# Patient Record
Sex: MALE | Race: BLACK OR AFRICAN AMERICAN | NOT HISPANIC OR LATINO | Employment: OTHER | URBAN - METROPOLITAN AREA
[De-identification: names, ages, dates, MRNs, and addresses within clinical notes are randomized per-mention and may not be internally consistent; named-entity substitution may affect disease eponyms.]

---

## 2019-06-17 ENCOUNTER — TELEPHONE (OUTPATIENT)
Dept: CARDIOLOGY | Facility: CLINIC | Age: 74
End: 2019-06-17

## 2019-06-17 NOTE — TELEPHONE ENCOUNTER
----- Message from Leias Esparza sent at 6/17/2019 11:21 AM CDT -----  Contact: self / [phone]  Patient is requesting a call back regarding, can you see him this week for medical rose marie. Please advise

## 2019-06-18 ENCOUNTER — OFFICE VISIT (OUTPATIENT)
Dept: CARDIOLOGY | Facility: CLINIC | Age: 74
End: 2019-06-18
Payer: MEDICARE

## 2019-06-18 VITALS
HEIGHT: 70 IN | WEIGHT: 199.94 LBS | DIASTOLIC BLOOD PRESSURE: 62 MMHG | HEART RATE: 66 BPM | BODY MASS INDEX: 28.62 KG/M2 | SYSTOLIC BLOOD PRESSURE: 133 MMHG

## 2019-06-18 DIAGNOSIS — Z01.818 PRE-OP EVALUATION: Primary | ICD-10-CM

## 2019-06-18 DIAGNOSIS — I25.10 CORONARY ARTERY DISEASE INVOLVING NATIVE CORONARY ARTERY OF NATIVE HEART WITHOUT ANGINA PECTORIS: ICD-10-CM

## 2019-06-18 DIAGNOSIS — Z95.1 HX OF CABG: ICD-10-CM

## 2019-06-18 DIAGNOSIS — M79.606 ISCHEMIC LEG PAIN: ICD-10-CM

## 2019-06-18 DIAGNOSIS — I73.9 PAD (PERIPHERAL ARTERY DISEASE): ICD-10-CM

## 2019-06-18 DIAGNOSIS — I99.8 ISCHEMIC LEG PAIN: ICD-10-CM

## 2019-06-18 PROCEDURE — 3075F SYST BP GE 130 - 139MM HG: CPT | Mod: CPTII,S$GLB,, | Performed by: INTERNAL MEDICINE

## 2019-06-18 PROCEDURE — 99204 PR OFFICE/OUTPT VISIT, NEW, LEVL IV, 45-59 MIN: ICD-10-PCS | Mod: S$GLB,,, | Performed by: INTERNAL MEDICINE

## 2019-06-18 PROCEDURE — 3078F PR MOST RECENT DIASTOLIC BLOOD PRESSURE < 80 MM HG: ICD-10-PCS | Mod: CPTII,S$GLB,, | Performed by: INTERNAL MEDICINE

## 2019-06-18 PROCEDURE — 3078F DIAST BP <80 MM HG: CPT | Mod: CPTII,S$GLB,, | Performed by: INTERNAL MEDICINE

## 2019-06-18 PROCEDURE — 99999 PR PBB SHADOW E&M-EST. PATIENT-LVL III: ICD-10-PCS | Mod: PBBFAC,,, | Performed by: INTERNAL MEDICINE

## 2019-06-18 PROCEDURE — 99999 PR PBB SHADOW E&M-EST. PATIENT-LVL III: CPT | Mod: PBBFAC,,, | Performed by: INTERNAL MEDICINE

## 2019-06-18 PROCEDURE — 99204 OFFICE O/P NEW MOD 45 MIN: CPT | Mod: S$GLB,,, | Performed by: INTERNAL MEDICINE

## 2019-06-18 PROCEDURE — 3075F PR MOST RECENT SYSTOLIC BLOOD PRESS GE 130-139MM HG: ICD-10-PCS | Mod: CPTII,S$GLB,, | Performed by: INTERNAL MEDICINE

## 2019-06-18 RX ORDER — SAW PALMETTO 160 MG
160 CAPSULE ORAL 2 TIMES DAILY
COMMUNITY

## 2019-06-18 RX ORDER — AMOXICILLIN 500 MG
CAPSULE ORAL DAILY
COMMUNITY
End: 2020-08-24

## 2019-06-18 RX ORDER — MULTIVITAMIN
1 TABLET ORAL DAILY
COMMUNITY

## 2019-06-18 NOTE — PROGRESS NOTES
Subjective:   Patient ID:  Kermit Castro is a 73 y.o. male who presents for evaluation of No chief complaint on file.      HPI: 71 y/o AA male with PMH of CAD s/p 3 vessel CABG in , PAD with previous interventions, HTN and HLP present as f/u after 4 years. He is also planning abdominal surgery for recently found polyps. He denies chest pain and SOB. He is only able to walk 200 feet,  Last MI was in .   No dizziness or syncope. BP is controlled. No orthopnea or PND. No history of CVA, CHF, recent MI, CRF or DM.   He had back surgery in  and continues to have discomfort when walking.     Past Medical History:   Diagnosis Date    Coronary artery disease     Hyperlipidemia     Hypertension        Past Surgical History:   Procedure Laterality Date    CORONARY ARTERY BYPASS GRAFT             Social History     Tobacco Use    Smoking status: Former Smoker     Last attempt to quit: 10/5/1998     Years since quittin.7   Substance Use Topics    Alcohol use: Not on file    Drug use: Not on file       History reviewed. No pertinent family history.    Patient's Medications   New Prescriptions    No medications on file   Previous Medications    AMLODIPINE (NORVASC) 10 MG TABLET        ATORVASTATIN (LIPITOR) 40 MG TABLET        FISH OIL-OMEGA-3 FATTY ACIDS 300-1,000 MG CAPSULE    Take by mouth once daily.    HYDROCODONE-ACETAMINOPHEN 10-325MG (NORCO)  MG TAB        LISINOPRIL (PRINIVIL,ZESTRIL) 20 MG TABLET        METOPROLOL TARTRATE (LOPRESSOR) 50 MG TABLET        MULTIVITAMIN (ONE DAILY MULTIVITAMIN) PER TABLET    Take 1 tablet by mouth once daily.    SAW PALMETTO 160 MG CAPSULE    Take 160 mg by mouth 2 (two) times daily.   Modified Medications    No medications on file   Discontinued Medications    No medications on file       Review of patient's allergies indicates:   Allergen Reactions    Iodine and iodide containing products Anaphylaxis    Shellfish containing products Anaphylaxis        Review of Systems   Constitution: Negative.   HENT: Negative.    Eyes: Negative.    Cardiovascular: Negative.    Respiratory: Negative.    Endocrine: Negative.    Hematologic/Lymphatic: Negative.    Skin: Negative.    Musculoskeletal: Negative.    Gastrointestinal: Negative.    Neurological: Negative.      Objective:   Physical Exam   Constitutional: He is oriented to person, place, and time. He appears well-developed and well-nourished. No distress.   Examination of the digits showed no clubbing or cyanosis   HENT:   Head: Normocephalic and atraumatic.   Eyes: Pupils are equal, round, and reactive to light. Conjunctivae are normal. Right eye exhibits no discharge.   Neck: Normal range of motion. Neck supple. No JVD present. No thyromegaly present.   No carotid bruits   Cardiovascular: Normal rate, regular rhythm, S1 normal, S2 normal, normal heart sounds, intact distal pulses and normal pulses. PMI is not displaced. Exam reveals no gallop, no friction rub and no opening snap.   No murmur heard.  Pulmonary/Chest: Effort normal and breath sounds normal. No respiratory distress. He has no wheezes. He has no rales. He exhibits no tenderness.   Abdominal: Soft. Bowel sounds are normal. He exhibits no distension and no mass. There is no tenderness. There is no guarding.   No hepatosplenomegaly   Musculoskeletal: Normal range of motion. He exhibits no edema or tenderness.   Lymphadenopathy:     He has no cervical adenopathy.   Neurological: He is alert and oriented to person, place, and time.   Skin: Skin is warm. No rash noted. He is not diaphoretic. No erythema.   Psychiatric: He has a normal mood and affect.   Nursing note and vitals reviewed.          ECGs reviewed-NSR with non specific ST and T wave changes.  Imaging including Echoes reviewed-2010 ef 55%  [facility] reviewed-2005 LIMA to LAD patent, SVG to PDA patent, SVG to LCx patent.  Peripheral angiogram reviewed. Stent to both left and right SFA and external  iliac respectively.    Assessment:     1. Pre-op evaluation    2. PAD (peripheral artery disease)    3. Ischemic leg pain    4. Coronary artery disease involving native coronary artery of native heart without angina pectoris    5. Hx of CABG        Plan:     Patient is intermediate risk for moderate risk surgery to remove polyps. MET<4 but risk factors are low with no DM, CVA, recent MI, CHF or known CRF. No contraindication to surgery such as recent MI, CHF, known severe obstructive valve disease or tachyarrhythmias.     Continue current medications  Arterial US of legs and ABIs  F/u in 4 months. Call with results and refer to Dr. May or Dr. Ocampo if abnormal

## 2019-06-18 NOTE — LETTER
June 18, 2019        Napoleon Wilcox MD  [address]  [address]  [place] [address]             [facility]  [address], [address]  [facility] [address]  Phone: [phone]   Patient: Kermit Castro   MR Number: [id number]   YOB: 1945   Date of Visit: 6/18/2019       Dear Dr. Wilcox:    Thank you for referring Kermit Castro to me for evaluation. Attached you will find relevant portions of my assessment and plan of care.          ECGs reviewed-NSR with non specific ST and T wave changes.  Imaging including Echoes reviewed-2010 ef 55%  [facility] reviewed-2005 LIMA to LAD patent, SVG to PDA patent, SVG to LCx patent.  Peripheral angiogram reviewed. Stent to both left and right SFA and external iliac respectively.     Assessment:      1. Pre-op evaluation    2. PAD (peripheral artery disease)    3. Ischemic leg pain    4. Coronary artery disease involving native coronary artery of native heart without angina pectoris    5. Hx of CABG          Plan:      Patient is intermediate risk for moderate risk surgery to remove polyps. MET<4 but risk factors are low with no DM, CVA, recent MI, CHF or known CRF. No contraindication to surgery such as recent MI, CHF, known severe obstructive valve disease or tachyarrhythmias.          If you have questions, please do not hesitate to call me. I look forward to following Kermit Castro along with you.    Sincerely,      Agustin Christine MD            CC  No Recipients    Enclosure

## 2019-06-21 ENCOUNTER — HOSPITAL ENCOUNTER (OUTPATIENT)
Dept: CARDIOLOGY | Facility: HOSPITAL | Age: 74
Discharge: HOME | End: 2019-06-21
Attending: INTERNAL MEDICINE
Payer: MEDICARE

## 2019-06-21 ENCOUNTER — HOSPITAL ENCOUNTER (OUTPATIENT)
Dept: RADIOLOGY | Facility: HOSPITAL | Age: 74
Discharge: HOME | End: 2019-06-21
Attending: INTERNAL MEDICINE
Payer: MEDICARE

## 2019-06-21 DIAGNOSIS — I99.8 ISCHEMIC LEG PAIN: ICD-10-CM

## 2019-06-21 DIAGNOSIS — M79.606 ISCHEMIC LEG PAIN: ICD-10-CM

## 2019-06-21 DIAGNOSIS — I73.9 PAD (PERIPHERAL ARTERY DISEASE): ICD-10-CM

## 2019-06-21 PROCEDURE — 93922 UPR/L XTREMITY ART 2 LEVELS: CPT | Mod: 50,PO

## 2019-06-21 PROCEDURE — 93922 UPR/L XTREMITY ART 2 LEVELS: CPT | Mod: 26,,, | Performed by: INTERNAL MEDICINE

## 2019-06-21 PROCEDURE — 93922 CV US ANKLE BRACHIAL INDICES RESTING (CUPID ONLY): ICD-10-PCS | Mod: 26,,, | Performed by: INTERNAL MEDICINE

## 2019-06-21 PROCEDURE — 93925 LOWER EXTREMITY STUDY: CPT | Mod: TC,PO

## 2019-07-01 LAB
LEFT ABI: 1.06
LEFT ARM BP: 134 MMHG
LEFT DORSALIS PEDIS: 144 MMHG
RIGHT ABI: 0.77
RIGHT ARM BP: 136 MMHG
RIGHT DORSALIS PEDIS: 105 MMHG

## 2019-07-02 ENCOUNTER — TELEPHONE (OUTPATIENT)
Dept: CARDIOLOGY | Facility: CLINIC | Age: 74
End: 2019-07-02

## 2019-07-02 NOTE — TELEPHONE ENCOUNTER
[facility] Dr May.    Will mail appt slip.

## 2019-08-07 ENCOUNTER — OFFICE VISIT (OUTPATIENT)
Dept: CARDIOLOGY | Facility: CLINIC | Age: 74
End: 2019-08-07
Payer: MEDICARE

## 2019-08-07 VITALS
HEIGHT: 70 IN | SYSTOLIC BLOOD PRESSURE: 100 MMHG | HEART RATE: 70 BPM | RESPIRATION RATE: 18 BRPM | DIASTOLIC BLOOD PRESSURE: 60 MMHG | BODY MASS INDEX: 26.48 KG/M2 | WEIGHT: 185 LBS

## 2019-08-07 DIAGNOSIS — I25.10 CORONARY ARTERY DISEASE, ANGINA PRESENCE UNSPECIFIED, UNSPECIFIED VESSEL OR LESION TYPE, UNSPECIFIED WHETHER NATIVE OR TRANSPLANTED HEART: ICD-10-CM

## 2019-08-07 DIAGNOSIS — I73.9 PAD (PERIPHERAL ARTERY DISEASE): Primary | ICD-10-CM

## 2019-08-07 DIAGNOSIS — I70.211 ATHEROSCLEROSIS OF NATIVE ARTERY OF RIGHT LOWER EXTREMITY WITH INTERMITTENT CLAUDICATION: ICD-10-CM

## 2019-08-07 DIAGNOSIS — R06.00 DYSPNEA, UNSPECIFIED TYPE: ICD-10-CM

## 2019-08-07 DIAGNOSIS — R06.09 DYSPNEA ON EXERTION: ICD-10-CM

## 2019-08-07 DIAGNOSIS — I10 ESSENTIAL HYPERTENSION: ICD-10-CM

## 2019-08-07 DIAGNOSIS — R07.9 CHEST PAIN, UNSPECIFIED TYPE: ICD-10-CM

## 2019-08-07 DIAGNOSIS — I25.111 CORONARY ARTERY DISEASE INVOLVING NATIVE CORONARY ARTERY OF NATIVE HEART WITH ANGINA PECTORIS WITH DOCUMENTED SPASM: ICD-10-CM

## 2019-08-07 DIAGNOSIS — E78.2 MIXED HYPERLIPIDEMIA: ICD-10-CM

## 2019-08-07 PROCEDURE — 99999 PR PBB SHADOW E&M-EST. PATIENT-LVL III: ICD-10-PCS | Mod: PBBFAC,,, | Performed by: INTERNAL MEDICINE

## 2019-08-07 PROCEDURE — 3074F SYST BP LT 130 MM HG: CPT | Mod: CPTII,S$GLB,, | Performed by: INTERNAL MEDICINE

## 2019-08-07 PROCEDURE — 99999 PR PBB SHADOW E&M-EST. PATIENT-LVL III: CPT | Mod: PBBFAC,,, | Performed by: INTERNAL MEDICINE

## 2019-08-07 PROCEDURE — 3074F PR MOST RECENT SYSTOLIC BLOOD PRESSURE < 130 MM HG: ICD-10-PCS | Mod: CPTII,S$GLB,, | Performed by: INTERNAL MEDICINE

## 2019-08-07 PROCEDURE — 99205 OFFICE O/P NEW HI 60 MIN: CPT | Mod: S$GLB,,, | Performed by: INTERNAL MEDICINE

## 2019-08-07 PROCEDURE — 3078F PR MOST RECENT DIASTOLIC BLOOD PRESSURE < 80 MM HG: ICD-10-PCS | Mod: CPTII,S$GLB,, | Performed by: INTERNAL MEDICINE

## 2019-08-07 PROCEDURE — 99205 PR OFFICE/OUTPT VISIT, NEW, LEVL V, 60-74 MIN: ICD-10-PCS | Mod: S$GLB,,, | Performed by: INTERNAL MEDICINE

## 2019-08-07 PROCEDURE — 3078F DIAST BP <80 MM HG: CPT | Mod: CPTII,S$GLB,, | Performed by: INTERNAL MEDICINE

## 2019-08-07 RX ORDER — ASPIRIN 81 MG/1
81 TABLET ORAL DAILY
Refills: 0 | COMMUNITY
Start: 2019-08-07 | End: 2020-08-19

## 2019-08-07 NOTE — PATIENT INSTRUCTIONS
Taking Aspirin for Atherosclerosis       Aspirin is a medicine often prescribed to treat atherosclerosis. This condition affects your arteries. These are the blood vessels that carry blood away from your heart. Having atherosclerosis means youre at greater risk of a heart attack or stroke. Aspirin can help prevent these from happening.  How atherosclerosis affects your arteries   A fatty material (plaque) can build up in your arteries. This makes it harder for blood to flow through them. A blood clot can then form on the plaque. This may block the artery, cutting off blood flow. This can cause conditions such as coronary artery disease (CAD) and peripheral arterial disease (PAD):  · CAD occurs when plaque builds up in the coronary artery. This artery supplies the heart with oxygen-rich blood.  · PAD occurs when plaque forms in leg arteries.  The same things that cause CAD and PAD can also cause plaque to form in other arteries in your body, such as those in the brain. When plaque occurs in any of these arteries, it raises your risk of heart attack or stroke.  What aspirin does  Aspirin is a blood-thinner (antiplatelet medicine). It helps keep blood clots from forming. This reduces the risk of blockage. Aspirin can be taken daily if you are at high risk of or have already had a heart attack or stroke. It is also used after a procedure called a stent placement. This is when a tiny wire mesh tube, or stent, is placed in an artery to keep it open. Aspirin helps prevent blood clots from forming on the stent.  Taking aspirin safely  Tell your healthcare provider about any medicines you are taking. This includes:  · All prescription medicines  · Over-the-counter medicines  · Herbs, vitamins, and other supplements  Also mention if you have a history of ulcers or bleeding problems. Ask whether you will need to stop taking aspirin before having surgery or dental work. Always take medicines as directed.  Tips for taking  aspirin  · Have a routine. For example, take aspirin with the same meal each day.  · Dont take more than prescribed. A low dose gives the same benefit as a higher one, with a lower risk of side effects.  · Dont skip doses. Aspirin needs to be taken each day to work well.  · Keep track of what you take. A pillbox with days of the week can help, especially if you take several medicines. Or use a list or chart to keep track.  When to call your healthcare provider  Side effects of aspirin are not usually serious. If you do have problems, changing your dose may help. Call your healthcare provider if you have any of the following:  · Excessive bruising (some bruising is normal)  · Nosebleeds, bleeding gums, or other excessive bleeding  · An upset stomach or stomach pain   Date Last Reviewed: 6/1/2016 © 2000-2017 [employer]. [address] [place] [address]. All rights reserved. This information is not intended as a substitute for professional medical care. Always follow your healthcare professional's instructions.        Heart Disease Education    The heart beats 60 to 100 times per minute, 24 hours a day. This equals almost 1000,000 times a day. It pumps blood with oxygen and nutrients to the tissues and organs of the body. But the heart is a muscle and needs its own supply of blood. Blood flow to the heart is supplied by the coronary arteries. Coronary artery disease (atherosclerosis) is a result of cholesterol, saturated fat, and calcium deposits (plaques) that build up inside the walls. This causes inflammation within the coronary arteries. These plaques narrow the artery and reduce blood flow to the heart muscle. The reduction in blood flow to the heart muscle decreases oxygen supply to the heart. If the narrowing is significant enough, the oxygen supply to one or more regions of the heart can be temporarily or permanently shut down. This can cause chest pain, and possibly death of heart  tissue (heart attack).  Types of chest pain  Angina is the name for pain in the heart muscle. Angina is a warning sign of serious heart disease. When untreated it can lead to a heart attack, also known as acute myocardial infarction, or AMI. Angina occurs when there is not enough blood and oxygen flowing to the heart for the amount of work it is doing. This most often happens during physical exertion, when the heart is working hardest. It is usually relieved by rest or nitroglycerin. Angina may also occur after a large meal when extra blood is sent to the digestive organs and less goes to the heart. In the case of advanced or unstable heart disease, angina can occur at rest or awaken you from sleep. Angina usually lasts from a few minutes up to 20 minutes or more. When treated early, the effects of angina can be reversed without permanent damage to the heart. Angina is a serious condition and needs to be evaluated by a medical professional immediately.  There are two types of angina -- stable and unstable:  · Stable angina usually occurs with a predictable level of activity. Being stable, its character, severity, and occurrence do not change much over time. It usually starts with activity, and resolves with rest or taking your medicine as instructed by your doctor. The symptoms usually do not last long.  · Unstable angina changes or gets worse over time. It is different from whatever you are used to. It may feel different or worse, begin without cause, occur with exercise or exertion, wake you up from sleep, and last longer. It may not respond in the same way as it does when you take your usual medicines for an attack. This type of angina can be a warning sign of an impending heart attack.     A heart attack is usually the result of a blood clot that suddenly forms in a coronary artery that has been narrowed with plaque. When this occurs, blood flow may be cut off to a part of the heart muscle, causing the cells to  "die. This weakens the pumping action of the heart, which affects the delivery of blood to all the other organs in the body including the brain. This damage is not reversible. However, early treatment can limit the amount of damage.  The pain you feel with angina and a heart attack may have a similar quality. However, it is usually different in intensity and duration. Here are some typical descriptions of a heart attack:  · It is most often experienced as a squeezing, crushing, pressure-like sensation in the center of the chest.  · It is sometimes described as something heavy sitting on my chest.  · It may feel more like a bad case of indigestion.  · The pain may spread from the chest to the arm, shoulder, throat or jaw.  · Sometimes the pain is not felt in the chest at all, but only in the arm, shoulder, throat or jaw.  · There may also be nausea, vomiting, dizziness or light-headedness, sweating and trouble breathing.  · Palpitations, or your heart beating rapidly  · A new, irregular heart beat  · Unexplained weakness  You may not be able to tell the difference between "bad" angina and a heart attack at home. Seek help if your symptoms are different than usual. Do not be in denial or just try to "tough it out."  Call 911  This is the fastest and safest way to get to the emergency department. The paramedics can also start treatment on the way to the hospital, saving valuable time for your heart.  · If the angina gets worse, if it continues, or if it stops and returns, call 911 immediately. Do not delay. You may be having a heart attack.  · After you call 911, take a second tablet or spray unless instructed otherwise. When repeating doses, sit down if possible, because it can make you feel lightheaded or dizzy. Wait another 5 minutes. If the angina still does not go away, take a third tablet or spray. Do not take more than 3 tablets or sprays within 15 minutes. Stay on the phone with 911 for further " instruction.  · Your healthcare provider may give you slightly different instructions than those above. If so, follow them carefully.  Do not wait until symptoms become severe to call 911.  Other reasons to call 911 include:  · Trouble breathing  · Feeling lightheaded, faint, or dizzy  · Rapid heart beat  · Slower than usual heart rate compared to your normal  · Angina with weakness, dizziness, fainting, heavy sweating, nausea, or vomiting  · Extreme drowsiness, confusion  · Weakness of an arm or leg or one side of the face  · Difficulty with speech or vision  When to seek medical care  Remember, the signs and symptoms of a heart attack are not always like they are on TV. Sometimes they are not so obvious. You may only feel weak, or just not right. If it is not clear or if you have any doubt, call for advice.  · Seek help if there is a change in the type of pain, if it feels different, or if your symptoms are mild.  · Do not drive yourself. Have someone else drive you. If no one can drive, call 911.  · Do not delay. Fast diagnosis and treatment can prevent or limit the amount of heart damage during a heart attack.  · Do not go to your doctor's office or a clinic as they may not be able to provide all the testing and treatment required for this condition.  · If your doctor has given you medicine to take when symptoms occur, take them but don't delay getting help trying to locate medicines.  What happens in the emergency department  The emergency department is connected to your local emergency medical system (EMS) through 911. That's why during a cardiac emergency, calling 911 is the fastest way to get help. The goal of the emergency department is to rapidly screen, evaluate, and treat people.  Once you are there, an electrocardiogram (ECG or heart tracing) will be done. Blood samples may be taken to look for the presence of heart enzymes that leak from damaged heart cells and show if a heart attack is occurring. You  "will often be evaluated by a heart specialist (cardiologist) who decides the best course of action. In the case of severe angina or early heart attack, and depending on the circumstances, powerful "clot busting" medicines can be used to dissolve blood clots in the coronary artery. In other cases, you may be taken to a cardiac catheterization lab. Here, a tiny balloon-tipped catheter is advanced through blood vessels to the heart. There the balloon is inflated pushing open the blood vessel restoring blood flow.  Risk factors for heart disease  Risk factors for heart disease are a combination of genetic and lifestyle. Many risk factors work by either directly or indirectly damaging the blood vessels of the heart, or by increasing the risk of forming blood or cholesterol clots, which then clog up and block the arteries.     Examples of physical lifestyle risk factors:  · Cigarette smoking  · High blood pressure  · High blood cholesterol  · Use of stimulant drugs such as cocaine, crack, and amphetamines  · Eating a high-fat, high-cholesterol meal  · Diabetes   · Obesity which increases risk for diabetes and high blood pressure  · Lack of regular physical activity     Examples of emotional lifestyle factors:  · Chronic high stress levels release stress hormones. These raise blood pressure and cholesterol level and makes blood clot more easily.  · Held-in anger, hostile or cynical attitude  · Social and emotional isolation, lack of intimacy  · Loss of relationship  · Depression  Other factors that increase the risk of heart attack that you cannot control :  · Age. The older you get beyond 40, the greater is your risk of significant coronary artery disease.  · Gender. More men than women get heart disease; but once past menopause, women who are not taking estrogen replacement have the same risk as men for a heart attack.  · Family history. If your mother, father, brother or sister has coronary artery disease, your risk " of having it is higher than a person your age without this family history.  What can you do to decrease your risk  To reduce your risk of heart disease:  · Get regular checkups with your doctor.  · Take your medicines for blood pressure, cholesterol or diabetes as directed.  · Watch your diet. Eat a heart healthy diet choosing fresh foods, less salt, cholesterol, and fat  · Stop smoking. Get help if needed.  · Get regular exercise.  · Manage stress.  · Carry a list of medicines and doses in your wallet.  Date Last Reviewed: 12/30/2015  © 9959-7003 [employer]. [address] [place] [address]. All rights reserved. This information is not intended as a substitute for professional medical care. Always follow your healthcare professional's instructions.

## 2019-08-07 NOTE — PROGRESS NOTES
Subjective:   Patient ID:  Kermit Castro is a 73 y.o. male who presents for evaluation and treatment of Claudication; Peripheral Arterial Disease; Shortness of Breath; Coronary Artery Disease; Hyperlipidemia; and Hypertension      HPI:       He is here for management of worsening yanira IIb claudication R >> L. He cannot walk > 1 room. He also complained of dyspnea II with moderate exertion. He has a history of bilateral SFA + R EIA intervention (details below) and CAD s/p CABG + POBA of LIMA-LAD + HTN + diastolic dysfunction and HLP. + aspirin for MAPT. + acei + statin.       JOAQUINA      R 0.77   L 1.06      US 6/2019    Diameters in mm     CFA 9   SFA 7   POP 6   BTK 4   BTA 3          R , , pSFA 81, mSFA 41, dSFA 18, pPOP 26-36, PT 34, AT 28, DP 23  L , , pSFA 219-136, mSFA 89, dSFA 25, POP 35, PT 59, AT 30, DP 03           S/p multiple PCI then CABG 1998     LIMA-LAD   SVG-OM   SVG-PDA        PTA of LIMA-LAD 12/1998      [facility] 2005     3 patent graft   Normal EF   LVEDP 20 mmHg   Patent renal arteries            Care team:    Dr. Emmett Hassan            Patient Active Problem List    Diagnosis Date Noted    PAD (peripheral artery disease) 08/07/2019 2/2006     L SFA  PTAS   8 x 40 mm and 6.0 x 120 Protege SES          5/2006     R SFA  PTA with 4 and 6.0 mm balloons   R EIA PTAS with 7 mm SES for ISR (Resilient trial)      JOAQUINA 6/2019     R 0.77   L 1.06        US 6/2019    Diameters in mm     CFA 9   SFA 7   POP 6   BTK 4   BTA 3          R , , pSFA 81, mSFA 41, dSFA 18, pPOP 26-36, PT 34, AT 28, DP 23  L , , pSFA 219-136, mSFA 89, dSFA 25, POP 35, PT 59, AT 30, DP 03                  Dyspnea on exertion 08/07/2019    Atherosclerosis of native artery of right lower extremity with intermittent claudication 08/07/2019    Mixed hyperlipidemia 08/07/2019    Essential hypertension 08/07/2019    Coronary artery disease involving native  coronary artery of native heart with angina pectoris with documented spasm 2019            S/p multiple PCI then CABG      LIMA-LAD   SVG-OM   SVG-PDA        PTA of LIMA-LAD 1998      [facility]      3 patent graft   Normal EF   LVEDP 20 mmHg   Patent renal arteries                     Right Arm BP - Sittin/60  Left Arm BP - Sittin/60        LABS      Lipid panel  Lab Results   Component Value Date    CHOL 164 2005     Lab Results   Component Value Date    HDL 37.0 (L) 2005     Lab Results   Component Value Date    LDLCALC 108.8 2005     Lab Results   Component Value Date    TRIG 91 2005     Lab Results   Component Value Date    CHOLHDL 22.6 2005            ROS    Objective:   Physical Exam   Constitutional: He is oriented to person, place, and time. He appears well-developed and well-nourished. He is not intubated.   HENT:   Head: Normocephalic and atraumatic.   Right Ear: External ear normal.   Left Ear: External ear normal.   Mouth/Throat: Oropharynx is clear and moist.   Eyes: Pupils are equal, round, and reactive to light. Conjunctivae and EOM are normal. Right eye exhibits no discharge. Left eye exhibits no discharge. No scleral icterus.   Neck: Normal range of motion. Neck supple. Normal carotid pulses, no hepatojugular reflux and no JVD present. Carotid bruit is not present. No tracheal deviation present. No thyromegaly present.   Cardiovascular: Normal rate, regular rhythm, S1 normal and S2 normal.  No extrasystoles are present. PMI is not displaced. Exam reveals no gallop, no S3, no distant heart sounds, no friction rub and no midsystolic click.   No murmur heard.  Pulses:       Carotid pulses are 2+ on the right side, and 2+ on the left side.       Radial pulses are 2+ on the right side, and 2+ on the left side.        Femoral pulses are 2+ on the right side, and 2+ on the left side.       Popliteal pulses are 2+ on the right side, and 2+ on the left  side.        Dorsalis pedis pulses are 0 on the right side, and 0 on the left side.        Posterior tibial pulses are 0 on the right side, and 0 on the left side.       Biphasic bilateral DP and PT doppler signals          Pulmonary/Chest: Effort normal and breath sounds normal. No accessory muscle usage or stridor. No apnea, no tachypnea and no bradypnea. He is not intubated. No respiratory distress. He has no decreased breath sounds. He has no wheezes. He has no rales. He exhibits no tenderness and no bony tenderness.   Abdominal: He exhibits no distension, no pulsatile liver, no abdominal bruit, no ascites, no pulsatile midline mass and no mass. There is no tenderness. There is no rebound and no guarding.   Musculoskeletal: Normal range of motion. He exhibits no edema or tenderness.   Lymphadenopathy:     He has no cervical adenopathy.   Neurological: He is alert and oriented to person, place, and time. He has normal reflexes. No cranial nerve deficit. Coordination normal.   Skin: Skin is warm. No rash noted. No erythema. No pallor.       Varicose veins      No ulcers         Psychiatric: He has a normal mood and affect. His behavior is normal. Judgment and thought content normal.       Assessment:     1. PAD (peripheral artery disease)    2. Dyspnea on exertion    3. Atherosclerosis of native artery of right lower extremity with intermittent claudication    4. Mixed hyperlipidemia    5. Essential hypertension    6. Dyspnea, unspecified type    7. Coronary artery disease, angina presence unspecified, unspecified vessel or lesion type, unspecified whether native or transplanted heart    8. Chest pain, unspecified type    9. Coronary artery disease involving native coronary artery of native heart with angina pectoris with documented spasm        Plan:         Will obtain an echo + stress for ischemic work + labs   Follow up after tests  Continue with medical therapy for now      Will start pletal after reviewing  EF  If stress test is abnormal he will have LHC +/- PCI and aortogram        He is a ANISA candidate  Referral to podiatry for high risk feet after his work up      Continue with current medical plan and lifestyle changes.  Return sooner for concerns or questions. If symptoms persist go to the ED  I have reviewed all pertinent data on this patient       I have reviewed the patient's medical history in detail and updated the computerized patient record.    Orders Placed This Encounter   Procedures    NM Myocardial Perfusion Spect Multi Pharmacologic     Standing Status:   Future     Standing Expiration Date:   8/7/2020     Order Specific Question:   May the Radiologist modify the order per protocol to meet the clinical needs of the patient?     Answer:   Yes     Order Specific Question:   Stress Medication to use:     Answer:   Regadenoson     Order Specific Question:   Diabetes?     Answer:   No     Order Specific Question:   Will a Cardiologist read this study?     Answer:   No    Comprehensive metabolic panel     Standing Status:   Future     Standing Expiration Date:   10/5/2020    Lipid panel     Standing Status:   Future     Standing Expiration Date:   10/5/2020    CBC auto differential     Standing Status:   Future     Standing Expiration Date:   10/5/2020    Treadmill Stress Test     Standing Status:   Future     Standing Expiration Date:   8/7/2020     Order Specific Question:   Which stress agent will be used     Answer:   Pharm     Order Specific Question:   Which medicaton for the stress procedure?     Answer:   Regadenoson    Transthoracic echo (TTE) 2D with Color Flow     Standing Status:   Future     Standing Expiration Date:   8/7/2020       Follow up as scheduled. Return sooner for concerns or questions            He expressed verbal understanding and agreed with the plan        Patient's Medications   New Prescriptions    ASPIRIN (ECOTRIN) 81 MG EC TABLET    Take 1 tablet (81 mg total) by mouth  once daily.   Previous Medications    AMLODIPINE (NORVASC) 10 MG TABLET        ATORVASTATIN (LIPITOR) 40 MG TABLET        FISH OIL-OMEGA-3 FATTY ACIDS 300-1,000 MG CAPSULE    Take by mouth once daily.    HYDROCODONE-ACETAMINOPHEN 10-325MG (NORCO)  MG TAB        LISINOPRIL (PRINIVIL,ZESTRIL) 20 MG TABLET        METOPROLOL TARTRATE (LOPRESSOR) 50 MG TABLET        MULTIVITAMIN (ONE DAILY MULTIVITAMIN) PER TABLET    Take 1 tablet by mouth once daily.    SAW PALMETTO 160 MG CAPSULE    Take 160 mg by mouth 2 (two) times daily.   Modified Medications    No medications on file   Discontinued Medications    No medications on file

## 2019-08-27 ENCOUNTER — HOSPITAL ENCOUNTER (OUTPATIENT)
Dept: CARDIOLOGY | Facility: HOSPITAL | Age: 74
Discharge: HOME | End: 2019-08-27
Attending: INTERNAL MEDICINE
Payer: MEDICARE

## 2019-08-27 ENCOUNTER — HOSPITAL ENCOUNTER (OUTPATIENT)
Dept: RADIOLOGY | Facility: HOSPITAL | Age: 74
Discharge: HOME | End: 2019-08-27
Attending: INTERNAL MEDICINE
Payer: MEDICARE

## 2019-08-27 DIAGNOSIS — I25.10 CORONARY ARTERY DISEASE, ANGINA PRESENCE UNSPECIFIED, UNSPECIFIED VESSEL OR LESION TYPE, UNSPECIFIED WHETHER NATIVE OR TRANSPLANTED HEART: ICD-10-CM

## 2019-08-27 DIAGNOSIS — R06.00 DYSPNEA, UNSPECIFIED TYPE: ICD-10-CM

## 2019-08-27 DIAGNOSIS — R07.9 CHEST PAIN, UNSPECIFIED TYPE: ICD-10-CM

## 2019-08-27 LAB
AORTIC ROOT ANNULUS: 2.22 CM
AORTIC VALVE CUSP SEPERATION: 2.05 CM
AV INDEX (PROSTH): 0.95
AV MEAN GRADIENT: 3 MMHG
AV PEAK GRADIENT: 5 MMHG
AV VALVE AREA: 3.56 CM2
AV VELOCITY RATIO: 0.79
CV ECHO LV RWT: 0.48 CM
CV STRESS BASE HR: 61 BPM
DIASTOLIC BLOOD PRESSURE: 67 MMHG
DOP CALC AO PEAK VEL: 1.17 M/S
DOP CALC AO VTI: 26.49 CM
DOP CALC LVOT AREA: 3.8 CM2
DOP CALC LVOT DIAMETER: 2.19 CM
DOP CALC LVOT PEAK VEL: 0.92 M/S
DOP CALC LVOT STROKE VOLUME: 94.27 CM3
DOP CALC MV VTI: 35 CM
DOP CALCLVOT PEAK VEL VTI: 25.04 CM
E WAVE DECELERATION TIME: 187.71 MSEC
E/A RATIO: 0.76
E/E' RATIO: 6.3 M/S
ECHO LV POSTERIOR WALL: 1.17 CM (ref 0.6–1.1)
FRACTIONAL SHORTENING: 29 % (ref 28–44)
INTERVENTRICULAR SEPTUM: 1.2 CM (ref 0.6–1.1)
LA MAJOR: 5.48 CM
LA MINOR: 5.49 CM
LA WIDTH: 4.4 CM
LEFT ATRIUM SIZE: 4.13 CM
LEFT ATRIUM VOLUME: 84.72 CM3
LEFT INTERNAL DIMENSION IN SYSTOLE: 3.46 CM (ref 2.1–4)
LEFT VENTRICLE DIASTOLIC VOLUME: 112.63 ML
LEFT VENTRICLE SYSTOLIC VOLUME: 49.61 ML
LEFT VENTRICULAR INTERNAL DIMENSION IN DIASTOLE: 4.9 CM (ref 3.5–6)
LEFT VENTRICULAR MASS: 222.4 G
LV LATERAL E/E' RATIO: 5.25 M/S
LV SEPTAL E/E' RATIO: 7.88 M/S
MV A" WAVE DURATION": 161 MSEC
MV PEAK A VEL: 0.83 M/S
MV PEAK E VEL: 0.63 M/S
MV STENOSIS PRESSURE HALF TIME: 54 MS
MV VALVE AREA BY CONTINUITY EQUATION: 2.69 CM2
MV VALVE AREA P 1/2 METHOD: 4.07 CM2
OHS CV CPX 1 MINUTE RECOVERY HEART RATE: 66 BPM
OHS CV CPX 85 PERCENT MAX PREDICTED HEART RATE MALE: 124
OHS CV CPX MAX PREDICTED HEART RATE: 146
OHS CV CPX PATIENT IS FEMALE: 0
OHS CV CPX PATIENT IS MALE: 1
OHS CV CPX PEAK DIASTOLIC BLOOD PRESSURE: 67 MMHG
OHS CV CPX PEAK HEAR RATE: 78 BPM
OHS CV CPX PEAK RATE PRESSURE PRODUCT: NORMAL
OHS CV CPX PEAK SYSTOLIC BLOOD PRESSURE: 148 MMHG
OHS CV CPX PERCENT MAX PREDICTED HEART RATE ACHIEVED: 53
OHS CV CPX RATE PRESSURE PRODUCT PRESENTING: 9028
PISA TR MAX VEL: 2.99 M/S
PULM VEIN S/D RATIO: 0.76
PV PEAK D VEL: 0.38 M/S
PV PEAK S VEL: 0.29 M/S
PV PEAK VELOCITY: 1.09 CM/S
RA PRESSURE: 3 MMHG
RIGHT VENTRICULAR END-DIASTOLIC DIMENSION: 2.74 CM
SINUS: 2.52 CM
STRESS ECHO TARGET HR: 124.1 BPM
SYSTOLIC BLOOD PRESSURE: 148 MMHG
TDI LATERAL: 0.12 M/S
TDI SEPTAL: 0.08 M/S
TDI: 0.1 M/S
TR MAX PG: 36 MMHG
TRICUSPID ANNULAR PLANE SYSTOLIC EXCURSION: 2.35 CM
TV REST PULMONARY ARTERY PRESSURE: 39 MMHG

## 2019-08-27 PROCEDURE — A9502 TC99M TETROFOSMIN: HCPCS | Mod: PO

## 2019-08-27 PROCEDURE — 93306 TTE W/DOPPLER COMPLETE: CPT | Mod: PO

## 2019-08-27 PROCEDURE — 93017 CV STRESS TEST TRACING ONLY: CPT | Mod: PO

## 2019-08-27 PROCEDURE — 93306 TRANSTHORACIC ECHO (TTE) COMPLETE (CUPID ONLY): ICD-10-PCS | Mod: 26,,, | Performed by: INTERNAL MEDICINE

## 2019-08-27 PROCEDURE — 63600175 PHARM REV CODE 636 W HCPCS: Mod: PO | Performed by: INTERNAL MEDICINE

## 2019-08-27 PROCEDURE — 93018 CV STRESS TEST I&R ONLY: CPT | Mod: ,,, | Performed by: INTERNAL MEDICINE

## 2019-08-27 PROCEDURE — 93306 TTE W/DOPPLER COMPLETE: CPT | Mod: 26,,, | Performed by: INTERNAL MEDICINE

## 2019-08-27 PROCEDURE — 93016 CV STRESS TEST SUPVJ ONLY: CPT | Mod: ,,, | Performed by: INTERNAL MEDICINE

## 2019-08-27 PROCEDURE — 93016 TREADMILL STRESS TEST (CUPID ONLY): ICD-10-PCS | Mod: ,,, | Performed by: INTERNAL MEDICINE

## 2019-08-27 PROCEDURE — 93018 TREADMILL STRESS TEST (CUPID ONLY): ICD-10-PCS | Mod: ,,, | Performed by: INTERNAL MEDICINE

## 2019-08-27 RX ORDER — REGADENOSON 0.08 MG/ML
0.4 INJECTION, SOLUTION INTRAVENOUS ONCE
Status: COMPLETED | OUTPATIENT
Start: 2019-08-27 | End: 2019-08-27

## 2019-08-27 RX ADMIN — REGADENOSON 0.4 MG: 0.08 INJECTION, SOLUTION INTRAVENOUS at 12:08

## 2019-09-06 ENCOUNTER — TELEPHONE (OUTPATIENT)
Dept: CARDIOLOGY | Facility: CLINIC | Age: 74
End: 2019-09-06

## 2019-09-06 DIAGNOSIS — I25.111 CORONARY ARTERY DISEASE INVOLVING NATIVE CORONARY ARTERY OF NATIVE HEART WITH ANGINA PECTORIS WITH DOCUMENTED SPASM: Primary | ICD-10-CM

## 2019-09-06 DIAGNOSIS — R94.39 ABNORMAL STRESS TEST: ICD-10-CM

## 2019-09-06 NOTE — TELEPHONE ENCOUNTER
----- Message from Mello Caceres NP sent at 9/6/2019 11:37 AM CDT -----  Please schedule PET stress

## 2019-09-06 NOTE — TELEPHONE ENCOUNTER
----- Message from Madalyn Alejandra sent at 9/6/2019 10:26 AM CDT -----  Contact: Self [phone]  TEST RESULTS:   Patient would like to get test results.  Name of test (lab, mammo, etc.): Stress test and Echo   Date of test: 8/27/19

## 2019-09-09 ENCOUNTER — TELEPHONE (OUTPATIENT)
Dept: CARDIOLOGY | Facility: CLINIC | Age: 74
End: 2019-09-09

## 2019-09-09 NOTE — TELEPHONE ENCOUNTER
----- Message from Sandy Moran sent at 9/9/2019  4:10 PM CDT -----  Contact: [phone]-Samira (wife)  Patient wife called stating she is still waiting on test results. That she keeps calling and not getting a callback concerning recent tests. Please call [phone] Samira (wife)

## 2019-09-10 NOTE — TELEPHONE ENCOUNTER
----- Message from Trena Vazquez sent at 9/10/2019  3:26 PM CDT -----  Contact: [phone]/wife  Patient wife is calling to speak with you concerning the patient test results   Please call back to assist today at [phone]

## 2019-09-10 NOTE — TELEPHONE ENCOUNTER
Returned pt wife phone call and apologized on the delay with the results of stress test and echo results pt had done on 8/27     Can you please interpret pt results

## 2019-09-25 ENCOUNTER — TELEPHONE (OUTPATIENT)
Dept: CARDIOLOGY | Facility: CLINIC | Age: 74
End: 2019-09-25

## 2019-09-25 DIAGNOSIS — R94.39 ABNORMAL STRESS TEST: ICD-10-CM

## 2019-09-25 DIAGNOSIS — I25.111 CORONARY ARTERY DISEASE INVOLVING NATIVE CORONARY ARTERY OF NATIVE HEART WITH ANGINA PECTORIS WITH DOCUMENTED SPASM: Primary | ICD-10-CM

## 2019-10-05 ENCOUNTER — HOSPITAL ENCOUNTER (EMERGENCY)
Facility: HOSPITAL | Age: 74
Discharge: HOME | End: 2019-10-06
Attending: EMERGENCY MEDICINE
Payer: MEDICARE

## 2019-10-05 DIAGNOSIS — R53.1 WEAKNESS: ICD-10-CM

## 2019-10-05 DIAGNOSIS — J18.9 PNEUMONIA OF LEFT UPPER LOBE DUE TO INFECTIOUS ORGANISM: Primary | ICD-10-CM

## 2019-10-05 DIAGNOSIS — R42 DIZZINESS: ICD-10-CM

## 2019-10-05 LAB
ALBUMIN SERPL BCP-MCNC: 4.2 G/DL (ref 3.5–5.2)
ALP SERPL-CCNC: 85 U/L (ref 38–126)
ALT SERPL W/O P-5'-P-CCNC: 23 U/L (ref 10–44)
ANION GAP SERPL CALC-SCNC: 11 MMOL/L (ref 8–16)
AST SERPL-CCNC: 22 U/L (ref 15–46)
BACTERIA #/AREA URNS AUTO: NORMAL /HPF
BASOPHILS # BLD AUTO: 0.02 K/UL (ref 0–0.2)
BASOPHILS NFR BLD: 0.1 % (ref 0–1.9)
BILIRUB SERPL-MCNC: 1.5 MG/DL (ref 0.1–1)
BILIRUB UR QL STRIP: NEGATIVE
BUN SERPL-MCNC: 10 MG/DL (ref 2–20)
CALCIUM SERPL-MCNC: 9.1 MG/DL (ref 8.7–10.5)
CHLORIDE SERPL-SCNC: 100 MMOL/L (ref 95–110)
CK SERPL-CCNC: 57 U/L (ref 55–170)
CLARITY UR REFRACT.AUTO: CLEAR
CO2 SERPL-SCNC: 26 MMOL/L (ref 23–29)
COLOR UR AUTO: ABNORMAL
CREAT SERPL-MCNC: 1 MG/DL (ref 0.5–1.4)
DIFFERENTIAL METHOD: ABNORMAL
EOSINOPHIL # BLD AUTO: 0 K/UL (ref 0–0.5)
EOSINOPHIL NFR BLD: 0 % (ref 0–8)
ERYTHROCYTE [DISTWIDTH] IN BLOOD BY AUTOMATED COUNT: 15.2 % (ref 11.5–14.5)
EST. GFR  (AFRICAN AMERICAN): >60 ML/MIN/1.73 M^2
EST. GFR  (NON AFRICAN AMERICAN): >60 ML/MIN/1.73 M^2
GLUCOSE SERPL-MCNC: 116 MG/DL (ref 70–110)
GLUCOSE UR QL STRIP: NEGATIVE
HCT VFR BLD AUTO: 42.4 % (ref 40–54)
HGB BLD-MCNC: 13.4 G/DL (ref 14–18)
HGB UR QL STRIP: ABNORMAL
KETONES UR QL STRIP: NEGATIVE
LACTATE SERPL-SCNC: 1.3 MMOL/L (ref 0.5–2.2)
LEUKOCYTE ESTERASE UR QL STRIP: ABNORMAL
LYMPHOCYTES # BLD AUTO: 1.3 K/UL (ref 1–4.8)
LYMPHOCYTES NFR BLD: 7.4 % (ref 18–48)
MCH RBC QN AUTO: 28.2 PG (ref 27–31)
MCHC RBC AUTO-ENTMCNC: 31.6 G/DL (ref 32–36)
MCV RBC AUTO: 89 FL (ref 82–98)
MICROSCOPIC COMMENT: NORMAL
MONOCYTES # BLD AUTO: 1.4 K/UL (ref 0.3–1)
MONOCYTES NFR BLD: 8.3 % (ref 4–15)
NEUTROPHILS # BLD AUTO: 14.5 K/UL (ref 1.8–7.7)
NEUTROPHILS NFR BLD: 84.2 % (ref 38–73)
NITRITE UR QL STRIP: NEGATIVE
NT-PROBNP: 812 PG/ML (ref 5–900)
PH UR STRIP: 6 [PH] (ref 5–8)
PLATELET # BLD AUTO: 276 K/UL (ref 150–350)
PMV BLD AUTO: 9.6 FL (ref 9.2–12.9)
POTASSIUM SERPL-SCNC: 4.4 MMOL/L (ref 3.5–5.1)
PROT SERPL-MCNC: 7.2 G/DL (ref 6–8.4)
PROT UR QL STRIP: NEGATIVE
RBC # BLD AUTO: 4.76 M/UL (ref 4.6–6.2)
RBC #/AREA URNS AUTO: 1 /HPF (ref 0–4)
SODIUM SERPL-SCNC: 137 MMOL/L (ref 136–145)
SP GR UR STRIP: 1.01 (ref 1–1.03)
TROPONIN I SERPL DL<=0.01 NG/ML-MCNC: <0.012 NG/ML (ref 0.01–0.03)
URN SPEC COLLECT METH UR: ABNORMAL
UROBILINOGEN UR STRIP-ACNC: NEGATIVE EU/DL
WBC # BLD AUTO: 17.33 K/UL (ref 3.9–12.7)
WBC #/AREA URNS AUTO: 5 /HPF (ref 0–5)

## 2019-10-05 PROCEDURE — 96361 HYDRATE IV INFUSION ADD-ON: CPT | Mod: ER

## 2019-10-05 PROCEDURE — 83880 ASSAY OF NATRIURETIC PEPTIDE: CPT | Mod: ER

## 2019-10-05 PROCEDURE — 87040 BLOOD CULTURE FOR BACTERIA: CPT | Mod: ER

## 2019-10-05 PROCEDURE — 85025 COMPLETE CBC W/AUTO DIFF WBC: CPT | Mod: ER

## 2019-10-05 PROCEDURE — 83605 ASSAY OF LACTIC ACID: CPT | Mod: ER

## 2019-10-05 PROCEDURE — 80053 COMPREHEN METABOLIC PANEL: CPT | Mod: ER

## 2019-10-05 PROCEDURE — 25000003 PHARM REV CODE 250: Mod: ER | Performed by: EMERGENCY MEDICINE

## 2019-10-05 PROCEDURE — 93010 ELECTROCARDIOGRAM REPORT: CPT | Mod: ,,, | Performed by: INTERNAL MEDICINE

## 2019-10-05 PROCEDURE — 84484 ASSAY OF TROPONIN QUANT: CPT | Mod: ER

## 2019-10-05 PROCEDURE — 82550 ASSAY OF CK (CPK): CPT | Mod: ER

## 2019-10-05 PROCEDURE — 93010 EKG 12-LEAD: ICD-10-PCS | Mod: ,,, | Performed by: INTERNAL MEDICINE

## 2019-10-05 PROCEDURE — 93005 ELECTROCARDIOGRAM TRACING: CPT | Mod: ER

## 2019-10-05 PROCEDURE — 81000 URINALYSIS NONAUTO W/SCOPE: CPT | Mod: ER

## 2019-10-05 PROCEDURE — 99285 EMERGENCY DEPT VISIT HI MDM: CPT | Mod: 25,ER

## 2019-10-05 PROCEDURE — 96365 THER/PROPH/DIAG IV INF INIT: CPT | Mod: ER

## 2019-10-05 PROCEDURE — 63600175 PHARM REV CODE 636 W HCPCS: Mod: ER | Performed by: EMERGENCY MEDICINE

## 2019-10-05 RX ORDER — LEVOFLOXACIN 500 MG/1
500 TABLET, FILM COATED ORAL DAILY
Qty: 7 TABLET | Refills: 0 | Status: SHIPPED | OUTPATIENT
Start: 2019-10-05 | End: 2019-10-12

## 2019-10-05 RX ORDER — LEVOFLOXACIN 5 MG/ML
750 INJECTION, SOLUTION INTRAVENOUS
Status: COMPLETED | OUTPATIENT
Start: 2019-10-05 | End: 2019-10-06

## 2019-10-05 RX ORDER — ACETAMINOPHEN 500 MG
1000 TABLET ORAL
Status: COMPLETED | OUTPATIENT
Start: 2019-10-05 | End: 2019-10-05

## 2019-10-05 RX ADMIN — SODIUM CHLORIDE 1000 ML: 0.9 INJECTION, SOLUTION INTRAVENOUS at 07:10

## 2019-10-05 RX ADMIN — ACETAMINOPHEN 1000 MG: 500 TABLET ORAL at 11:10

## 2019-10-05 RX ADMIN — LEVOFLOXACIN 750 MG: 750 INJECTION, SOLUTION INTRAVENOUS at 10:10

## 2019-10-06 VITALS
OXYGEN SATURATION: 98 % | HEART RATE: 80 BPM | BODY MASS INDEX: 30.16 KG/M2 | HEIGHT: 68 IN | SYSTOLIC BLOOD PRESSURE: 117 MMHG | WEIGHT: 199 LBS | DIASTOLIC BLOOD PRESSURE: 60 MMHG | TEMPERATURE: 100 F | RESPIRATION RATE: 16 BRPM

## 2019-10-06 NOTE — ED PROVIDER NOTES
"Encounter Date: 10/5/2019       History     Chief Complaint   Patient presents with    Fatigue     75 y/o M to er with c/o "weakness and over heated." Pt reports to working in the yard all day and started feeling dizzy. Pt admits to getting nauseated and vomiting once. Pt reports "it felt like my head was about to explode." EMS called and checked blood pressure and told pt he "looked dehydrated but otherwise ok." Pt states he started feeling bad about 10 am this morning.     The history is provided by the patient.   General Illness    The current episode started today. The problem occurs continuously. The problem has been gradually improving. The pain is at a severity of 0/10. Nothing relieves the symptoms. Nothing aggravates the symptoms. Associated symptoms include muscle aches. Pertinent negatives include no fever, no abdominal pain, no constipation, no diarrhea, no nausea, no vomiting, no cough, no shortness of breath, no URI and no wheezing.     Review of patient's allergies indicates:   Allergen Reactions    Iodine and iodide containing products Anaphylaxis    Shellfish containing products Anaphylaxis     Past Medical History:   Diagnosis Date    Coronary artery disease     Hyperlipidemia     Hypertension      Past Surgical History:   Procedure Laterality Date    ABDOMINAL SURGERY      Polyps in colon removed (noncancerous)    APPENDECTOMY      back surgery (screws & okate)      CORONARY ARTERY BYPASS GRAFT           History reviewed. No pertinent family history.  Social History     Tobacco Use    Smoking status: Former Smoker     Last attempt to quit: 10/5/1998     Years since quittin.0   Substance Use Topics    Alcohol use: Not Currently     Alcohol/week: 0.0 standard drinks     Comment:     Drug use: Never     Review of Systems   Constitutional: Negative for fever.   Respiratory: Negative for cough, shortness of breath and wheezing.    Gastrointestinal: Negative for abdominal pain, " constipation, diarrhea, nausea and vomiting.   All other systems reviewed and are negative.      Physical Exam     Initial Vitals [10/05/19 1902]   BP Pulse Resp Temp SpO2   (!) 141/65 86 18 98.9 °F (37.2 °C) 98 %      MAP       --         Physical Exam    Nursing note and vitals reviewed.  Constitutional: He appears well-developed and well-nourished.   HENT:   Head: Normocephalic and atraumatic.   Eyes: Conjunctivae and EOM are normal.   Neck: Normal range of motion. Neck supple.   Cardiovascular: Normal rate, regular rhythm and normal heart sounds.   Pulmonary/Chest: Breath sounds normal. No respiratory distress. He has no wheezes. He has no rhonchi. He has no rales.   Abdominal: Soft. He exhibits no distension. There is no tenderness. There is no rebound and no guarding.   Musculoskeletal: Normal range of motion.   Neurological: He is alert and oriented to person, place, and time. GCS score is 15. GCS eye subscore is 4. GCS verbal subscore is 5. GCS motor subscore is 6.   Skin: Skin is warm and dry. Capillary refill takes less than 2 seconds.   Psychiatric: He has a normal mood and affect. His behavior is normal. Judgment and thought content normal.         ED Course   Procedures  Labs Reviewed   CBC W/ AUTO DIFFERENTIAL - Abnormal; Notable for the following components:       Result Value    WBC 17.33 (*)     Hemoglobin 13.4 (*)     Mean Corpuscular Hemoglobin Conc 31.6 (*)     RDW 15.2 (*)     Gran # (ANC) 14.5 (*)     Mono # 1.4 (*)     Gran% 84.2 (*)     Lymph% 7.4 (*)     All other components within normal limits   COMPREHENSIVE METABOLIC PANEL - Abnormal; Notable for the following components:    Glucose 116 (*)     Total Bilirubin 1.5 (*)     All other components within normal limits   URINALYSIS, REFLEX TO URINE CULTURE - Abnormal; Notable for the following components:    Occult Blood UA Trace (*)     Leukocytes, UA 3+ (*)     All other components within normal limits    Narrative:     Preferred Collection  Type->Urine, Clean Catch   CK   URINALYSIS MICROSCOPIC    Narrative:     Preferred Collection Type->Urine, Clean Catch     EKG Readings: (Independently Interpreted)   Rhythm: Normal Sinus Rhythm. Heart Rate: 89. Ectopy: No Ectopy. Conduction: Normal. ST Segments: Normal ST Segments. T Waves: Normal. Clinical Impression: Normal Sinus Rhythm       Imaging Results    None       X-Rays:   Independently Interpreted Readings:   Chest X-Ray: Cardiomegaly present.  Increased vascular markings consistent with CHF are present. Concerned that there may be an infiltrate in the left lung.     Medical Decision Making:   Clinical Tests:   Lab Tests: Ordered and Reviewed  Radiological Study: Ordered and Reviewed  Medical Tests: Ordered and Reviewed                      Clinical Impression:       ICD-10-CM ICD-9-CM   1. Pneumonia of left upper lobe due to infectious organism J18.1 486   2. Dizziness R42 780.4   3. Weakness R53.1 780.79         Disposition:   Disposition: Discharged  Condition: Stable                        Quynh Tovar MD  10/05/19 7927

## 2019-10-06 NOTE — ED NOTES
"Pt reports that he became overheated while cutting the grass this am--had a near syncopal episode PTA--states that he felt like his head was going to explode during incident--pt was dizzy and lethargic PTA--pt denies pain and dizziness at present--still c/o fatigue and feels "dehydrated"--neuro intact--family at bedside--will monitor  "

## 2019-10-10 ENCOUNTER — TELEPHONE (OUTPATIENT)
Dept: CARDIOLOGY | Facility: CLINIC | Age: 74
End: 2019-10-10

## 2019-10-12 LAB
BACTERIA BLD CULT: NORMAL
BACTERIA BLD CULT: NORMAL

## 2019-10-14 ENCOUNTER — CLINICAL SUPPORT (OUTPATIENT)
Dept: CARDIOLOGY | Facility: CLINIC | Age: 74
End: 2019-10-14
Attending: NURSE PRACTITIONER
Payer: MEDICARE

## 2019-10-14 VITALS
BODY MASS INDEX: 29.5 KG/M2 | WEIGHT: 194 LBS | DIASTOLIC BLOOD PRESSURE: 56 MMHG | HEART RATE: 74 BPM | SYSTOLIC BLOOD PRESSURE: 130 MMHG

## 2019-10-14 DIAGNOSIS — R94.39 ABNORMAL STRESS TEST: ICD-10-CM

## 2019-10-14 DIAGNOSIS — I25.111 CORONARY ARTERY DISEASE INVOLVING NATIVE CORONARY ARTERY OF NATIVE HEART WITH ANGINA PECTORIS WITH DOCUMENTED SPASM: ICD-10-CM

## 2019-10-14 LAB
CFR FLOW - ANTERIOR: 1.99 CC/MIN/G
CFR FLOW - INFERIOR: 2.42 CC/MIN/G
CFR FLOW - LATERAL: 2.19 CC/MIN/G
CFR FLOW - MAX: 3.3 CC/MIN/G
CFR FLOW - MIN: 1.4 CC/MIN/G
CFR FLOW - SEPTAL: 2.12 CC/MIN/G
CFR FLOW - WHOLE HEART: 2.18 CC/MIN/G
CV PHARM DOSE: 49.4 MG
CV STRESS BASE HR: 62 BPM
DIASTOLIC BLOOD PRESSURE: 75 MMHG
END DIASTOLIC INDEX-HIGH: 170 ML/M2
END SYSTOLIC INDEX-HIGH: 70 ML/M2
NUC REST DIASTOLIC VOLUME INDEX: 72
NUC REST EJECTION FRACTION: 59
NUC REST SYSTOLIC VOLUME INDEX: 30
NUC STRESS DIASTOLIC VOLUME INDEX: 118
NUC STRESS EJECTION FRACTION: 76 %
NUC STRESS SYSTOLIC VOLUME INDEX: 29
OHS CV CPX 85 PERCENT MAX PREDICTED HEART RATE MALE: 123
OHS CV CPX MAX PREDICTED HEART RATE: 145
OHS CV CPX PATIENT IS FEMALE: 0
OHS CV CPX PATIENT IS MALE: 1
OHS CV CPX PEAK DIASTOLIC BLOOD PRESSURE: 66 MMHG
OHS CV CPX PEAK HEAR RATE: 72 BPM
OHS CV CPX PEAK RATE PRESSURE PRODUCT: 9504
OHS CV CPX PEAK SYSTOLIC BLOOD PRESSURE: 132 MMHG
OHS CV CPX PERCENT MAX PREDICTED HEART RATE ACHIEVED: 50
OHS CV CPX RATE PRESSURE PRODUCT PRESENTING: NORMAL
PERFUSION DEFECT 1 SIZE IN %: 8 %
REST FLOW - ANTERIOR: 0.84 CC/MIN/G
REST FLOW - INFERIOR: 0.61 CC/MIN/G
REST FLOW - LATERAL: 0.84 CC/MIN/G
REST FLOW - MAX: 1.3 CC/MIN/G
REST FLOW - MIN: 0.3 CC/MIN/G
REST FLOW - SEPTAL: 0.87 CC/MIN/G
REST FLOW - WHOLE HEART: 0.79
RETIRED EF AND QEF - SEE NOTES: 51 %
STRESS ECHO TARGET HR: 123.25 BPM
STRESS FLOW - ANTERIOR: 1.65 CC/MIN/G
STRESS FLOW - INFERIOR: 1.47 CC/MIN/G
STRESS FLOW - LATERAL: 1.84 CC/MIN/G
STRESS FLOW - MAX: 2.5 CC/MIN/G
STRESS FLOW - MIN: 0.6 CC/MIN/G
STRESS FLOW - SEPTAL: 1.84 CC/MIN/G
STRESS FLOW - WHOLE HEART: 1.7 CC/MIN/G
SYSTOLIC BLOOD PRESSURE: 187 MMHG

## 2019-10-14 PROCEDURE — 78492 CARDIAC PET SCAN STRESS (CUPID ONLY): ICD-10-PCS | Mod: S$GLB,,, | Performed by: INTERNAL MEDICINE

## 2019-10-14 PROCEDURE — 93015 CV STRESS TEST SUPVJ I&R: CPT | Mod: S$GLB,,, | Performed by: INTERNAL MEDICINE

## 2019-10-14 PROCEDURE — 99999 PR PBB SHADOW E&M-EST. PATIENT-LVL II: CPT | Mod: PBBFAC,,,

## 2019-10-14 PROCEDURE — 93015 CARDIAC PET SCAN STRESS (CUPID ONLY): ICD-10-PCS | Mod: S$GLB,,, | Performed by: INTERNAL MEDICINE

## 2019-10-14 PROCEDURE — A9555 CARDIAC PET SCAN STRESS (CUPID ONLY): ICD-10-PCS | Mod: S$GLB,,, | Performed by: INTERNAL MEDICINE

## 2019-10-14 PROCEDURE — 99999 PR PBB SHADOW E&M-EST. PATIENT-LVL II: ICD-10-PCS | Mod: PBBFAC,,,

## 2019-10-14 PROCEDURE — A9555 RB82 RUBIDIUM: HCPCS | Mod: S$GLB,,, | Performed by: INTERNAL MEDICINE

## 2019-10-14 PROCEDURE — 78492 MYOCRD IMG PET MLT RST&STRS: CPT | Mod: S$GLB,,, | Performed by: INTERNAL MEDICINE

## 2019-10-14 RX ORDER — DIPYRIDAMOLE 5 MG/ML
49.38 INJECTION INTRAVENOUS
Status: COMPLETED | OUTPATIENT
Start: 2019-10-14 | End: 2019-10-14

## 2019-10-14 RX ADMIN — DIPYRIDAMOLE 49.4 MG: 5 INJECTION INTRAVENOUS at 03:10

## 2019-11-29 ENCOUNTER — TELEPHONE (OUTPATIENT)
Dept: CARDIOLOGY | Facility: CLINIC | Age: 74
End: 2019-11-29

## 2019-11-29 NOTE — TELEPHONE ENCOUNTER
----- Message from Stewart Lazaro, Patient Care Assistant sent at 11/29/2019 11:26 AM CST -----  Contact: Pt wife  Pt wants to check on results     Please advise pt wife can be reached [phone]

## 2019-12-04 ENCOUNTER — OFFICE VISIT (OUTPATIENT)
Dept: CARDIOLOGY | Facility: CLINIC | Age: 74
End: 2019-12-04
Payer: MEDICARE

## 2019-12-04 VITALS
SYSTOLIC BLOOD PRESSURE: 122 MMHG | HEART RATE: 52 BPM | HEIGHT: 70 IN | WEIGHT: 198 LBS | BODY MASS INDEX: 28.35 KG/M2 | DIASTOLIC BLOOD PRESSURE: 60 MMHG

## 2019-12-04 DIAGNOSIS — I25.111 CORONARY ARTERY DISEASE INVOLVING NATIVE CORONARY ARTERY OF NATIVE HEART WITH ANGINA PECTORIS WITH DOCUMENTED SPASM: ICD-10-CM

## 2019-12-04 DIAGNOSIS — E78.2 MIXED HYPERLIPIDEMIA: ICD-10-CM

## 2019-12-04 DIAGNOSIS — I70.211 ATHEROSCLEROSIS OF NATIVE ARTERY OF RIGHT LOWER EXTREMITY WITH INTERMITTENT CLAUDICATION: ICD-10-CM

## 2019-12-04 DIAGNOSIS — I73.9 PAD (PERIPHERAL ARTERY DISEASE): Primary | ICD-10-CM

## 2019-12-04 DIAGNOSIS — R94.39 ABNORMAL CARDIOVASCULAR STRESS TEST: ICD-10-CM

## 2019-12-04 DIAGNOSIS — I10 ESSENTIAL HYPERTENSION: ICD-10-CM

## 2019-12-04 PROCEDURE — 1159F MED LIST DOCD IN RCRD: CPT | Mod: S$GLB,,, | Performed by: INTERNAL MEDICINE

## 2019-12-04 PROCEDURE — 1126F PR PAIN SEVERITY QUANTIFIED, NO PAIN PRESENT: ICD-10-PCS | Mod: S$GLB,,, | Performed by: INTERNAL MEDICINE

## 2019-12-04 PROCEDURE — 99215 OFFICE O/P EST HI 40 MIN: CPT | Mod: S$GLB,,, | Performed by: INTERNAL MEDICINE

## 2019-12-04 PROCEDURE — 3074F PR MOST RECENT SYSTOLIC BLOOD PRESSURE < 130 MM HG: ICD-10-PCS | Mod: CPTII,S$GLB,, | Performed by: INTERNAL MEDICINE

## 2019-12-04 PROCEDURE — 1101F PR PT FALLS ASSESS DOC 0-1 FALLS W/OUT INJ PAST YR: ICD-10-PCS | Mod: CPTII,S$GLB,, | Performed by: INTERNAL MEDICINE

## 2019-12-04 PROCEDURE — 1126F AMNT PAIN NOTED NONE PRSNT: CPT | Mod: S$GLB,,, | Performed by: INTERNAL MEDICINE

## 2019-12-04 PROCEDURE — 1101F PT FALLS ASSESS-DOCD LE1/YR: CPT | Mod: CPTII,S$GLB,, | Performed by: INTERNAL MEDICINE

## 2019-12-04 PROCEDURE — 99999 PR PBB SHADOW E&M-EST. PATIENT-LVL III: CPT | Mod: PBBFAC,,, | Performed by: INTERNAL MEDICINE

## 2019-12-04 PROCEDURE — 1159F PR MEDICATION LIST DOCUMENTED IN MEDICAL RECORD: ICD-10-PCS | Mod: S$GLB,,, | Performed by: INTERNAL MEDICINE

## 2019-12-04 PROCEDURE — 3078F DIAST BP <80 MM HG: CPT | Mod: CPTII,S$GLB,, | Performed by: INTERNAL MEDICINE

## 2019-12-04 PROCEDURE — 3074F SYST BP LT 130 MM HG: CPT | Mod: CPTII,S$GLB,, | Performed by: INTERNAL MEDICINE

## 2019-12-04 PROCEDURE — 99999 PR PBB SHADOW E&M-EST. PATIENT-LVL III: ICD-10-PCS | Mod: PBBFAC,,, | Performed by: INTERNAL MEDICINE

## 2019-12-04 PROCEDURE — 99215 PR OFFICE/OUTPT VISIT, EST, LEVL V, 40-54 MIN: ICD-10-PCS | Mod: S$GLB,,, | Performed by: INTERNAL MEDICINE

## 2019-12-04 PROCEDURE — 3078F PR MOST RECENT DIASTOLIC BLOOD PRESSURE < 80 MM HG: ICD-10-PCS | Mod: CPTII,S$GLB,, | Performed by: INTERNAL MEDICINE

## 2019-12-04 RX ORDER — CILOSTAZOL 100 MG/1
100 TABLET ORAL 2 TIMES DAILY
Qty: 180 TABLET | Refills: 3 | Status: SHIPPED | OUTPATIENT
Start: 2019-12-04 | End: 2021-04-13

## 2019-12-04 NOTE — PROGRESS NOTES
Subjective:   Patient ID:  Kermit Castro is a 74 y.o. male who presents for evaluation and treatment of Coronary Artery Disease; Hyperlipidemia; Hypertension; Peripheral Arterial Disease; and Claudication      HPI:       He is here for management of worsening yanira IIb claudication R >> L. He cannot walk > 1 room. He also complained of dyspnea II with moderate exertion. He has a history of bilateral SFA + R EIA intervention (details below) and CAD s/p CABG + POBA of LIMA-LAD + HTN + diastolic dysfunction and HLP. + aspirin for MAPT. + acei + statin.       JOAQUINA      R 0.77   L 1.06      US 6/2019    Diameters in mm     CFA 9   SFA 7   POP 6   BTK 4   BTA 3          R , , pSFA 81, mSFA 41, dSFA 18, pPOP 26-36, PT 34, AT 28, DP 23  L , , pSFA 219-136, mSFA 89, dSFA 25, POP 35, PT 59, AT 30, DP 03           S/p multiple PCI then CABG 1998     LIMA-LAD   SVG-OM   SVG-PDA        PTA of LIMA-LAD 12/1998      [facility] 2005     3 patent graft   Normal EF   LVEDP 20 mmHg   Patent renal arteries        PET stress 10/2019     8% infarct   Normal EF   No arrhythmias   No symptoms   No ECG changes               Care team:    Dr. Emmett Hassan            Patient Active Problem List    Diagnosis Date Noted    Abnormal cardiovascular stress test 12/04/2019         PET stress 10/2019     8% infarct   Normal EF   No arrhythmias   No symptoms   No ECG changes         PAD (peripheral artery disease) 08/07/2019 2/2006     L SFA  PTAS   8 x 40 mm and 6.0 x 120 Protege SES          5/2006     R SFA  PTA with 4 and 6.0 mm balloons   R EIA PTAS with 7 mm SES for ISR (Resilient trial)      JOAQUINA 6/2019     R 0.77   L 1.06        US 6/2019    Diameters in mm     CFA 9   SFA 7   POP 6   BTK 4   BTA 3          R , , pSFA 81, mSFA 41, dSFA 18, pPOP 26-36, PT 34, AT 28, DP 23  L , , pSFA 219-136, mSFA 89, dSFA 25, POP 35, PT 59, AT 30, DP 03                  Dyspnea on  exertion 2019    Atherosclerosis of native artery of right lower extremity with intermittent claudication 2019    Mixed hyperlipidemia 2019    Essential hypertension 2019    Coronary artery disease involving native coronary artery of native heart with angina pectoris with documented spasm 2019            S/p multiple PCI then CABG      LIMA-LAD   SVG-OM   SVG-PDA        PTA of LIMA-LAD 1998      [facility] 2005     3 patent graft   Normal EF   LVEDP 20 mmHg   Patent renal arteries                     Right Arm BP - Sittin/60  Left Arm BP - Sittin/58        LABS      Lipid panel  Lab Results   Component Value Date    CHOL 115 (L) 2019    CHOL 164 2005     Lab Results   Component Value Date    HDL 40 2019    HDL 37.0 (L) 2005     Lab Results   Component Value Date    LDLCALC 63.2 2019    LDLCALC 108.8 2005     Lab Results   Component Value Date    TRIG 59 2019    TRIG 91 2005     Lab Results   Component Value Date    CHOLHDL 34.8 2019    CHOLHDL 22.6 2005            Review of Systems   Constitution: Negative for diaphoresis, night sweats, weight gain and weight loss.   HENT: Negative for congestion.    Eyes: Negative for blurred vision, discharge and double vision.   Cardiovascular: Positive for claudication. Negative for chest pain, cyanosis, dyspnea on exertion, irregular heartbeat, leg swelling, near-syncope, orthopnea, palpitations, paroxysmal nocturnal dyspnea and syncope.   Respiratory: Negative for cough, shortness of breath and wheezing.    Endocrine: Negative for cold intolerance, heat intolerance and polyphagia.   Hematologic/Lymphatic: Negative for adenopathy and bleeding problem. Does not bruise/bleed easily.   Skin: Negative for dry skin and nail changes.   Musculoskeletal: Negative for arthritis, back pain, falls, joint pain, myalgias and neck pain.   Gastrointestinal: Negative for bloating,  abdominal pain, change in bowel habit and constipation.   Genitourinary: Negative for bladder incontinence, dysuria, flank pain, genital sores and missed menses.   Neurological: Negative for aphonia, brief paralysis, difficulty with concentration, dizziness and weakness.   Psychiatric/Behavioral: Negative for altered mental status and memory loss. The patient does not have insomnia.    Allergic/Immunologic: Negative for environmental allergies.       Objective:   Physical Exam   Constitutional: He is oriented to person, place, and time. He appears well-developed and well-nourished. He is not intubated.   HENT:   Head: Normocephalic and atraumatic.   Right Ear: External ear normal.   Left Ear: External ear normal.   Mouth/Throat: Oropharynx is clear and moist.   Eyes: Pupils are equal, round, and reactive to light. Conjunctivae and EOM are normal. Right eye exhibits no discharge. Left eye exhibits no discharge. No scleral icterus.   Neck: Normal range of motion. Neck supple. Normal carotid pulses, no hepatojugular reflux and no JVD present. Carotid bruit is not present. No tracheal deviation present. No thyromegaly present.   Cardiovascular: Normal rate, regular rhythm, S1 normal and S2 normal.  No extrasystoles are present. PMI is not displaced. Exam reveals no gallop, no S3, no distant heart sounds, no friction rub and no midsystolic click.   No murmur heard.  Pulses:       Carotid pulses are 2+ on the right side, and 2+ on the left side.       Radial pulses are 2+ on the right side, and 2+ on the left side.        Femoral pulses are 2+ on the right side, and 2+ on the left side.       Popliteal pulses are 2+ on the right side, and 2+ on the left side.        Dorsalis pedis pulses are 0 on the right side, and 0 on the left side.        Posterior tibial pulses are 0 on the right side, and 0 on the left side.       Biphasic bilateral DP and PT doppler signals          Pulmonary/Chest: Effort normal and breath sounds  normal. No accessory muscle usage or stridor. No apnea, no tachypnea and no bradypnea. He is not intubated. No respiratory distress. He has no decreased breath sounds. He has no wheezes. He has no rales. He exhibits no tenderness and no bony tenderness.   Abdominal: He exhibits no distension, no pulsatile liver, no abdominal bruit, no ascites, no pulsatile midline mass and no mass. There is no tenderness. There is no rebound and no guarding.   Musculoskeletal: Normal range of motion. He exhibits no edema or tenderness.   Lymphadenopathy:     He has no cervical adenopathy.   Neurological: He is alert and oriented to person, place, and time. He has normal reflexes. No cranial nerve deficit. Coordination normal.   Skin: Skin is warm. No rash noted. No erythema. No pallor.       Varicose veins      No ulcers         Psychiatric: He has a normal mood and affect. His behavior is normal. Judgment and thought content normal.       Assessment:     1. PAD (peripheral artery disease)    2. Atherosclerosis of native artery of right lower extremity with intermittent claudication    3. Mixed hyperlipidemia    4. Essential hypertension    5. Coronary artery disease involving native coronary artery of native heart with angina pectoris with documented spasm    6. Abnormal cardiovascular stress test        Plan:         Medical therapy for PAD   Pletal 50 mg then titrate to 100 mg po bid   Start walking program         Follow up in 6 months with JOAQUINA + US  Consider revascularization for refractory claudication or CLTI or ALI        Medical therapy for CAD   PET stress only revealed 8% ischemic burden             Continue with current medical plan and lifestyle changes.  Return sooner for concerns or questions. If symptoms persist go to the ED  I have reviewed all pertinent data on this patient       I have reviewed the patient's medical history in detail and updated the computerized patient record.    Orders Placed This Encounter    Procedures    US Lower Extremity Arteries Bilateral     Standing Status:   Future     Standing Expiration Date:   12/4/2020    CV Exercise JOAQUINA     Standing Status:   Future     Standing Expiration Date:   12/4/2020       Follow up as scheduled. Return sooner for concerns or questions            He expressed verbal understanding and agreed with the plan        Patient's Medications   New Prescriptions    CILOSTAZOL (PLETAL) 100 MG TAB    Take 1 tablet (100 mg total) by mouth 2 (two) times daily.   Previous Medications    AMLODIPINE (NORVASC) 10 MG TABLET        ASPIRIN (ECOTRIN) 81 MG EC TABLET    Take 1 tablet (81 mg total) by mouth once daily.    ATORVASTATIN (LIPITOR) 40 MG TABLET        FISH OIL-OMEGA-3 FATTY ACIDS 300-1,000 MG CAPSULE    Take by mouth once daily.    HYDROCODONE-ACETAMINOPHEN 10-325MG (NORCO)  MG TAB        LISINOPRIL (PRINIVIL,ZESTRIL) 20 MG TABLET        METOPROLOL TARTRATE (LOPRESSOR) 50 MG TABLET        MULTIVITAMIN (ONE DAILY MULTIVITAMIN) PER TABLET    Take 1 tablet by mouth once daily.    SAW PALMETTO 160 MG CAPSULE    Take 160 mg by mouth 2 (two) times daily.   Modified Medications    No medications on file   Discontinued Medications    No medications on file

## 2019-12-05 NOTE — PATIENT INSTRUCTIONS
Taking Aspirin for Atherosclerosis       Aspirin is a medicine often prescribed to treat atherosclerosis. This condition affects your arteries. These are the blood vessels that carry blood away from your heart. Having atherosclerosis means youre at greater risk of a heart attack or stroke. Aspirin can help prevent these from happening.  How atherosclerosis affects your arteries   A fatty material (plaque) can build up in your arteries. This makes it harder for blood to flow through them. A blood clot can then form on the plaque. This may block the artery, cutting off blood flow. This can cause conditions such as coronary artery disease (CAD) and peripheral arterial disease (PAD):  · CAD occurs when plaque builds up in the coronary artery. This artery supplies the heart with oxygen-rich blood.  · PAD occurs when plaque forms in leg arteries.  The same things that cause CAD and PAD can also cause plaque to form in other arteries in your body, such as those in the brain. When plaque occurs in any of these arteries, it raises your risk of heart attack or stroke.  What aspirin does  Aspirin is a blood-thinner (antiplatelet medicine). It helps keep blood clots from forming. This reduces the risk of blockage. Aspirin can be taken daily if you are at high risk of or have already had a heart attack or stroke. It is also used after a procedure called a stent placement. This is when a tiny wire mesh tube, or stent, is placed in an artery to keep it open. Aspirin helps prevent blood clots from forming on the stent.  Taking aspirin safely  Tell your healthcare provider about any medicines you are taking. This includes:  · All prescription medicines  · Over-the-counter medicines  · Herbs, vitamins, and other supplements  Also mention if you have a history of ulcers or bleeding problems. Ask whether you will need to stop taking aspirin before having surgery or dental work. Always take medicines as directed.  Tips for taking  aspirin  · Have a routine. For example, take aspirin with the same meal each day.  · Dont take more than prescribed. A low dose gives the same benefit as a higher one, with a lower risk of side effects.  · Dont skip doses. Aspirin needs to be taken each day to work well.  · Keep track of what you take. A pillbox with days of the week can help, especially if you take several medicines. Or use a list or chart to keep track.  When to call your healthcare provider  Side effects of aspirin are not usually serious. If you do have problems, changing your dose may help. Call your healthcare provider if you have any of the following:  · Excessive bruising (some bruising is normal)  · Nosebleeds, bleeding gums, or other excessive bleeding  · An upset stomach or stomach pain   Date Last Reviewed: 6/1/2016 © 2000-2017 [employer]. [address] [place] [address]. All rights reserved. This information is not intended as a substitute for professional medical care. Always follow your healthcare professional's instructions.        Heart Disease Education    The heart beats 60 to 100 times per minute, 24 hours a day. This equals almost 1000,000 times a day. It pumps blood with oxygen and nutrients to the tissues and organs of the body. But the heart is a muscle and needs its own supply of blood. Blood flow to the heart is supplied by the coronary arteries. Coronary artery disease (atherosclerosis) is a result of cholesterol, saturated fat, and calcium deposits (plaques) that build up inside the walls. This causes inflammation within the coronary arteries. These plaques narrow the artery and reduce blood flow to the heart muscle. The reduction in blood flow to the heart muscle decreases oxygen supply to the heart. If the narrowing is significant enough, the oxygen supply to one or more regions of the heart can be temporarily or permanently shut down. This can cause chest pain, and possibly death of heart  tissue (heart attack).  Types of chest pain  Angina is the name for pain in the heart muscle. Angina is a warning sign of serious heart disease. When untreated it can lead to a heart attack, also known as acute myocardial infarction, or AMI. Angina occurs when there is not enough blood and oxygen flowing to the heart for the amount of work it is doing. This most often happens during physical exertion, when the heart is working hardest. It is usually relieved by rest or nitroglycerin. Angina may also occur after a large meal when extra blood is sent to the digestive organs and less goes to the heart. In the case of advanced or unstable heart disease, angina can occur at rest or awaken you from sleep. Angina usually lasts from a few minutes up to 20 minutes or more. When treated early, the effects of angina can be reversed without permanent damage to the heart. Angina is a serious condition and needs to be evaluated by a medical professional immediately.  There are two types of angina -- stable and unstable:  · Stable angina usually occurs with a predictable level of activity. Being stable, its character, severity, and occurrence do not change much over time. It usually starts with activity, and resolves with rest or taking your medicine as instructed by your doctor. The symptoms usually do not last long.  · Unstable angina changes or gets worse over time. It is different from whatever you are used to. It may feel different or worse, begin without cause, occur with exercise or exertion, wake you up from sleep, and last longer. It may not respond in the same way as it does when you take your usual medicines for an attack. This type of angina can be a warning sign of an impending heart attack.     A heart attack is usually the result of a blood clot that suddenly forms in a coronary artery that has been narrowed with plaque. When this occurs, blood flow may be cut off to a part of the heart muscle, causing the cells to  "die. This weakens the pumping action of the heart, which affects the delivery of blood to all the other organs in the body including the brain. This damage is not reversible. However, early treatment can limit the amount of damage.  The pain you feel with angina and a heart attack may have a similar quality. However, it is usually different in intensity and duration. Here are some typical descriptions of a heart attack:  · It is most often experienced as a squeezing, crushing, pressure-like sensation in the center of the chest.  · It is sometimes described as something heavy sitting on my chest.  · It may feel more like a bad case of indigestion.  · The pain may spread from the chest to the arm, shoulder, throat or jaw.  · Sometimes the pain is not felt in the chest at all, but only in the arm, shoulder, throat or jaw.  · There may also be nausea, vomiting, dizziness or light-headedness, sweating and trouble breathing.  · Palpitations, or your heart beating rapidly  · A new, irregular heart beat  · Unexplained weakness  You may not be able to tell the difference between "bad" angina and a heart attack at home. Seek help if your symptoms are different than usual. Do not be in denial or just try to "tough it out."  Call 911  This is the fastest and safest way to get to the emergency department. The paramedics can also start treatment on the way to the hospital, saving valuable time for your heart.  · If the angina gets worse, if it continues, or if it stops and returns, call 911 immediately. Do not delay. You may be having a heart attack.  · After you call 911, take a second tablet or spray unless instructed otherwise. When repeating doses, sit down if possible, because it can make you feel lightheaded or dizzy. Wait another 5 minutes. If the angina still does not go away, take a third tablet or spray. Do not take more than 3 tablets or sprays within 15 minutes. Stay on the phone with 911 for further " instruction.  · Your healthcare provider may give you slightly different instructions than those above. If so, follow them carefully.  Do not wait until symptoms become severe to call 911.  Other reasons to call 911 include:  · Trouble breathing  · Feeling lightheaded, faint, or dizzy  · Rapid heart beat  · Slower than usual heart rate compared to your normal  · Angina with weakness, dizziness, fainting, heavy sweating, nausea, or vomiting  · Extreme drowsiness, confusion  · Weakness of an arm or leg or one side of the face  · Difficulty with speech or vision  When to seek medical care  Remember, the signs and symptoms of a heart attack are not always like they are on TV. Sometimes they are not so obvious. You may only feel weak, or just not right. If it is not clear or if you have any doubt, call for advice.  · Seek help if there is a change in the type of pain, if it feels different, or if your symptoms are mild.  · Do not drive yourself. Have someone else drive you. If no one can drive, call 911.  · Do not delay. Fast diagnosis and treatment can prevent or limit the amount of heart damage during a heart attack.  · Do not go to your doctor's office or a clinic as they may not be able to provide all the testing and treatment required for this condition.  · If your doctor has given you medicine to take when symptoms occur, take them but don't delay getting help trying to locate medicines.  What happens in the emergency department  The emergency department is connected to your local emergency medical system (EMS) through 911. That's why during a cardiac emergency, calling 911 is the fastest way to get help. The goal of the emergency department is to rapidly screen, evaluate, and treat people.  Once you are there, an electrocardiogram (ECG or heart tracing) will be done. Blood samples may be taken to look for the presence of heart enzymes that leak from damaged heart cells and show if a heart attack is occurring. You  "will often be evaluated by a heart specialist (cardiologist) who decides the best course of action. In the case of severe angina or early heart attack, and depending on the circumstances, powerful "clot busting" medicines can be used to dissolve blood clots in the coronary artery. In other cases, you may be taken to a cardiac catheterization lab. Here, a tiny balloon-tipped catheter is advanced through blood vessels to the heart. There the balloon is inflated pushing open the blood vessel restoring blood flow.  Risk factors for heart disease  Risk factors for heart disease are a combination of genetic and lifestyle. Many risk factors work by either directly or indirectly damaging the blood vessels of the heart, or by increasing the risk of forming blood or cholesterol clots, which then clog up and block the arteries.     Examples of physical lifestyle risk factors:  · Cigarette smoking  · High blood pressure  · High blood cholesterol  · Use of stimulant drugs such as cocaine, crack, and amphetamines  · Eating a high-fat, high-cholesterol meal  · Diabetes   · Obesity which increases risk for diabetes and high blood pressure  · Lack of regular physical activity     Examples of emotional lifestyle factors:  · Chronic high stress levels release stress hormones. These raise blood pressure and cholesterol level and makes blood clot more easily.  · Held-in anger, hostile or cynical attitude  · Social and emotional isolation, lack of intimacy  · Loss of relationship  · Depression  Other factors that increase the risk of heart attack that you cannot control :  · Age. The older you get beyond 40, the greater is your risk of significant coronary artery disease.  · Gender. More men than women get heart disease; but once past menopause, women who are not taking estrogen replacement have the same risk as men for a heart attack.  · Family history. If your mother, father, brother or sister has coronary artery disease, your risk " of having it is higher than a person your age without this family history.  What can you do to decrease your risk  To reduce your risk of heart disease:  · Get regular checkups with your doctor.  · Take your medicines for blood pressure, cholesterol or diabetes as directed.  · Watch your diet. Eat a heart healthy diet choosing fresh foods, less salt, cholesterol, and fat  · Stop smoking. Get help if needed.  · Get regular exercise.  · Manage stress.  · Carry a list of medicines and doses in your wallet.  Date Last Reviewed: 12/30/2015  © 7787-6923 [employer]. [address] [place] [address]. All rights reserved. This information is not intended as a substitute for professional medical care. Always follow your healthcare professional's instructions.         None

## 2019-12-10 ENCOUNTER — TELEPHONE (OUTPATIENT)
Dept: CARDIOLOGY | Facility: CLINIC | Age: 74
End: 2019-12-10

## 2019-12-10 NOTE — TELEPHONE ENCOUNTER
I called  and left him V/Message. to remind him of his U/s  Test 12-20-19 and follow up w/ Dr.Ndandu Aisha Koch.

## 2019-12-20 ENCOUNTER — HOSPITAL ENCOUNTER (OUTPATIENT)
Dept: RADIOLOGY | Facility: HOSPITAL | Age: 74
Discharge: HOME | End: 2019-12-20
Attending: INTERNAL MEDICINE
Payer: MEDICARE

## 2019-12-20 ENCOUNTER — HOSPITAL ENCOUNTER (OUTPATIENT)
Dept: CARDIOLOGY | Facility: HOSPITAL | Age: 74
Discharge: HOME | End: 2019-12-20
Attending: INTERNAL MEDICINE
Payer: MEDICARE

## 2019-12-20 DIAGNOSIS — I73.9 PAD (PERIPHERAL ARTERY DISEASE): ICD-10-CM

## 2019-12-20 LAB
LEFT ABI: 0.71
LEFT ARM BP: 147 MMHG
LEFT DORSALIS PEDIS: 91 MMHG
LEFT POSTERIOR TIBIAL: 104 MMHG
RIGHT ABI: 0.64
RIGHT ARM BP: 147 MMHG
RIGHT DORSALIS PEDIS: 94 MMHG
RIGHT POSTERIOR TIBIAL: 93 MMHG

## 2019-12-20 PROCEDURE — 93922 CV US ANKLE BRACHIAL INDICES RESTING (CUPID ONLY): ICD-10-PCS | Mod: 26,,, | Performed by: INTERNAL MEDICINE

## 2019-12-20 PROCEDURE — 93922 UPR/L XTREMITY ART 2 LEVELS: CPT | Mod: 26,,, | Performed by: INTERNAL MEDICINE

## 2019-12-20 PROCEDURE — 93925 LOWER EXTREMITY STUDY: CPT | Mod: TC

## 2019-12-20 PROCEDURE — 93922 UPR/L XTREMITY ART 2 LEVELS: CPT | Mod: 50

## 2019-12-20 PROCEDURE — 93925 LOWER EXTREMITY STUDY: CPT | Mod: 26,,, | Performed by: RADIOLOGY

## 2019-12-20 PROCEDURE — 93925 US LOWER EXTREMITY ARTERIES BILATERAL: ICD-10-PCS | Mod: 26,,, | Performed by: RADIOLOGY

## 2019-12-20 PROCEDURE — 93924 LWR XTR VASC STDY BILAT: CPT

## 2020-01-08 ENCOUNTER — OFFICE VISIT (OUTPATIENT)
Dept: CARDIOLOGY | Facility: CLINIC | Age: 75
End: 2020-01-08
Payer: MEDICARE

## 2020-01-08 VITALS
SYSTOLIC BLOOD PRESSURE: 110 MMHG | HEART RATE: 65 BPM | DIASTOLIC BLOOD PRESSURE: 70 MMHG | BODY MASS INDEX: 29.7 KG/M2 | WEIGHT: 196 LBS | HEIGHT: 68 IN

## 2020-01-08 DIAGNOSIS — E78.2 MIXED HYPERLIPIDEMIA: ICD-10-CM

## 2020-01-08 DIAGNOSIS — I10 ESSENTIAL HYPERTENSION: ICD-10-CM

## 2020-01-08 DIAGNOSIS — I70.211 ATHEROSCLEROSIS OF NATIVE ARTERY OF RIGHT LOWER EXTREMITY WITH INTERMITTENT CLAUDICATION: ICD-10-CM

## 2020-01-08 DIAGNOSIS — I25.111 CORONARY ARTERY DISEASE INVOLVING NATIVE CORONARY ARTERY OF NATIVE HEART WITH ANGINA PECTORIS WITH DOCUMENTED SPASM: ICD-10-CM

## 2020-01-08 DIAGNOSIS — I73.9 PAD (PERIPHERAL ARTERY DISEASE): Primary | ICD-10-CM

## 2020-01-08 PROCEDURE — 1101F PR PT FALLS ASSESS DOC 0-1 FALLS W/OUT INJ PAST YR: ICD-10-PCS | Mod: CPTII,S$GLB,, | Performed by: INTERNAL MEDICINE

## 2020-01-08 PROCEDURE — 1159F MED LIST DOCD IN RCRD: CPT | Mod: S$GLB,,, | Performed by: INTERNAL MEDICINE

## 2020-01-08 PROCEDURE — 1126F AMNT PAIN NOTED NONE PRSNT: CPT | Mod: S$GLB,,, | Performed by: INTERNAL MEDICINE

## 2020-01-08 PROCEDURE — 3078F PR MOST RECENT DIASTOLIC BLOOD PRESSURE < 80 MM HG: ICD-10-PCS | Mod: CPTII,S$GLB,, | Performed by: INTERNAL MEDICINE

## 2020-01-08 PROCEDURE — 99215 PR OFFICE/OUTPT VISIT, EST, LEVL V, 40-54 MIN: ICD-10-PCS | Mod: S$GLB,,, | Performed by: INTERNAL MEDICINE

## 2020-01-08 PROCEDURE — 1159F PR MEDICATION LIST DOCUMENTED IN MEDICAL RECORD: ICD-10-PCS | Mod: S$GLB,,, | Performed by: INTERNAL MEDICINE

## 2020-01-08 PROCEDURE — 99999 PR PBB SHADOW E&M-EST. PATIENT-LVL III: ICD-10-PCS | Mod: PBBFAC,,, | Performed by: INTERNAL MEDICINE

## 2020-01-08 PROCEDURE — 1101F PT FALLS ASSESS-DOCD LE1/YR: CPT | Mod: CPTII,S$GLB,, | Performed by: INTERNAL MEDICINE

## 2020-01-08 PROCEDURE — 3074F PR MOST RECENT SYSTOLIC BLOOD PRESSURE < 130 MM HG: ICD-10-PCS | Mod: CPTII,S$GLB,, | Performed by: INTERNAL MEDICINE

## 2020-01-08 PROCEDURE — 3078F DIAST BP <80 MM HG: CPT | Mod: CPTII,S$GLB,, | Performed by: INTERNAL MEDICINE

## 2020-01-08 PROCEDURE — 1126F PR PAIN SEVERITY QUANTIFIED, NO PAIN PRESENT: ICD-10-PCS | Mod: S$GLB,,, | Performed by: INTERNAL MEDICINE

## 2020-01-08 PROCEDURE — 99999 PR PBB SHADOW E&M-EST. PATIENT-LVL III: CPT | Mod: PBBFAC,,, | Performed by: INTERNAL MEDICINE

## 2020-01-08 PROCEDURE — 99215 OFFICE O/P EST HI 40 MIN: CPT | Mod: S$GLB,,, | Performed by: INTERNAL MEDICINE

## 2020-01-08 PROCEDURE — 3074F SYST BP LT 130 MM HG: CPT | Mod: CPTII,S$GLB,, | Performed by: INTERNAL MEDICINE

## 2020-01-08 NOTE — PROGRESS NOTES
Subjective:   Patient ID:  Kermit Castro is a 74 y.o. male who presents for evaluation and treatment of Peripheral Arterial Disease; Hyperlipidemia; and Hypertension      HPI:       He is here for management of worsening yanira IIb claudication R >> L. He cannot walk > 1 room. He also complained of dyspnea II with moderate exertion. He has a history of bilateral SFA + R EIA intervention (details below) and CAD s/p CABG + POBA of LIMA-LAD + HTN + diastolic dysfunction and HLP. + aspirin for MAPT. + acei + statin.       JOAQUINA 6/2019     R 0.77   L 1.06      JOAQUINA 12/2019      R 0.64   L 0.71    US 6/2019    Diameters in mm     CFA 9   SFA 7   POP 6   BTK 4   BTA 3          R , , pSFA 81, mSFA 41, dSFA 18, pPOP 26-36, PT 34, AT 28, DP 23  L , , pSFA 219-136, mSFA 89, dSFA 25, POP 35, PT 59, AT 30, DP 03           S/p multiple PCI then CABG 1998     LIMA-LAD   SVG-OM   SVG-PDA        PTA of LIMA-LAD 12/1998      [facility] 2005     3 patent graft   Normal EF   LVEDP 20 mmHg   Patent renal arteries        PET stress 10/2019     8% infarct   Normal EF   No arrhythmias   No symptoms   No ECG changes       US 12/2019       Bilateral SFA    Native R SFA and L SFA ISR               Care team:    Dr. Emmett Hassan            Patient Active Problem List    Diagnosis Date Noted    Abnormal cardiovascular stress test 12/04/2019         PET stress 10/2019     8% infarct   Normal EF   No arrhythmias   No symptoms   No ECG changes         PAD (peripheral artery disease) 08/07/2019 2/2006     L SFA  PTAS   8 x 40 mm and 6.0 x 120 Protege SES          5/2006     R SFA  PTA with 4 and 6.0 mm balloons   R EIA PTAS with 7 mm SES for ISR (Resilient trial)      JOAQUINA 6/2019     R 0.77   L 1.06        US 6/2019    Diameters in mm     CFA 9   SFA 7   POP 6   BTK 4   BTA 3          R , , pSFA 81, mSFA 41, dSFA 18, pPOP 26-36, PT 34, AT 28, DP 23  L , , pSFA 219-136, mSFA  89, dSFA 25, POP 35, PT 59, AT 30, DP 03                  Dyspnea on exertion 2019    Atherosclerosis of native artery of right lower extremity with intermittent claudication 2019    Mixed hyperlipidemia 2019    Essential hypertension 2019    Coronary artery disease involving native coronary artery of native heart with angina pectoris with documented spasm 2019            S/p multiple PCI then CABG      LIMA-LAD   SVG-OM   SVG-PDA        PTA of LIMA-LAD 1998      [facility] 2005     3 patent graft   Normal EF   LVEDP 20 mmHg   Patent renal arteries                     Right Arm BP - Sittin/70  Left Arm BP - Sittin/70        LABS      Lipid panel  Lab Results   Component Value Date    CHOL 115 (L) 2019    CHOL 164 2005     Lab Results   Component Value Date    HDL 40 2019    HDL 37.0 (L) 2005     Lab Results   Component Value Date    LDLCALC 63.2 2019    LDLCALC 108.8 2005     Lab Results   Component Value Date    TRIG 59 2019    TRIG 91 2005     Lab Results   Component Value Date    CHOLHDL 34.8 2019    CHOLHDL 22.6 2005            Review of Systems   Constitution: Negative for diaphoresis, night sweats, weight gain and weight loss.   HENT: Negative for congestion.    Eyes: Negative for blurred vision, discharge and double vision.   Cardiovascular: Positive for claudication. Negative for chest pain, cyanosis, dyspnea on exertion, irregular heartbeat, leg swelling, near-syncope, orthopnea, palpitations, paroxysmal nocturnal dyspnea and syncope.   Respiratory: Negative for cough, shortness of breath and wheezing.    Endocrine: Negative for cold intolerance, heat intolerance and polyphagia.   Hematologic/Lymphatic: Negative for adenopathy and bleeding problem. Does not bruise/bleed easily.   Skin: Negative for dry skin and nail changes.   Musculoskeletal: Negative for arthritis, back pain, falls, joint pain,  myalgias and neck pain.   Gastrointestinal: Negative for bloating, abdominal pain, change in bowel habit and constipation.   Genitourinary: Negative for bladder incontinence, dysuria, flank pain, genital sores and missed menses.   Neurological: Negative for aphonia, brief paralysis, difficulty with concentration, dizziness and weakness.   Psychiatric/Behavioral: Negative for altered mental status and memory loss. The patient does not have insomnia.    Allergic/Immunologic: Negative for environmental allergies.       Objective:   Physical Exam   Constitutional: He is oriented to person, place, and time. He appears well-developed and well-nourished. He is not intubated.   HENT:   Head: Normocephalic and atraumatic.   Right Ear: External ear normal.   Left Ear: External ear normal.   Mouth/Throat: Oropharynx is clear and moist.   Eyes: Pupils are equal, round, and reactive to light. Conjunctivae and EOM are normal. Right eye exhibits no discharge. Left eye exhibits no discharge. No scleral icterus.   Neck: Normal range of motion. Neck supple. Normal carotid pulses, no hepatojugular reflux and no JVD present. Carotid bruit is not present. No tracheal deviation present. No thyromegaly present.   Cardiovascular: Normal rate, regular rhythm, S1 normal and S2 normal.  No extrasystoles are present. PMI is not displaced. Exam reveals no gallop, no S3, no distant heart sounds, no friction rub and no midsystolic click.   No murmur heard.  Pulses:       Carotid pulses are 2+ on the right side, and 2+ on the left side.       Radial pulses are 2+ on the right side, and 2+ on the left side.        Femoral pulses are 2+ on the right side, and 2+ on the left side.       Popliteal pulses are 2+ on the right side, and 2+ on the left side.        Dorsalis pedis pulses are 0 on the right side, and 0 on the left side.        Posterior tibial pulses are 0 on the right side, and 0 on the left side.       Biphasic bilateral DP and PT doppler  signals          Pulmonary/Chest: Effort normal and breath sounds normal. No accessory muscle usage or stridor. No apnea, no tachypnea and no bradypnea. He is not intubated. No respiratory distress. He has no decreased breath sounds. He has no wheezes. He has no rales. He exhibits no tenderness and no bony tenderness.   Abdominal: He exhibits no distension, no pulsatile liver, no abdominal bruit, no ascites, no pulsatile midline mass and no mass. There is no tenderness. There is no rebound and no guarding.   Musculoskeletal: Normal range of motion. He exhibits no edema or tenderness.       R lumbar pain with palpation   Lymphadenopathy:     He has no cervical adenopathy.   Neurological: He is alert and oriented to person, place, and time. He has normal reflexes. No cranial nerve deficit. Coordination normal.   Skin: Skin is warm. No rash noted. No erythema. No pallor.       Varicose veins      No ulcers         Psychiatric: He has a normal mood and affect. His behavior is normal. Judgment and thought content normal.       Assessment:     1. PAD (peripheral artery disease)    2. Atherosclerosis of native artery of right lower extremity with intermittent claudication    3. Mixed hyperlipidemia    4. Essential hypertension    5. Coronary artery disease involving native coronary artery of native heart with angina pectoris with documented spasm        Plan:         Medical therapy for PAD   Pletal 50 mg then titrate to 100 mg po bid   Start walking program         Follow up in 6 months with JOAQUINA + US  Consider revascularization for refractory claudication or CLTI or ALI        Medical therapy for CAD   PET stress only revealed 8% ischemic burden       Follow up with pain management for chronic lumbar radiculopathy            Continue with current medical plan and lifestyle changes.  Return sooner for concerns or questions. If symptoms persist go to the ED  I have reviewed all pertinent data on this patient       I have  reviewed the patient's medical history in detail and updated the computerized patient record.    Orders Placed This Encounter   Procedures    Cardiac Rehab Phase II     Standing Status:   Future     Standing Expiration Date:   1/8/2021     Order Specific Question:   Department     Answer:   [facility]     Order Specific Question:   Select qualifying diagnosis:     Answer:   Other - Specify     Order Specific Question:   Other (Specify):     Answer:   PAD walking program    CV Exercise JOAQUINA     Standing Status:   Future     Standing Expiration Date:   1/8/2021       Follow up as scheduled. Return sooner for concerns or questions            He expressed verbal understanding and agreed with the plan        Patient's Medications   New Prescriptions    No medications on file   Previous Medications    AMLODIPINE (NORVASC) 10 MG TABLET        ASPIRIN (ECOTRIN) 81 MG EC TABLET    Take 1 tablet (81 mg total) by mouth once daily.    ATORVASTATIN (LIPITOR) 40 MG TABLET        CILOSTAZOL (PLETAL) 100 MG TAB    Take 1 tablet (100 mg total) by mouth 2 (two) times daily.    FISH OIL-OMEGA-3 FATTY ACIDS 300-1,000 MG CAPSULE    Take by mouth once daily.    HYDROCODONE-ACETAMINOPHEN 10-325MG (NORCO)  MG TAB        LISINOPRIL (PRINIVIL,ZESTRIL) 20 MG TABLET        METOPROLOL TARTRATE (LOPRESSOR) 50 MG TABLET        MULTIVITAMIN (ONE DAILY MULTIVITAMIN) PER TABLET    Take 1 tablet by mouth once daily.    SAW PALMETTO 160 MG CAPSULE    Take 160 mg by mouth 2 (two) times daily.   Modified Medications    No medications on file   Discontinued Medications    No medications on file

## 2020-01-08 NOTE — PATIENT INSTRUCTIONS
Taking Aspirin for Atherosclerosis       Aspirin is a medicine often prescribed to treat atherosclerosis. This condition affects your arteries. These are the blood vessels that carry blood away from your heart. Having atherosclerosis means youre at greater risk of a heart attack or stroke. Aspirin can help prevent these from happening.  How atherosclerosis affects your arteries   A fatty material (plaque) can build up in your arteries. This makes it harder for blood to flow through them. A blood clot can then form on the plaque. This may block the artery, cutting off blood flow. This can cause conditions such as coronary artery disease (CAD) and peripheral arterial disease (PAD):  · CAD occurs when plaque builds up in the coronary artery. This artery supplies the heart with oxygen-rich blood.  · PAD occurs when plaque forms in leg arteries.  The same things that cause CAD and PAD can also cause plaque to form in other arteries in your body, such as those in the brain. When plaque occurs in any of these arteries, it raises your risk of heart attack or stroke.  What aspirin does  Aspirin is a blood-thinner (antiplatelet medicine). It helps keep blood clots from forming. This reduces the risk of blockage. Aspirin can be taken daily if you are at high risk of or have already had a heart attack or stroke. It is also used after a procedure called a stent placement. This is when a tiny wire mesh tube, or stent, is placed in an artery to keep it open. Aspirin helps prevent blood clots from forming on the stent.  Taking aspirin safely  Tell your healthcare provider about any medicines you are taking. This includes:  · All prescription medicines  · Over-the-counter medicines  · Herbs, vitamins, and other supplements  Also mention if you have a history of ulcers or bleeding problems. Ask whether you will need to stop taking aspirin before having surgery or dental work. Always take medicines as directed.  Tips for taking  aspirin  · Have a routine. For example, take aspirin with the same meal each day.  · Dont take more than prescribed. A low dose gives the same benefit as a higher one, with a lower risk of side effects.  · Dont skip doses. Aspirin needs to be taken each day to work well.  · Keep track of what you take. A pillbox with days of the week can help, especially if you take several medicines. Or use a list or chart to keep track.  When to call your healthcare provider  Side effects of aspirin are not usually serious. If you do have problems, changing your dose may help. Call your healthcare provider if you have any of the following:  · Excessive bruising (some bruising is normal)  · Nosebleeds, bleeding gums, or other excessive bleeding  · An upset stomach or stomach pain   Date Last Reviewed: 6/1/2016  © 6445-0907 [employer]. [address] [place] [address], [place] [address]. All rights reserved. This information is not intended as a substitute for professional medical care. Always follow your healthcare professional's instructions.

## 2020-03-10 ENCOUNTER — OFFICE VISIT (OUTPATIENT)
Dept: CARDIOLOGY | Facility: CLINIC | Age: 75
End: 2020-03-10
Payer: MEDICARE

## 2020-03-10 VITALS
WEIGHT: 201.5 LBS | DIASTOLIC BLOOD PRESSURE: 75 MMHG | SYSTOLIC BLOOD PRESSURE: 142 MMHG | HEIGHT: 67 IN | OXYGEN SATURATION: 97 % | HEART RATE: 71 BPM | BODY MASS INDEX: 31.63 KG/M2

## 2020-03-10 DIAGNOSIS — I73.9 PAD (PERIPHERAL ARTERY DISEASE): Primary | ICD-10-CM

## 2020-03-10 DIAGNOSIS — I25.10 CORONARY ARTERY DISEASE INVOLVING NATIVE CORONARY ARTERY OF NATIVE HEART WITHOUT ANGINA PECTORIS: ICD-10-CM

## 2020-03-10 DIAGNOSIS — I70.211 ATHEROSCLEROSIS OF NATIVE ARTERY OF RIGHT LOWER EXTREMITY WITH INTERMITTENT CLAUDICATION: ICD-10-CM

## 2020-03-10 DIAGNOSIS — E78.2 MIXED HYPERLIPIDEMIA: ICD-10-CM

## 2020-03-10 DIAGNOSIS — I10 ESSENTIAL HYPERTENSION: ICD-10-CM

## 2020-03-10 PROCEDURE — 3077F PR MOST RECENT SYSTOLIC BLOOD PRESSURE >= 140 MM HG: ICD-10-PCS | Mod: CPTII,S$GLB,, | Performed by: INTERNAL MEDICINE

## 2020-03-10 PROCEDURE — 1126F AMNT PAIN NOTED NONE PRSNT: CPT | Mod: S$GLB,,, | Performed by: INTERNAL MEDICINE

## 2020-03-10 PROCEDURE — 99999 PR PBB SHADOW E&M-EST. PATIENT-LVL III: CPT | Mod: PBBFAC,,, | Performed by: INTERNAL MEDICINE

## 2020-03-10 PROCEDURE — 3077F SYST BP >= 140 MM HG: CPT | Mod: CPTII,S$GLB,, | Performed by: INTERNAL MEDICINE

## 2020-03-10 PROCEDURE — 1159F MED LIST DOCD IN RCRD: CPT | Mod: S$GLB,,, | Performed by: INTERNAL MEDICINE

## 2020-03-10 PROCEDURE — 1101F PR PT FALLS ASSESS DOC 0-1 FALLS W/OUT INJ PAST YR: ICD-10-PCS | Mod: CPTII,S$GLB,, | Performed by: INTERNAL MEDICINE

## 2020-03-10 PROCEDURE — 1101F PT FALLS ASSESS-DOCD LE1/YR: CPT | Mod: CPTII,S$GLB,, | Performed by: INTERNAL MEDICINE

## 2020-03-10 PROCEDURE — 3078F DIAST BP <80 MM HG: CPT | Mod: CPTII,S$GLB,, | Performed by: INTERNAL MEDICINE

## 2020-03-10 PROCEDURE — 99214 OFFICE O/P EST MOD 30 MIN: CPT | Mod: S$GLB,,, | Performed by: INTERNAL MEDICINE

## 2020-03-10 PROCEDURE — 1159F PR MEDICATION LIST DOCUMENTED IN MEDICAL RECORD: ICD-10-PCS | Mod: S$GLB,,, | Performed by: INTERNAL MEDICINE

## 2020-03-10 PROCEDURE — 99999 PR PBB SHADOW E&M-EST. PATIENT-LVL III: ICD-10-PCS | Mod: PBBFAC,,, | Performed by: INTERNAL MEDICINE

## 2020-03-10 PROCEDURE — 1126F PR PAIN SEVERITY QUANTIFIED, NO PAIN PRESENT: ICD-10-PCS | Mod: S$GLB,,, | Performed by: INTERNAL MEDICINE

## 2020-03-10 PROCEDURE — 99214 PR OFFICE/OUTPT VISIT, EST, LEVL IV, 30-39 MIN: ICD-10-PCS | Mod: S$GLB,,, | Performed by: INTERNAL MEDICINE

## 2020-03-10 PROCEDURE — 3078F PR MOST RECENT DIASTOLIC BLOOD PRESSURE < 80 MM HG: ICD-10-PCS | Mod: CPTII,S$GLB,, | Performed by: INTERNAL MEDICINE

## 2020-03-10 NOTE — LETTER
March 10, 2020      Steven May MD  [address]  [address]  [place] [address]           [facility]  [address]  [facility] [address]  Phone: [phone]          Patient: Kermit Castro   MR Number: [id number]   YOB: 1945   Date of Visit: 3/10/2020       Dear Dr. Steven May:    Thank you for referring Kermit Castro to me for evaluation. Attached you will find relevant portions of my assessment and plan of care.    If you have questions, please do not hesitate to call me. I look forward to following Kermit Castro along with you.    Sincerely,    Gume Nagy MD    Enclosure  CC:  No Recipients    If you would like to receive this communication electronically, please contact [email or web address] or [phone] to request more information on [facility] [facility] Link access.    For providers and/or their staff who would like to refer a patient to [facility], please contact us through our one-stop-shop provider referral line, [facility], at [phone].    If you feel you have received this communication in error or would no longer like to receive these types of communications, please e-mail [email or web address]

## 2020-03-10 NOTE — PROGRESS NOTES
Interventional Cardiology Clinic Note  Reason for Visit: PAD    HPI:   Pt is a 74 year old gentleman who presented for a second opinion for PAD    He has yanira IIb claudication R >> L. He cannot walk > 1 room. He also complainsof dyspnea II with moderate exertion. He has a history of bilateral SFA + R EIA intervention (details below) and CAD s/p CABG + POBA of LIMA-LAD + HTN + diastolic dysfunction and HLP. He is on GDMT however still has symptoms. He was just seen in January for this however states that he was not told anything so scheduled an appt today.     JOAQUINA 6/2019                 R 0.77              L 1.06        JOAQUINA 12/2019                        R 0.64            L 0.71     US 6/2019     Diameters in mm                 CFA 9              SFA 7              POP 6              BTK 4              BTA 3              R , , pSFA 81, mSFA 41, dSFA 18, pPOP 26-36, PT 34, AT 28, DP 23  L , , pSFA 219-136, mSFA 89, dSFA 25, POP 35, PT 59, AT 30, DP 03                         S/p multiple PCI then CABG 1998                 LIMA-LAD              SVG-OM              SVG-PDA                               PTA of LIMA-LAD 12/1998        [facility] 2005                 3 patent graft              Normal EF              LVEDP 20 mmHg              Patent renal arteries           PET stress 10/2019                 8% infarct              Normal EF              No arrhythmias              No symptoms              No ECG changes         US 12/2019                    Bilateral SFA               Native R SFA and L SFA ISR           Review of Systems   All other systems reviewed and are negative.      PMH:     Past Medical History:   Diagnosis Date    Coronary artery disease     Hyperlipidemia     Hypertension      Past Surgical History:   Procedure Laterality Date    ABDOMINAL SURGERY      Polyps in colon removed (noncancerous)    APPENDECTOMY      back surgery (screws & okate)       "CORONARY ARTERY BYPASS GRAFT           Allergies:     Review of patient's allergies indicates:   Allergen Reactions    Iodine and iodide containing products Anaphylaxis    Shellfish containing products Anaphylaxis     Medications:     Current Outpatient Medications on File Prior to Visit   Medication Sig Dispense Refill    amlodipine (NORVASC) 10 MG tablet       aspirin (ECOTRIN) 81 MG EC tablet Take 1 tablet (81 mg total) by mouth once daily.  0    atorvastatin (LIPITOR) 40 MG tablet       cilostazol (PLETAL) 100 MG Tab Take 1 tablet (100 mg total) by mouth 2 (two) times daily. 180 tablet 3    fish oil-omega-3 fatty acids 300-1,000 mg capsule Take by mouth once daily.      hydrocodone-acetaminophen 10-325mg (NORCO)  mg Tab       lisinopril (PRINIVIL,ZESTRIL) 20 MG tablet       metoprolol tartrate (LOPRESSOR) 50 MG tablet       multivitamin (ONE DAILY MULTIVITAMIN) per tablet Take 1 tablet by mouth once daily.      saw palmetto 160 MG capsule Take 160 mg by mouth 2 (two) times daily.       No current facility-administered medications on file prior to visit.      Social History:     Social History     Tobacco Use    Smoking status: Former Smoker     Last attempt to quit: 10/5/1998     Years since quittin.4   Substance Use Topics    Alcohol use: Not Currently     Alcohol/week: 0.0 standard drinks     Comment:      Family History:   History reviewed. No pertinent family history.    Physical Exam  BP (!) 142/75 (BP Location: Right arm, Patient Position: Sitting, BP Method: Large (Automatic))   Pulse 71   Ht 5' 7" (1.702 m)   Wt 91.4 kg (201 lb 8 oz)   SpO2 97%   BMI 31.56 kg/m²    GEN: Alert and oriented in NAD  NECK: no JVD appreciated  CVS: RRR, s1/s2, no MRG  PULM: CTAB no rales  ABD: NT/ND BS +  Extremities: warm and dry, no palpable PT/DP, no edema  NEURO: Alert and oriented x 3  PSYCH: appropriate affect.             Labs:     Lab Results   Component Value Date     " 10/05/2019    K 4.4 10/05/2019     10/05/2019    CO2 26 10/05/2019    BUN 10 10/05/2019    CREATININE 1.00 10/05/2019    ANIONGAP 11 10/05/2019     No results found for: HGBA1C  No results found for: BNP, BNPTRIAGEBLO Lab Results   Component Value Date    WBC 17.33 (H) 10/05/2019    HGB 13.4 (L) 10/05/2019    HCT 42.4 10/05/2019     10/05/2019    GRAN 14.5 (H) 10/05/2019    GRAN 84.2 (H) 10/05/2019     Lab Results   Component Value Date    CHOL 115 (L) 08/27/2019    HDL 40 08/27/2019    LDLCALC 63.2 08/27/2019    TRIG 59 08/27/2019          Nuc Stress EF   Date Value Ref Range Status   10/14/2019 76 % Final     Nuc Rest EF   Date Value Ref Range Status   10/14/2019 59.0  Final       Assessment and Plan  Kermit Castro is a 74 y.o. gentleman here for a second opinion for PAD    1. PAD (peripheral artery disease)   Pt of Dr. Velazquez with Yuliet IIb claudication mainly the right leg. Was just seen in January and was placed on pletal and was told to start an exercise program however the patient states he was never told anything so presented here today for a second opinion. He was instructed to start an exercise program and follow up in May.      2. Atherosclerosis of native artery of right lower extremity with intermittent claudication  As above     3. Mixed hyperlipidemia   Continue statin    4. Essential hypertension   Management per PCP    5. Coronary artery disease involving native coronary artery of native heart without angina pectoris   No chest pain, recent PET with no serious defects.       Plan  Exercise program  Cilostazol   F/U in May if symptoms have not improved will plan for angiogram.     Signed:        Damion Wetzel MD  Cardiology Fellow  Pager [phone]    Interventional Cardiology Staff  I have personally taken the history and examined this patient. I have discussed and agree with the resident's findings and plan as documented in the resident's note.  Yuliet 2B claudication  self-referred for 2nd opinion.  Patient was recently placed on plate tall has known bilateral lower extremity PAD and prior stents done by me in the distant past.  I discussed walking program to the point of leg pain extensively with patient and recommended continuing plate tall and follow-up in approximately 3 months.  Would not recommend intervention at this point unless patient fails walking program.  Gume Nagy

## 2020-05-20 ENCOUNTER — TELEPHONE (OUTPATIENT)
Dept: CARDIOLOGY | Facility: HOSPITAL | Age: 75
End: 2020-05-20

## 2020-05-20 ENCOUNTER — TELEPHONE (OUTPATIENT)
Dept: CARDIOLOGY | Facility: CLINIC | Age: 75
End: 2020-05-20

## 2020-05-20 NOTE — TELEPHONE ENCOUNTER
,     Per ,  I need to scheduled you an appointment to see  here at [facility]  Or      In  [facility].    Please call me at your earliest convenience at    [phone].      Thank you--    Aisha Koch (rita).

## 2020-05-20 NOTE — TELEPHONE ENCOUNTER
He has an appointment with Dr. Nagy next week      An in person visit will be very helpful       We work together as team. Dr. Nagy will notify me       I can see him the following week either in [place] or [place]      Please tell them to send the picture of the feet      Thanks      ZN

## 2020-05-20 NOTE — TELEPHONE ENCOUNTER
----- Message from Nesha Russo sent at 5/20/2020  1:33 PM CDT -----  Contact: [phone] patient  Patient states he is returning Shruti's call. Please advise.

## 2020-05-20 NOTE — TELEPHONE ENCOUNTER
----- Message from Aisha Koch MA sent at 5/19/2020 11:24 AM CDT -----  Contact: Pt wife Sloane Castro  Dear RUBEN,    Pt c/o Foot swelling that Comes and goes.    Family is going to Hopefully send me Pt Foot Pictures he stated     , and tomorrow we can do  Audio Visit.      Please advise-    Aisha Koch (rita).        ----- Message -----  From: Rylee Cantrell  Sent: 5/19/2020  10:53 AM CDT  To: Lalo BLACK Staff    Type:  Needs Medical Advice    Who Called: Mrs. Castro  Symptoms (please be specific): swollen feet   How long has patient had these symptoms:  Last week  Would the patient rather a call back or a response via [facility]? Callback   Best Call Back Number: [phone]  Additional Information: Says it has gotten worst

## 2020-05-21 ENCOUNTER — TELEPHONE (OUTPATIENT)
Dept: CARDIOLOGY | Facility: CLINIC | Age: 75
End: 2020-05-21

## 2020-05-21 NOTE — TELEPHONE ENCOUNTER
Returned patient call. Appointment scheduled for follow-up appointment and mailed to patient.      Aisha Koch (rita).                     ----- Message from Aisha Koch MA sent at 5/20/2020  5:04 PM CDT -----  Pooja BLACK Staff  Caller: patient (Today, 11:28 AM)         Type:  Patient Returning Call     Who Called: Patient   Who Left Message for Patient: Shruti   Does the patient know what this is regarding?: yes   Would the patient rather a call back or a response via [facility]?  Call back   Best Call Back Number: [phone]   Additional Information: please call today

## 2020-05-25 ENCOUNTER — TELEPHONE (OUTPATIENT)
Dept: CARDIOLOGY | Facility: CLINIC | Age: 75
End: 2020-05-25

## 2020-05-25 NOTE — TELEPHONE ENCOUNTER
I returned patient call, he didn't answer,    So I left him V/Message reminding him of his up coming visit with  6-1-2020.          ANDREINA Amaya (RITA).                        ----- Message from Nesha Russo sent at 5/25/2020  9:30 AM CDT -----  Contact: patient [phone]  Patient requesting to speak with you regarding his upcoming appointment/procedure. Please advise.

## 2020-06-03 ENCOUNTER — OFFICE VISIT (OUTPATIENT)
Dept: CARDIOLOGY | Facility: CLINIC | Age: 75
End: 2020-06-03
Payer: MEDICARE

## 2020-06-03 VITALS
DIASTOLIC BLOOD PRESSURE: 64 MMHG | HEART RATE: 74 BPM | WEIGHT: 196 LBS | BODY MASS INDEX: 29.7 KG/M2 | HEIGHT: 68 IN | SYSTOLIC BLOOD PRESSURE: 115 MMHG

## 2020-06-03 DIAGNOSIS — I87.2 VENOUS INSUFFICIENCY OF LOWER EXTREMITY, UNSPECIFIED LATERALITY: ICD-10-CM

## 2020-06-03 DIAGNOSIS — I70.211 ATHEROSCLEROSIS OF NATIVE ARTERY OF RIGHT LOWER EXTREMITY WITH INTERMITTENT CLAUDICATION: ICD-10-CM

## 2020-06-03 DIAGNOSIS — E78.2 MIXED HYPERLIPIDEMIA: ICD-10-CM

## 2020-06-03 DIAGNOSIS — I73.9 PAD (PERIPHERAL ARTERY DISEASE): ICD-10-CM

## 2020-06-03 DIAGNOSIS — M54.16 LUMBAR RADICULOPATHY: Primary | ICD-10-CM

## 2020-06-03 DIAGNOSIS — I25.111 CORONARY ARTERY DISEASE INVOLVING NATIVE CORONARY ARTERY OF NATIVE HEART WITH ANGINA PECTORIS WITH DOCUMENTED SPASM: ICD-10-CM

## 2020-06-03 DIAGNOSIS — M54.50 LOW BACK PAIN, NON-SPECIFIC: ICD-10-CM

## 2020-06-03 DIAGNOSIS — I10 ESSENTIAL HYPERTENSION: ICD-10-CM

## 2020-06-03 PROCEDURE — 99999 PR PBB SHADOW E&M-EST. PATIENT-LVL III: ICD-10-PCS | Mod: PBBFAC,,, | Performed by: INTERNAL MEDICINE

## 2020-06-03 PROCEDURE — 1126F PR PAIN SEVERITY QUANTIFIED, NO PAIN PRESENT: ICD-10-PCS | Mod: S$GLB,,, | Performed by: INTERNAL MEDICINE

## 2020-06-03 PROCEDURE — 1126F AMNT PAIN NOTED NONE PRSNT: CPT | Mod: S$GLB,,, | Performed by: INTERNAL MEDICINE

## 2020-06-03 PROCEDURE — 3074F SYST BP LT 130 MM HG: CPT | Mod: CPTII,S$GLB,, | Performed by: INTERNAL MEDICINE

## 2020-06-03 PROCEDURE — 99215 OFFICE O/P EST HI 40 MIN: CPT | Mod: S$GLB,,, | Performed by: INTERNAL MEDICINE

## 2020-06-03 PROCEDURE — 3074F PR MOST RECENT SYSTOLIC BLOOD PRESSURE < 130 MM HG: ICD-10-PCS | Mod: CPTII,S$GLB,, | Performed by: INTERNAL MEDICINE

## 2020-06-03 PROCEDURE — 1159F MED LIST DOCD IN RCRD: CPT | Mod: S$GLB,,, | Performed by: INTERNAL MEDICINE

## 2020-06-03 PROCEDURE — 99999 PR PBB SHADOW E&M-EST. PATIENT-LVL III: CPT | Mod: PBBFAC,,, | Performed by: INTERNAL MEDICINE

## 2020-06-03 PROCEDURE — 3078F PR MOST RECENT DIASTOLIC BLOOD PRESSURE < 80 MM HG: ICD-10-PCS | Mod: CPTII,S$GLB,, | Performed by: INTERNAL MEDICINE

## 2020-06-03 PROCEDURE — 99215 PR OFFICE/OUTPT VISIT, EST, LEVL V, 40-54 MIN: ICD-10-PCS | Mod: S$GLB,,, | Performed by: INTERNAL MEDICINE

## 2020-06-03 PROCEDURE — 1159F PR MEDICATION LIST DOCUMENTED IN MEDICAL RECORD: ICD-10-PCS | Mod: S$GLB,,, | Performed by: INTERNAL MEDICINE

## 2020-06-03 PROCEDURE — 3078F DIAST BP <80 MM HG: CPT | Mod: CPTII,S$GLB,, | Performed by: INTERNAL MEDICINE

## 2020-06-03 PROCEDURE — 1101F PT FALLS ASSESS-DOCD LE1/YR: CPT | Mod: CPTII,S$GLB,, | Performed by: INTERNAL MEDICINE

## 2020-06-03 PROCEDURE — 1101F PR PT FALLS ASSESS DOC 0-1 FALLS W/OUT INJ PAST YR: ICD-10-PCS | Mod: CPTII,S$GLB,, | Performed by: INTERNAL MEDICINE

## 2020-06-03 NOTE — PROGRESS NOTES
Subjective:   Patient ID:  Kermit Castro is a 74 y.o. male who presents for evaluation and treatment of Peripheral Arterial Disease; Claudication; Hyperlipidemia; and Hypertension      HPI:       He is here for management of worsening R leg pain with ambulation. He has yanira IIb claudication R >> L. He cannot walk > 1 room. He also has lumbar radiculopathy with pain starting in the lower back area going down to the foot. He has a history of back surgery + injections. He also complained of dyspnea II with moderate exertion unchanged over several years. He has trace to 1+ edema of his feet with some varicose veins suggestive of underlying venous insufficiency. He does not wear compression stockings. He has a history of bilateral SFA + R EIA intervention (details below) and CAD s/p CABG + POBA of LIMA-LAD + HTN + diastolic dysfunction and HLP. + aspirin for MAPT. + acei + statin.       JOAQUINA 6/2019     R 0.77   L 1.06      JOAQUINA 12/2019      R 0.64   L 0.71    US 6/2019    Diameters in mm     CFA 9   SFA 7   POP 6   BTK 4   BTA 3          R , , pSFA 81, mSFA 41, dSFA 18, pPOP 26-36, PT 34, AT 28, DP 23  L , , pSFA 219-136, mSFA 89, dSFA 25, POP 35, PT 59, AT 30, DP 03           S/p multiple PCI then CABG 1998     LIMA-LAD   SVG-OM   SVG-PDA        PTA of LIMA-LAD 12/1998      [facility] 2005     3 patent graft   Normal EF   LVEDP 20 mmHg   Patent renal arteries        PET stress 10/2019     8% infarct   Normal EF   No arrhythmias   No symptoms   No ECG changes       US 12/2019       Bilateral SFA    Native R SFA and L SFA ISR               Care team:    Dr. Emmett Hassan            Patient Active Problem List    Diagnosis Date Noted    Abnormal cardiovascular stress test 12/04/2019         PET stress 10/2019     8% infarct   Normal EF   No arrhythmias   No symptoms   No ECG changes         PAD (peripheral artery disease) 08/07/2019 2/2006     L SFA  PTAS   8 x 40 mm and 6.0  x 120 [employer] SES          2006     R SFA  PTA with 4 and 6.0 mm balloons   R EIA PTAS with 7 mm SES for ISR (Resilient trial)      JOAQUINA 2019     R 0.77   L 1.06        US 2019    Diameters in mm     CFA 9   SFA 7   POP 6   BTK 4   BTA 3          R , , pSFA 81, mSFA 41, dSFA 18, pPOP 26-36, PT 34, AT 28, DP 23  L , , pSFA 219-136, mSFA 89, dSFA 25, POP 35, PT 59, AT 30, DP 03                  Dyspnea on exertion 2019    Atherosclerosis of native artery of right lower extremity with intermittent claudication 2019    Mixed hyperlipidemia 2019    Essential hypertension 2019    Coronary artery disease involving native coronary artery of native heart with angina pectoris with documented spasm 2019            S/p multiple PCI then CABG      LIMA-LAD   SVG-OM   SVG-PDA        PTA of LIMA-LAD 1998      [facility] 2005     3 patent graft   Normal EF   LVEDP 20 mmHg   Patent renal arteries                     Right Arm BP - Sittin/63  Left Arm BP - Sittin/64        LABS      Lipid panel  Lab Results   Component Value Date    CHOL 115 (L) 2019    CHOL 164 2005     Lab Results   Component Value Date    HDL 40 2019    HDL 37.0 (L) 2005     Lab Results   Component Value Date    LDLCALC 63.2 2019    LDLCALC 108.8 2005     Lab Results   Component Value Date    TRIG 59 2019    TRIG 91 2005     Lab Results   Component Value Date    CHOLHDL 34.8 2019    CHOLHDL 22.6 2005            Review of Systems   Constitution: Negative for diaphoresis, night sweats, weight gain and weight loss.   HENT: Negative for congestion.    Eyes: Negative for blurred vision, discharge and double vision.   Cardiovascular: Positive for claudication. Negative for chest pain, cyanosis, dyspnea on exertion, irregular heartbeat, leg swelling, near-syncope, orthopnea, palpitations, paroxysmal nocturnal dyspnea and syncope.    Respiratory: Negative for cough, shortness of breath and wheezing.    Endocrine: Negative for cold intolerance, heat intolerance and polyphagia.   Hematologic/Lymphatic: Negative for adenopathy and bleeding problem. Does not bruise/bleed easily.   Skin: Negative for dry skin and nail changes.   Musculoskeletal: Negative for arthritis, back pain, falls, joint pain, myalgias and neck pain.   Gastrointestinal: Negative for bloating, abdominal pain, change in bowel habit and constipation.   Genitourinary: Negative for bladder incontinence, dysuria, flank pain, genital sores and missed menses.   Neurological: Negative for aphonia, brief paralysis, difficulty with concentration, dizziness and weakness.   Psychiatric/Behavioral: Negative for altered mental status and memory loss. The patient does not have insomnia.    Allergic/Immunologic: Negative for environmental allergies.       Objective:   Physical Exam   Constitutional: He is oriented to person, place, and time. He appears well-developed and well-nourished. He is not intubated.   HENT:   Head: Normocephalic and atraumatic.   Right Ear: External ear normal.   Left Ear: External ear normal.   Mouth/Throat: Oropharynx is clear and moist.   Eyes: Pupils are equal, round, and reactive to light. Conjunctivae and EOM are normal. Right eye exhibits no discharge. Left eye exhibits no discharge. No scleral icterus.   Neck: Normal range of motion. Neck supple. Normal carotid pulses, no hepatojugular reflux and no JVD present. Carotid bruit is not present. No tracheal deviation present. No thyromegaly present.   Cardiovascular: Normal rate, regular rhythm, S1 normal and S2 normal.  No extrasystoles are present. PMI is not displaced. Exam reveals no gallop, no S3, no distant heart sounds, no friction rub and no midsystolic click.   No murmur heard.  Pulses:       Carotid pulses are 2+ on the right side, and 2+ on the left side.       Radial pulses are 2+ on the right side, and  2+ on the left side.        Femoral pulses are 2+ on the right side, and 2+ on the left side.       Popliteal pulses are 2+ on the right side, and 2+ on the left side.        Dorsalis pedis pulses are 0 on the right side, and 0 on the left side.        Posterior tibial pulses are 0 on the right side, and 0 on the left side.       Biphasic bilateral DP and PT doppler signals          Pulmonary/Chest: Effort normal and breath sounds normal. No accessory muscle usage or stridor. No apnea, no tachypnea and no bradypnea. He is not intubated. No respiratory distress. He has no decreased breath sounds. He has no wheezes. He has no rales. He exhibits no tenderness and no bony tenderness.   Abdominal: He exhibits no distension, no pulsatile liver, no abdominal bruit, no ascites, no pulsatile midline mass and no mass. There is no tenderness. There is no rebound and no guarding.   Musculoskeletal: Normal range of motion. He exhibits no edema or tenderness.       R lumbar pain with palpation   Lymphadenopathy:     He has no cervical adenopathy.   Neurological: He is alert and oriented to person, place, and time. He has normal reflexes. No cranial nerve deficit. Coordination normal.   Skin: Skin is warm. No rash noted. No erythema. No pallor.       Varicose veins      No ulcers         Psychiatric: He has a normal mood and affect. His behavior is normal. Judgment and thought content normal.       Assessment:     1. Lumbar radiculopathy    2. PAD (peripheral artery disease)    3. Atherosclerosis of native artery of right lower extremity with intermittent claudication    4. Mixed hyperlipidemia    5. Essential hypertension    6. Coronary artery disease involving native coronary artery of native heart with angina pectoris with documented spasm    7. Venous insufficiency of lower extremity, unspecified laterality    8. Low back pain, non-specific        Plan:       Lumbar MRI with pain management referral   To assure there is no  underlying lumbar radiculopathy contributing to his symptoms        Medical therapy for PAD for now   Pletal 100 mg po bid   Start walking program         Follow up after pain management visit   Consider revascularization for refractory claudication or CLTI or ALI        Medical therapy for CAD   PET stress only revealed 8% ischemic burden       Compression stockings for edema  Consider venous reflux ultrasound for refractory symptoms           Continue with current medical plan and lifestyle changes.  Return sooner for concerns or questions. If symptoms persist go to the ED  I have reviewed all pertinent data on this patient       I have reviewed the patient's medical history in detail and updated the computerized patient record.    Orders Placed This Encounter   Procedures    COMPRESSION STOCKINGS     Knee high     Order Specific Question:   Pressure amount:     Answer:   20-30 mmHg    MRI Lumbar Spine W WO Contrast     Standing Status:   Future     Standing Expiration Date:   6/3/2021     Order Specific Question:   Does the patient have a pacemaker or a defibrilator?     Answer:   No     Order Specific Question:   Does the patient have a cerebral aneurysm or surgical clip, pump, nerve or brain stimulator, middle or inner ear prosthesis, or other metal implant or  been injured by a metal object(i.e. bullet, bb, shrapnel)?     Answer:   No     Order Specific Question:   Is the patient claustrophobic?     Answer:   No     Order Specific Question:   Will the patient require sedation?     Answer:   No     Order Specific Question:   Does the patient have any of the following conditions? Diabetes, History of Renal Disease or Hypertension requiring medical therapy?     Answer:   No     Order Specific Question:   May the Radiologist modify the order per protocol to meet the clinical needs of the patient?     Answer:   Yes     Order Specific Question:   Is this part of a Research Study?     Answer:   No     Order  Specific Question:   Recist criteria?     Answer:   Yes     Order Specific Question:   Does the patient have on a skin patch for medication with aluminized backing?     Answer:   No    Creatinine, serum     Standing Status:   Future     Standing Expiration Date:   8/2/2021    Ambulatory referral/consult to Pain Clinic     Standing Status:   Future     Standing Expiration Date:   7/3/2021     Referral Priority:   Routine     Referral Type:   Consultation     Referral Reason:   Specialty Services Required     Requested Specialty:   Pain Medicine     Number of Visits Requested:   1       Follow up as scheduled. Return sooner for concerns or questions            He and his wife expressed verbal understanding and agreed with the plan        Patient's Medications   New Prescriptions    No medications on file   Previous Medications    AMLODIPINE (NORVASC) 10 MG TABLET        ASPIRIN (ECOTRIN) 81 MG EC TABLET    Take 1 tablet (81 mg total) by mouth once daily.    ATORVASTATIN (LIPITOR) 40 MG TABLET        CILOSTAZOL (PLETAL) 100 MG TAB    Take 1 tablet (100 mg total) by mouth 2 (two) times daily.    FISH OIL-OMEGA-3 FATTY ACIDS 300-1,000 MG CAPSULE    Take by mouth once daily.    HYDROCODONE-ACETAMINOPHEN 10-325MG (NORCO)  MG TAB        LISINOPRIL (PRINIVIL,ZESTRIL) 20 MG TABLET        METOPROLOL TARTRATE (LOPRESSOR) 50 MG TABLET        MULTIVITAMIN (ONE DAILY MULTIVITAMIN) PER TABLET    Take 1 tablet by mouth once daily.    SAW PALMETTO 160 MG CAPSULE    Take 160 mg by mouth 2 (two) times daily.   Modified Medications    No medications on file   Discontinued Medications    No medications on file

## 2020-06-10 ENCOUNTER — HOSPITAL ENCOUNTER (OUTPATIENT)
Dept: RADIOLOGY | Facility: HOSPITAL | Age: 75
Discharge: HOME | End: 2020-06-10
Attending: INTERNAL MEDICINE
Payer: MEDICARE

## 2020-06-10 DIAGNOSIS — M54.50 LOW BACK PAIN, NON-SPECIFIC: ICD-10-CM

## 2020-06-10 PROCEDURE — 72158 MRI LUMBAR SPINE W/O & W/DYE: CPT | Mod: TC,PO

## 2020-06-10 PROCEDURE — 25500020 PHARM REV CODE 255: Mod: PO | Performed by: INTERNAL MEDICINE

## 2020-06-10 PROCEDURE — A9585 GADOBUTROL INJECTION: HCPCS | Mod: PO | Performed by: INTERNAL MEDICINE

## 2020-06-10 RX ORDER — GADOBUTROL 604.72 MG/ML
10 INJECTION INTRAVENOUS
Status: COMPLETED | OUTPATIENT
Start: 2020-06-10 | End: 2020-06-10

## 2020-06-10 RX ADMIN — GADOBUTROL 10 ML: 604.72 INJECTION INTRAVENOUS at 10:06

## 2020-07-06 ENCOUNTER — OFFICE VISIT (OUTPATIENT)
Dept: PAIN MEDICINE | Facility: CLINIC | Age: 75
End: 2020-07-06
Payer: MEDICARE

## 2020-07-06 ENCOUNTER — TELEPHONE (OUTPATIENT)
Dept: PAIN MEDICINE | Facility: CLINIC | Age: 75
End: 2020-07-06

## 2020-07-06 VITALS
HEART RATE: 109 BPM | BODY MASS INDEX: 29.8 KG/M2 | WEIGHT: 196 LBS | SYSTOLIC BLOOD PRESSURE: 121 MMHG | DIASTOLIC BLOOD PRESSURE: 72 MMHG

## 2020-07-06 DIAGNOSIS — G89.4 CHRONIC PAIN SYNDROME: ICD-10-CM

## 2020-07-06 DIAGNOSIS — F11.90 CHRONIC, CONTINUOUS USE OF OPIOIDS: ICD-10-CM

## 2020-07-06 DIAGNOSIS — M54.16 LUMBAR RADICULOPATHY: Primary | ICD-10-CM

## 2020-07-06 DIAGNOSIS — M96.1 FAILED BACK SURGICAL SYNDROME: ICD-10-CM

## 2020-07-06 PROCEDURE — 3074F PR MOST RECENT SYSTOLIC BLOOD PRESSURE < 130 MM HG: ICD-10-PCS | Mod: CPTII,S$GLB,, | Performed by: PAIN MEDICINE

## 2020-07-06 PROCEDURE — 3078F DIAST BP <80 MM HG: CPT | Mod: CPTII,S$GLB,, | Performed by: PAIN MEDICINE

## 2020-07-06 PROCEDURE — 3074F SYST BP LT 130 MM HG: CPT | Mod: CPTII,S$GLB,, | Performed by: PAIN MEDICINE

## 2020-07-06 PROCEDURE — 1125F PR PAIN SEVERITY QUANTIFIED, PAIN PRESENT: ICD-10-PCS | Mod: S$GLB,,, | Performed by: PAIN MEDICINE

## 2020-07-06 PROCEDURE — 1159F MED LIST DOCD IN RCRD: CPT | Mod: S$GLB,,, | Performed by: PAIN MEDICINE

## 2020-07-06 PROCEDURE — 99999 PR PBB SHADOW E&M-EST. PATIENT-LVL III: ICD-10-PCS | Mod: PBBFAC,,, | Performed by: PAIN MEDICINE

## 2020-07-06 PROCEDURE — 99204 PR OFFICE/OUTPT VISIT, NEW, LEVL IV, 45-59 MIN: ICD-10-PCS | Mod: S$GLB,,, | Performed by: PAIN MEDICINE

## 2020-07-06 PROCEDURE — 1101F PR PT FALLS ASSESS DOC 0-1 FALLS W/OUT INJ PAST YR: ICD-10-PCS | Mod: CPTII,S$GLB,, | Performed by: PAIN MEDICINE

## 2020-07-06 PROCEDURE — 99204 OFFICE O/P NEW MOD 45 MIN: CPT | Mod: S$GLB,,, | Performed by: PAIN MEDICINE

## 2020-07-06 PROCEDURE — 1159F PR MEDICATION LIST DOCUMENTED IN MEDICAL RECORD: ICD-10-PCS | Mod: S$GLB,,, | Performed by: PAIN MEDICINE

## 2020-07-06 PROCEDURE — 1125F AMNT PAIN NOTED PAIN PRSNT: CPT | Mod: S$GLB,,, | Performed by: PAIN MEDICINE

## 2020-07-06 PROCEDURE — 99999 PR PBB SHADOW E&M-EST. PATIENT-LVL III: CPT | Mod: PBBFAC,,, | Performed by: PAIN MEDICINE

## 2020-07-06 PROCEDURE — 3078F PR MOST RECENT DIASTOLIC BLOOD PRESSURE < 80 MM HG: ICD-10-PCS | Mod: CPTII,S$GLB,, | Performed by: PAIN MEDICINE

## 2020-07-06 PROCEDURE — 1101F PT FALLS ASSESS-DOCD LE1/YR: CPT | Mod: CPTII,S$GLB,, | Performed by: PAIN MEDICINE

## 2020-07-06 RX ORDER — GABAPENTIN 100 MG/1
100 CAPSULE ORAL 3 TIMES DAILY
Qty: 90 CAPSULE | Refills: 1 | Status: SHIPPED | OUTPATIENT
Start: 2020-07-06 | End: 2020-08-24

## 2020-07-06 NOTE — TELEPHONE ENCOUNTER
----- Message from Trena Vazquez sent at 7/6/2020  2:51 PM CDT -----  Contact: PATIENT  [phone]  Patient calling to speak with the nurse regarding running late for his appointment due to an accident.   Tried IM message and calling EXT for nurse no answer

## 2020-07-06 NOTE — PROGRESS NOTES
[facility] New Patient Evaluation    Referred by: TRISTIAN DONIS  Reason for referral: Lumbar Radiculopathy    CC:   Chief Complaint   Patient presents with    Low-back Pain    Leg Pain       Last 3 PDI Scores 7/6/2020   Pain Disability Index (PDI) 49       HPI: Kermit Castro is a 74 y.o. male referred for low back pain that radiates into both legs, though radiating pain into the right leg is much worse than the left.  Who reports history of back surgery (confirmed by recent MRI) with fusion of L2-L3.  He believes that his back and leg pain got worse after surgery, which was in 2017.  Since that time, he has been maintained on Percocet  t.i.d. p.r.n. by an outside pain management physician.  He reports that Percocet  used to provide him good relief, but now this medication is like taking candy.    Location: Low back   Severity: Currently: 7/10   Typical Range: 5/7/10     Exacerbation: 10/10   Onset: about 3 years ago  Quality: Throbbing and Shooting  Radiation: right leg  Axial/Extremity Percentage of Pain: 50/50  Exacerbating Factors: walking for more than a few minutes  Mitigating Factors: medications  Assoc: denies night fever/night sweats, urinary incontinence, bowel incontinence, significant weight loss, significant motor weakness and loss of sensations    Previous Therapies:  PT/OT:  Yes, previously completed  HEP:  Denies regular, focused exercise for rehabilitation of the core muscles and back  TENS:  Denies  Injections:  Reports history of injections from the outside pain management provider none of which provided significant or sustained relief  Surgery: Back Surgery years ago  Medications:   - NSAIDS:   - MSK Relaxants:   - TCAs:   - SNRIs:  Denies  - Topicals:   - Anticonvulsants:  Endorses trial of Lyrica.  Denies trial of gabapentin.  - Opioids:  Norco , Percocet     Current Pain Medications:  1. Percocet  t.i.d. p.r.n.    Full Medication List:    Current  Outpatient Medications:     amlodipine (NORVASC) 10 MG tablet, , Disp: , Rfl:     aspirin (ECOTRIN) 81 MG EC tablet, Take 1 tablet (81 mg total) by mouth once daily., Disp: , Rfl: 0    atorvastatin (LIPITOR) 40 MG tablet, , Disp: , Rfl:     cilostazol (PLETAL) 100 MG Tab, Take 1 tablet (100 mg total) by mouth 2 (two) times daily., Disp: 180 tablet, Rfl: 3    fish oil-omega-3 fatty acids 300-1,000 mg capsule, Take by mouth once daily., Disp: , Rfl:     lisinopril (PRINIVIL,ZESTRIL) 20 MG tablet, , Disp: , Rfl:     metoprolol tartrate (LOPRESSOR) 50 MG tablet, , Disp: , Rfl:     multivitamin (ONE DAILY MULTIVITAMIN) per tablet, Take 1 tablet by mouth once daily., Disp: , Rfl:     saw palmetto 160 MG capsule, Take 160 mg by mouth 2 (two) times daily., Disp: , Rfl:     hydrocodone-acetaminophen 10-325mg (NORCO)  mg Tab, , Disp: , Rfl:      Review of Systems:  Review of Systems   Constitutional: Negative for chills and fever.   HENT: Negative for nosebleeds.    Eyes: Negative for blurred vision.   Respiratory: Negative for hemoptysis.    Cardiovascular: Negative for chest pain and palpitations.   Gastrointestinal: Negative for heartburn and vomiting.   Genitourinary: Negative for hematuria.   Musculoskeletal: Positive for back pain. Negative for myalgias.        Right leg pain   Skin: Negative for rash.   Neurological: Positive for tingling. Negative for seizures and loss of consciousness.   Endo/Heme/Allergies: Does not bruise/bleed easily.   Psychiatric/Behavioral: Negative for hallucinations. The patient has insomnia.        Allergies:  Iodine and iodide containing products and Shellfish containing products     Medical History:  Past Medical History:   Diagnosis Date    Coronary artery disease     Hyperlipidemia     Hypertension         Surgical History:  Past Surgical History:   Procedure Laterality Date    ABDOMINAL SURGERY      Polyps in colon removed (noncancerous)    APPENDECTOMY      back  surgery (screws & okate)      CORONARY ARTERY BYPASS GRAFT              Social History:  Social History     Socioeconomic History    Marital status:      Spouse name: Not on file    Number of children: Not on file    Years of education: Not on file    Highest education level: Not on file   Occupational History    Not on file   Social Needs    Financial resource strain: Not on file    Food insecurity     Worry: Not on file     Inability: Not on file    Transportation needs     Medical: Not on file     Non-medical: Not on file   Tobacco Use    Smoking status: Former Smoker     Quit date: 10/5/1998     Years since quittin.7   Substance and Sexual Activity    Alcohol use: Not Currently     Alcohol/week: 0.0 standard drinks     Comment:     Drug use: Never    Sexual activity: Not on file   Lifestyle    Physical activity     Days per week: Not on file     Minutes per session: Not on file    Stress: Not on file   Relationships    Social connections     Talks on phone: Not on file     Gets together: Not on file     Attends Taoist service: Not on file     Active member of club or organization: Not on file     Attends meetings of clubs or organizations: Not on file     Relationship status: Not on file   Other Topics Concern    Not on file   Social History Narrative    Not on file       Physical Exam:  Vitals:    20 1527   BP: 121/72   Pulse: 109   Weight: 88.9 kg (195 lb 15.8 oz)   PainSc:   7     General    Nursing note and vitals reviewed.  Constitutional: He is oriented to person, place, and time. He appears well-developed and well-nourished. No distress.   HENT:   Head: Normocephalic and atraumatic.   Nose: Nose normal.   Eyes: Conjunctivae and EOM are normal. Pupils are equal, round, and reactive to light. Right eye exhibits no discharge. Left eye exhibits no discharge. No scleral icterus.   Neck: No JVD present.   Cardiovascular: Intact distal pulses.    Pulmonary/Chest:  Effort normal. No respiratory distress.   Abdominal: He exhibits no distension.   Neurological: He is alert and oriented to person, place, and time. Coordination normal.   Psychiatric: He has a normal mood and affect. His behavior is normal. Judgment and thought content normal.     General Musculoskeletal Exam   Gait: normal     Back (L-Spine & T-Spine) / Neck (C-Spine) Exam     Tenderness Right paramedian tenderness of the Lower L-Spine. Left paramedian tenderness of the Lower L-Spine.     Back (L-Spine & T-Spine) Range of Motion   Back extension: facet loading is positive and exacerabtes/reproduces the patient's typical low back pain    Back flexion: limited ROM but partial relief of low back pain noted.     Spinal Sensation   Right Side Sensation  L-Spine Level: normal  Left Side Sensation  L-Spine Level: normal    Other He has no scoliosis .    Comments:  SLR negative bilaterally.        Muscle Strength   Right Lower Extremity   Hip Flexion: 5/5   Hip Extensors: 5/5  Quadriceps:  5/5   Hamstrin/5   Gastrocsoleus:  5/5/5  Left Lower Extremity   Hip Flexion: 5/5   Hip Extensors: 5/5  Quadriceps:  5/5   Hamstrin/5   Gastrocsoleus:  5/5/5    Reflexes     Left Side  Quadriceps:  2+  Achilles:  2+    Right Side   Quadriceps:  2+  Achilles:  2+      Imagin/10/20 MRI Lumbar Spine W WO Contrast     Narrative & Impression     EXAMINATION:  MRI LUMBAR SPINE W WO CONTRAST     CLINICAL HISTORY:  Low back painLow back pain, prior surgery, new or progressive sx;     TECHNIQUE:  Standard multiplanar without and with contrast MRI sequences of the lumbar spine performed with 10cc Gadavist.     COMPARISON:  None     FINDINGS:  There are postop changes consistent with posterior decompression with posterior lumbar interbody fusion at the L2-3 disc level.     Thoracolumbar scoliosis is evident.     The distal cord and conus appear grossly normal with the conus at T12-L1.     Mild disc degeneration and disc desiccation  is evident at T10-11 and T11-12.     T12-L1: Minor disc bulge with mild bilateral facet arthritis.     L1-2: Mild generalized disc bulge with ventral sac impression.  Mild hypertrophic ligamentum flavum and facet arthritis.  Mild central and left foraminal stenosis.     L2-3: Postoperative changes consistent with posterior decompression with posterior lumbar interbody fusion.  The central canal appears patent.  Metallic artifact obscures the neural foramen.     L3-4: Moderate disc degeneration with disc bulging osteophyte.  Slightly more pronounced on the right.  Moderate hypertrophic ligamentum flavum and facet arthritis is present as well.  The central canal is approximately 6 mm.  Right lateral recess stenosis is present with moderate left and moderate to severe right foraminal stenosis.     L4-5: Moderate disc degeneration with disc bulging osteophyte eccentric to the right.  Ventral sac impression with hypertrophic ligamentum flavum and facet arthritis with central canal stenosis.  AP diameter of the canal at this level is approximately 6 mm as well.  Again right lateral recess stenosis is present with severe right foraminal stenosis.  Moderate left foraminal stenosis.     L5-S1:  Moderate disc degeneration with disc narrowing, desiccation and disc bulging osteophyte with ventral sac impression.  Moderate facet arthritis, left greater than right.  Mild central canal stenosis with moderate bilateral foraminal stenosis.     Impression:     Scoliosis.     Postop changes consistent with posterior decompression with L2-3 posterior lumbar interbody fusion.     L3-4, L4-5 and L5-S1 disc degeneration with disc bulging osteophyte as well as hypertrophic ligamentum flavum and facet arthritis.  Central, lateral recess and foraminal stenosis as described above in detail.        Electronically signed by: Herson Bustamante MD  Date:                                            06/10/2020  Time:                                            11:07      Labs:  BMP  Lab Results   Component Value Date     10/05/2019    K 4.4 10/05/2019     10/05/2019    CO2 26 10/05/2019    BUN 10 10/05/2019    CREATININE 1.42 (H) 06/10/2020    CALCIUM 9.1 10/05/2019    ANIONGAP 11 10/05/2019    ESTGFRAFRICA 55.5 (A) 06/10/2020    EGFRNONAA 48.0 (A) 06/10/2020     Lab Results   Component Value Date    ALT 23 10/05/2019    AST 22 10/05/2019    ALKPHOS 85 10/05/2019    BILITOT 1.5 (H) 10/05/2019       Assessment:  Kermit Castro is a 74 y.o. male with the following diagnoses based on history, exam, and imaging:    Problem List Items Addressed This Visit     Chronic pain syndrome    Failed back surgical syndrome    Chronic, continuous use of opioids    Lumbar radiculopathy - Primary    Relevant Medications    gabapentin (NEURONTIN) 100 MG capsule          7/6/2020 - this is a very pleasant 74-year-old male with chronic low back pain and right lower extremity radiculopathy affecting the right L3 dermatome primarily.  Patient has a history of interbody fusion L2-L3 in 2017 after which he experienced increased back and right lower extremity symptoms.  Patient reports continued, severe pain daily which is now failing to respond to Percocet  as he is likely developing tolerance to this medication.  He denies a history of trying gabapentin, which is a medication I have recommended to him today.  He will continue to follow up with his outside pain management provider for opioid prescriptions.  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  We will have him return to clinic in 1 month to assess the effect of gabapentin and to discuss a trial of spinal cord stimulation therapy once he has had an opportunity to perform his own research.    Treatment Plan:   PT/OT/HEP: I encouraged the patient to start a home  exercise regimen that includes daily, moderate cardiovascular exercise lasting at least 30 minutes.  This may include yoga, ban chi, walking, swimming, aqua aerobics, or other exercises that maintain a heart rate of 50-70% of the calculated maximum heart rate.  I also encouraged light, daily stretching focused on the target area.  Procedures: Considering SCS trial.  Pamphlet given to patient for Nevro/HF10.  Medications:    - Patient to follow up with Dr. Boateng re: opioid refills   - Start Trial of Gabapentin 100 TID and plan to titrate up as tolerated  Imaging: No additional imaging recommended at this time.  Labs: Reviewed.  Medications are appropriately dosed for current hepatorenal function.    Follow Up: RTC 4 weeks    Vinay Chatterjee Jr, MD  Interventional Pain Medicine / Anesthesiology    Disclaimer: This note was partly generated using dictation software which may occasionally result in transcription errors.

## 2020-07-06 NOTE — TELEPHONE ENCOUNTER
Spoke with pt regarding his appt. Pt stated he's running late and would like to know if he can still be seen. I informed pt I rescheduled his appt to 3:30 and he can still be seen. Pt verbalized understanding.

## 2020-07-06 NOTE — LETTER
July 6, 2020      Steven May MD  [address]  [address]  [place] [address]           [facility]  [address]  [place] [address]  Phone: [phone]          Patient: Kermit Castro   MR Number: [id number]   YOB: 1945   Date of Visit: 7/6/2020       Dear Dr. Steven May:    Thank you for referring Kermit Castro to me for evaluation. Attached you will find relevant portions of my assessment and plan of care.    If you have questions, please do not hesitate to call me. I look forward to following Kermit Castro along with you.    Sincerely,    Vinay Chatterjee Jr., MD    Enclosure  CC:  No Recipients    If you would like to receive this communication electronically, please contact [email or web address] or [phone] to request more information on [facility] access.    For providers and/or their staff who would like to refer a patient to [facility], please contact us through our one-stop-shop provider referral line, [facility], at [phone].    If you feel you have received this communication in error or would no longer like to receive these types of communications, please e-mail [email or web address]

## 2020-08-04 ENCOUNTER — LAB VISIT (OUTPATIENT)
Dept: PRIMARY CARE CLINIC | Facility: OTHER | Age: 75
End: 2020-08-04
Attending: INTERNAL MEDICINE
Payer: MEDICARE

## 2020-08-04 DIAGNOSIS — Z03.818 ENCOUNTER FOR OBSERVATION FOR SUSPECTED EXPOSURE TO OTHER BIOLOGICAL AGENTS RULED OUT: ICD-10-CM

## 2020-08-04 PROCEDURE — U0003 INFECTIOUS AGENT DETECTION BY NUCLEIC ACID (DNA OR RNA); SEVERE ACUTE RESPIRATORY SYNDROME CORONAVIRUS 2 (SARS-COV-2) (CORONAVIRUS DISEASE [COVID-19]), AMPLIFIED PROBE TECHNIQUE, MAKING USE OF HIGH THROUGHPUT TECHNOLOGIES AS DESCRIBED BY CMS-2020-01-R: HCPCS

## 2020-08-05 ENCOUNTER — OFFICE VISIT (OUTPATIENT)
Dept: PAIN MEDICINE | Facility: CLINIC | Age: 75
End: 2020-08-05
Payer: MEDICARE

## 2020-08-05 VITALS — WEIGHT: 196 LBS | BODY MASS INDEX: 29.8 KG/M2

## 2020-08-05 DIAGNOSIS — F11.90 CHRONIC, CONTINUOUS USE OF OPIOIDS: ICD-10-CM

## 2020-08-05 DIAGNOSIS — M51.36 DDD (DEGENERATIVE DISC DISEASE), LUMBAR: ICD-10-CM

## 2020-08-05 DIAGNOSIS — M96.1 FAILED BACK SURGICAL SYNDROME: ICD-10-CM

## 2020-08-05 DIAGNOSIS — G89.4 CHRONIC PAIN SYNDROME: ICD-10-CM

## 2020-08-05 DIAGNOSIS — M54.16 LUMBAR RADICULOPATHY: Primary | ICD-10-CM

## 2020-08-05 LAB — SARS-COV-2 RNA RESP QL NAA+PROBE: NOT DETECTED

## 2020-08-05 PROCEDURE — 99999 PR PBB SHADOW E&M-EST. PATIENT-LVL III: ICD-10-PCS | Mod: PBBFAC,,, | Performed by: NURSE PRACTITIONER

## 2020-08-05 PROCEDURE — 1125F PR PAIN SEVERITY QUANTIFIED, PAIN PRESENT: ICD-10-PCS | Mod: S$GLB,,, | Performed by: NURSE PRACTITIONER

## 2020-08-05 PROCEDURE — 99999 PR PBB SHADOW E&M-EST. PATIENT-LVL III: CPT | Mod: PBBFAC,,, | Performed by: NURSE PRACTITIONER

## 2020-08-05 PROCEDURE — 99214 OFFICE O/P EST MOD 30 MIN: CPT | Mod: S$GLB,,, | Performed by: NURSE PRACTITIONER

## 2020-08-05 PROCEDURE — 1159F PR MEDICATION LIST DOCUMENTED IN MEDICAL RECORD: ICD-10-PCS | Mod: S$GLB,,, | Performed by: NURSE PRACTITIONER

## 2020-08-05 PROCEDURE — 99214 PR OFFICE/OUTPT VISIT, EST, LEVL IV, 30-39 MIN: ICD-10-PCS | Mod: S$GLB,,, | Performed by: NURSE PRACTITIONER

## 2020-08-05 PROCEDURE — 1125F AMNT PAIN NOTED PAIN PRSNT: CPT | Mod: S$GLB,,, | Performed by: NURSE PRACTITIONER

## 2020-08-05 PROCEDURE — 1101F PT FALLS ASSESS-DOCD LE1/YR: CPT | Mod: CPTII,S$GLB,, | Performed by: NURSE PRACTITIONER

## 2020-08-05 PROCEDURE — 1159F MED LIST DOCD IN RCRD: CPT | Mod: S$GLB,,, | Performed by: NURSE PRACTITIONER

## 2020-08-05 PROCEDURE — 1101F PR PT FALLS ASSESS DOC 0-1 FALLS W/OUT INJ PAST YR: ICD-10-PCS | Mod: CPTII,S$GLB,, | Performed by: NURSE PRACTITIONER

## 2020-08-05 NOTE — PROGRESS NOTES
[facility] New Patient Evaluation    Referred by: TRISTIAN DONIS  Reason for referral: Lumbar Radiculopathy    CC:   Chief Complaint   Patient presents with    Low-back Pain       Last 3 PDI Scores 8/5/2020 7/6/2020   Pain Disability Index (PDI) 56 49     8/5/20 - Mr. Castro returns to clinic for follow up visit reporting worse low back and both legs pain. Pain intensity is currently 8/10.  He reports no relief with Gabapentin 100 mg TID, he reports not wanting to move forward with SCS therapy.     HPI: Kermit aCstro is a 74 y.o. male referred for low back pain that radiates into both legs, though radiating pain into the right leg is much worse than the left.  Who reports history of back surgery (confirmed by recent MRI) with fusion of L2-L3.  He believes that his back and leg pain got worse after surgery, which was in 2017.  Since that time, he has been maintained on Percocet  t.i.d. p.r.n. by an outside pain management physician.  He reports that Percocet  used to provide him good relief, but now this medication is like taking candy.    Location: Low back   Severity: Currently: 7/10   Typical Range: 5/7/10     Exacerbation: 10/10   Onset: about 3 years ago  Quality: Throbbing and Shooting  Radiation: right leg  Axial/Extremity Percentage of Pain: 50/50  Exacerbating Factors: walking for more than a few minutes  Mitigating Factors: medications  Assoc: denies night fever/night sweats, urinary incontinence, bowel incontinence, significant weight loss, significant motor weakness and loss of sensations    Previous Therapies:  PT/OT:  Yes, previously completed  HEP:  Denies regular, focused exercise for rehabilitation of the core muscles and back  TENS:  Denies  Injections:  Reports history of injections from the outside pain management provider none of which provided significant or sustained relief  Surgery: Back Surgery years ago  Medications:   - NSAIDS:   - MSK Relaxants:   - TCAs:   -  SNRIs:  Denies  - Topicals:   - Anticonvulsants:  Endorses trial of Lyrica.  Denies trial of gabapentin.  - Opioids:  Norco , Percocet     Current Pain Medications:  1. Percocet  t.i.d. p.r.n.    Full Medication List:    Current Outpatient Medications:     amlodipine (NORVASC) 10 MG tablet, , Disp: , Rfl:     aspirin (ECOTRIN) 81 MG EC tablet, Take 1 tablet (81 mg total) by mouth once daily., Disp: , Rfl: 0    atorvastatin (LIPITOR) 40 MG tablet, , Disp: , Rfl:     cilostazol (PLETAL) 100 MG Tab, Take 1 tablet (100 mg total) by mouth 2 (two) times daily., Disp: 180 tablet, Rfl: 3    fish oil-omega-3 fatty acids 300-1,000 mg capsule, Take by mouth once daily., Disp: , Rfl:     gabapentin (NEURONTIN) 100 MG capsule, Take 1 capsule (100 mg total) by mouth 3 (three) times daily., Disp: 90 capsule, Rfl: 1    hydrocodone-acetaminophen 10-325mg (NORCO)  mg Tab, , Disp: , Rfl:     lisinopril (PRINIVIL,ZESTRIL) 20 MG tablet, , Disp: , Rfl:     metoprolol tartrate (LOPRESSOR) 50 MG tablet, , Disp: , Rfl:     multivitamin (ONE DAILY MULTIVITAMIN) per tablet, Take 1 tablet by mouth once daily., Disp: , Rfl:     saw palmetto 160 MG capsule, Take 160 mg by mouth 2 (two) times daily., Disp: , Rfl:      Review of Systems:  Review of Systems   Constitutional: Negative for chills and fever.   HENT: Negative for nosebleeds.    Eyes: Negative for blurred vision.   Respiratory: Negative for hemoptysis.    Cardiovascular: Negative for chest pain and palpitations.   Gastrointestinal: Negative for heartburn and vomiting.   Genitourinary: Negative for hematuria.   Musculoskeletal: Positive for back pain. Negative for myalgias.        Right leg pain   Skin: Negative for rash.   Neurological: Positive for tingling. Negative for seizures and loss of consciousness.   Endo/Heme/Allergies: Does not bruise/bleed easily.   Psychiatric/Behavioral: Negative for hallucinations. The patient has insomnia.         Allergies:  Iodine and iodide containing products and Shellfish containing products     Medical History:  Past Medical History:   Diagnosis Date    Coronary artery disease     Hyperlipidemia     Hypertension         Surgical History:  Past Surgical History:   Procedure Laterality Date    ABDOMINAL SURGERY      Polyps in colon removed (noncancerous)    APPENDECTOMY      back surgery (screws & okate)      CORONARY ARTERY BYPASS GRAFT              Social History:  Social History     Socioeconomic History    Marital status:      Spouse name: Not on file    Number of children: Not on file    Years of education: Not on file    Highest education level: Not on file   Occupational History    Not on file   Social Needs    Financial resource strain: Not on file    Food insecurity     Worry: Not on file     Inability: Not on file    Transportation needs     Medical: Not on file     Non-medical: Not on file   Tobacco Use    Smoking status: Former Smoker     Quit date: 10/5/1998     Years since quittin.8   Substance and Sexual Activity    Alcohol use: Not Currently     Alcohol/week: 0.0 standard drinks     Comment:     Drug use: Never    Sexual activity: Not on file   Lifestyle    Physical activity     Days per week: Not on file     Minutes per session: Not on file    Stress: Not on file   Relationships    Social connections     Talks on phone: Not on file     Gets together: Not on file     Attends [employer] service: Not on file     Active member of club or organization: Not on file     Attends meetings of clubs or organizations: Not on file     Relationship status: Not on file   Other Topics Concern    Not on file   Social History Narrative    Not on file       Physical Exam:  Vitals:    20 1410   Weight: 88.9 kg (195 lb 15.8 oz)   PainSc:   8     General    Nursing note and vitals reviewed.  Constitutional: He is oriented to person, place, and time. He appears  well-developed and well-nourished. No distress.   HENT:   Head: Normocephalic and atraumatic.   Nose: Nose normal.   Eyes: Conjunctivae and EOM are normal. Pupils are equal, round, and reactive to light. Right eye exhibits no discharge. Left eye exhibits no discharge. No scleral icterus.   Neck: No JVD present.   Cardiovascular: Intact distal pulses.    Pulmonary/Chest: Effort normal. No respiratory distress.   Abdominal: He exhibits no distension.   Neurological: He is alert and oriented to person, place, and time. Coordination normal.   Psychiatric: He has a normal mood and affect. His behavior is normal. Judgment and thought content normal.     General Musculoskeletal Exam   Gait: normal     Back (L-Spine & T-Spine) / Neck (C-Spine) Exam     Tenderness Right paramedian tenderness of the Lower L-Spine. Left paramedian tenderness of the Lower L-Spine.     Back (L-Spine & T-Spine) Range of Motion   Back extension: facet loading is positive and exacerabtes/reproduces the patient's typical low back pain    Back flexion: limited ROM but partial relief of low back pain noted.     Spinal Sensation   Right Side Sensation  L-Spine Level: normal  Left Side Sensation  L-Spine Level: normal    Other He has no scoliosis .    Comments:  SLR negative bilaterally.        Muscle Strength   Right Lower Extremity   Hip Flexion: 5/5   Hip Extensors: 5/5  Quadriceps:  5/5   Hamstrin/5   Gastrocsoleus:  5/5/5  Left Lower Extremity   Hip Flexion: 5/5   Hip Extensors: 5/5  Quadriceps:  5/5   Hamstrin/5   Gastrocsoleus:  5/5/5    Reflexes     Left Side  Quadriceps:  2+  Achilles:  2+    Right Side   Quadriceps:  2+  Achilles:  2+      Imagin/10/20 MRI Lumbar Spine W WO Contrast     Narrative & Impression     EXAMINATION:  MRI LUMBAR SPINE W WO CONTRAST     CLINICAL HISTORY:  Low back painLow back pain, prior surgery, new or progressive sx;     TECHNIQUE:  Standard multiplanar without and with contrast MRI sequences of the  lumbar spine performed with 10cc Gadavist.     COMPARISON:  None     FINDINGS:  There are postop changes consistent with posterior decompression with posterior lumbar interbody fusion at the L2-3 disc level.     Thoracolumbar scoliosis is evident.     The distal cord and conus appear grossly normal with the conus at T12-L1.     Mild disc degeneration and disc desiccation is evident at T10-11 and T11-12.     T12-L1: Minor disc bulge with mild bilateral facet arthritis.     L1-2: Mild generalized disc bulge with ventral sac impression.  Mild hypertrophic ligamentum flavum and facet arthritis.  Mild central and left foraminal stenosis.     L2-3: Postoperative changes consistent with posterior decompression with posterior lumbar interbody fusion.  The central canal appears patent.  Metallic artifact obscures the neural foramen.     L3-4: Moderate disc degeneration with disc bulging osteophyte.  Slightly more pronounced on the right.  Moderate hypertrophic ligamentum flavum and facet arthritis is present as well.  The central canal is approximately 6 mm.  Right lateral recess stenosis is present with moderate left and moderate to severe right foraminal stenosis.     L4-5: Moderate disc degeneration with disc bulging osteophyte eccentric to the right.  Ventral sac impression with hypertrophic ligamentum flavum and facet arthritis with central canal stenosis.  AP diameter of the canal at this level is approximately 6 mm as well.  Again right lateral recess stenosis is present with severe right foraminal stenosis.  Moderate left foraminal stenosis.     L5-S1:  Moderate disc degeneration with disc narrowing, desiccation and disc bulging osteophyte with ventral sac impression.  Moderate facet arthritis, left greater than right.  Mild central canal stenosis with moderate bilateral foraminal stenosis.     Impression:     Scoliosis.     Postop changes consistent with posterior decompression with L2-3 posterior lumbar interbody  fusion.     L3-4, L4-5 and L5-S1 disc degeneration with disc bulging osteophyte as well as hypertrophic ligamentum flavum and facet arthritis.  Central, lateral recess and foraminal stenosis as described above in detail.        Electronically signed by: Herson Bustamante MD  Date:                                            06/10/2020  Time:                                           11:07      Labs:  BMP  Lab Results   Component Value Date     10/05/2019    K 4.4 10/05/2019     10/05/2019    CO2 26 10/05/2019    BUN 10 10/05/2019    CREATININE 1.42 (H) 06/10/2020    CALCIUM 9.1 10/05/2019    ANIONGAP 11 10/05/2019    ESTGFRAFRICA 55.5 (A) 06/10/2020    EGFRNONAA 48.0 (A) 06/10/2020     Lab Results   Component Value Date    ALT 23 10/05/2019    AST 22 10/05/2019    ALKPHOS 85 10/05/2019    BILITOT 1.5 (H) 10/05/2019       Assessment:  Kermit Castro is a 74 y.o. male with the following diagnoses based on history, exam, and imaging:    Problem List Items Addressed This Visit     None          7/6/2020 - this is a very pleasant 74-year-old male with chronic low back pain and right lower extremity radiculopathy affecting the right L3 dermatome primarily.  Patient has a history of interbody fusion L2-L3 in 2017 after which he experienced increased back and right lower extremity symptoms.  Patient reports continued, severe pain daily which is now failing to respond to Percocet  as he is likely developing tolerance to this medication.  He denies a history of trying gabapentin, which is a medication I have recommended to him today.  He will continue to follow up with his outside pain management provider for opioid prescriptions.  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  We will have him return to clinic in 1 month to assess the effect of gabapentin  and to discuss a trial of spinal cord stimulation therapy once he has had an opportunity to perform his own research.    8/5/2020- 73 y/o male with history of chronic low back and right lower extremity radiculopathy affecting the right L3/4 and some L 5 dermatome primarily.   Mr Matthew has history of interbody fusion L2-L3 in 2017 after which he experienced increased back and right lower extremity symptoms. His pain continues and is severe at times other times his pain is mild to moderate. He reports taking Percocet  TID per Dr. Boateng that provides him short term relief, he reports taking Gabapentin 100 mg daily for 1 week then quitting.  Discussed with patient that considering he is refusing spinal cord stimulation at this point, and considering his current diagnosis of failed back syndrome then my recommendation would  restarting gabapentin 100 mg t.i.d. consistently for 4 weeks in effort to provide him some relief from his symptoms.  Educated patient that in order for medication provide relief of symptoms you need to be on it consistently patient verbalized understanding.       Treatment Plan:   PT/OT/HEP: I encouraged the patient to start a home exercise regimen that includes daily, moderate cardiovascular exercise lasting at least 30 minutes.  This may include yoga, ban chi, walking, swimming, aqua aerobics, or other exercises that maintain a heart rate of 50-70% of the calculated maximum heart rate.  I also encouraged light, daily stretching focused on the target area.  Procedures:  Patient refuse Nevro/HF10.  Medications:    - Patient to follow up with Dr. Boateng re: opioid refills   - restart Trial of Gabapentin 100 TID and plan to titrate up as tolerated 4 weeks  Imaging: No additional imaging recommended at this time.  Labs: Reviewed.  Medications are appropriately dosed for current hepatorenal function.    Follow Up: RTC 4 weeks, patient reports wanting to notify me via phone that any of the  effectiveness of his gabapentin.    Stephen Martínez NP-C  [facility]      Disclaimer: This note was partly generated using dictation software which may occasionally result in transcription errors.

## 2020-08-12 ENCOUNTER — HOSPITAL ENCOUNTER (EMERGENCY)
Facility: HOSPITAL | Age: 75
Discharge: HOME | End: 2020-08-12
Attending: EMERGENCY MEDICINE
Payer: MEDICARE

## 2020-08-12 VITALS
OXYGEN SATURATION: 99 % | HEART RATE: 94 BPM | WEIGHT: 195 LBS | HEIGHT: 68 IN | SYSTOLIC BLOOD PRESSURE: 150 MMHG | BODY MASS INDEX: 29.55 KG/M2 | TEMPERATURE: 98 F | DIASTOLIC BLOOD PRESSURE: 70 MMHG | RESPIRATION RATE: 20 BRPM

## 2020-08-12 DIAGNOSIS — M51.26 HERNIATED INTERVERTEBRAL DISC OF LUMBAR SPINE: ICD-10-CM

## 2020-08-12 DIAGNOSIS — M54.16 LUMBAR RADICULOPATHY, ACUTE: Primary | ICD-10-CM

## 2020-08-12 PROCEDURE — 25000003 PHARM REV CODE 250: Performed by: NURSE PRACTITIONER

## 2020-08-12 PROCEDURE — 99284 EMERGENCY DEPT VISIT MOD MDM: CPT

## 2020-08-12 RX ORDER — NAPROXEN 500 MG/1
500 TABLET ORAL 2 TIMES DAILY WITH MEALS
Qty: 10 TABLET | Refills: 0 | Status: SHIPPED | OUTPATIENT
Start: 2020-08-12 | End: 2020-08-17

## 2020-08-12 RX ORDER — HYDROCODONE BITARTRATE AND ACETAMINOPHEN 10; 325 MG/1; MG/1
1 TABLET ORAL
Status: COMPLETED | OUTPATIENT
Start: 2020-08-12 | End: 2020-08-12

## 2020-08-12 RX ORDER — METHYLPREDNISOLONE 4 MG/1
TABLET ORAL
Qty: 1 PACKAGE | Refills: 0 | Status: SHIPPED | OUTPATIENT
Start: 2020-08-12 | End: 2020-09-02

## 2020-08-12 RX ADMIN — HYDROCODONE BITARTRATE AND ACETAMINOPHEN 1 TABLET: 10; 325 TABLET ORAL at 11:08

## 2020-08-13 NOTE — ED PROVIDER NOTES
Encounter Date: 2020       History     Chief Complaint   Patient presents with    Leg Pain     Right leg pain that starts from buttocks and ends at lower leg. Pain has been ongoing for 1 month. Is currently being seen by pain management for back surgery that took place 2-3 years ago. Oxycodone taken last at 2:30 pm.      HPI   Mr. Castro is a 74 y.o. male with CAD s/p CABG, HTN here today with buttocks and leg pain.  Reports he has had prior back surgeries in the past and has had some chronic pain associated.  For the past few days, he has had worsening right-sided leg pain this starts in his buttocks and moves down his leg.  It is mostly in his thigh but does cross the knee.  Pain is severe when standing or walking but goes away at rest when laying down.  Has known herniated discs in lumbar spine.  Is seen in pain management for this.  His on home Percocet.  Denies trauma/falls, bowel or bladder incontinence, perineal numbness, weakness, numbness, tingling, fevers, chills, nausea, vomiting, abdominal pain, chest pain, shortness of breath.     Review of patient's allergies indicates:   Allergen Reactions    Iodine and iodide containing products Anaphylaxis    Shellfish containing products Anaphylaxis     Past Medical History:   Diagnosis Date    Coronary artery disease     Hyperlipidemia     Hypertension      Past Surgical History:   Procedure Laterality Date    ABDOMINAL SURGERY      Polyps in colon removed (noncancerous)    APPENDECTOMY      back surgery (screws & okate)      CORONARY ARTERY BYPASS GRAFT           No family history on file.  Social History     Tobacco Use    Smoking status: Former Smoker     Quit date: 10/5/1998     Years since quittin.8   Substance Use Topics    Alcohol use: Not Currently     Alcohol/week: 0.0 standard drinks     Comment:     Drug use: Never     Review of Systems   Constitutional: Negative for fever.   HENT: Negative for sore throat.    Respiratory:  Negative for shortness of breath.    Cardiovascular: Negative for chest pain.   Gastrointestinal: Negative for nausea.   Genitourinary: Negative for dysuria.   Musculoskeletal: Positive for back pain.   Skin: Negative for rash.   Neurological: Negative for dizziness, speech difficulty, weakness, numbness and headaches.   Hematological: Does not bruise/bleed easily.       Physical Exam     Initial Vitals [08/12/20 2048]   BP Pulse Resp Temp SpO2   138/67 94 18 98.3 °F (36.8 °C) 98 %      MAP       --         Physical Exam    Nursing note and vitals reviewed.  Constitutional: He appears well-developed and well-nourished. He is not diaphoretic. No distress.   HENT:   Head: Normocephalic and atraumatic.   Right Ear: External ear normal.   Left Ear: External ear normal.   Neck: Neck supple.   Cardiovascular: Normal rate, regular rhythm, normal heart sounds and intact distal pulses.   Pulmonary/Chest: Breath sounds normal. No respiratory distress. He has no wheezes. He has no rhonchi. He has no rales.   Abdominal: Soft. He exhibits no distension. There is no abdominal tenderness. There is no rebound and no guarding.   Musculoskeletal:      Comments: No midline C, T, or L spine TTP. Mild pain with palpation in obturator space over right buttocks. No pain with movement of leg or palpation of greater trochanter/right hip.    Neurological: He is alert and oriented to person, place, and time. He has normal strength. No cranial nerve deficit or sensory deficit. GCS score is 15. GCS eye subscore is 4. GCS verbal subscore is 5. GCS motor subscore is 6.   Skin: Skin is warm. Capillary refill takes less than 2 seconds. No rash noted.   Psychiatric: He has a normal mood and affect.         ED Course   Procedures  Labs Reviewed - No data to display       Imaging Results    None          Medical Decision Making:   History:   Old Medical Records: I decided to obtain old medical records.  Old Records Summarized: other records.       <>  Summary of Records: MRI 06/10/2020 with multilevel herniated discs and lumbar spine with more pronounced right-sided nerve root impingement.  Clinical Tests:   Radiological Study: Reviewed  ED Management:  Vitals normal. Afebrile.  No red flag signs or symptoms of low back pain today.  Very unlikely to be epidural abscess, cauda equina syndrome, etc.  Pain is gone when he is laying down arrest.  He has already seen in pain management where he is prescribed Percocet 10 mg for use multiple times per day.  I do not feel that giving further opiates is going to help his situation long term.  Does not have a spine surgeon in the area; prior surgery performed many years ago in [place].  MRI from June shows multilevel lumbar herniated disc with nerve root impingement on the right at multiple levels.  I suspect this (herniated disc with nerve root impingement and radiculopathy) is ultimately the cause of his symptoms now.  Will place on a Medrol Dosepak and just a few days of scheduled NSAIDs (has some mild CKD) while the steroids take action.  Referred to Spine surgery for further management.  Stable for discharge this time.  Strict return precautions discussed regarding red flag signs and symptoms of back pain..                   ED Course as of Aug 13 1538   Wed Aug 12, 2020   2051 Sort note: Kermit Castro nontoxic/afebrile 74 y.o.  presented to the ED with c/o right buttock pain with radiation to right leg. Pt in pain management.     Patient seen and medically screened by Nurse practitioner in Sort process due to ED crowding.  Appropriate tests and/or medications ordered.  Care transferred to an alternate provider when patient was placed in an Exam Room from the [facility] for physical exam, additional orders, and disposition. 8:51 PM. JS         [JS]      ED Course User Index  [JS] JADEN Farnsworth                Clinical Impression:       ICD-10-CM ICD-9-CM   1. Lumbar radiculopathy, acute  M54.16 724.4    2. Herniated intervertebral disc of lumbar spine  M51.26 722.10             ED Disposition Condition    Discharge Stable        ED Prescriptions     Medication Sig Dispense Start Date End Date Auth. Provider    naproxen (NAPROSYN) 500 MG tablet Take 1 tablet (500 mg total) by mouth 2 (two) times daily with meals. for 5 days 10 tablet 8/12/2020 8/17/2020 Osman Montalvo MD    methylPREDNISolone (MEDROL DOSEPACK) 4 mg tablet Take as directed on package 1 Package 8/12/2020 9/2/2020 Osman Montalvo MD        Follow-up Information     Follow up With Specialties Details Why Contact Info Additional Information    Luis Hassan MD Family Medicine  As needed PO   [facility] [id number] [phone]       [facility]  If symptoms worsen [address] [facility] [address] [phone]     Pain Management   as scheduled      [facility] In 1 week  [address] [facility] [address] [phone] Counts include 234 beds at the [facility] - 5th Floor                                     Osman Montalvo MD  08/13/20 8686

## 2020-08-13 NOTE — ED NOTES
Pt to ED with c/o R leg pain that starts from buttocks and radiates down entire leg. Pain has been ongoing x 1 mth. Is currently being seen by pain management for back surgery x 2-3 yrs. Oxycodone taken last at 1430.    Patient identifiers for Kermit Castro verified by spelling and stated name on armband along with .     APPEARANCE: Alert, oriented and in no acute distress.  CARDIAC: Normal rate, no murmur heard.   PERIPHERAL VASCULAR: peripheral pulses present. Normal cap refill. No edema. Warm to touch.    GASTRO: soft, bowel sounds normal, no tenderness, no abdominal distention.  MUSC: Full ROM. + R hip/leg pain, No obvious deformity.  SKIN: Skin is warm and dry, normal skin turgor, mucous membranes moist.  MENTAL STATUS: awake, alert and aware of environment.

## 2020-08-14 ENCOUNTER — TELEPHONE (OUTPATIENT)
Dept: CARDIOLOGY | Facility: CLINIC | Age: 75
End: 2020-08-14

## 2020-08-14 ENCOUNTER — NURSE TRIAGE (OUTPATIENT)
Dept: ADMINISTRATIVE | Facility: CLINIC | Age: 75
End: 2020-08-14

## 2020-08-14 ENCOUNTER — HOSPITAL ENCOUNTER (EMERGENCY)
Facility: HOSPITAL | Age: 75
Discharge: HOME | End: 2020-08-14
Attending: EMERGENCY MEDICINE
Payer: MEDICARE

## 2020-08-14 VITALS
RESPIRATION RATE: 18 BRPM | DIASTOLIC BLOOD PRESSURE: 68 MMHG | HEIGHT: 68 IN | WEIGHT: 197 LBS | OXYGEN SATURATION: 100 % | SYSTOLIC BLOOD PRESSURE: 132 MMHG | HEART RATE: 86 BPM | TEMPERATURE: 99 F | BODY MASS INDEX: 29.86 KG/M2

## 2020-08-14 DIAGNOSIS — M54.16 LUMBAR RADICULOPATHY: Primary | ICD-10-CM

## 2020-08-14 PROCEDURE — 99284 EMERGENCY DEPT VISIT MOD MDM: CPT | Mod: 25

## 2020-08-14 PROCEDURE — 96372 THER/PROPH/DIAG INJ SC/IM: CPT

## 2020-08-14 PROCEDURE — 63600175 PHARM REV CODE 636 W HCPCS: Performed by: EMERGENCY MEDICINE

## 2020-08-14 RX ORDER — CYCLOBENZAPRINE HCL 10 MG
10 TABLET ORAL EVERY 8 HOURS PRN
Qty: 14 TABLET | Refills: 0 | Status: SHIPPED | OUTPATIENT
Start: 2020-08-14 | End: 2020-08-19

## 2020-08-14 RX ORDER — DEXAMETHASONE SODIUM PHOSPHATE 4 MG/ML
8 INJECTION, SOLUTION INTRA-ARTICULAR; INTRALESIONAL; INTRAMUSCULAR; INTRAVENOUS; SOFT TISSUE
Status: COMPLETED | OUTPATIENT
Start: 2020-08-14 | End: 2020-08-14

## 2020-08-14 RX ORDER — PROCHLORPERAZINE EDISYLATE 5 MG/ML
10 INJECTION INTRAMUSCULAR; INTRAVENOUS
Status: COMPLETED | OUTPATIENT
Start: 2020-08-14 | End: 2020-08-14

## 2020-08-14 RX ADMIN — DEXAMETHASONE SODIUM PHOSPHATE 8 MG: 4 INJECTION, SOLUTION INTRAMUSCULAR; INTRAVENOUS at 07:08

## 2020-08-14 RX ADMIN — PROCHLORPERAZINE EDISYLATE 10 MG: 5 INJECTION INTRAMUSCULAR; INTRAVENOUS at 07:08

## 2020-08-14 NOTE — FIRST PROVIDER EVALUATION
Emergency Department TeleTRIAGE Encounter Note      CHIEF COMPLAINT    Chief Complaint   Patient presents with    Leg Pain     Pt reports has been having pain to R hip/buttocks radiating down RLE. Seen for same a couple days ago without relief.        VITAL SIGNS   Initial Vitals [08/14/20 1813]   BP Pulse Resp Temp SpO2   (!) 147/69 94 20 97.8 °F (36.6 °C) 97 %      MAP       --            ALLERGIES    Review of patient's allergies indicates:   Allergen Reactions    Iodine and iodide containing products Anaphylaxis    Shellfish containing products Anaphylaxis       PROVIDER TRIAGE NOTE  Pt is a 75 yo male presenting for right leg pain.  Pt states pain starts in his buttocks and radiates down his leg.  Pt states he was seen a few nights ago for same complaint.  Pt states no relief with home medication.         ORDERS  Labs Reviewed - No data to display    ED Orders (720h ago, onward)    None            Virtual Visit Note: The provider triage portion of this emergency department evaluation and documentation was performed via [employer], a HIPAA-compliant telemedicine application, in concert with a tele-presenter in the room. A face to face patient evaluation with one of my colleagues will occur once the patient is placed in an emergency department room.      DISCLAIMER: This note was prepared with [employer] voice recognition transcription software. Garbled syntax, mangled pronouns, and other bizarre constructions may be attributed to that software system.

## 2020-08-14 NOTE — TELEPHONE ENCOUNTER
I finally got a hold of Mrs. Castro [phone].    I advised her if patient is in Pain due to his legs feel     Swollen.    Please take him to [facility] now.    Mrs. Castro is taking him to [facility].      NW

## 2020-08-14 NOTE — TELEPHONE ENCOUNTER
I called , No answer,    I  Left V/Message .    Mr. Castro,  If you are in Pain or your swelling Gets too Bad that it Hurts,    Being  That its  the weekend-  Please go to your nearest Urgent care  Or to the ED nearest you .    I will try to find another Phone Number in  Your chart to try to get a hold of you.    davin [email or web address].                                        ----- Message from Griffin Eng sent at 8/14/2020  3:36 PM CDT -----  Regarding: call back  Patient called in to speak with someone at the office regarding swelling in his leg. The patient would like a call back from the office to discuss this matter and can be reached at    [phone]

## 2020-08-15 NOTE — ED TRIAGE NOTES
Pt presents to ED with C/O R hip, buttock pain that radiates down the RLE. With onset x1 week. Pt states he was seen here in the ED wed. He states he started taking his prescribed medication yesterday. Pt states his pain is 10/10.at this time.

## 2020-08-17 ENCOUNTER — TELEPHONE (OUTPATIENT)
Dept: CARDIOLOGY | Facility: CLINIC | Age: 75
End: 2020-08-17

## 2020-08-17 NOTE — TELEPHONE ENCOUNTER
----- Message from Audrey Rothman sent at 8/17/2020 11:09 AM CDT -----  Contact: Andra Castro  Type:  Needs Medical Advice    Who Called:  Andra   Symptoms (please be specific):  pain in legs   How long has patient had these symptoms:  weekend  Pharmacy name and phone #:   [facility] [address] [place] [address] [place] [phone] (Phone)  [phone] (Fax)  Would the patient rather a call back or a response via [facility]?  Call back   Best Call Back Number:  [phone]  Additional Information:  none

## 2020-08-17 NOTE — TELEPHONE ENCOUNTER
Called the pt wife back in regards to this message.   Pt wife stated the pt is doing better today and will make it to his scheduled appointment with Dr. May on Wednesday August 19, 2020.     Advised to go to the nearest ED if symptoms worsen    ND

## 2020-08-18 ENCOUNTER — HOSPITAL ENCOUNTER (EMERGENCY)
Facility: HOSPITAL | Age: 75
Discharge: HOME | End: 2020-08-19
Attending: EMERGENCY MEDICINE
Payer: MEDICARE

## 2020-08-18 DIAGNOSIS — I73.9 CLAUDICATION: ICD-10-CM

## 2020-08-18 DIAGNOSIS — M79.604 RIGHT LEG PAIN: ICD-10-CM

## 2020-08-18 DIAGNOSIS — I73.9 PERIPHERAL ARTERY DISEASE: ICD-10-CM

## 2020-08-18 DIAGNOSIS — M47.26 OSTEOARTHRITIS OF SPINE WITH RADICULOPATHY, LUMBAR REGION: Primary | ICD-10-CM

## 2020-08-18 LAB
BUN SERPL-MCNC: 20 MG/DL (ref 6–30)
CHLORIDE SERPL-SCNC: 99 MMOL/L (ref 95–110)
CREAT SERPL-MCNC: 1 MG/DL (ref 0.5–1.4)
GLUCOSE SERPL-MCNC: 80 MG/DL (ref 70–110)
HCT VFR BLD CALC: 47 %PCV (ref 36–54)
POC IONIZED CALCIUM: 1.2 MMOL/L (ref 1.06–1.42)
POC TCO2 (MEASURED): 31 MMOL/L (ref 23–29)
POTASSIUM BLD-SCNC: 3.7 MMOL/L (ref 3.5–5.1)
SAMPLE: ABNORMAL
SODIUM BLD-SCNC: 142 MMOL/L (ref 136–145)

## 2020-08-18 PROCEDURE — 99282 EMERGENCY DEPT VISIT SF MDM: CPT

## 2020-08-18 PROCEDURE — 99284 PR EMERGENCY DEPT VISIT,LEVEL IV: ICD-10-PCS | Mod: GC,,, | Performed by: EMERGENCY MEDICINE

## 2020-08-18 PROCEDURE — 80047 BASIC METABLC PNL IONIZED CA: CPT

## 2020-08-18 PROCEDURE — 99284 EMERGENCY DEPT VISIT MOD MDM: CPT | Mod: GC,,, | Performed by: EMERGENCY MEDICINE

## 2020-08-19 ENCOUNTER — OFFICE VISIT (OUTPATIENT)
Dept: CARDIOLOGY | Facility: CLINIC | Age: 75
End: 2020-08-19
Payer: MEDICARE

## 2020-08-19 ENCOUNTER — TELEPHONE (OUTPATIENT)
Dept: NEUROSURGERY | Facility: CLINIC | Age: 75
End: 2020-08-19

## 2020-08-19 VITALS
BODY MASS INDEX: 30.16 KG/M2 | RESPIRATION RATE: 15 BRPM | SYSTOLIC BLOOD PRESSURE: 166 MMHG | TEMPERATURE: 98 F | WEIGHT: 199 LBS | HEART RATE: 76 BPM | OXYGEN SATURATION: 97 % | HEIGHT: 68 IN | DIASTOLIC BLOOD PRESSURE: 80 MMHG

## 2020-08-19 VITALS
HEIGHT: 68 IN | SYSTOLIC BLOOD PRESSURE: 110 MMHG | WEIGHT: 198 LBS | HEART RATE: 95 BPM | DIASTOLIC BLOOD PRESSURE: 69 MMHG | BODY MASS INDEX: 30.01 KG/M2

## 2020-08-19 DIAGNOSIS — I73.9 PAD (PERIPHERAL ARTERY DISEASE): Primary | ICD-10-CM

## 2020-08-19 DIAGNOSIS — I25.111 CORONARY ARTERY DISEASE INVOLVING NATIVE CORONARY ARTERY OF NATIVE HEART WITH ANGINA PECTORIS WITH DOCUMENTED SPASM: ICD-10-CM

## 2020-08-19 DIAGNOSIS — E78.2 MIXED HYPERLIPIDEMIA: ICD-10-CM

## 2020-08-19 DIAGNOSIS — I10 ESSENTIAL HYPERTENSION: ICD-10-CM

## 2020-08-19 DIAGNOSIS — I70.211 ATHEROSCLEROSIS OF NATIVE ARTERY OF RIGHT LOWER EXTREMITY WITH INTERMITTENT CLAUDICATION: ICD-10-CM

## 2020-08-19 DIAGNOSIS — M96.1 FAILED BACK SURGICAL SYNDROME: ICD-10-CM

## 2020-08-19 PROCEDURE — 1159F MED LIST DOCD IN RCRD: CPT | Mod: S$GLB,,, | Performed by: INTERNAL MEDICINE

## 2020-08-19 PROCEDURE — 1101F PT FALLS ASSESS-DOCD LE1/YR: CPT | Mod: CPTII,S$GLB,, | Performed by: INTERNAL MEDICINE

## 2020-08-19 PROCEDURE — 99215 PR OFFICE/OUTPT VISIT, EST, LEVL V, 40-54 MIN: ICD-10-PCS | Mod: S$GLB,,, | Performed by: INTERNAL MEDICINE

## 2020-08-19 PROCEDURE — 99999 PR PBB SHADOW E&M-EST. PATIENT-LVL IV: CPT | Mod: PBBFAC,,, | Performed by: INTERNAL MEDICINE

## 2020-08-19 PROCEDURE — 3074F PR MOST RECENT SYSTOLIC BLOOD PRESSURE < 130 MM HG: ICD-10-PCS | Mod: CPTII,S$GLB,, | Performed by: INTERNAL MEDICINE

## 2020-08-19 PROCEDURE — 3078F PR MOST RECENT DIASTOLIC BLOOD PRESSURE < 80 MM HG: ICD-10-PCS | Mod: CPTII,S$GLB,, | Performed by: INTERNAL MEDICINE

## 2020-08-19 PROCEDURE — 3078F DIAST BP <80 MM HG: CPT | Mod: CPTII,S$GLB,, | Performed by: INTERNAL MEDICINE

## 2020-08-19 PROCEDURE — 1101F PR PT FALLS ASSESS DOC 0-1 FALLS W/OUT INJ PAST YR: ICD-10-PCS | Mod: CPTII,S$GLB,, | Performed by: INTERNAL MEDICINE

## 2020-08-19 PROCEDURE — 99999 PR PBB SHADOW E&M-EST. PATIENT-LVL IV: ICD-10-PCS | Mod: PBBFAC,,, | Performed by: INTERNAL MEDICINE

## 2020-08-19 PROCEDURE — 1125F AMNT PAIN NOTED PAIN PRSNT: CPT | Mod: S$GLB,,, | Performed by: INTERNAL MEDICINE

## 2020-08-19 PROCEDURE — 1125F PR PAIN SEVERITY QUANTIFIED, PAIN PRESENT: ICD-10-PCS | Mod: S$GLB,,, | Performed by: INTERNAL MEDICINE

## 2020-08-19 PROCEDURE — 1159F PR MEDICATION LIST DOCUMENTED IN MEDICAL RECORD: ICD-10-PCS | Mod: S$GLB,,, | Performed by: INTERNAL MEDICINE

## 2020-08-19 PROCEDURE — 3074F SYST BP LT 130 MM HG: CPT | Mod: CPTII,S$GLB,, | Performed by: INTERNAL MEDICINE

## 2020-08-19 PROCEDURE — 99215 OFFICE O/P EST HI 40 MIN: CPT | Mod: S$GLB,,, | Performed by: INTERNAL MEDICINE

## 2020-08-19 NOTE — TELEPHONE ENCOUNTER
----- Message from Madeline Brink sent at 8/19/2020 12:13 PM CDT -----  Regarding: Appt  Contact: Samira (Wife) [phone]  Samira called to speak to someone regarding scheduling the patient's hospital follow up. Please contact the patient to discuss further.

## 2020-08-19 NOTE — PATIENT INSTRUCTIONS
Understanding Lumbar Radiculopathy    Lumbar radiculopathy is irritation or inflammation of a nerve root in the low back. It causes symptoms that spread out from the back down one or both legs. To understand this condition, it helps to understand the parts of the spine:  · Vertebrae. These are bones that stack to form the spine. The lumbar spine contains the 5 bottom vertebrae.  · Disks. These are soft pads of tissue between the vertebrae. They act as shock absorbers for the spine.  · Spinal canal. This is a tunnel formed within the stacked vertebrae. In the lumbar spine, nerves run through this canal.  · Nerves. These branch off and leave the spinal canal, traveling out to parts of the body. As they leave the spinal canal, nerves pass through openings between the vertebrae. The nerve root is the part of the nerve that is closest to the spinal canal.  · Sciatic nerve. This is a large nerve formed from several nerve roots in the low back. This nerve extends down the back of the leg to the foot.  With lumbar radiculopathy, nerve roots in the low back become irritated. This leads to pain and symptoms. The sciatic nerve is commonly involved, so the condition is often called sciatica.  What causes lumbar radiculopathy?  Aging, injury, poor posture, extra body weight, and other issues can lead to problems in the low back. These problems may then irritate nerve roots. They include:  · Damage to a disk in the lumbar spine. The damaged disk may then press on nearby nerve roots.  · Degeneration from wear and tear, and aging. This can lead to narrowing (stenosis) of the openings between the vertebrae. The narrowed openings press on nerve roots as they leave the spinal canal.  · Unstable spine. This is when a vertebra slips forward. It can then press on a nerve root.  Other, less common things can put pressure on nerves in the low back. These include diabetes, infection, or a tumor.  Symptoms of lumbar radiculopathy  These  include:  · Pain in the low back  · Pain, numbness, tingling, or weakness that travels into the buttocks, hip, groin, or leg  · Muscle spasms  Treatment for lumbar radiculopathy  In most cases, your healthcare provider will first try treatments that help relieve symptoms. These may include:  · Prescription and over-the-counter pain medicines. These help relieve pain, swelling, and irritation.  · Limits on positions and activities that increase pain. But lying in bed or avoiding all movement is only recommended for a short period of time.  · Physical therapy, including exercises and stretches. This helps decrease pain and increase movement and function.  · Steroid shots into the lower back. This may help relieve symptoms for a time.  · Weight-loss program. If you are overweight, losing extra pounds may help relieve symptoms.  In some cases, you may need surgery to fix the underlying problem. This depends on the cause, the symptoms, and how long the pain has lasted.  Possible complications  Over time, an irritated and inflamed nerve may become damaged. This may lead to long-lasting (permanent) numbness or weakness in your legs and feet. If symptoms change suddenly or get worse, be sure to let your healthcare provider know.  When to call your healthcare provider  Call your healthcare provider right away if you have any of these:  · New pain or pain that gets worse  · New or increasing weakness, tingling, or numbness in your leg or foot  · Problems controlling your bladder or bowel   Date Last Reviewed: 3/10/2016  © 3036-0886 [employer]. [address] [place] [address], [place] [address]. All rights reserved. This information is not intended as a substitute for professional medical care. Always follow your healthcare professional's instructions.        Sciatica    Sciatica is a condition that causes pain in the lower back that spreads down into the buttock, hip, and leg. Sometimes the leg pain can happen without  any back pain. Sciatica happens when a spinal nerve is irritated or has pressure put on it as comes out of the spinal canal in the lower back. This most often happens when a bulge or rupture of a nearby spinal disk presses on the nerve. Sciatica can also be caused by a narrowing of the spinal canal (spinal stenosis) or spasm of the muscle in the buttocks that the sciatic nerve passes through (pyriform muscle). Sciatica is also called lumbar radiculopathy.  Sciatica may begin after a sudden twisting or bending force, such as in a car accident. Or it can happen after a simple awkward movement. In either case, muscle spasm often also happens. Muscle spasm makes the pain worse.  A healthcare provider makes a diagnosis of sciatica from your symptoms and a physical exam. Unless you had an injury from a car accident or fall, you usually wont have X-rays taken at this time. This is because the nerves and disks in your back cant be seen on an X-ray. If the provider sees signs of a compressed nerve, you will need to schedule an MRI scan as an outpatient. Signs of a compressed nerve include loss of strength in a leg.  Most sciatica gets better with medicine, exercise, and physical therapy. If your symptoms continue after at least 3 months of medical treatment, you may need surgery or injections to your lower back.  Home care  Follow these tips when caring for yourself at home:  · You may need to stay in bed the first few days. But as soon as possible, begin sitting up or walking. This will help you avoid problems that come from staying in bed for long periods.  · When in bed, try to find a position that is comfortable. A firm mattress is best. Try lying flat on your back with pillows under your knees. You can also try lying on your side with your knees bent up toward your chest and a pillow between your knees.  · Avoid sitting for long periods. This puts more stress on your lower back than standing or walking.  · Use heat  from a hot shower, hot bath, or heating pad to help ease pain. Massage can also help. You can also try using an ice pack. You can make your own ice pack by putting ice cubes in a plastic bag. Wrap the bag in a thin towel. Try both heat and cold to see which works best. Use the method that feels best for 20 minutes several times a day.  · You may use acetaminophen or ibuprofen to ease pain, unless another pain medicine was prescribed. Note: If you have chronic liver or kidney disease, talk with your healthcare provider before taking these medicines. Also talk with your provider if youve had a stomach ulcer or gastrointestinal bleeding.  · Use safe lifting methods. Dont lift anything heavier than 15 pounds until all of the pain is gone.  Follow-up care  Follow up with your healthcare provider, or as advised. You may need physical therapy or additional tests.  If X-rays were taken, a radiologist will look at them. You will be told of any new findings that may affect your care.  When to seek medical advice  Call your healthcare provider right away if any of these occur:  · Pain gets worse even after taking prescribed medicine  · Weakness or numbness in 1 or both legs or hips  · Numbness in your groin or genital area  · You cant control your bowel or bladder  · Fever  · Redness or swelling over your back or spine   Date Last Reviewed: 8/1/2016  © 1682-5548 [employer]. [address] [place] [address], [place] [address]. All rights reserved. This information is not intended as a substitute for professional medical care. Always follow your healthcare professional's instructions.        Taking Aspirin for Atherosclerosis       Aspirin is a medicine often prescribed to treat atherosclerosis. This condition affects your arteries. These are the blood vessels that carry blood away from your heart. Having atherosclerosis means youre at greater risk of a heart attack or stroke. Aspirin can help prevent these from  happening.  How atherosclerosis affects your arteries   A fatty material (plaque) can build up in your arteries. This makes it harder for blood to flow through them. A blood clot can then form on the plaque. This may block the artery, cutting off blood flow. This can cause conditions such as coronary artery disease (CAD) and peripheral arterial disease (PAD):  · CAD occurs when plaque builds up in the coronary artery. This artery supplies the heart with oxygen-rich blood.  · PAD occurs when plaque forms in leg arteries.  The same things that cause CAD and PAD can also cause plaque to form in other arteries in your body, such as those in the brain. When plaque occurs in any of these arteries, it raises your risk of heart attack or stroke.  What aspirin does  Aspirin is a blood-thinner (antiplatelet medicine). It helps keep blood clots from forming. This reduces the risk of blockage. Aspirin can be taken daily if you are at high risk of or have already had a heart attack or stroke. It is also used after a procedure called a stent placement. This is when a tiny wire mesh tube, or stent, is placed in an artery to keep it open. Aspirin helps prevent blood clots from forming on the stent.  Taking aspirin safely  Tell your healthcare provider about any medicines you are taking. This includes:  · All prescription medicines  · Over-the-counter medicines  · Herbs, vitamins, and other supplements  Also mention if you have a history of ulcers or bleeding problems. Ask whether you will need to stop taking aspirin before having surgery or dental work. Always take medicines as directed.  Tips for taking aspirin  · Have a routine. For example, take aspirin with the same meal each day.  · Dont take more than prescribed. A low dose gives the same benefit as a higher one, with a lower risk of side effects.  · Dont skip doses. Aspirin needs to be taken each day to work well.  · Keep track of what you take. A pillbox with days of the  week can help, especially if you take several medicines. Or use a list or chart to keep track.  When to call your healthcare provider  Side effects of aspirin are not usually serious. If you do have problems, changing your dose may help. Call your healthcare provider if you have any of the following:  · Excessive bruising (some bruising is normal)  · Nosebleeds, bleeding gums, or other excessive bleeding  · An upset stomach or stomach pain   Date Last Reviewed: 6/1/2016 © 2000-2017 [employer]. [address] [place] [address], [place] [address]. All rights reserved. This information is not intended as a substitute for professional medical care. Always follow your healthcare professional's instructions.

## 2020-08-19 NOTE — PROGRESS NOTES
Subjective:   Patient ID:  Kermit Castro is a 74 y.o. male who presents for evaluation and treatment of Peripheral Arterial Disease, Hyperlipidemia, Hypertension, and non vascular limb pain      HPI:         He is here complaining of severe R back, thigh, and calf pain with palpation. All related to lumbar radiculopathy. He has yanira IIb claudication R >> L. He cannot walk > 1 room. He also has lumbar radiculopathy with pain starting in the lower back area going down to the foot. He has a history of back surgery + injections. He also complained of dyspnea II with moderate exertion unchanged over several years. He has trace to 1+ edema of his feet with some varicose veins suggestive of underlying venous insufficiency. He does not wear compression stockings. He has a history of bilateral SFA + R EIA intervention (details below) and CAD s/p CABG + POBA of LIMA-LAD + HTN + diastolic dysfunction and HLP. + aspirin for MAPT. + acei + statin.         He has been seen in pain management clinic. He went to ED 8/18/2020 for lumbar pain.         JOAQUINA 6/2019     R 0.77   L 1.06      JOAQUINA 12/2019      R 0.64   L 0.71    US 6/2019    Diameters in mm     CFA 9   SFA 7   POP 6   BTK 4   BTA 3          R , , pSFA 81, mSFA 41, dSFA 18, pPOP 26-36, PT 34, AT 28, DP 23  L , , pSFA 219-136, mSFA 89, dSFA 25, POP 35, PT 59, AT 30, DP 03           S/p multiple PCI then CABG 1998     LIMA-LAD   SVG-OM   SVG-PDA        PTA of LIMA-LAD 12/1998      [facility] 2005     3 patent graft   Normal EF   LVEDP 20 mmHg   Patent renal arteries        PET stress 10/2019     8% infarct   Normal EF   No arrhythmias   No symptoms   No ECG changes       US 12/2019       Bilateral SFA    Native R SFA and L SFA ISR               Care team:    Dr. Emmett Hassan            Patient Active Problem List    Diagnosis Date Noted    Chronic pain syndrome 07/06/2020    Failed back surgical syndrome 07/06/2020    Chronic,  continuous use of opioids 2020    Lumbar radiculopathy 2020    Abnormal cardiovascular stress test 2019         PET stress 10/2019     8% infarct   Normal EF   No arrhythmias   No symptoms   No ECG changes         PAD (peripheral artery disease) 2019     L SFA  PTAS   8 x 40 mm and 6.0 x 120 [employer]ge SES          2006     R SFA  PTA with 4 and 6.0 mm balloons   R EIA PTAS with 7 mm SES for ISR (Resilient trial)      JOAQUINA 2019     R 0.77   L 1.06        US 2019    Diameters in mm     CFA 9   SFA 7   POP 6   BTK 4   BTA 3          R , , pSFA 81, mSFA 41, dSFA 18, pPOP 26-36, PT 34, AT 28, DP 23  L , , pSFA 219-136, mSFA 89, dSFA 25, POP 35, PT 59, AT 30, DP 03                  Dyspnea on exertion 2019    Atherosclerosis of native artery of right lower extremity with intermittent claudication 2019    Mixed hyperlipidemia 2019    Essential hypertension 2019    Coronary artery disease involving native coronary artery of native heart with angina pectoris with documented spasm 2019            S/p multiple PCI then CABG      LIMA-LAD   SVG-OM   SVG-PDA        PTA of LIMA-LAD 1998      [facility] 2005     3 patent graft   Normal EF   LVEDP 20 mmHg   Patent renal arteries                     Right Arm BP - Sittin/69  Left Arm BP - Sittin/68        LABS      Lipid panel  Lab Results   Component Value Date    CHOL 115 (L) 2019    CHOL 164 2005     Lab Results   Component Value Date    HDL 40 2019    HDL 37.0 (L) 2005     Lab Results   Component Value Date    LDLCALC 63.2 2019    LDLCALC 108.8 2005     Lab Results   Component Value Date    TRIG 59 2019    TRIG 91 2005     Lab Results   Component Value Date    CHOLHDL 34.8 2019    CHOLHDL 22.6 2005            Review of Systems   Constitution: Negative for diaphoresis, night sweats, weight gain and  weight loss.   HENT: Negative for congestion.    Eyes: Negative for blurred vision, discharge and double vision.   Cardiovascular: Positive for claudication. Negative for chest pain, cyanosis, dyspnea on exertion, irregular heartbeat, leg swelling, near-syncope, orthopnea, palpitations, paroxysmal nocturnal dyspnea and syncope.   Respiratory: Negative for cough, shortness of breath and wheezing.    Endocrine: Negative for cold intolerance, heat intolerance and polyphagia.   Hematologic/Lymphatic: Negative for adenopathy and bleeding problem. Does not bruise/bleed easily.   Skin: Negative for dry skin and nail changes.   Musculoskeletal: Negative for arthritis, back pain, falls, joint pain, myalgias and neck pain.   Gastrointestinal: Negative for bloating, abdominal pain, change in bowel habit and constipation.   Genitourinary: Negative for bladder incontinence, dysuria, flank pain, genital sores and missed menses.   Neurological: Negative for aphonia, brief paralysis, difficulty with concentration, dizziness and weakness.   Psychiatric/Behavioral: Negative for altered mental status and memory loss. The patient does not have insomnia.    Allergic/Immunologic: Negative for environmental allergies.       Objective:   Physical Exam   Constitutional: He is oriented to person, place, and time. He appears well-developed and well-nourished. He is not intubated.   HENT:   Head: Normocephalic and atraumatic.   Right Ear: External ear normal.   Left Ear: External ear normal.   Mouth/Throat: Oropharynx is clear and moist.   Eyes: Pupils are equal, round, and reactive to light. Conjunctivae and EOM are normal. Right eye exhibits no discharge. Left eye exhibits no discharge. No scleral icterus.   Neck: Normal range of motion. Neck supple. Normal carotid pulses, no hepatojugular reflux and no JVD present. Carotid bruit is not present. No tracheal deviation present. No thyromegaly present.   Cardiovascular: Normal rate, regular  rhythm, S1 normal and S2 normal.  No extrasystoles are present. PMI is not displaced. Exam reveals no gallop, no S3, no distant heart sounds, no friction rub and no midsystolic click.   No murmur heard.  Pulses:       Carotid pulses are 2+ on the right side and 2+ on the left side.       Radial pulses are 2+ on the right side and 2+ on the left side.        Femoral pulses are 2+ on the right side and 2+ on the left side.       Popliteal pulses are 2+ on the right side and 2+ on the left side.        Dorsalis pedis pulses are 0 on the right side and 0 on the left side.        Posterior tibial pulses are 0 on the right side and 0 on the left side.       Biphasic bilateral DP and PT doppler signals          Pulmonary/Chest: Effort normal and breath sounds normal. No accessory muscle usage or stridor. No apnea, no tachypnea and no bradypnea. He is not intubated. No respiratory distress. He has no decreased breath sounds. He has no wheezes. He has no rales. He exhibits no tenderness and no bony tenderness.   Abdominal: He exhibits no distension, no pulsatile liver, no abdominal bruit, no ascites, no pulsatile midline mass and no mass. There is no abdominal tenderness. There is no rebound and no guarding.   Musculoskeletal: Normal range of motion.         General: No tenderness or edema.      Comments:     R lumbar pain with palpation        R thigh and calf pain with palpation            Lymphadenopathy:     He has no cervical adenopathy.   Neurological: He is alert and oriented to person, place, and time. He has normal reflexes. No cranial nerve deficit. Coordination normal.   Skin: Skin is warm. No rash noted. No erythema. No pallor.       Varicose veins      No ulcers      Warm       No cyanosis                 Psychiatric: He has a normal mood and affect. His behavior is normal. Judgment and thought content normal.       Assessment:     1. PAD (peripheral artery disease)    2. Atherosclerosis of native artery of  right lower extremity with intermittent claudication    3. Mixed hyperlipidemia    4. Essential hypertension    5. Coronary artery disease involving native coronary artery of native heart with angina pectoris with documented spasm    6. Failed back surgical syndrome        Plan:     Immediate evaluation and management in pain clinic  Symptoms today are related to lumbar radiculopathy      Medical therapy for PAD for now   Pletal 100 mg po bid   Start walking program         Follow up after pain management visit   Consider revascularization for refractory claudication or CLTI or ALI        Medical therapy for CAD   PET stress only revealed 8% ischemic burden       Compression stockings for edema  Consider venous reflux ultrasound for refractory symptoms           Continue with current medical plan and lifestyle changes.  Return sooner for concerns or questions. If symptoms persist go to the ED  I have reviewed all pertinent data on this patient       I have reviewed the patient's medical history in detail and updated the computerized patient record.  Follow up as scheduled. Return sooner for concerns or questions            He and his wife expressed verbal understanding and agreed with the plan        Patient's Medications   New Prescriptions    No medications on file   Previous Medications    AMLODIPINE (NORVASC) 10 MG TABLET        ASPIRIN (ECOTRIN) 81 MG EC TABLET    Take 1 tablet (81 mg total) by mouth once daily.    ATORVASTATIN (LIPITOR) 40 MG TABLET        CILOSTAZOL (PLETAL) 100 MG TAB    Take 1 tablet (100 mg total) by mouth 2 (two) times daily.    CYCLOBENZAPRINE (FLEXERIL) 10 MG TABLET    Take 1 tablet (10 mg total) by mouth every 8 (eight) hours as needed for Muscle spasms.    FISH OIL-OMEGA-3 FATTY ACIDS 300-1,000 MG CAPSULE    Take by mouth once daily.    GABAPENTIN (NEURONTIN) 100 MG CAPSULE    Take 1 capsule (100 mg total) by mouth 3 (three) times daily.    HYDROCODONE-ACETAMINOPHEN 10-325MG (NORCO)   MG TAB        LISINOPRIL (PRINIVIL,ZESTRIL) 20 MG TABLET        METHYLPREDNISOLONE (MEDROL DOSEPACK) 4 MG TABLET    Take as directed on package    METOPROLOL TARTRATE (LOPRESSOR) 50 MG TABLET        MULTIVITAMIN (ONE DAILY MULTIVITAMIN) PER TABLET    Take 1 tablet by mouth once daily.    SAW PALMETTO 160 MG CAPSULE    Take 160 mg by mouth 2 (two) times daily.   Modified Medications    No medications on file   Discontinued Medications    No medications on file

## 2020-08-20 ENCOUNTER — PATIENT OUTREACH (OUTPATIENT)
Dept: EMERGENCY MEDICINE | Facility: HOSPITAL | Age: 75
End: 2020-08-20

## 2020-08-20 ENCOUNTER — OFFICE VISIT (OUTPATIENT)
Dept: PAIN MEDICINE | Facility: CLINIC | Age: 75
End: 2020-08-20
Payer: MEDICARE

## 2020-08-20 ENCOUNTER — TELEPHONE (OUTPATIENT)
Dept: PAIN MEDICINE | Facility: CLINIC | Age: 75
End: 2020-08-20

## 2020-08-20 VITALS
WEIGHT: 198 LBS | BODY MASS INDEX: 30.1 KG/M2 | HEART RATE: 74 BPM | SYSTOLIC BLOOD PRESSURE: 105 MMHG | DIASTOLIC BLOOD PRESSURE: 68 MMHG

## 2020-08-20 DIAGNOSIS — G89.4 CHRONIC PAIN SYNDROME: ICD-10-CM

## 2020-08-20 DIAGNOSIS — M96.1 FAILED BACK SURGICAL SYNDROME: ICD-10-CM

## 2020-08-20 DIAGNOSIS — M54.16 LUMBAR RADICULOPATHY: Primary | ICD-10-CM

## 2020-08-20 DIAGNOSIS — M51.36 DDD (DEGENERATIVE DISC DISEASE), LUMBAR: ICD-10-CM

## 2020-08-20 PROCEDURE — 99999 PR PBB SHADOW E&M-EST. PATIENT-LVL IV: CPT | Mod: PBBFAC,,, | Performed by: NURSE PRACTITIONER

## 2020-08-20 PROCEDURE — 3074F SYST BP LT 130 MM HG: CPT | Mod: CPTII,S$GLB,, | Performed by: NURSE PRACTITIONER

## 2020-08-20 PROCEDURE — 1125F PR PAIN SEVERITY QUANTIFIED, PAIN PRESENT: ICD-10-PCS | Mod: S$GLB,,, | Performed by: NURSE PRACTITIONER

## 2020-08-20 PROCEDURE — 99999 PR PBB SHADOW E&M-EST. PATIENT-LVL IV: ICD-10-PCS | Mod: PBBFAC,,, | Performed by: NURSE PRACTITIONER

## 2020-08-20 PROCEDURE — 1101F PT FALLS ASSESS-DOCD LE1/YR: CPT | Mod: CPTII,S$GLB,, | Performed by: NURSE PRACTITIONER

## 2020-08-20 PROCEDURE — 1159F MED LIST DOCD IN RCRD: CPT | Mod: S$GLB,,, | Performed by: NURSE PRACTITIONER

## 2020-08-20 PROCEDURE — 99213 OFFICE O/P EST LOW 20 MIN: CPT | Mod: S$GLB,,, | Performed by: NURSE PRACTITIONER

## 2020-08-20 PROCEDURE — 99213 PR OFFICE/OUTPT VISIT, EST, LEVL III, 20-29 MIN: ICD-10-PCS | Mod: S$GLB,,, | Performed by: NURSE PRACTITIONER

## 2020-08-20 PROCEDURE — 3078F PR MOST RECENT DIASTOLIC BLOOD PRESSURE < 80 MM HG: ICD-10-PCS | Mod: CPTII,S$GLB,, | Performed by: NURSE PRACTITIONER

## 2020-08-20 PROCEDURE — 3078F DIAST BP <80 MM HG: CPT | Mod: CPTII,S$GLB,, | Performed by: NURSE PRACTITIONER

## 2020-08-20 PROCEDURE — 3074F PR MOST RECENT SYSTOLIC BLOOD PRESSURE < 130 MM HG: ICD-10-PCS | Mod: CPTII,S$GLB,, | Performed by: NURSE PRACTITIONER

## 2020-08-20 PROCEDURE — 1159F PR MEDICATION LIST DOCUMENTED IN MEDICAL RECORD: ICD-10-PCS | Mod: S$GLB,,, | Performed by: NURSE PRACTITIONER

## 2020-08-20 PROCEDURE — 1125F AMNT PAIN NOTED PAIN PRSNT: CPT | Mod: S$GLB,,, | Performed by: NURSE PRACTITIONER

## 2020-08-20 PROCEDURE — 1101F PR PT FALLS ASSESS DOC 0-1 FALLS W/OUT INJ PAST YR: ICD-10-PCS | Mod: CPTII,S$GLB,, | Performed by: NURSE PRACTITIONER

## 2020-08-20 NOTE — PROGRESS NOTES
[facility] established Patient Evaluation    Referred by: TRISTIAN DONIS  Reason for referral: Lumbar Radiculopathy    CC:   Chief Complaint   Patient presents with    Back Pain    Leg Pain     right        Last 3 PDI Scores 8/20/2020 8/5/2020 7/6/2020   Pain Disability Index (PDI) 63 56 49     8/20/20 - Mr. Castro returns to clinic for follow up visit reporting worse back and right leg pain.  Pain intensity is currently 9/10.      8/5/20 - Mr. Castro returns to clinic for follow up visit reporting worse low back and both legs pain. Pain intensity is currently 8/10.  He reports no relief with Gabapentin 100 mg TID, he reports not wanting to move forward with SCS therapy.     HPI: Kermit Castro is a 74 y.o. male referred for low back pain that radiates into both legs, though radiating pain into the right leg is much worse than the left.  Who reports history of back surgery (confirmed by recent MRI) with fusion of L2-L3.  He believes that his back and leg pain got worse after surgery, which was in 2017.  Since that time, he has been maintained on Percocet  t.i.d. p.r.n. by an outside pain management physician.  He reports that Percocet  used to provide him good relief, but now this medication is like taking candy.    Location: Low back   Severity: Currently: 7/10   Typical Range: 5/7/10     Exacerbation: 10/10   Onset: about 3 years ago  Quality: Throbbing and Shooting  Radiation: right leg  Axial/Extremity Percentage of Pain: 50/50  Exacerbating Factors: walking for more than a few minutes  Mitigating Factors: medications  Assoc: denies night fever/night sweats, urinary incontinence, bowel incontinence, significant weight loss, significant motor weakness and loss of sensations    Previous Therapies:  PT/OT:  Yes, previously completed  HEP:  Denies regular, focused exercise for rehabilitation of the core muscles and back  TENS:  Denies  Injections:  Reports history of injections from the  outside pain management provider none of which provided significant or sustained relief  Surgery: Back Surgery years ago  Medications:   - NSAIDS:   - MSK Relaxants:   - TCAs:   - SNRIs:  Denies  - Topicals:   - Anticonvulsants:  Endorses trial of Lyrica.  Denies trial of gabapentin.  - Opioids:  Norco , Percocet     Current Pain Medications:  1. Percocet  t.i.d. p.r.n.    Full Medication List:    Current Outpatient Medications:     amlodipine (NORVASC) 10 MG tablet, , Disp: , Rfl:     atorvastatin (LIPITOR) 40 MG tablet, , Disp: , Rfl:     cilostazol (PLETAL) 100 MG Tab, Take 1 tablet (100 mg total) by mouth 2 (two) times daily., Disp: 180 tablet, Rfl: 3    fish oil-omega-3 fatty acids 300-1,000 mg capsule, Take by mouth once daily., Disp: , Rfl:     gabapentin (NEURONTIN) 100 MG capsule, Take 1 capsule (100 mg total) by mouth 3 (three) times daily., Disp: 90 capsule, Rfl: 1    hydrocodone-acetaminophen 10-325mg (NORCO)  mg Tab, , Disp: , Rfl:     lisinopril (PRINIVIL,ZESTRIL) 20 MG tablet, , Disp: , Rfl:     methylPREDNISolone (MEDROL DOSEPACK) 4 mg tablet, Take as directed on package, Disp: 1 Package, Rfl: 0    metoprolol tartrate (LOPRESSOR) 50 MG tablet, , Disp: , Rfl:     multivitamin (ONE DAILY MULTIVITAMIN) per tablet, Take 1 tablet by mouth once daily., Disp: , Rfl:     saw palmetto 160 MG capsule, Take 160 mg by mouth 2 (two) times daily., Disp: , Rfl:     aspirin (ECOTRIN) 81 MG EC tablet, Take 1 tablet (81 mg total) by mouth once daily., Disp: , Rfl: 0     Review of Systems:  Review of Systems   Constitutional: Negative for chills and fever.   HENT: Negative for nosebleeds.    Eyes: Negative for blurred vision.   Respiratory: Negative for hemoptysis.    Cardiovascular: Negative for chest pain and palpitations.   Gastrointestinal: Negative for heartburn and vomiting.   Genitourinary: Negative for hematuria.   Musculoskeletal: Positive for back pain. Negative for  myalgias.        Right leg pain   Skin: Negative for rash.   Neurological: Positive for tingling. Negative for seizures and loss of consciousness.   Endo/Heme/Allergies: Does not bruise/bleed easily.   Psychiatric/Behavioral: Negative for hallucinations. The patient has insomnia.        Allergies:  Iodine and iodide containing products and Shellfish containing products     Medical History:  Past Medical History:   Diagnosis Date    Coronary artery disease     Hyperlipidemia     Hypertension         Surgical History:  Past Surgical History:   Procedure Laterality Date    ABDOMINAL SURGERY      Polyps in colon removed (noncancerous)    APPENDECTOMY      back surgery (screws & okate)      CORONARY ARTERY BYPASS GRAFT              Social History:  Social History     Socioeconomic History    Marital status:      Spouse name: Not on file    Number of children: Not on file    Years of education: Not on file    Highest education level: Not on file   Occupational History    Not on file   Social Needs    Financial resource strain: Not on file    Food insecurity     Worry: Not on file     Inability: Not on file    Transportation needs     Medical: Not on file     Non-medical: Not on file   Tobacco Use    Smoking status: Former Smoker     Quit date: 10/5/1998     Years since quittin.8    Smokeless tobacco: Never Used   Substance and Sexual Activity    Alcohol use: Not Currently     Alcohol/week: 0.0 standard drinks     Comment:     Drug use: Never    Sexual activity: Not on file   Lifestyle    Physical activity     Days per week: Not on file     Minutes per session: Not on file    Stress: Not on file   Relationships    Social connections     Talks on phone: Not on file     Gets together: Not on file     Attends [employer] service: Not on file     Active member of club or organization: Not on file     Attends meetings of clubs or organizations: Not on file     Relationship status: Not on  file   Other Topics Concern    Not on file   Social History Narrative    Not on file       Physical Exam:  Vitals:    20 1407   BP: 105/68   Pulse: 74   Weight: 89.8 kg (197 lb 15.6 oz)   PainSc:   9     General    Nursing note and vitals reviewed.  Constitutional: He is oriented to person, place, and time. He appears well-developed and well-nourished. No distress.   HENT:   Head: Normocephalic and atraumatic.   Nose: Nose normal.   Eyes: Conjunctivae and EOM are normal. Pupils are equal, round, and reactive to light. Right eye exhibits no discharge. Left eye exhibits no discharge. No scleral icterus.   Neck: No JVD present.   Cardiovascular: Intact distal pulses.    Pulmonary/Chest: Effort normal. No respiratory distress.   Abdominal: He exhibits no distension.   Neurological: He is alert and oriented to person, place, and time. Coordination normal.   Psychiatric: He has a normal mood and affect. His behavior is normal. Judgment and thought content normal.     General Musculoskeletal Exam   Gait: normal     Back (L-Spine & T-Spine) / Neck (C-Spine) Exam     Tenderness Right paramedian tenderness of the Lower L-Spine. Left paramedian tenderness of the Lower L-Spine.     Back (L-Spine & T-Spine) Range of Motion   Back extension: facet loading is positive and exacerabtes/reproduces the patient's typical low back pain    Back flexion: limited ROM but partial relief of low back pain noted.     Spinal Sensation   Right Side Sensation  L-Spine Level: normal  Left Side Sensation  L-Spine Level: normal    Other He has no scoliosis .    Comments:  SLR negative bilaterally.        Muscle Strength   Right Lower Extremity   Hip Flexion: 5/5   Hip Extensors: 5/5  Quadriceps:  5/5   Hamstrin/5   Gastrocsoleus:  5/5/5  Left Lower Extremity   Hip Flexion: 5/5   Hip Extensors: 5/5  Quadriceps:  5/5   Hamstrin/5   Gastrocsoleus:  5/5/5    Reflexes     Left Side  Achilles:  2+  Quadriceps:  2+    Right Side    Achilles:  2+  Quadriceps:  2+      Imagin/10/20 MRI Lumbar Spine W WO Contrast     Narrative & Impression     EXAMINATION:  MRI LUMBAR SPINE W WO CONTRAST     CLINICAL HISTORY:  Low back painLow back pain, prior surgery, new or progressive sx;     TECHNIQUE:  Standard multiplanar without and with contrast MRI sequences of the lumbar spine performed with 10cc Gadavist.     COMPARISON:  None     FINDINGS:  There are postop changes consistent with posterior decompression with posterior lumbar interbody fusion at the L2-3 disc level.     Thoracolumbar scoliosis is evident.     The distal cord and conus appear grossly normal with the conus at T12-L1.     Mild disc degeneration and disc desiccation is evident at T10-11 and T11-12.     T12-L1: Minor disc bulge with mild bilateral facet arthritis.     L1-2: Mild generalized disc bulge with ventral sac impression.  Mild hypertrophic ligamentum flavum and facet arthritis.  Mild central and left foraminal stenosis.     L2-3: Postoperative changes consistent with posterior decompression with posterior lumbar interbody fusion.  The central canal appears patent.  Metallic artifact obscures the neural foramen.     L3-4: Moderate disc degeneration with disc bulging osteophyte.  Slightly more pronounced on the right.  Moderate hypertrophic ligamentum flavum and facet arthritis is present as well.  The central canal is approximately 6 mm.  Right lateral recess stenosis is present with moderate left and moderate to severe right foraminal stenosis.     L4-5: Moderate disc degeneration with disc bulging osteophyte eccentric to the right.  Ventral sac impression with hypertrophic ligamentum flavum and facet arthritis with central canal stenosis.  AP diameter of the canal at this level is approximately 6 mm as well.  Again right lateral recess stenosis is present with severe right foraminal stenosis.  Moderate left foraminal stenosis.     L5-S1:  Moderate disc degeneration with  disc narrowing, desiccation and disc bulging osteophyte with ventral sac impression.  Moderate facet arthritis, left greater than right.  Mild central canal stenosis with moderate bilateral foraminal stenosis.     Impression:     Scoliosis.     Postop changes consistent with posterior decompression with L2-3 posterior lumbar interbody fusion.     L3-4, L4-5 and L5-S1 disc degeneration with disc bulging osteophyte as well as hypertrophic ligamentum flavum and facet arthritis.  Central, lateral recess and foraminal stenosis as described above in detail.        Electronically signed by: Herson Bustamante MD  Date:                                            06/10/2020  Time:                                           11:07      Labs:  BMP  Lab Results   Component Value Date     10/05/2019    K 4.4 10/05/2019     10/05/2019    CO2 26 10/05/2019    BUN 10 10/05/2019    CREATININE 1.42 (H) 06/10/2020    CALCIUM 9.1 10/05/2019    ANIONGAP 11 10/05/2019    ESTGFRAFRICA 55.5 (A) 06/10/2020    EGFRNONAA 48.0 (A) 06/10/2020     Lab Results   Component Value Date    ALT 23 10/05/2019    AST 22 10/05/2019    ALKPHOS 85 10/05/2019    BILITOT 1.5 (H) 10/05/2019       Assessment:  Kermit Castro is a 74 y.o. male with the following diagnoses based on history, exam, and imaging:    Problem List Items Addressed This Visit        Neuro    Chronic pain syndrome    Lumbar radiculopathy - Primary    Relevant Orders    Ambulatory referral/consult to Neurosurgery       Orthopedic    Failed back surgical syndrome    Relevant Orders    Ambulatory referral/consult to Neurosurgery      Other Visit Diagnoses     DDD (degenerative disc disease), lumbar        Relevant Orders    Ambulatory referral/consult to Neurosurgery          7/6/2020 - this is a very pleasant 74-year-old male with chronic low back pain and right lower extremity radiculopathy affecting the right L3 dermatome primarily.  Patient has a history of interbody fusion  L2-L3 in 2017 after which he experienced increased back and right lower extremity symptoms.  Patient reports continued, severe pain daily which is now failing to respond to Percocet  as he is likely developing tolerance to this medication.  He denies a history of trying gabapentin, which is a medication I have recommended to him today.  He will continue to follow up with his outside pain management provider for opioid prescriptions.  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  We will have him return to clinic in 1 month to assess the effect of gabapentin and to discuss a trial of spinal cord stimulation therapy once he has had an opportunity to perform his own research.    8/5/2020- 75 y/o male with history of chronic low back and right lower extremity radiculopathy affecting the right L3/4 and some L 5 dermatome primarily.   Mr Castro has history of interbody fusion L2-L3 in 2017 after which he experienced increased back and right lower extremity symptoms. His pain continues and is severe at times other times his pain is mild to moderate. He reports taking Percocet  TID per Dr. Boateng that provides him short term relief, he reports taking Gabapentin 100 mg daily for 1 week then quitting.  Discussed with patient that considering he is refusing spinal cord stimulation at this point, and considering his current diagnosis of failed back syndrome then my recommendation would  restarting gabapentin 100 mg t.i.d. consistently for 4 weeks in effort to provide him some relief from his symptoms.  Educated patient that in order for medication provide relief of symptoms you need to be on it consistently patient verbalized understanding.     08/20/20204449-51-exyl-old male presents with his wife with continued chronic low back pain and right lower extremity  radiculopathy that affecting the right L3-4 and some L5 dermatome.  Patient has a history of interbody fusion L2-3 in 2017.  Previously recommended SCS therapy however patient is refusing at this time.He will continue to follow up with his outside pain management provider for opioid prescriptions.  Discussed today that they would like a referral to Neurosurgery for a consultation to determine if he is a candidate lumbar intervention.      Treatment Plan:   PT/OT/HEP: I encouraged the patient to start a home exercise regimen that includes daily, moderate cardiovascular exercise lasting at least 30 minutes.  This may include yoga, ban chi, walking, swimming, aqua aerobics, or other exercises that maintain a heart rate of 50-70% of the calculated maximum heart rate.  I also encouraged light, daily stretching focused on the target area.  Procedures:  Patient refuse Nevro/HF10.  Medications:    - Patient to follow up with Dr. Boateng re: opioid refills   -continue Gabapentin 100 TID and plan to titrate up as tolerated 4 weeks  Imaging: No additional imaging recommended at this time.  Labs: Reviewed.  Medications are appropriately dosed for current hepatorenal function.  Referral-neurosurgery today  Follow Up: RTC almaz Martínez, ADOLFO-C  Interventional Pain Management      Disclaimer: This note was partly generated using dictation software which may occasionally result in transcription errors.

## 2020-08-24 ENCOUNTER — TELEPHONE (OUTPATIENT)
Dept: NEUROSURGERY | Facility: CLINIC | Age: 75
End: 2020-08-24

## 2020-08-24 ENCOUNTER — OFFICE VISIT (OUTPATIENT)
Dept: NEUROSURGERY | Facility: CLINIC | Age: 75
End: 2020-08-24
Payer: MEDICARE

## 2020-08-24 VITALS
SYSTOLIC BLOOD PRESSURE: 128 MMHG | BODY MASS INDEX: 28.46 KG/M2 | HEART RATE: 91 BPM | TEMPERATURE: 99 F | DIASTOLIC BLOOD PRESSURE: 72 MMHG | WEIGHT: 187.81 LBS | HEIGHT: 68 IN

## 2020-08-24 DIAGNOSIS — M54.9 DORSALGIA, UNSPECIFIED: ICD-10-CM

## 2020-08-24 DIAGNOSIS — M51.36 DDD (DEGENERATIVE DISC DISEASE), LUMBAR: ICD-10-CM

## 2020-08-24 DIAGNOSIS — M96.1 FAILED BACK SURGICAL SYNDROME: ICD-10-CM

## 2020-08-24 DIAGNOSIS — M54.16 LUMBAR RADICULOPATHY: Primary | ICD-10-CM

## 2020-08-24 DIAGNOSIS — Z98.1 S/P LUMBAR FUSION: ICD-10-CM

## 2020-08-24 PROCEDURE — 3074F PR MOST RECENT SYSTOLIC BLOOD PRESSURE < 130 MM HG: ICD-10-PCS | Mod: CPTII,S$GLB,, | Performed by: NURSE PRACTITIONER

## 2020-08-24 PROCEDURE — 3078F PR MOST RECENT DIASTOLIC BLOOD PRESSURE < 80 MM HG: ICD-10-PCS | Mod: CPTII,S$GLB,, | Performed by: NURSE PRACTITIONER

## 2020-08-24 PROCEDURE — 1125F AMNT PAIN NOTED PAIN PRSNT: CPT | Mod: S$GLB,,, | Performed by: NURSE PRACTITIONER

## 2020-08-24 PROCEDURE — 99999 PR PBB SHADOW E&M-EST. PATIENT-LVL V: ICD-10-PCS | Mod: PBBFAC,,, | Performed by: NURSE PRACTITIONER

## 2020-08-24 PROCEDURE — 1159F MED LIST DOCD IN RCRD: CPT | Mod: S$GLB,,, | Performed by: NURSE PRACTITIONER

## 2020-08-24 PROCEDURE — 99999 PR PBB SHADOW E&M-EST. PATIENT-LVL V: CPT | Mod: PBBFAC,,, | Performed by: NURSE PRACTITIONER

## 2020-08-24 PROCEDURE — 99204 PR OFFICE/OUTPT VISIT, NEW, LEVL IV, 45-59 MIN: ICD-10-PCS | Mod: S$GLB,,, | Performed by: NURSE PRACTITIONER

## 2020-08-24 PROCEDURE — 1159F PR MEDICATION LIST DOCUMENTED IN MEDICAL RECORD: ICD-10-PCS | Mod: S$GLB,,, | Performed by: NURSE PRACTITIONER

## 2020-08-24 PROCEDURE — 1101F PT FALLS ASSESS-DOCD LE1/YR: CPT | Mod: CPTII,S$GLB,, | Performed by: NURSE PRACTITIONER

## 2020-08-24 PROCEDURE — 1125F PR PAIN SEVERITY QUANTIFIED, PAIN PRESENT: ICD-10-PCS | Mod: S$GLB,,, | Performed by: NURSE PRACTITIONER

## 2020-08-24 PROCEDURE — 3078F DIAST BP <80 MM HG: CPT | Mod: CPTII,S$GLB,, | Performed by: NURSE PRACTITIONER

## 2020-08-24 PROCEDURE — 99204 OFFICE O/P NEW MOD 45 MIN: CPT | Mod: S$GLB,,, | Performed by: NURSE PRACTITIONER

## 2020-08-24 PROCEDURE — 1101F PR PT FALLS ASSESS DOC 0-1 FALLS W/OUT INJ PAST YR: ICD-10-PCS | Mod: CPTII,S$GLB,, | Performed by: NURSE PRACTITIONER

## 2020-08-24 PROCEDURE — 3074F SYST BP LT 130 MM HG: CPT | Mod: CPTII,S$GLB,, | Performed by: NURSE PRACTITIONER

## 2020-08-24 NOTE — LETTER
August 24, 2020      Stephen Martínez, FNP  [address]  [address]  [facility] [address]           [facility] - Neurosurgery 7th Fl  [address]  [facility] [address]  Phone: [phone]  Fax: [phone]          Patient: Kermit Castro   MR Number: [id number]   YOB: 1945   Date of Visit: 8/24/2020       Dear Stephen Martínez:    Thank you for referring Kermit Castro to me for evaluation. Attached you will find relevant portions of my assessment and plan of care.    If you have questions, please do not hesitate to call me. I look forward to following Kermit Castro along with you.    Sincerely,    Jeanne Granado, ADOLFO    Enclosure  CC:  No Recipients    If you would like to receive this communication electronically, please contact [email or web address] or [phone] to request more information on [facility] access.    For providers and/or their staff who would like to refer a patient to [facility], please contact us through our one-stop-shop provider referral line, [facility], at [phone].    If you feel you have received this communication in error or would no longer like to receive these types of communications, please e-mail [email or web address]

## 2020-08-24 NOTE — TELEPHONE ENCOUNTER
----- Message from Michelle Lu sent at 8/24/2020  8:32 AM CDT -----  Regarding: pt advice  Contact: Samira (wife) @ [phone]  Caller is asking to speak with someone in Dr. Granado's office regarding patient's appt, says he was scheduled to see the doctor today, caller says she took off from work to bring the patient, however in [facility] the appt is scheduled 8/24. Please call to advise.

## 2020-08-31 ENCOUNTER — HOSPITAL ENCOUNTER (OUTPATIENT)
Dept: RADIOLOGY | Facility: HOSPITAL | Age: 75
Discharge: HOME | End: 2020-08-31
Attending: NURSE PRACTITIONER
Payer: MEDICARE

## 2020-08-31 DIAGNOSIS — M54.16 LUMBAR RADICULOPATHY: ICD-10-CM

## 2020-08-31 DIAGNOSIS — Z98.1 S/P LUMBAR FUSION: ICD-10-CM

## 2020-08-31 DIAGNOSIS — M54.9 DORSALGIA, UNSPECIFIED: ICD-10-CM

## 2020-08-31 PROCEDURE — 72131 CT LUMBAR SPINE W/O DYE: CPT | Mod: TC,PO

## 2020-08-31 PROCEDURE — 72082 X-RAY EXAM ENTIRE SPI 2/3 VW: CPT | Mod: TC,FY,PO

## 2020-08-31 PROCEDURE — 72110 X-RAY EXAM L-2 SPINE 4/>VWS: CPT | Mod: TC,FY,PO

## 2020-09-02 ENCOUNTER — TELEPHONE (OUTPATIENT)
Dept: NEUROSURGERY | Facility: CLINIC | Age: 75
End: 2020-09-02

## 2020-09-02 NOTE — TELEPHONE ENCOUNTER
Spoke with Mrs. Castro informed that Jeanne and Dr. Fuentes reviewed his images and surgery is needed. Advised Mrs. Castro that I will speak with May to get Mr. Castro scheduled to see Dr. Grullon in clinic. Verbalized understanding no other questions at this time.

## 2020-09-02 NOTE — TELEPHONE ENCOUNTER
----- Message from Rebceca Gaitan MA sent at 9/2/2020 12:54 PM CDT -----  Regarding: FW: pt advice  Contact: Samira (wife) @ [phone]    ----- Message -----  From: Michelle Lu  Sent: 9/2/2020  11:59 AM CDT  To: [facility] Staff  Subject: pt advice                                        Caller is returning a missed call from Dr. Granado's office ([facility]), please call.

## 2020-10-12 ENCOUNTER — OFFICE VISIT (OUTPATIENT)
Dept: ORTHOPEDICS | Facility: CLINIC | Age: 75
End: 2020-10-12
Payer: MEDICARE

## 2020-10-12 VITALS — BODY MASS INDEX: 28 KG/M2 | WEIGHT: 184.75 LBS | HEIGHT: 68 IN

## 2020-10-12 DIAGNOSIS — M48.062 SPINAL STENOSIS OF LUMBAR REGION WITH NEUROGENIC CLAUDICATION: Primary | ICD-10-CM

## 2020-10-12 DIAGNOSIS — M54.16 LUMBAR RADICULOPATHY, ACUTE: ICD-10-CM

## 2020-10-12 PROCEDURE — 1159F PR MEDICATION LIST DOCUMENTED IN MEDICAL RECORD: ICD-10-PCS | Mod: S$GLB,,, | Performed by: PHYSICIAN ASSISTANT

## 2020-10-12 PROCEDURE — 1101F PT FALLS ASSESS-DOCD LE1/YR: CPT | Mod: CPTII,S$GLB,, | Performed by: PHYSICIAN ASSISTANT

## 2020-10-12 PROCEDURE — 1125F AMNT PAIN NOTED PAIN PRSNT: CPT | Mod: S$GLB,,, | Performed by: PHYSICIAN ASSISTANT

## 2020-10-12 PROCEDURE — 1159F MED LIST DOCD IN RCRD: CPT | Mod: S$GLB,,, | Performed by: PHYSICIAN ASSISTANT

## 2020-10-12 PROCEDURE — 99999 PR PBB SHADOW E&M-EST. PATIENT-LVL III: CPT | Mod: PBBFAC,,, | Performed by: PHYSICIAN ASSISTANT

## 2020-10-12 PROCEDURE — 1101F PR PT FALLS ASSESS DOC 0-1 FALLS W/OUT INJ PAST YR: ICD-10-PCS | Mod: CPTII,S$GLB,, | Performed by: PHYSICIAN ASSISTANT

## 2020-10-12 PROCEDURE — 1125F PR PAIN SEVERITY QUANTIFIED, PAIN PRESENT: ICD-10-PCS | Mod: S$GLB,,, | Performed by: PHYSICIAN ASSISTANT

## 2020-10-12 PROCEDURE — 99204 OFFICE O/P NEW MOD 45 MIN: CPT | Mod: S$GLB,,, | Performed by: PHYSICIAN ASSISTANT

## 2020-10-12 PROCEDURE — 99999 PR PBB SHADOW E&M-EST. PATIENT-LVL III: ICD-10-PCS | Mod: PBBFAC,,, | Performed by: PHYSICIAN ASSISTANT

## 2020-10-12 PROCEDURE — 99204 PR OFFICE/OUTPT VISIT, NEW, LEVL IV, 45-59 MIN: ICD-10-PCS | Mod: S$GLB,,, | Performed by: PHYSICIAN ASSISTANT

## 2020-10-12 NOTE — PROGRESS NOTES
DATE: 10/12/2020  PATIENT: Kermit Castro    Supervising Physician: Hood Rodriguez M.D.    CHIEF COMPLAINT: right leg pain    HISTORY:  Kermit Castro is a 75 y.o. male with PMH of L2/3 TLIF about 3 years ago in [place] here for initial evaluation of low back and right posterior leg pain (Back - 6, Leg - 6).  The pain in the leg is what bothers him most.  The pain has been present for several months but has progressively worsened over the last 3 weeks. The patient describes the pain as aching.  The pain is worse with walking and improved by sitting or leaning forward over a shopping cart. There is associated numbness and tingling. There is no subjective weakness. Prior treatments have included pain medications, gabapentin and injections with Dr. Boateng, but no recent surgery.  He is not certain of what injections he had with Dr. Boateng.     The patient denies myelopathic symptoms such as handwriting changes or difficulty with buttons/coins/keys. Denies perineal paresthesias, bowel/bladder dysfunction.    PAST MEDICAL/SURGICAL HISTORY:  Past Medical History:   Diagnosis Date    Coronary artery disease     Hyperlipidemia     Hypertension      Past Surgical History:   Procedure Laterality Date    ABDOMINAL SURGERY      Polyps in colon removed (noncancerous)    APPENDECTOMY      back surgery (screws & okate)      CORONARY ARTERY BYPASS GRAFT      1998       Medications:   Current Outpatient Medications on File Prior to Visit   Medication Sig Dispense Refill    amlodipine (NORVASC) 10 MG tablet       aspirin (ECOTRIN) 81 MG EC tablet Take 1 tablet (81 mg total) by mouth once daily.  0    atorvastatin (LIPITOR) 40 MG tablet       cilostazol (PLETAL) 100 MG Tab Take 1 tablet (100 mg total) by mouth 2 (two) times daily. 180 tablet 3    hydrocodone-acetaminophen 10-325mg (NORCO)  mg Tab       lisinopril (PRINIVIL,ZESTRIL) 20 MG tablet       multivitamin (ONE DAILY MULTIVITAMIN) per tablet Take  1 tablet by mouth once daily.      saw palmetto 160 MG capsule Take 160 mg by mouth 2 (two) times daily.       No current facility-administered medications on file prior to visit.        Social History:   Social History     Socioeconomic History    Marital status:      Spouse name: Not on file    Number of children: Not on file    Years of education: Not on file    Highest education level: Not on file   Occupational History    Not on file   Social Needs    Financial resource strain: Not on file    Food insecurity     Worry: Not on file     Inability: Not on file    Transportation needs     Medical: Not on file     Non-medical: Not on file   Tobacco Use    Smoking status: Former Smoker     Quit date: 10/5/1998     Years since quittin.0    Smokeless tobacco: Never Used   Substance and Sexual Activity    Alcohol use: Not Currently     Alcohol/week: 0.0 standard drinks     Comment:     Drug use: Never    Sexual activity: Not on file   Lifestyle    Physical activity     Days per week: Not on file     Minutes per session: Not on file    Stress: Not on file   Relationships    Social connections     Talks on phone: Not on file     Gets together: Not on file     Attends Restoration service: Not on file     Active member of club or organization: Not on file     Attends meetings of clubs or organizations: Not on file     Relationship status: Not on file   Other Topics Concern    Not on file   Social History Narrative    Not on file       REVIEW OF SYSTEMS:  Constitution: Negative. Negative for chills, fever and night sweats.   Cardiovascular: Negative for chest pain and syncope.   Respiratory: Negative for cough and shortness of breath.   Gastrointestinal: See HPI. Negative for nausea/vomiting. Negative for abdominal pain.  Genitourinary: See HPI. Negative for discoloration or dysuria.  Skin: Negative for dry skin, itching and rash.   Hematologic/Lymphatic: Negative for bleeding problem. Does  "not bruise/bleed easily.   Musculoskeletal: Negative for falls and muscle weakness.   Neurological: See HPI. No seizures.   Endocrine: Negative for polydipsia, polyphagia and polyuria.   Allergic/Immunologic: Negative for hives and persistent infections.     EXAM:  Ht 5' 8" (1.727 m)   Wt 83.8 kg (184 lb 11.9 oz)   BMI 28.09 kg/m²     General: The patient is a very pleasant 75 y.o. male in no apparent distress, the patient is oriented to person, place and time.  Psych: Normal mood and affect  HEENT: Vision grossly intact, hearing intact to the spoken word.  Lungs: Respirations unlabored.  Gait: Antalgic station and gait, no difficulty with toe or heel walk.   Skin: Dorsal lumbar skin negative for rashes, lesions, hairy patches.  There is a well healed lumbar surgical scar.  There is mild right sided lumbar tenderness to palpation.  Range of motion: Lumbar range of motion is acceptable.  Spinal Balance: Global saggital and coronal spinal balance acceptable, not significant for scoliosis and kyphosis.  Musculoskeletal: No pain with the range of motion of the bilateral hips. No trochanteric tenderness to palpation.  Vascular: Bilateral lower extremities warm and well perfused, dorsalis pedis pulses 2+ bilaterally.  Neurological: Normal strength and tone in all major motor groups in the bilateral lower extremities. Normal sensation to light touch in the L2-S1 dermatomes bilaterally with the exception of decreased sensation in the L4-S1 dermatomes on the right.  Deep tendon reflexes symmetric 2+ in the bilateral lower extremities.  Negative Babinski bilaterally. Straight leg raise positive on the right, negative on the left.    IMAGING:      Today I personally reviewed AP, Lat and Flex/Ex  upright L-spine films that demonstrate hardware in place at L2/3.  There is mild scoliosis.     MRI and CT lumbar spine show severe right foraminal stenosis and central stenosis at L3/4 and L4/5.  Moderate at L5/S1.       Body mass " index is 28.09 kg/m².    No results found for: HGBA1C        ASSESSMENT/PLAN:    Kermit ANN was seen today for low-back pain and leg pain.    Diagnoses and all orders for this visit:    Spinal stenosis of lumbar region with neurogenic claudication  -     Procedure Order to Pain Management; Future    Lumbar radiculopathy, acute  -     Ambulatory referral/consult to Spine Surgery  -     Procedure Order to Pain Management; Future        Today we discussed at length all of the different treatment options including anti-inflammatories, acetaminophen, rest, ice, heat, physical therapy including strengthening and stretching exercises, home exercises, ROM, aerobic conditioning, aqua therapy, other modalities including ultrasound, massage, and dry needling, epidural steroid injections and finally surgical intervention.      We will get the patient's records from Dr. Boateng.  We will get him scheduled for a right L3/4 and L4/5 TFESI with our pain management and see him back 2 weeks after injection.  He could be a candidate for surgery, however it would likely be a large surgery due to scoliosis and prior surgery.

## 2020-10-13 ENCOUNTER — TELEPHONE (OUTPATIENT)
Dept: PAIN MEDICINE | Facility: CLINIC | Age: 75
End: 2020-10-13

## 2020-10-14 ENCOUNTER — TELEPHONE (OUTPATIENT)
Dept: PAIN MEDICINE | Facility: CLINIC | Age: 75
End: 2020-10-14

## 2020-10-14 ENCOUNTER — PATIENT MESSAGE (OUTPATIENT)
Dept: PAIN MEDICINE | Facility: CLINIC | Age: 75
End: 2020-10-14

## 2020-10-14 DIAGNOSIS — M54.16 LUMBAR RADICULOPATHY: Primary | ICD-10-CM

## 2020-10-14 NOTE — TELEPHONE ENCOUNTER
Patient returned call to schedule procedure. Date, time, and instruction given. Patient verbalized understanding.

## 2020-10-21 ENCOUNTER — TELEPHONE (OUTPATIENT)
Dept: ADMINISTRATIVE | Facility: OTHER | Age: 75
End: 2020-10-21

## 2020-10-21 NOTE — TELEPHONE ENCOUNTER
----- Message from Juan Carlos Humphreys sent at 10/21/2020 11:27 AM CDT -----  Regarding: Cancellation  Contact: RITCHIE KHAN [[id number]]  Name of Who is Calling: RITCHIE KHAN [[id number]]      What is the request in detail: Would like to cancel tomorrow's procedure, due to going out of town. Does not want to reschedule, does not know when he will be back in town.       Can the clinic reply by [facility]: no      What Number to Call Back if not in [facility]: [phone]

## 2020-10-26 ENCOUNTER — TELEPHONE (OUTPATIENT)
Dept: PAIN MEDICINE | Facility: CLINIC | Age: 75
End: 2020-10-26

## 2020-10-26 NOTE — TELEPHONE ENCOUNTER
----- Message from Sandy Eubanks sent at 10/26/2020  4:28 PM CDT -----  Contact: RITCHIE KHAN [[id number]]  Type: Patient Call Back    Who called:RITCHIE KHAN [[id number]]    What is the request in detail: Patient is requesting a call back. RITCHIE KHAN [[id number]] states he needs to reschedule his 10/22/2020 appt. I was unable to.  Please advise.    Can the clinic reply by [facility]? No    Best call back number: ..[phone]/ [phone] (Samira Khan wife)    Additional Information: N/A

## 2020-10-27 ENCOUNTER — TELEPHONE (OUTPATIENT)
Dept: PAIN MEDICINE | Facility: CLINIC | Age: 75
End: 2020-10-27

## 2020-10-29 ENCOUNTER — PATIENT MESSAGE (OUTPATIENT)
Dept: PAIN MEDICINE | Facility: OTHER | Age: 75
End: 2020-10-29

## 2020-10-29 ENCOUNTER — TELEPHONE (OUTPATIENT)
Dept: PAIN MEDICINE | Facility: OTHER | Age: 75
End: 2020-10-29

## 2020-10-29 DIAGNOSIS — M54.16 LUMBAR RADICULOPATHY: Primary | ICD-10-CM

## 2020-10-30 NOTE — PROGRESS NOTES
Dear Jeanne Maldonado NP,    Thank you for referring this patient to my clinic. The full details of my evaluation will also be forthcoming to you by letter.    CHIEF COMPLAINT:  Consultation for progressive lumbar radiculopathy    I, Candy Shah, attest that this documentation has been prepared under the direction and in the presence of Gume Grullon MD.    HPI:  Kermit Castro is a 75 y.o.  male  with PMHx of CAD s/p CABG on ASA 81mg, HTN, s/p L2-3 fusion in 2015 at [facility] with Dr. Castro, and PAD who presents to the clinic for evaluation of progressive lumbar radiculopathy. He was seen by ADOLFO Granado 8/24/2020 as a referral from pain management for further evaluation of progressive lumbar radiculopathy. States that over the past few months the pain has become intolerable and affecting his ADLs. Pt states he has right back pain that radiates down the back of his RLE. The pain stops before reaching his right foot.  His previous doctor has administered multiple KATIANA shots. The last of which was approximately 1 month ago. He states that the KATIANA helped but he still has pain. The pt states the pain is alleviated when he lays down but it is exacerbated when standing and walking. The pt denies that bending over alleviates pain. He believes that he does not feel that he needs surgical intervention. He is currently on oxy-10. He states he had a triple bypass surgery in the past, allergic to seafood, iodine and shell fish. Denies being allergic to any medications.        Review of patient's allergies indicates:   Allergen Reactions    Iodine and iodide containing products Anaphylaxis    Shellfish containing products Anaphylaxis       Past Medical History:   Diagnosis Date    Coronary artery disease     Hyperlipidemia     Hypertension      Past Surgical History:   Procedure Laterality Date    ABDOMINAL SURGERY      Polyps in colon removed (noncancerous)    APPENDECTOMY      back surgery (screws &  dwayne)      CORONARY ARTERY BYPASS GRAFT           No family history on file.  Social History     Tobacco Use    Smoking status: Former Smoker     Quit date: 10/5/1998     Years since quittin.0    Smokeless tobacco: Never Used   Substance Use Topics    Alcohol use: Not Currently     Alcohol/week: 0.0 standard drinks     Comment:     Drug use: Never        Review of Systems   Constitutional: Negative.    HENT: Negative.    Eyes: Negative.    Respiratory: Negative.    Cardiovascular: Negative.    Gastrointestinal: Negative.    Endocrine: Negative.    Genitourinary: Negative.    Musculoskeletal: Negative for back pain, gait problem and neck pain.   Skin: Negative.    Allergic/Immunologic: Negative.    Neurological: Negative for weakness, light-headedness, numbness and headaches.   Hematological: Negative.    Psychiatric/Behavioral: Negative.        OBJECTIVE:   Vital Signs:       Physical Exam:    Vital signs: All nursing notes and vital signs reviewed -- afebrile, vital signs stable.  Constitutional: Patient sitting comfortably in chair. Appears well developed and well nourished.  Skin: Exposed areas are intact without abnormal markings, rashes or other lesions.  HEENT: Normocephalic. Normal conjunctivae.  Cardiovascular: Normal rate and regular rhythm.  Respiratory: Chest wall rises and falls symmetrically, without signs of respiratory distress.  Abdomen: Soft and non-tender.  Extremities: Warm and without edema. Calves supple, non-tender.  Psych/Behavior: Normal affect.    Neurological:    Mental status: Alert and oriented. Conversational and appropriate.       Cranial Nerves: VFF to confrontation. PERRL. EOMI without nystagmus. Facial STLT normal and symmetric. Strong, symmetric muscles of mastication. Facial strength full and symmetric. Hearing equal bilaterally to finger rub. Palate and uvula rise and fall normally in midline. Shoulder shrug 5/5 strength. Tongue midline.     Motor:    Upper:   Deltoids Triceps Biceps WE WF     R 5/5 5/5 5/5 5/5 5/5 5/5    L 5/5 5/5 5/5 5/5 5/5 5/5      Lower:  HF KE KF DF PF EHL    R 5/5 5/5 5/5 5/5 5/5 5/5    L 5/5 5/5 5/5 5/5 5/5 5/5     Sensory: Intact sensation to light touch in all extremities. Romberg negative.    Reflexes:          DTR: 2+ symmetrically throughout.     Garcia's: Negative.     Babinski's: Negative.     Clonus: Negative.    Cerebellar: Finger-to-nose and rapid alternating movements normal. Gait stable, fluid.    Spine:    Posture: Head well aligned over pelvis in front and side views.  No focal or global spinal deformity visible on inspection. Shoulders and hips even. No obvious leg length discrepancy. No scapula winging.    Bending: Full ROM with forward, back and lateral bending. No rib prominence with forward bend.    Cervical:      ROM: Full with flexion, extension, lateral rotation and ear-to-shoulder bend.      Midline TTP: Negative.     Spurling's test: Negative.     Lhermitte's: Negative.    Thoracic:     Midline TTP: Negative    Lumbar:     Midline TTP: Negative     Straight Leg Test: Negative     Crossed Straight Leg Test: Negative     Sciatic notch tenderness: Negative.    Other:     SI joint TTP: Negative.     Greater trochanter TTP: Negative.     Tenderness with external/internal hip rotation: Negative.     The right big toe and accompanying toe has numbness.    Diagnostic Results:  All imaging was independently reviewed by me.    CT L-spine, dated 8/31/2020  1. L3-4 right neural foraminal stenosis with possible impingement exiting right L3 nerve root    Flex/Ex X-ray L-spine, dated 8/31/2020:  1. Sigmoidal scoliosis thoracolumbar spine  2. Multilevel advanced degenerative sequela lower lumbar spine.      ASSESSMENT/PLAN:     Kermit Castro has degenerative scoliosis and flat back deformity with sagittal imbalance. His primary concern is right L5 radiculopathy more than axial back pain, which is a consequence of DDD at multiple  levels below his prior L2-3 fusion, and neurogenic claudication along the concavity of his lumbosacral fractional curve. I recommend repeat ESIs and PT. He will follow up in 3 months. If these measures fail we could consider an L2-pelvis fusion with the goal of neurologic decompression, not deformity correction. A SCS would be the final resort in the event that surgery is not selected or fails.    The patient understands and agrees with the plan of care. All questions were answered.     1. ESIs and PT  2. RTC in 3 months      I, Dr. Gume Grullon personally performed the services described in this documentation. All medical record entries made by the scribe, Candy Shah, were at my direction and in my presence.  I have reviewed the chart and agree that the record reflects my personal performance and is accurate and complete.      Gume Grullon M.D.  Department of Neurosurgery  [facility]

## 2020-11-04 ENCOUNTER — TELEPHONE (OUTPATIENT)
Dept: NEUROSURGERY | Facility: CLINIC | Age: 75
End: 2020-11-04

## 2020-11-05 ENCOUNTER — HOSPITAL ENCOUNTER (OUTPATIENT)
Facility: OTHER | Age: 75
Discharge: HOME | End: 2020-11-05
Attending: ANESTHESIOLOGY | Admitting: ANESTHESIOLOGY
Payer: MEDICARE

## 2020-11-05 ENCOUNTER — TELEPHONE (OUTPATIENT)
Dept: NEUROSURGERY | Facility: CLINIC | Age: 75
End: 2020-11-05

## 2020-11-05 ENCOUNTER — OFFICE VISIT (OUTPATIENT)
Dept: NEUROSURGERY | Facility: CLINIC | Age: 75
End: 2020-11-05
Payer: MEDICARE

## 2020-11-05 VITALS
HEIGHT: 68 IN | TEMPERATURE: 97 F | HEART RATE: 65 BPM | SYSTOLIC BLOOD PRESSURE: 125 MMHG | WEIGHT: 184 LBS | BODY MASS INDEX: 27.89 KG/M2 | DIASTOLIC BLOOD PRESSURE: 65 MMHG

## 2020-11-05 VITALS
DIASTOLIC BLOOD PRESSURE: 64 MMHG | HEART RATE: 70 BPM | BODY MASS INDEX: 27.89 KG/M2 | HEIGHT: 68 IN | OXYGEN SATURATION: 98 % | WEIGHT: 184 LBS | SYSTOLIC BLOOD PRESSURE: 118 MMHG | TEMPERATURE: 99 F | RESPIRATION RATE: 18 BRPM

## 2020-11-05 DIAGNOSIS — M41.50 DEGENERATIVE SCOLIOSIS IN ADULT PATIENT: Primary | ICD-10-CM

## 2020-11-05 DIAGNOSIS — M51.36 DDD (DEGENERATIVE DISC DISEASE), LUMBAR: Primary | ICD-10-CM

## 2020-11-05 DIAGNOSIS — G89.29 CHRONIC PAIN: ICD-10-CM

## 2020-11-05 DIAGNOSIS — M54.17 LUMBOSACRAL RADICULOPATHY: ICD-10-CM

## 2020-11-05 PROCEDURE — 1159F MED LIST DOCD IN RCRD: CPT | Mod: S$GLB,,, | Performed by: NEUROLOGICAL SURGERY

## 2020-11-05 PROCEDURE — 99999 PR PBB SHADOW E&M-EST. PATIENT-LVL IV: CPT | Mod: PBBFAC,,, | Performed by: NEUROLOGICAL SURGERY

## 2020-11-05 PROCEDURE — 3078F DIAST BP <80 MM HG: CPT | Mod: CPTII,S$GLB,, | Performed by: NEUROLOGICAL SURGERY

## 2020-11-05 PROCEDURE — 64483 PR EPIDURAL INJ, ANES/STEROID, TRANSFORAMINAL, LUMB/SACR, SNGL LEVL: ICD-10-PCS | Mod: RT,,, | Performed by: ANESTHESIOLOGY

## 2020-11-05 PROCEDURE — 1125F AMNT PAIN NOTED PAIN PRSNT: CPT | Mod: S$GLB,,, | Performed by: NEUROLOGICAL SURGERY

## 2020-11-05 PROCEDURE — 3074F PR MOST RECENT SYSTOLIC BLOOD PRESSURE < 130 MM HG: ICD-10-PCS | Mod: CPTII,S$GLB,, | Performed by: NEUROLOGICAL SURGERY

## 2020-11-05 PROCEDURE — 25000003 PHARM REV CODE 250: Performed by: STUDENT IN AN ORGANIZED HEALTH CARE EDUCATION/TRAINING PROGRAM

## 2020-11-05 PROCEDURE — 1159F PR MEDICATION LIST DOCUMENTED IN MEDICAL RECORD: ICD-10-PCS | Mod: S$GLB,,, | Performed by: NEUROLOGICAL SURGERY

## 2020-11-05 PROCEDURE — 99214 PR OFFICE/OUTPT VISIT, EST, LEVL IV, 30-39 MIN: ICD-10-PCS | Mod: S$GLB,,, | Performed by: NEUROLOGICAL SURGERY

## 2020-11-05 PROCEDURE — 1125F PR PAIN SEVERITY QUANTIFIED, PAIN PRESENT: ICD-10-PCS | Mod: S$GLB,,, | Performed by: NEUROLOGICAL SURGERY

## 2020-11-05 PROCEDURE — 25000003 PHARM REV CODE 250: Performed by: ANESTHESIOLOGY

## 2020-11-05 PROCEDURE — 64483 NJX AA&/STRD TFRM EPI L/S 1: CPT | Mod: RT | Performed by: ANESTHESIOLOGY

## 2020-11-05 PROCEDURE — 63600175 PHARM REV CODE 636 W HCPCS: Performed by: ANESTHESIOLOGY

## 2020-11-05 PROCEDURE — 1101F PR PT FALLS ASSESS DOC 0-1 FALLS W/OUT INJ PAST YR: ICD-10-PCS | Mod: CPTII,S$GLB,, | Performed by: NEUROLOGICAL SURGERY

## 2020-11-05 PROCEDURE — 99999 PR PBB SHADOW E&M-EST. PATIENT-LVL IV: ICD-10-PCS | Mod: PBBFAC,,, | Performed by: NEUROLOGICAL SURGERY

## 2020-11-05 PROCEDURE — 64484 NJX AA&/STRD TFRM EPI L/S EA: CPT | Mod: RT,,, | Performed by: ANESTHESIOLOGY

## 2020-11-05 PROCEDURE — 25500020 PHARM REV CODE 255: Performed by: ANESTHESIOLOGY

## 2020-11-05 PROCEDURE — 64484 NJX AA&/STRD TFRM EPI L/S EA: CPT | Mod: RT | Performed by: ANESTHESIOLOGY

## 2020-11-05 PROCEDURE — 3078F PR MOST RECENT DIASTOLIC BLOOD PRESSURE < 80 MM HG: ICD-10-PCS | Mod: CPTII,S$GLB,, | Performed by: NEUROLOGICAL SURGERY

## 2020-11-05 PROCEDURE — 64484 PRA INJECT ANES/STEROID FORAMEN LUMBAR/SACRAL W IMG GUIDE ,EA ADD LEVEL: ICD-10-PCS | Mod: RT,,, | Performed by: ANESTHESIOLOGY

## 2020-11-05 PROCEDURE — 1101F PT FALLS ASSESS-DOCD LE1/YR: CPT | Mod: CPTII,S$GLB,, | Performed by: NEUROLOGICAL SURGERY

## 2020-11-05 PROCEDURE — 99214 OFFICE O/P EST MOD 30 MIN: CPT | Mod: S$GLB,,, | Performed by: NEUROLOGICAL SURGERY

## 2020-11-05 PROCEDURE — 3074F SYST BP LT 130 MM HG: CPT | Mod: CPTII,S$GLB,, | Performed by: NEUROLOGICAL SURGERY

## 2020-11-05 PROCEDURE — 64483 NJX AA&/STRD TFRM EPI L/S 1: CPT | Mod: RT,,, | Performed by: ANESTHESIOLOGY

## 2020-11-05 RX ORDER — AMLODIPINE BESYLATE 10 MG/1
TABLET ORAL
COMMUNITY

## 2020-11-05 RX ORDER — SAW PALMETTO 160 MG
CAPSULE ORAL
COMMUNITY
End: 2021-12-21

## 2020-11-05 RX ORDER — MIDAZOLAM HYDROCHLORIDE 1 MG/ML
INJECTION INTRAMUSCULAR; INTRAVENOUS
Status: DISCONTINUED | OUTPATIENT
Start: 2020-11-05 | End: 2020-11-05 | Stop reason: HOSPADM

## 2020-11-05 RX ORDER — FENTANYL CITRATE 50 UG/ML
INJECTION, SOLUTION INTRAMUSCULAR; INTRAVENOUS
Status: DISCONTINUED | OUTPATIENT
Start: 2020-11-05 | End: 2020-11-05 | Stop reason: HOSPADM

## 2020-11-05 RX ORDER — MULTIVITAMIN/IRON/FOLIC ACID 18MG-0.4MG
TABLET ORAL
COMMUNITY
End: 2021-12-21 | Stop reason: SDUPTHER

## 2020-11-05 RX ORDER — OXYCODONE AND ACETAMINOPHEN 10; 325 MG/1; MG/1
TABLET ORAL
COMMUNITY

## 2020-11-05 RX ORDER — SODIUM CHLORIDE 9 MG/ML
500 INJECTION, SOLUTION INTRAVENOUS CONTINUOUS
Status: DISCONTINUED | OUTPATIENT
Start: 2020-11-05 | End: 2020-11-05 | Stop reason: HOSPADM

## 2020-11-05 RX ORDER — METOPROLOL TARTRATE 50 MG/1
TABLET ORAL
COMMUNITY
End: 2022-04-19

## 2020-11-05 RX ORDER — LIDOCAINE HYDROCHLORIDE 10 MG/ML
INJECTION INFILTRATION; PERINEURAL
Status: DISCONTINUED | OUTPATIENT
Start: 2020-11-05 | End: 2020-11-05 | Stop reason: HOSPADM

## 2020-11-05 RX ORDER — ASPIRIN 325 MG
TABLET ORAL
COMMUNITY
End: 2022-06-17

## 2020-11-05 RX ORDER — ATORVASTATIN CALCIUM 40 MG/1
TABLET, FILM COATED ORAL
COMMUNITY
End: 2021-12-21 | Stop reason: SDUPTHER

## 2020-11-05 RX ORDER — DEXAMETHASONE SODIUM PHOSPHATE 10 MG/ML
INJECTION INTRAMUSCULAR; INTRAVENOUS
Status: DISCONTINUED | OUTPATIENT
Start: 2020-11-05 | End: 2020-11-05 | Stop reason: HOSPADM

## 2020-11-05 RX ORDER — LISINOPRIL 20 MG/1
TABLET ORAL
COMMUNITY
End: 2022-04-19 | Stop reason: SDUPTHER

## 2020-11-05 RX ORDER — CHOLECALCIFEROL (VITAMIN D3) 50 MCG
CAPSULE ORAL
COMMUNITY

## 2020-11-05 RX ORDER — BUPIVACAINE HYDROCHLORIDE 2.5 MG/ML
INJECTION, SOLUTION EPIDURAL; INFILTRATION; INTRACAUDAL
Status: DISCONTINUED | OUTPATIENT
Start: 2020-11-05 | End: 2020-11-05 | Stop reason: HOSPADM

## 2020-11-05 RX ORDER — METOPROLOL SUCCINATE 50 MG/1
TABLET, EXTENDED RELEASE ORAL
COMMUNITY
End: 2021-12-21 | Stop reason: SDUPTHER

## 2020-11-05 RX ADMIN — SODIUM CHLORIDE 500 ML: 0.9 INJECTION, SOLUTION INTRAVENOUS at 01:11

## 2020-11-05 NOTE — DISCHARGE INSTRUCTIONS
Thank you for allowing us to care for you today. You may receive a survey about the care we provided. Your feedback is valuable and helps us provide excellent care throughout the community.     Home Care Instructions for Pain Management:    1. DIET:   You may resume your normal diet today.   2. BATHING:   You may shower with luke warm water. No tub baths or anything that will soak injection sites under water for the next 24 hours.  3. DRESSING:   You may remove your bandage today.   4. ACTIVITY LEVEL:   You may resume your normal activities 24 hrs after your procedure. Nothing strenuous today.  5. MEDICATIONS:   You may resume your normal medications today. To restart blood thinners, ask your doctor.  6. DRIVING    If you have received any sedatives by mouth today, you may not drive for 12 hours.    If you have received any sedation through your IV, you may not drive for 24 hrs.   7. SPECIAL INSTRUCTIONS:   No heat to the injection site for 24 hrs including, hot bath or shower, heating pad, moist heat, or hot tubs.    Use ice pack to injection site for any pain or discomfort.  Apply ice packs for 20 minute intervals as needed.    IF you have diabetes, be sure to monitor your blood sugar more closely. IF your injection contained steroids your blood sugar levels may become higher than normal.    If you are still having pain upon discharge:  Your pain may improve over the next 48 hours. The anesthetic (numbing medication) works immediately to 48 hours. IF your injection contained a steroid (anti-inflammatory medication), it takes approximately 3 days to start feeling relief and 7-10 days to see your greatest results from the medication. It is possible you may need subsequent injections. This would be discussed at your follow up appointment with pain management or your referring doctor.    Please call the PAIN MANAGEMENT office at [phone] or ON CALL pager at [phone] if you experienced any:   -Weakness or  loss of sensation  -Fever > 101.5  -Pain uncontrolled with oral medications   -Persistent nausea, vomiting, or diarrhea  -Redness or drainage from the injection sites, or any other worrisome concerns.   If physician on call was not reached or could not communicate with our office for any reason please go to the nearest emergency department.  Adult Procedural Sedation Instructions    Recovery After Procedural Sedation (Adult)  You have been given medicine by vein to make you sleep during your surgery. This may have included both a pain medicine and sleeping medicine. Most of the effects have worn off. But you may still have some drowsiness for the next 6 to 8 hours.  Home care  Follow these guidelines when you get home:  · For the next 8 hours, you should be watched by a responsible adult. This person should make sure your condition is not getting worse.  · Don't drink any alcohol for the next 24 hours.  · Don't drive, operate dangerous machinery, or make important business or personal decisions during the next 24 hours.  Note: Your healthcare provider may tell you not to take any medicine by mouth for pain or sleep in the next 4 hours. These medicines may react with the medicines you were given in the hospital. This could cause a much stronger response than usual.  Follow-up care  Follow up with your healthcare provider if you are not alert and back to your usual level of activity within 12 hours.  When to seek medical advice  Call your healthcare provider right away if any of these occur:  · Drowsiness gets worse  · Weakness or dizziness gets worse  · Repeated vomiting  · You can't be awakened   Date Last Reviewed: 10/18/2016  © 8943-3323 [employer] [address] [place] [address], [place] [address]. All rights reserved. This information is not intended as a substitute for professional medical care. Always follow your healthcare professional's instructions.

## 2020-11-05 NOTE — TELEPHONE ENCOUNTER
Gave patient and wife AVS with PT referral, will schedule followup in 3 months and mail. Patient and wife decline MYchart.    Lori Melgar RN

## 2020-11-05 NOTE — LETTER
November 5, 2020      Jeanne Granado, NP  [address]  [facility] [address]           [facility] - Neurosurgery 7th Fl  [address]  [facility] [address]  Phone: [phone]  Fax: [phone]          Patient: Kermit Castro   MR Number: [id number]   YOB: 1945   Date of Visit: 11/5/2020       Dear Jeanne Granado:    Thank you for referring Kermit Castro to me for evaluation. Attached you will find relevant portions of my assessment and plan of care.    If you have questions, please do not hesitate to call me. I look forward to following Kermit Castro along with you.    Sincerely,    Gume Grullon MD    Enclosure  CC:  No Recipients    If you would like to receive this communication electronically, please contact [email or web address] or [phone] to request more information on [employer] Link access.    For providers and/or their staff who would like to refer a patient to [facility], please contact us through our one-stop-shop provider referral line, [facility], at [phone].    If you feel you have received this communication in error or would no longer like to receive these types of communications, please e-mail [email or web address]

## 2020-11-05 NOTE — DISCHARGE SUMMARY
Discharge Note  Short Stay      SUMMARY     Admit Date: 11/5/2020    Attending Physician: Nicole Toth      Discharge Physician: Nicole Toth      Discharge Date: 11/5/2020 1:52 PM    Procedure(s) (LRB):  LUMBAR TRANSFORAMINAL RIGHT L3/4 AND L4/5 DIRECT REFERRAL (Right)    Final Diagnosis: Lumbar radiculopathy [M54.16]    Disposition: Home or self care    Patient Instructions:   Current Discharge Medication List      CONTINUE these medications which have NOT CHANGED    Details   !! amlodipine (NORVASC) 10 MG tablet       !! amLODIPine (NORVASC) 10 MG tablet amlodipine besylate  10 mg tabs    Comments: .      aspirin 325 MG tablet aspirin 325 mg tablet   Take 1 tablet every day by oral route.      !! atorvastatin (LIPITOR) 40 MG tablet       !! atorvastatin (LIPITOR) 40 MG tablet atorvastatin calcium  40 mg tabs      cilostazol (PLETAL) 100 MG Tab Take 1 tablet (100 mg total) by mouth 2 (two) times daily.  Qty: 180 tablet, Refills: 3      flu vacc it9417-04,65yr up,PF (FLUZONE HIGH-DOSE 2019-20, PF,) 180 mcg/0.5 mL Syrg Fluzone High-Dose 2019-20 (PF) 180 mcg/0.5 mL intramuscular syringe   PHARMACIST ADMINISTERED IMMUNIZATION ADMINISTERED AT TIME OF DISPENSING      hydrocodone-acetaminophen 10-325mg (NORCO)  mg Tab       !! lisinopril (PRINIVIL,ZESTRIL) 20 MG tablet       !! lisinopriL (PRINIVIL,ZESTRIL) 20 MG tablet lisinopril  20 mg tabs    Comments: .      metoprolol succinate (TOPROL-XL) 50 MG 24 hr tablet metoprolol succinate ER 50 mg tablet,extended release 24 hr   1 po bid    Comments: .      metoprolol tartrate (LOPRESSOR) 50 MG tablet metoprolol tartrate  50 mg tabs    Comments: .      multivitamin (ONE DAILY MULTIVITAMIN) per tablet Take 1 tablet by mouth once daily.      multivitamin-iron-folic acid (CENTRUM) Tab Centrum   daily      omega 3-dha-epa-fish oil (FISH OIL) 100-160-1,000 mg Cap Fish Oil   daily      oxyCODONE-acetaminophen (PERCOCET)  mg per tablet oxycod/apap  tab 10-325mg     Comments: Quantity prescribed more than 7 day supply? Press F2 and select one:[id number]        !! saw palmetto 160 MG capsule Take 160 mg by mouth 2 (two) times daily.      !! saw palmetto 160 MG capsule saw palmetto   daily       !! - Potential duplicate medications found. Please discuss with provider.              Discharge Diagnosis: Lumbar radiculopathy [M54.16]  Condition on Discharge: Stable with no complications to procedure   Diet on Discharge: Same as before.  Activity: as per instruction sheet.  Discharge to: Home with a responsible adult.  Follow up: 2-4 weeks       Please call my office or pager at [phone] if experienced any weakness or loss of sensation, fever > 101.5, pain uncontrolled with oral medications, persistent nausea/vomiting/or diarrhea, redness or drainage from the incisions, or any other worrisome concerns. If physician on call was not reached or could not communicate with our office for any reason please go to the nearest emergency department

## 2020-11-05 NOTE — H&P
"HPI  Patient presenting for Procedure(s) (LRB):  LUMBAR TRANSFORAMINAL RIGHT L3/4 AND L4/5 DIRECT REFERRAL (Right)     Patient on Anti-coagulation No    No health changes since previous encounter    Past Medical History:   Diagnosis Date    Coronary artery disease     Hyperlipidemia     Hypertension      Past Surgical History:   Procedure Laterality Date    ABDOMINAL SURGERY      Polyps in colon removed (noncancerous)    APPENDECTOMY      back surgery (screws & okate)      CORONARY ARTERY BYPASS GRAFT      1998     Review of patient's allergies indicates:   Allergen Reactions    Iodine and iodide containing products Anaphylaxis    Shellfish containing products Anaphylaxis      No current facility-administered medications for this encounter.        PMHx, PSHx, Allergies, Medications reviewed in epic    ROS negative except pain complaints in HPI    OBJECTIVE:    /70 (BP Location: Right arm, Patient Position: Sitting)   Pulse 81   Temp 98.9 °F (37.2 °C) (Oral)   Resp 20   Ht 5' 8" (1.727 m)   Wt 83.5 kg (184 lb)   SpO2 100%   BMI 27.98 kg/m²     PHYSICAL EXAMINATION:    GENERAL: Well appearing, in no acute distress, alert and oriented x3.  PSYCH:  Mood and affect appropriate.  SKIN: Skin color, texture, turgor normal, no rashes or lesions which will impact the procedure.  CV: RRR with palpation of the radial artery.  PULM: No evidence of respiratory difficulty, symmetric chest rise. Clear to auscultation.  NEURO: Cranial nerves grossly intact.    Plan:    Proceed with procedure as planned Procedure(s) (LRB):  LUMBAR TRANSFORAMINAL RIGHT L3/4 AND L4/5 DIRECT REFERRAL (Right)    Hugo Andres  11/05/2020            "

## 2020-11-05 NOTE — OP NOTE
INFORMED CONSENT: The procedure, risks, benefits and options were discussed with patient. There are no contraindications to the procedure. The patient expressed understanding and agreed to proceed. The personnel performing the procedure was discussed.    11/05/2020    Surgeon: Nicole Toth MD    Assistants: None    PROCEDURE:    1)  Right  L3/4 TRANSFORAMINAL EPIDURAL STEROID INJECTION    2)  Right  L4/5 TRANSFORAMINAL EPIDURAL STEROID INJECTION    Pre Procedure diagnosis:    Right  L3/4 and L4/5  Lumbar radiculopathy [M54.16]    Post-Procedure diagnosis:   same    Complications: None    Specimens: None      DESCRIPTION OF PROCEDURE: The patient was brought to the procedure room. IV access was obtained prior to the procedure. The patient was positioned prone on the fluoroscopy table. Continuous hemodynamic monitoring was initiated including blood pressure, EKG, and pulse oximetry. . The skin was prepped with chlorhexidine and draped in a sterile fashion. Skin anesthesia was achieved using a total of 10mL of lidocaine, 5mL over each respective injection site.     The  L3/4and L4/5 transforaminal spaces were identified with fluoroscopy in the  AP, oblique, and lateral views.  A 22 gauge spinal quinke needle was then advanced into the area of the trans foraminal spaces at the above levels with confirmation of proper needle position using AP, oblique, and lateral fluoroscopic views. Once the needle tip was in the area of the transforaminal space, and there was no blood, CSF or paraesthesias,  1 mL of Omnipaque 300mg/ml was injected on each side for a total of 2 mL.  Fluoroscopic imaging in the AP and lateral views revealed a clear outline of the spinal nerve with proximal spread of agent through the neural foramen into the epidural space. A total combination of 1 mL of Bupivicaine 0.25% and 40 mg depo medrol was injected into each level for a total of 4mL of injected medications with displacement of the contrast dye  confirming that the medication went into the area of the transforaminal spaces at each level. A sterile dressing was applied.   Patient tolerated the procedure well.    Conscious sedation provided by M.D    The patient was monitored with continuous pulse oximetry, EKG, and intermittent blood pressure monitors.  The patient was hemodynamically stable throughout the entire process was responsive to voice, and breathing spontaneously.  Supplemental O2 was provided at 2L/min via nasal cannula.  Patient was comfortable for the duration of the procedure. (See nurse documentation and case log for sedation time)    There was a total of 2mg IV Midazolam and 50mcg Fentanyl titrated for the procedure    Patient was taken back to the recovery room for further observation.     The patient was discharged to home in stable condition

## 2020-11-19 ENCOUNTER — CLINICAL SUPPORT (OUTPATIENT)
Dept: REHABILITATION | Facility: HOSPITAL | Age: 75
End: 2020-11-19
Payer: MEDICARE

## 2020-11-19 DIAGNOSIS — G89.29 CHRONIC RIGHT-SIDED LOW BACK PAIN WITH RIGHT-SIDED SCIATICA: ICD-10-CM

## 2020-11-19 DIAGNOSIS — R26.89 DECREASED FUNCTIONAL MOBILITY: ICD-10-CM

## 2020-11-19 DIAGNOSIS — M54.41 CHRONIC RIGHT-SIDED LOW BACK PAIN WITH RIGHT-SIDED SCIATICA: ICD-10-CM

## 2020-11-19 DIAGNOSIS — M41.50 DEGENERATIVE SCOLIOSIS IN ADULT PATIENT: ICD-10-CM

## 2020-11-19 PROCEDURE — 97162 PT EVAL MOD COMPLEX 30 MIN: CPT | Mod: PN

## 2020-11-19 PROCEDURE — 97110 THERAPEUTIC EXERCISES: CPT | Mod: PN

## 2020-11-19 NOTE — PLAN OF CARE
[facility]  Physical Therapy Initial Evaluation    Date: 11/19/2020   Name: Kermit Castro  Clinic Number: [id number]    Therapy Diagnosis:   Encounter Diagnoses   Name Primary?    Degenerative scoliosis in adult patient     Chronic right-sided low back pain with right-sided sciatica     Decreased functional mobility      Physician: Gume Grullon MD    Physician Orders: PT Eval and Treat  Medical Diagnosis from Referral: M41.80 (ICD-10-CM) - Degenerative scoliosis in adult patient  Evaluation Date: 11/19/2020  Authorization Period Expiration: 01/19/2021  Plan of Care Expiration: 1/15/2021  Visit # / Visits authorized: 1/ 12    Time In: 1:50PM  Time Out: 2:30PM  Total Appointment Time (timed & untimed codes): 40 minutes (1 mod eval; 1 TE)    Precautions: Standard and hard of hearing    Subjective   Date of onset: Chronic low back pain/R radiculopathy with worsening symptoms over the past several months  History of current condition Note, pt is a fair historian; several details of subjective gathered from chart review - Kermit reports: He had spinal surgery (L2-L3 fusion) in 2015 at [facility]. Surgery did not alleviate pain and pt has worsening radicular symptoms down RLE (occasionally pain extends to lateral side of R calf). Pt says last two injections he received were ineffective. Pain is worst in standing positions and eases in sitting positions.     Medical History:   Past Medical History:   Diagnosis Date    Coronary artery disease     Hyperlipidemia     Hypertension      Surgical History:   Kermit Castro  has a past surgical history that includes Coronary artery bypass graft; back surgery (screws & okate); Abdominal surgery; Appendectomy; and Transforaminal epidural injection of steroid (Right, 11/5/2020).    Medications:   Kermit ANN has a current medication list which includes the following prescription(s): amlodipine, amlodipine, aspirin, atorvastatin,  atorvastatin, cilostazol, fluzone high-dose 2019-20 (pf), hydrocodone-acetaminophen, lisinopril, lisinopril, metoprolol succinate, metoprolol tartrate, multivitamin, centrum, fish oil, oxycodone-acetaminophen, saw palmetto, and saw palmetto.    Allergies:   Review of patient's allergies indicates:   Allergen Reactions    Iodine and iodide containing products Anaphylaxis    Shellfish containing products Anaphylaxis        Imaging, CT Lumbar Spine (8/24/2020): Sigmoidal scoliosis thoracolumbar spine.  Status post PLIF L2-3, detailed above.  Multilevel thoracolumbar spondylosis/degenerative disc disease sequela with subsequent multilevel spinal canal stenosis including L1-2 (7 mm), L3-4 (8 mm), and L4-5 (7 mm) in addition to narrowing at L5-S1.  L3-4 right neural foraminal stenosis with possible impingement exiting right L3 nerve root with loss of perineural fat density.  There is marked right L4-5 neural foraminal stenosis with impingement upon the exiting right L4 nerve root secondary to a large right-sided disc osteophyte complex.  Bilateral L5-S1 neural foraminal stenosis, left more notable than right, without definite impingement.  There is also multilevel severe facet arthropathy including L1-2, L3-4, L4-5, and L5-S1.  There is no compression fracture deformity or subluxation    Prior Therapy: PT in the past post-fusion  Social History: Lives alone  Occupation: Does not work  Prior Level of Function: Independent  Current Level of Function: Independent but with worsening pain especially standing ADLs    Pain:  Current 6/10, worst 10/10, best 6/10   Location: right low back with radiculopathy down RLE  Description: Shooting  Aggravating Factors: Standing and Walking  Easing Factors: sitting, self-massage     Patients goals: To decrease pain with standing activity    Objective     Posture/observations: Flattened lumbar lordosis; well healed central scar (lumbar fusion) over mid L-Spine; significant forward flexed  "posturing in standing   Palpation: TTP over sacral spine; R gluteal and R QL musculauture   Sensation: Pt endorses paresthesia down RLE to R lateral calf  DTRs: 2+ B patellar tendon reflexes  Range of Motion/Strength:     Thoracolumbar AROM Pain/Dysfunction with Movement   Flexion Full    Extension 25% limited Mild pain increase   Right side bending Full    Left side bending Full    Right rotation Full    Left rotation Full        L/E MMT Right Left   Hip Flexion 4/5 4-/5   Hip Extension 3+/5 2/5   Hip Abduction 4/5 4/5   Knee Flexion 4-/5 4-/5   Knee Extension 4+/5 5/5   Ankle DF 3+/5 4/5   Ankle PF 5/5 5/5     Joint Mobility:   - Thoracic: Hypomobile thoracic PA and rib springing   - Lumbar: Deferred - history lumbar fusion; sacral PA hypomobile however    Flexibility:   -- HS 90/90 (L): Lacking 25 degrees knee extension  -- HS 90/90 (R): Lacking 35 degrees knee extension  -- Ely (L): (+) at 105 degrees knee flexion  -- HS 90/90 (R): (+) at 110 degrees knee flexion    Special Tests:   -- Slump and SLR inconclusive due to B muscle flexibility deficits  -- Prolonged prone positioning: (-) no change in pain  -- Repeated motion testing (flexion): (+) for centralization    Limitation/Restriction for FOTO Lumbar Spine Survey    Therapist reviewed FOTO scores for Kermit Castro on 11/19/2020.   FOTO documents entered into [employer] - see Media section.    Limitation Score: 54% (predicted 41%)       TREATMENT   Treatment Time In: 2:21PM  Treatment Time Out: 2:30PM  Total Treatment time (time-based codes) separate from Evaluation: 9 minutes    Kermit received therapeutic exercises to develop strength, ROM and flexibility for 9 minutes including:  -- Double knee to chest (instruction and performance x10 with 3" holds  -- Single knee to chest (instruction and performance x5 each side wit 3" holds)  -- Lower trunk rotations x 15B with 3" holds    Other treatment suggestions for follow-up: HS stretching, prone quad stretching, hip " flexor stretching, TA activation series, double knee to chest with Swiss ball; seated lumbar flexion with Swiss ball, bridging, clamshells    Home Exercises and Patient Education Provided    Education provided:   - PT plan of care  - HEP instruction    Written Home Exercises Provided: yes.  Exercises were reviewed and Kermit was able to demonstrate them prior to the end of the session.  Kermit demonstrated good  understanding of the education provided.     See EMR under Patient Instructions for exercises provided 11/19/2020.    Assessment   Kermit ANN is a 75 y.o. male referred to outpatient Physical Therapy with a medical diagnosis of Degenerative scoliosis in adult patient. Patient presents with chronic low back pain with RLE radiculopathy. Pt has history of L2-L3 lumbar fusion (2015) and significant multilevel facet and canal stenosis of lumbar spine per imaging. He presents today with pain/paresthesia in L5 dermatomal pattern, BLE weakness + myotomal weakness of RLE, significant postural abnormalities, BLE flexibility deficits, and reduced overall functional mobility. Of note, pt demonstrated signs of symptom centralization with repeated flexion. He is appropriate for skilled physical therapy services to address listed impairments and improve quality of life. He would likely benefit from combination of flexion-based program and abdominal strengthening.    Patient prognosis is Fair.   Patientt will benefit from skilled outpatient Physical Therapy to address the deficits stated above and in the chart below, provide patient /family education, and to maximize patientt's level of independence.     Plan of care discussed with patient: Yes  Patient's spiritual, cultural and educational needs considered and patient is agreeable to the plan of care and goals as stated below:     Anticipated Barriers for therapy: Chronicity of low back pain    Medical Necessity is demonstrated by the following  History  Co-morbidities and  personal factors that may impact the plan of care Co-morbidities:   Coronary artery disease   Hyperlipidemia   Hypertension   History of CABG    Personal Factors:   age     high   Examination  Body Structures and Functions, activity limitations and participation restrictions that may impact the plan of care Body Regions:   back  lower extremities  trunk    Body Systems:    gross symmetry  ROM  strength  gait  transfers  transitions    Participation Restrictions:   Decreased quality of life due to deficits    Activity limitations:   Learning and applying knowledge  no deficits    General Tasks and Commands  no deficits    Communication  no deficits    Mobility  walking    Self care  no deficits    Domestic Life  shopping  cooking  doing house work (cleaning house, washing dishes, laundry)    Interactions/Relationships  no deficits    Life Areas  no deficits    Community and Social Life  community life  recreation and leisure         high   Clinical Presentation evolving clinical presentation with changing clinical characteristics moderate   Decision Making/ Complexity Score: moderate     Goals:  Short Term Goals: 4 weeks   1. Pt will be compliant with HEP in order to maximize PT benefits  2. Pt will improve BLE MMT grades by >/=1/2 grade in order to improve strength for ADL completion  3. Pt will report pain at rest as </=4/10 in order to improve comfort with leisure activity    Long Term Goals: 8 weeks   4. Pt will score </= 41% on FOTO limitation survey in order to improve self-perception of functional mobility deficits  5. Pt will improve BLE MMT grades by >/=1 grade in order to improve strength for ADL completion  6. Pt will improve ROM on B HS 90/90 assessment by >/= 5 degrees in order to improve functional mobility for activities such as lower body dressing  7. Pt will improve ROM on B Ely assessment by >/= 5 degrees in order to improve functional mobility for activities such as lower body dressing  8. Pt will  complete >/=10 Paloff presses B with Green theraband in order to improve core control during standing ADLs  9. Pt will report pain at rest as </=2/10 in order to improve comfort with leisure activity  10. Pt will begin some form of home/community fitness in order to sustain progress gained in PT    Plan   Plan of care Certification: 11/19/2020 to 1/15/2021.    Outpatient Physical Therapy 2 times weekly for 8 weeks to include the following interventions: Cervical/Lumbar Traction, Electrical Stimulation IFC, Gait Training, Manual Therapy, Moist Heat/ Ice, Neuromuscular Re-ed, Orthotic Management and Training, Patient Education, Self Care, Therapeutic Activites, Therapeutic Exercise and Modalities/Dry Needling PRN.     ALICE BAKER, PT

## 2020-11-20 PROBLEM — M54.41 CHRONIC RIGHT-SIDED LOW BACK PAIN WITH RIGHT-SIDED SCIATICA: Status: ACTIVE | Noted: 2020-11-20

## 2020-11-20 PROBLEM — G89.29 CHRONIC RIGHT-SIDED LOW BACK PAIN WITH RIGHT-SIDED SCIATICA: Status: ACTIVE | Noted: 2020-11-20

## 2020-11-20 PROBLEM — R26.89 DECREASED FUNCTIONAL MOBILITY: Status: ACTIVE | Noted: 2020-11-20

## 2020-11-25 ENCOUNTER — CLINICAL SUPPORT (OUTPATIENT)
Dept: REHABILITATION | Facility: HOSPITAL | Age: 75
End: 2020-11-25
Payer: MEDICARE

## 2020-11-25 DIAGNOSIS — G89.29 CHRONIC RIGHT-SIDED LOW BACK PAIN WITH RIGHT-SIDED SCIATICA: ICD-10-CM

## 2020-11-25 DIAGNOSIS — M54.41 CHRONIC RIGHT-SIDED LOW BACK PAIN WITH RIGHT-SIDED SCIATICA: ICD-10-CM

## 2020-11-25 DIAGNOSIS — R26.89 DECREASED FUNCTIONAL MOBILITY: ICD-10-CM

## 2020-11-25 PROCEDURE — 97110 THERAPEUTIC EXERCISES: CPT | Mod: PN,CQ

## 2020-11-25 NOTE — PATIENT INSTRUCTIONS
Supine Hamstring Stretch    Raise your leg with belt around heel of foot. Raise leg until a stretch is felt in the back of the thigh. Keep knee straight. Hold 10 seconds.   Repeat 10 times each side per set. Do 1 sets per session. Do 2 sessions per day.      Hip flexor stretch (Supine)        With right leg hanging off side of bench, relax leg as much as possible. Hold 10 seconds. Relax.  Repeat 5 times per set. Do 1 sets per session. Do 2 sessions per day.     [email or web address]     Copyright © [employer]. All rights reserved.        Isometric Abdominal        Lying on back with knees bent, tighten stomach by pressing elbows down. Hold 5 seconds.  Repeat 10 times per set. Do 1 sets per session. Do 2 sessions per day.     [email or web address]     Copyright © [employer]. All rights reserved.     Bridging    Flatten back against floor then slowly raise buttocks from floor, keeping stomach tight. Hold 1 seconds.  Repeat 10 times per set. Do 1 sets per session. Do 2 sessions per day.      Clam Shells    Lie on side with knees bent. Raise top leg, keeping knee bent and ankles together. Do not let your hips roll back as your raise your leg.  Repeat 10 times each leg per set. Do 1 sets per session. Do 2 sessions per day.  Copyright © [employer]. All rights reserved.

## 2020-11-25 NOTE — PROGRESS NOTES
"  Physical Therapy Treatment Note     Name: Kermit ANN Parks  [facility] Number: [id number]    Therapy Diagnosis:   Encounter Diagnoses   Name Primary?    Chronic right-sided low back pain with right-sided sciatica     Decreased functional mobility      Physician: Gume Grullon MD    Visit Date: 11/25/2020    Physician Orders: PT Eval and Treat  Medical Diagnosis from Referral: M41.80 (ICD-10-CM) - Degenerative scoliosis in adult patient  Evaluation Date: 11/19/2020  Authorization Period Expiration: 01/19/2021  Plan of Care Expiration: 1/15/2021  Visit # / Visits authorized: 2/ 12     Time In: 9:45 am  Time Out: 10:29 am  Total Billable Time: 44 minutes (TE-3)    Precautions: Standard and hard of hearing    Subjective     Pt reports: his pain is not too bad in his right low back today.  He was compliant with home exercise program.  Response to previous treatment: performing the DKTC at home caused increased back pain.  Functional change: not at this time.    Pain: 6/10  Location: right back      Objective     Kermit received therapeutic exercises to develop strength, ROM and flexibility for 44 minutes including:    -- HSS w/strap   10x10"  -- Hip flexor stretch   5x10"  -- TA's     10x5"  -- Bridging w/ TA   x10  -- Double knee to chest  x10 with 3" holds w/SB  -- Single knee to chest  x10 each side wit 3" holds  -- Lower trunk rotations  x 15 B with 3" holds  -- clamshells    x15  -- prone quad stretching  Next visit?     -- seated lumbar flexion with SB x15       Home Exercises Provided and Patient Education Provided     Education provided:   - keep all exercises in a pain free range.    Written Home Exercises Provided: yes.  Exercises were reviewed and Kermit was able to demonstrate them prior to the end of the session.  Kermit demonstrated good  understanding of the education provided.     See EMR under Patient Instructions for exercises provided 11/25/2020.    Assessment     Patient had no increased back pain " performing DKTC with Swiss ball today.  Patient had no difficulty with new exercises added along with today's progressions with no increase in symptoms prior to leaving the clinic.   Kermit is progressing well towards his goals.   Pt prognosis is Fair.     Pt will continue to benefit from skilled outpatient physical therapy to address the deficits listed in the problem list box on initial evaluation, provide pt/family education and to maximize pt's level of independence in the home and community environment.     Pt's spiritual, cultural and educational needs considered and pt agreeable to plan of care and goals.     Anticipated barriers to physical therapy: Chronicity of low back pain    Goals:  Short Term Goals: 4 weeks   1. Pt will be compliant with HEP in order to maximize PT benefits  2. Pt will improve BLE MMT grades by >/=1/2 grade in order to improve strength for ADL completion  3. Pt will report pain at rest as </=4/10 in order to improve comfort with leisure activity    Long Term Goals: 8 weeks   4. Pt will score </= 41% on FOTO limitation survey in order to improve self-perception of functional mobility deficits  5. Pt will improve BLE MMT grades by >/=1 grade in order to improve strength for ADL completion  6. Pt will improve ROM on B HS 90/90 assessment by >/= 5 degrees in order to improve functional mobility for activities such as lower body dressing  7. Pt will improve ROM on B Ely assessment by >/= 5 degrees in order to improve functional mobility for activities such as lower body dressing  8. Pt will complete >/=10 Paloff presses B with Green theraband in order to improve core control during standing ADLs  9. Pt will report pain at rest as </=2/10 in order to improve comfort with leisure activity  10. Pt will begin some form of home/community fitness in order to sustain progress gained in PT    Plan     Continue with Plan Of Care and progress toward PT goals.     Manjinder Still, PTA

## 2020-11-27 ENCOUNTER — CLINICAL SUPPORT (OUTPATIENT)
Dept: REHABILITATION | Facility: HOSPITAL | Age: 75
End: 2020-11-27
Payer: MEDICARE

## 2020-11-27 DIAGNOSIS — M54.41 CHRONIC RIGHT-SIDED LOW BACK PAIN WITH RIGHT-SIDED SCIATICA: ICD-10-CM

## 2020-11-27 DIAGNOSIS — G89.29 CHRONIC RIGHT-SIDED LOW BACK PAIN WITH RIGHT-SIDED SCIATICA: ICD-10-CM

## 2020-11-27 DIAGNOSIS — R26.89 DECREASED FUNCTIONAL MOBILITY: ICD-10-CM

## 2020-11-27 PROCEDURE — 97110 THERAPEUTIC EXERCISES: CPT | Mod: PN,CQ

## 2020-11-27 NOTE — PROGRESS NOTES
"  Physical Therapy Treatment Note     Name: Kermit ANN [place]  [facility] Number: [id number]    Therapy Diagnosis:   Encounter Diagnoses   Name Primary?    Chronic right-sided low back pain with right-sided sciatica     Decreased functional mobility      Physician: Gume Grullon MD    Visit Date: 11/27/2020    Physician Orders: PT Eval and Treat  Medical Diagnosis from Referral: M41.80 (ICD-10-CM) - Degenerative scoliosis in adult patient  Evaluation Date: 11/19/2020  Authorization Period Expiration: 01/19/2021  Plan of Care Expiration: 1/15/2021  Visit # / Visits authorized: 3/ 12     Time In: 11:15 am  Time Out: 11:55 am  Total Billable Time: 40 minutes (TE-3)    Precautions: Standard and hard of hearing    Subjective     Pt reports: pain is not too bad today.  He was compliant with home exercise program.  Response to previous treatment: soreness  Functional change: not at this time.    Pain: 2/10  Location: right back      Objective     Kermit received therapeutic exercises to develop strength, ROM and flexibility for 40 minutes including:    -- HSS w/strap   10x10"  -- Hip flexor stretch   5x10"  -- TA's     10x5"  -- Bridging w/ TA   2x10  -- Double knee to chest  x10 with 3" holds w/SB  -- Single knee to chest  x10 each side wit 3" holds  -- Lower trunk rotations  x 15 B with 3" holds  -- clamshells    x15  -- prone quad stretching  10"x5     -- seated lumbar flexion with SB x15       Home Exercises Provided and Patient Education Provided     Education provided:   - keep all exercises in a pain free range.  - Cont with HEP    Written Home Exercises Provided: yes.  Exercises were reviewed and Kermit was able to demonstrate them prior to the end of the session.  Kermit demonstrated good  understanding of the education provided.     See EMR under Patient Instructions for exercises provided 11/25/2020.    Assessment     Patient tolerated treatment well. Patient needed occasional cuing to complete exercises in a pain " free range.  Patient completed all exercises without increases in pain or adverse reactions. Patient continues to be appropriate for therapy to address deficits.   Kermit is progressing well towards his goals.   Pt prognosis is Fair.     Pt will continue to benefit from skilled outpatient physical therapy to address the deficits listed in the problem list box on initial evaluation, provide pt/family education and to maximize pt's level of independence in the home and community environment.     Pt's spiritual, cultural and educational needs considered and pt agreeable to plan of care and goals.     Anticipated barriers to physical therapy: Chronicity of low back pain    Goals:  Short Term Goals: 4 weeks   1. Pt will be compliant with HEP in order to maximize PT benefits  2. Pt will improve BLE MMT grades by >/=1/2 grade in order to improve strength for ADL completion  3. Pt will report pain at rest as </=4/10 in order to improve comfort with leisure activity    Long Term Goals: 8 weeks   4. Pt will score </= 41% on FOTO limitation survey in order to improve self-perception of functional mobility deficits  5. Pt will improve BLE MMT grades by >/=1 grade in order to improve strength for ADL completion  6. Pt will improve ROM on B HS 90/90 assessment by >/= 5 degrees in order to improve functional mobility for activities such as lower body dressing  7. Pt will improve ROM on B Ely assessment by >/= 5 degrees in order to improve functional mobility for activities such as lower body dressing  8. Pt will complete >/=10 Paloff presses B with Green theraband in order to improve core control during standing ADLs  9. Pt will report pain at rest as </=2/10 in order to improve comfort with leisure activity  10. Pt will begin some form of home/community fitness in order to sustain progress gained in PT    Plan     Continue with Plan Of Care and progress toward PT goals.     Ml Mason, PTA

## 2020-12-04 ENCOUNTER — CLINICAL SUPPORT (OUTPATIENT)
Dept: REHABILITATION | Facility: HOSPITAL | Age: 75
End: 2020-12-04
Payer: MEDICARE

## 2020-12-04 DIAGNOSIS — G89.29 CHRONIC RIGHT-SIDED LOW BACK PAIN WITH RIGHT-SIDED SCIATICA: ICD-10-CM

## 2020-12-04 DIAGNOSIS — M54.41 CHRONIC RIGHT-SIDED LOW BACK PAIN WITH RIGHT-SIDED SCIATICA: ICD-10-CM

## 2020-12-04 DIAGNOSIS — R26.89 DECREASED FUNCTIONAL MOBILITY: ICD-10-CM

## 2020-12-04 PROCEDURE — 97110 THERAPEUTIC EXERCISES: CPT | Mod: PN,CQ

## 2020-12-04 NOTE — PROGRESS NOTES
"  Physical Therapy Treatment Note     Name: Kermit ANN [place]  [facility] Number: [id number]    Therapy Diagnosis:   Encounter Diagnoses   Name Primary?    Chronic right-sided low back pain with right-sided sciatica     Decreased functional mobility      Physician: Gume Grullon MD    Visit Date: 12/4/2020    Physician Orders: PT Eval and Treat  Medical Diagnosis from Referral: M41.80 (ICD-10-CM) - Degenerative scoliosis in adult patient  Evaluation Date: 11/19/2020  Authorization Period Expiration: 01/19/2021  Plan of Care Expiration: 1/15/2021  Visit # / Visits authorized: 4/ 12     Time In: 11:55 am  Time Out: 12:40 am  Total Billable Time: 45 minutes (TE-3)    Precautions: Standard and hard of hearing    Subjective     Pt reports: No pain today.  He was compliant with home exercise program.  Response to previous treatment: soreness  Functional change: not at this time.    Pain: 0/10  Location: right back      Objective     Kermit received therapeutic exercises to develop strength, ROM and flexibility for 40 minutes including:    -- HSS w/strap   3x30"  -- Hip flexor stretch   5x10"  -- TA's     10x5"  -- Bridging w/ TA   3x10  -- Double knee to chest  x15 with 3" holds w/SB  -- Single knee to chest  x10 each side wit 3" holds  -- Lower trunk rotations  x 15 B with 3" holds  -- clamshells    2x10  -- prone quad stretching  30"x3     -- seated lumbar flexion with SB x15    -- Standing Hip ABD   x10 B  -- Standing Hip Ext   x10 B       Home Exercises Provided and Patient Education Provided     Education provided:   - keep all exercises in a pain free range.  - Cont with HEP    Written Home Exercises Provided: yes.  Exercises were reviewed and Kermit was able to demonstrate them prior to the end of the session.  Kermit demonstrated good  understanding of the education provided.     See EMR under Patient Instructions for exercises provided 11/25/2020.    Assessment     Patient tolerated treatment fair. Patient needed " occasional cuing to complete exercises in a pain free range.  Patient states that he has felt more short winded lately and attributes problem to possibly the mask he is wearing. Patient states that this has been an issue in the past and reports no other symptoms. Patient completed exercises but required more frequent and prolonged rest breaks today.   Patient continues to be appropriate for therapy to address deficits.   Kermit is progressing well towards his goals.   Pt prognosis is Fair.     Pt will continue to benefit from skilled outpatient physical therapy to address the deficits listed in the problem list box on initial evaluation, provide pt/family education and to maximize pt's level of independence in the home and community environment.     Pt's spiritual, cultural and educational needs considered and pt agreeable to plan of care and goals.     Anticipated barriers to physical therapy: Chronicity of low back pain    Goals:  Short Term Goals: 4 weeks   1. Pt will be compliant with HEP in order to maximize PT benefits  2. Pt will improve BLE MMT grades by >/=1/2 grade in order to improve strength for ADL completion  3. Pt will report pain at rest as </=4/10 in order to improve comfort with leisure activity    Long Term Goals: 8 weeks   4. Pt will score </= 41% on FOTO limitation survey in order to improve self-perception of functional mobility deficits  5. Pt will improve BLE MMT grades by >/=1 grade in order to improve strength for ADL completion  6. Pt will improve ROM on B HS 90/90 assessment by >/= 5 degrees in order to improve functional mobility for activities such as lower body dressing  7. Pt will improve ROM on B Ely assessment by >/= 5 degrees in order to improve functional mobility for activities such as lower body dressing  8. Pt will complete >/=10 Paloff presses B with Green theraband in order to improve core control during standing ADLs  9. Pt will report pain at rest as </=2/10 in order to  improve comfort with leisure activity  10. Pt will begin some form of home/community fitness in order to sustain progress gained in PT    Plan     Continue with Plan Of Care and progress toward PT goals.     Ml Mason, PTA

## 2020-12-07 ENCOUNTER — CLINICAL SUPPORT (OUTPATIENT)
Dept: REHABILITATION | Facility: HOSPITAL | Age: 75
End: 2020-12-07
Payer: MEDICARE

## 2020-12-07 DIAGNOSIS — M54.41 CHRONIC RIGHT-SIDED LOW BACK PAIN WITH RIGHT-SIDED SCIATICA: ICD-10-CM

## 2020-12-07 DIAGNOSIS — G89.29 CHRONIC RIGHT-SIDED LOW BACK PAIN WITH RIGHT-SIDED SCIATICA: ICD-10-CM

## 2020-12-07 DIAGNOSIS — R26.89 DECREASED FUNCTIONAL MOBILITY: ICD-10-CM

## 2020-12-07 PROCEDURE — 97110 THERAPEUTIC EXERCISES: CPT | Mod: PN

## 2020-12-07 NOTE — PROGRESS NOTES
"  Physical Therapy Treatment Note     Name: Kermit ANN [facility] Number: [id number]    Therapy Diagnosis:   Encounter Diagnoses   Name Primary?    Chronic right-sided low back pain with right-sided sciatica     Decreased functional mobility      Physician: Gume Grullon MD    Visit Date: 12/7/2020    Physician Orders: PT Eval and Treat  Medical Diagnosis from Referral: M41.80 (ICD-10-CM) - Degenerative scoliosis in adult patient  Evaluation Date: 11/19/2020  Authorization Period Expiration: 01/19/2021  Plan of Care Expiration: 1/15/2021  Visit # / Visits authorized: 5/ 12  FOTO: 5/5 NEXT PLEASE     Time In: 9:45AM  Time Out: 10:30AM  Total Billable Time: 45 minutes (TE-3)    Precautions: Standard and hard of hearing    Subjective     Pt reports: Increased pain since last session which he attributes to the cold weather  He was compliant with home exercise program.  Response to previous treatment: soreness  Functional change: not at this time.    Pain: 5/10  Location: right back      Objective     Kermit received therapeutic exercises to develop strength, ROM and flexibility for 45 minutes including:    -- HSS w/strap   2x30"B  -- Hip flexor stretch   3x30"B  -- TA's     10x5" (not today)  -- Bridging w/ TA   3x10  -- Double knee to chest  x20 with 3" holds w/SB  -- Single knee to chest  x10 each side wit 3" holds  -- Lower trunk rotations  x 15 B with 3" holds (not today)  -- clamshells sidelying  2x10B YTB  -- prone quad stretching  30"x3 (not today)  -- seated lumbar flexion with SB x15 with 2" holds  -- Standing Hip ABD   2x10 B YTB  -- Standing Hip Ext   2x10 B YTB (not today)     Home Exercises Provided and Patient Education Provided     Education provided:   - keep all exercises in a pain free range.  - Cont with HEP    Written Home Exercises Provided: yes.  Exercises were reviewed and Kermit was able to demonstrate them prior to the end of the session.  Kermit demonstrated good  understanding of the " education provided.     See EMR under Patient Instructions for exercises provided 11/25/2020.    Assessment     Kermit tolerated program well without increase in pain symptoms from resting level. Higher level of pain noted upon entrance today however. Progressions elicited appropriate levels of muscular fatigue. Occasional verbal cues required for correct exercise performance. He continues to benefit from skilled PT services to progress toward functional goals.     Patient continues to be appropriate for therapy to address deficits.   Kermit is progressing well towards his goals.   Pt prognosis is Fair.     Pt will continue to benefit from skilled outpatient physical therapy to address the deficits listed in the problem list box on initial evaluation, provide pt/family education and to maximize pt's level of independence in the home and community environment.     Pt's spiritual, cultural and educational needs considered and pt agreeable to plan of care and goals.     Anticipated barriers to physical therapy: Chronicity of low back pain    Goals:  Short Term Goals: 4 weeks   1. Pt will be compliant with HEP in order to maximize PT benefits (MET)  2. Pt will improve BLE MMT grades by >/=1/2 grade in order to improve strength for ADL completion (progressing, not met)  3. Pt will report pain at rest as </=4/10 in order to improve comfort with leisure activity (progressing, not met)    Long Term Goals: 8 weeks   4. Pt will score </= 41% on FOTO limitation survey in order to improve self-perception of functional mobility deficits (progressing, not met)  5. Pt will improve BLE MMT grades by >/=1 grade in order to improve strength for ADL completion (progressing, not met)  6. Pt will improve ROM on B HS 90/90 assessment by >/= 5 degrees in order to improve functional mobility for activities such as lower body dressing (progressing, not met)  7. Pt will improve ROM on B Ely assessment by >/= 5 degrees in order to improve  functional mobility for activities such as lower body dressing (progressing, not met)  8. Pt will complete >/=10 Paloff presses B with Green theraband in order to improve core control during standing ADLs (progressing, not met)  9. Pt will report pain at rest as </=2/10 in order to improve comfort with leisure activity (progressing, not met)  10. Pt will begin some form of home/community fitness in order to sustain progress gained in PT (progressing, not met)    Plan     Continue with Plan Of Care and progress toward PT goals.     ALICE BAKER, PT

## 2020-12-09 ENCOUNTER — CLINICAL SUPPORT (OUTPATIENT)
Dept: REHABILITATION | Facility: HOSPITAL | Age: 75
End: 2020-12-09
Payer: MEDICARE

## 2020-12-09 DIAGNOSIS — M54.41 CHRONIC RIGHT-SIDED LOW BACK PAIN WITH RIGHT-SIDED SCIATICA: ICD-10-CM

## 2020-12-09 DIAGNOSIS — G89.29 CHRONIC RIGHT-SIDED LOW BACK PAIN WITH RIGHT-SIDED SCIATICA: ICD-10-CM

## 2020-12-09 DIAGNOSIS — R26.89 DECREASED FUNCTIONAL MOBILITY: ICD-10-CM

## 2020-12-09 PROCEDURE — 97110 THERAPEUTIC EXERCISES: CPT | Mod: PN

## 2020-12-09 NOTE — PROGRESS NOTES
"  Physical Therapy Treatment Note     Name: Kermit ANN [place]  [facility] Number: [id number]    Therapy Diagnosis:   Encounter Diagnoses   Name Primary?    Chronic right-sided low back pain with right-sided sciatica     Decreased functional mobility      Physician: Gume Grullon MD    Visit Date: 12/9/2020    Physician Orders: PT Eval and Treat  Medical Diagnosis from Referral: M41.80 (ICD-10-CM) - Degenerative scoliosis in adult patient  Evaluation Date: 11/19/2020  Authorization Period Expiration: 01/19/2021  Plan of Care Expiration: 1/15/2021  Visit # / Visits authorized: 6/ 12  FOTO: 6/10     Time In: 11:45AM  Time Out: 12:30PM  Total Billable Time: 45 minutes (TE-3)    Precautions: Standard and hard of hearing    Subjective     Pt reports: Still believes cold weather is increasing his pain.  He was compliant with home exercise program.  Response to previous treatment: Soreness  Functional change: Ongoing    Pain: 7/10  Location: right back      Objective     Kermit received therapeutic exercises to develop strength, ROM and flexibility for 45 minutes including:    -- NuStep Level 3.0   5 minutes  -- HSS w/strap   2x30"B  -- Hip flexor stretch   3x30"B  -- TA's     10x5" (not today)  -- Bridging w/ TA   3x10 (not today)  -- Double knee to chest  x20 with 3" holds w/SB  -- Single knee to chest  x10 each side with 3" holds  -- Lower trunk rotations  x 15 B with 3" holds  -- clamshells sidelying  2x10B YTB  -- prone quad stretching  30"x3 (not today)  -- seated lumbar flexion with SB x15 with 2" holds (not today)  -- Standing Hip ABD   2x10 B YTB  -- Standing Hip Ext   2x10 B YTB     Home Exercises Provided and Patient Education Provided     Education provided:   - keep all exercises in a pain free range.  - Cont with HEP    Written Home Exercises Provided: yes.  Exercises were reviewed and Kermit was able to demonstrate them prior to the end of the session.  Kermit demonstrated good  understanding of the education " provided.     See EMR under Patient Instructions for exercises provided 11/25/2020.    Assessment     Kermit continues to respond well to flexion program with decrease in symptoms noted following exercise. He does report minimal muscle soreness, particularly with hip strengthening activity. Moderate verbal/tactile cuing required for correct exercise performance. Continued high pain levels upon entrance to PT, which again pt attributes to colder weather. He would benefit from continued PT services to progress toward functional goals.     Patient continues to be appropriate for therapy to address deficits.   Kermit is progressing well towards his goals.   Pt prognosis is Fair.     Pt will continue to benefit from skilled outpatient physical therapy to address the deficits listed in the problem list box on initial evaluation, provide pt/family education and to maximize pt's level of independence in the home and community environment.     Pt's spiritual, cultural and educational needs considered and pt agreeable to plan of care and goals.     Anticipated barriers to physical therapy: Chronicity of low back pain    Goals:  Short Term Goals: 4 weeks   1. Pt will be compliant with HEP in order to maximize PT benefits (MET)  2. Pt will improve BLE MMT grades by >/=1/2 grade in order to improve strength for ADL completion (progressing, not met)  3. Pt will report pain at rest as </=4/10 in order to improve comfort with leisure activity (progressing, not met)    Long Term Goals: 8 weeks   4. Pt will score </= 41% on FOTO limitation survey in order to improve self-perception of functional mobility deficits (progressing, not met)  5. Pt will improve BLE MMT grades by >/=1 grade in order to improve strength for ADL completion (progressing, not met)  6. Pt will improve ROM on B HS 90/90 assessment by >/= 5 degrees in order to improve functional mobility for activities such as lower body dressing (progressing, not met)  7. Pt will  improve ROM on B Ely assessment by >/= 5 degrees in order to improve functional mobility for activities such as lower body dressing (progressing, not met)  8. Pt will complete >/=10 Paloff presses B with Green theraband in order to improve core control during standing ADLs (progressing, not met)  9. Pt will report pain at rest as </=2/10 in order to improve comfort with leisure activity (progressing, not met)  10. Pt will begin some form of home/community fitness in order to sustain progress gained in PT (progressing, not met)    Plan     Continue with Plan Of Care and progress toward PT goals. Continue flexion program and progress core activity as appropriate.     ALICE BAKER, PT

## 2020-12-14 ENCOUNTER — CLINICAL SUPPORT (OUTPATIENT)
Dept: REHABILITATION | Facility: HOSPITAL | Age: 75
End: 2020-12-14
Payer: MEDICARE

## 2020-12-14 DIAGNOSIS — M54.41 CHRONIC RIGHT-SIDED LOW BACK PAIN WITH RIGHT-SIDED SCIATICA: ICD-10-CM

## 2020-12-14 DIAGNOSIS — R26.89 DECREASED FUNCTIONAL MOBILITY: ICD-10-CM

## 2020-12-14 DIAGNOSIS — G89.29 CHRONIC RIGHT-SIDED LOW BACK PAIN WITH RIGHT-SIDED SCIATICA: ICD-10-CM

## 2020-12-14 PROCEDURE — 97110 THERAPEUTIC EXERCISES: CPT | Mod: PN,CQ

## 2020-12-14 NOTE — PROGRESS NOTES
"  Physical Therapy Treatment Note     Name: Kermit ANN [facility] Number: [id number]    Therapy Diagnosis:   Encounter Diagnoses   Name Primary?    Chronic right-sided low back pain with right-sided sciatica     Decreased functional mobility      Physician: Gume Grullon MD    Visit Date: 12/14/2020    Physician Orders: PT Eval and Treat  Medical Diagnosis from Referral: M41.80 (ICD-10-CM) - Degenerative scoliosis in adult patient  Evaluation Date: 11/19/2020  Authorization Period Expiration: 01/19/2021  Plan of Care Expiration: 1/15/2021  Visit # / Visits authorized: 7/ 12  FOTO: 7/10     Time In: 1:00 PM  Time Out: 1:44 PM  Total Billable Time: 44 minutes (TE-3)    Precautions: Standard and hard of hearing    Subjective     Pt reports: while he is still having a lot of back pain, it is better than last week.  He was compliant with home exercise program.  Response to previous treatment: Soreness  Functional change: Ongoing    Pain: 5/10  Location: right back      Objective     Kermit received therapeutic exercises to develop strength, ROM and flexibility for 44 minutes including:    -- NuStep Level 3.0   5 minutes  -- HSS w/strap   2x30" B  -- Hip flexor stretch   3x30" B  -- TA's     10x5"   -- Bridging w/ TA   3x10   -- Double knee to chest  x20 with 3" holds w/SB  -- Single knee to chest  x15 each side with 3" holds  -- Lower trunk rotations  x 15 B with 3" holds  -- clamshells sidelying  2x10B YTB  -- prone quad stretching  30"x3 - OOT  -- seated lumbar flexion with SB x15 with 2" holds - OOT  -- Standing Hip ABD   2x10 B YTB- OOT  -- Standing Hip Ext   2x10 B YTB- OOT     Home Exercises Provided and Patient Education Provided     Education provided:   - keep all exercises in a pain free range.  - Cont with HEP    Written Home Exercises Provided: yes.  Exercises were reviewed and Kermit was able to demonstrate them prior to the end of the session.  Kermit demonstrated good  understanding of the education " provided.     See EMR under Patient Instructions for exercises provided 11/25/2020.    Assessment     Moderate verbal/tactile cuing required for correct exercise performance.  Patient was moving very slowly through his exercises today and did not finish all of them.  He would benefit from continued PT services to progress toward functional goals.     Patient continues to be appropriate for therapy to address deficits.   Kermit is progressing well towards his goals.   Pt prognosis is Fair.     Pt will continue to benefit from skilled outpatient physical therapy to address the deficits listed in the problem list box on initial evaluation, provide pt/family education and to maximize pt's level of independence in the home and community environment.     Pt's spiritual, cultural and educational needs considered and pt agreeable to plan of care and goals.     Anticipated barriers to physical therapy: Chronicity of low back pain    Goals:  Short Term Goals: 4 weeks   1. Pt will be compliant with HEP in order to maximize PT benefits (MET)  2. Pt will improve BLE MMT grades by >/=1/2 grade in order to improve strength for ADL completion (progressing, not met)  3. Pt will report pain at rest as </=4/10 in order to improve comfort with leisure activity (progressing, not met)    Long Term Goals: 8 weeks   4. Pt will score </= 41% on FOTO limitation survey in order to improve self-perception of functional mobility deficits (progressing, not met)  5. Pt will improve BLE MMT grades by >/=1 grade in order to improve strength for ADL completion (progressing, not met)  6. Pt will improve ROM on B HS 90/90 assessment by >/= 5 degrees in order to improve functional mobility for activities such as lower body dressing (progressing, not met)  7. Pt will improve ROM on B Ely assessment by >/= 5 degrees in order to improve functional mobility for activities such as lower body dressing (progressing, not met)  8. Pt will complete >/=10 Paloff  presses B with Green theraband in order to improve core control during standing ADLs (progressing, not met)  9. Pt will report pain at rest as </=2/10 in order to improve comfort with leisure activity (progressing, not met)  10. Pt will begin some form of home/community fitness in order to sustain progress gained in PT (progressing, not met)    Plan     Continue with Plan Of Care and progress toward PT goals. Continue flexion program and progress core activity as appropriate.     Manjinder Still, PTA

## 2020-12-16 ENCOUNTER — CLINICAL SUPPORT (OUTPATIENT)
Dept: REHABILITATION | Facility: HOSPITAL | Age: 75
End: 2020-12-16
Payer: MEDICARE

## 2020-12-16 DIAGNOSIS — G89.29 CHRONIC RIGHT-SIDED LOW BACK PAIN WITH RIGHT-SIDED SCIATICA: ICD-10-CM

## 2020-12-16 DIAGNOSIS — R26.89 DECREASED FUNCTIONAL MOBILITY: ICD-10-CM

## 2020-12-16 DIAGNOSIS — M54.41 CHRONIC RIGHT-SIDED LOW BACK PAIN WITH RIGHT-SIDED SCIATICA: ICD-10-CM

## 2020-12-16 PROCEDURE — 97110 THERAPEUTIC EXERCISES: CPT | Mod: PN,CQ

## 2020-12-16 NOTE — PROGRESS NOTES
"  Physical Therapy Treatment Note     Name: Kermit ANN Dennis  [facility] Number: [id number]    Therapy Diagnosis:   Encounter Diagnoses   Name Primary?    Chronic right-sided low back pain with right-sided sciatica     Decreased functional mobility      Physician: Gume Grullon MD    Visit Date: 12/16/2020    Physician Orders: PT Eval and Treat  Medical Diagnosis from Referral: M41.80 (ICD-10-CM) - Degenerative scoliosis in adult patient  Evaluation Date: 11/19/2020  Authorization Period Expiration: 01/19/2021  Plan of Care Expiration: 1/15/2021  Visit # / Visits authorized: 8/ 12  FOTO: 8/10     Time In: 12:15 PM  Time Out: 1:10 PM  Total Billable Time: 55 minutes (TE-4)    Precautions: Standard and hard of hearing    Subjective     Pt reports: "not bad - got that pain in the rump"  States "little bit" but rates :5/10"  He was compliant with home exercise program.  Response to previous treatment: Soreness  Functional change: Ongoing    Pain: 5/10  Location: right back      Objective     Kermit received therapeutic exercises to develop strength, ROM and flexibility for 55 minutes including:    -- NuStep Level 3.0   5 minutes  -- HSS w/strap   2x30" B  -- Hip flexor stretch   3x30" B  -- TA's     15x5"   -- Bridging w/ TA   3x10   -- Double knee to chest  x20 with 3" holds w/SB  -- Single knee to chest  x15 each side with 3" holds  -- Lower trunk rotations  x 15 B with 3" holds  -- clamshells sidelying  2x10 B RTB  -- prone quad stretching  30"x3 - OOT  -- seated lumbar flexion with SB x15 with 2" holds  -- Standing Hip ABD   2x10 B RTB  -- Standing Hip Ext   2x10 B RTB     Home Exercises Provided and Patient Education Provided     Education provided:   - keep all exercises in a pain free range.  - Cont with HEP    Written Home Exercises Provided: yes.  Exercises were reviewed and Kermit was able to demonstrate them prior to the end of the session.  Kermit demonstrated good  understanding of the education provided. " "    See EMR under Patient Instructions for exercises provided 11/25/2020.    Assessment     Moderate verbal/tactile cuing required for correct exercise performance.  Patient able to perform increased reps and resistance as bolded.  Slow steady movements.  States "pretty good - I always feel better after I get moving and loosened up."  Not specific to scale. He would benefit from continued PT services to progress toward functional goals.     Patient continues to be appropriate for therapy to address deficits.   Kermit is progressing well towards his goals.   Pt prognosis is Fair.     Pt will continue to benefit from skilled outpatient physical therapy to address the deficits listed in the problem list box on initial evaluation, provide pt/family education and to maximize pt's level of independence in the home and community environment.     Pt's spiritual, cultural and educational needs considered and pt agreeable to plan of care and goals.     Anticipated barriers to physical therapy: Chronicity of low back pain    Goals:  Short Term Goals: 4 weeks   1. Pt will be compliant with HEP in order to maximize PT benefits (MET)  2. Pt will improve BLE MMT grades by >/=1/2 grade in order to improve strength for ADL completion (progressing, not met)  3. Pt will report pain at rest as </=4/10 in order to improve comfort with leisure activity (progressing, not met)    Long Term Goals: 8 weeks   4. Pt will score </= 41% on FOTO limitation survey in order to improve self-perception of functional mobility deficits (progressing, not met)  5. Pt will improve BLE MMT grades by >/=1 grade in order to improve strength for ADL completion (progressing, not met)  6. Pt will improve ROM on B HS 90/90 assessment by >/= 5 degrees in order to improve functional mobility for activities such as lower body dressing (progressing, not met)  7. Pt will improve ROM on B Ely assessment by >/= 5 degrees in order to improve functional mobility for " activities such as lower body dressing (progressing, not met)  8. Pt will complete >/=10 Paloff presses B with Green theraband in order to improve core control during standing ADLs (progressing, not met)  9. Pt will report pain at rest as </=2/10 in order to improve comfort with leisure activity (progressing, not met)  10. Pt will begin some form of home/community fitness in order to sustain progress gained in PT (progressing, not met)    Plan     Continue with Plan Of Care and progress toward PT goals. Continue flexion program and progress core activity as appropriate.     Fern Espana, PTA

## 2020-12-21 ENCOUNTER — CLINICAL SUPPORT (OUTPATIENT)
Dept: REHABILITATION | Facility: HOSPITAL | Age: 75
End: 2020-12-21
Payer: MEDICARE

## 2020-12-21 DIAGNOSIS — M54.41 CHRONIC RIGHT-SIDED LOW BACK PAIN WITH RIGHT-SIDED SCIATICA: ICD-10-CM

## 2020-12-21 DIAGNOSIS — G89.29 CHRONIC RIGHT-SIDED LOW BACK PAIN WITH RIGHT-SIDED SCIATICA: ICD-10-CM

## 2020-12-21 DIAGNOSIS — R26.89 DECREASED FUNCTIONAL MOBILITY: ICD-10-CM

## 2020-12-21 PROCEDURE — 97110 THERAPEUTIC EXERCISES: CPT | Mod: PN
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
## 2020-12-23 ENCOUNTER — CLINICAL SUPPORT (OUTPATIENT)
Dept: REHABILITATION | Facility: HOSPITAL | Age: 75
End: 2020-12-23
Payer: MEDICARE

## 2020-12-23 DIAGNOSIS — R26.89 DECREASED FUNCTIONAL MOBILITY: ICD-10-CM

## 2020-12-23 DIAGNOSIS — M54.41 CHRONIC RIGHT-SIDED LOW BACK PAIN WITH RIGHT-SIDED SCIATICA: ICD-10-CM

## 2020-12-23 DIAGNOSIS — G89.29 CHRONIC RIGHT-SIDED LOW BACK PAIN WITH RIGHT-SIDED SCIATICA: ICD-10-CM

## 2020-12-23 PROCEDURE — 97110 THERAPEUTIC EXERCISES: CPT | Mod: PN,CQ

## 2020-12-23 NOTE — PROGRESS NOTES
"  Physical Therapy Treatment Note     Name: Kermit Castro  [facility] Number: [id number]    Therapy Diagnosis:   Encounter Diagnoses   Name Primary?    Chronic right-sided low back pain with right-sided sciatica     Decreased functional mobility      Physician: Gume Grullon MD    Visit Date: 12/23/2020    Physician Orders: PT Eval and Treat  Medical Diagnosis from Referral: M41.80 (ICD-10-CM) - Degenerative scoliosis in adult patient  Evaluation Date: 11/19/2020    Authorization Period Expiration: 01/19/2021  Plan of Care Expiration: 1/15/2021  Visit # / Visits authorized: 10/ 12  FOTO: 10/10 DONE     Time In:12:15 PM   Time Out: 1:00 PM   Total Billable Time: 45 minutes (TE-3)  Precautions: Standard and hard of hearing    Subjective     Pt reports:"The pain in my lower back is worst in the morning". Pt agreeable to PT session  He was compliant with home exercise program.  Response to previous treatment: R lumbar Soreness  Functional change: Ongoing    Pain: 5/10  Location: right back      Objective     Kermit received therapeutic exercises to develop strength, ROM and flexibility for 45 minutes including:    -- NuStep Level 3.0   5 minutes at L4'  -- HSS w/strap   2x30" B  -- Hip flexor stretch   3x30" B  -- TA's     15x5"   -- Bridging w/ TA   3x10 Le's on physioball  -- Double knee to chest  x20 with 3" holds w/SB  -- Single knee to chest  x15 each side with 3" holds  -- Lower trunk rotations  x 15 B with 3" holds  -- clamshells sidelying  2x10 B RTB  -- prone quad stretching  30"x3 - OOT  -- seated lumbar flexion with SB x15 with 2" holds  -- Standing Hip ABD   2x10 B RTB  -- Standing Hip Ext   2x10 B RTB  -- SAPD    3x10 BTB  -- low rows    2x10 BTB     Home Exercises Provided and Patient Education Provided   Education provided:   - keep all exercises in a pain free range.  - Cont with HEP    Written Home Exercises Provided: yes.  Exercises were reviewed and Kermit was able to demonstrate them prior to the end " of the session.  Kermit demonstrated good  understanding of the education provided.     See EMR under Patient Instructions for exercises provided 11/25/2020.    Assessment   Pt completed today's session with no reports of increased pain lumbar spine. He was able to perform standing therex with minimal verbal instructions on postural awareness and safety.       Kermit is progressing well towards his goals.   Pt prognosis is Fair.     Pt will continue to benefit from skilled outpatient physical therapy to address the deficits listed in the problem list box on initial evaluation, provide pt/family education and to maximize pt's level of independence in the home and community environment.     Pt's spiritual, cultural and educational needs considered and pt agreeable to plan of care and goals.     Anticipated barriers to physical therapy: Chronicity of low back pain    Goals:  Short Term Goals: 4 weeks   1. Pt will be compliant with HEP in order to maximize PT benefits (MET)  2. Pt will improve BLE MMT grades by >/=1/2 grade in order to improve strength for ADL completion (progressing, not met)  3. Pt will report pain at rest as </=4/10 in order to improve comfort with leisure activity (progressing, not met)    Long Term Goals: 8 weeks   4. Pt will score </= 41% on FOTO limitation survey in order to improve self-perception of functional mobility deficits (progressing, not met)  5. Pt will improve BLE MMT grades by >/=1 grade in order to improve strength for ADL completion (progressing, not met)  6. Pt will improve ROM on B HS 90/90 assessment by >/= 5 degrees in order to improve functional mobility for activities such as lower body dressing (progressing, not met)  7. Pt will improve ROM on B Ely assessment by >/= 5 degrees in order to improve functional mobility for activities such as lower body dressing (progressing, not met)  8. Pt will complete >/=10 Paloff presses B with Green theraband in order to improve core  control during standing ADLs (progressing, not met)  9. Pt will report pain at rest as </=2/10 in order to improve comfort with leisure activity (progressing, not met)  10. Pt will begin some form of home/community fitness in order to sustain progress gained in PT (progressing, not met)    Plan     Continue with Plan Of Care and progress toward PT goals.   Continue flexion program and progress core activity as appropriate.     Leo Sam, PTA

## 2020-12-28 ENCOUNTER — CLINICAL SUPPORT (OUTPATIENT)
Dept: REHABILITATION | Facility: HOSPITAL | Age: 75
End: 2020-12-28
Payer: MEDICARE

## 2020-12-28 DIAGNOSIS — R26.89 DECREASED FUNCTIONAL MOBILITY: ICD-10-CM

## 2020-12-28 DIAGNOSIS — M54.41 CHRONIC RIGHT-SIDED LOW BACK PAIN WITH RIGHT-SIDED SCIATICA: ICD-10-CM

## 2020-12-28 DIAGNOSIS — G89.29 CHRONIC RIGHT-SIDED LOW BACK PAIN WITH RIGHT-SIDED SCIATICA: ICD-10-CM

## 2020-12-28 PROCEDURE — 97110 THERAPEUTIC EXERCISES: CPT | Mod: PN

## 2020-12-28 NOTE — PROGRESS NOTES
"  Physical Therapy Treatment Note     Name: Kermit Castro  [facility] Number: [id number]    Therapy Diagnosis:   Encounter Diagnoses   Name Primary?    Chronic right-sided low back pain with right-sided sciatica     Decreased functional mobility      Physician: Gume Grullon MD    Visit Date: 12/28/2020    Physician Orders: PT Eval and Treat  Medical Diagnosis from Referral: M41.80 (ICD-10-CM) - Degenerative scoliosis in adult patient  Evaluation Date: 11/19/2020    Authorization Period Expiration: 01/19/2021  Plan of Care Expiration: 1/15/2021  Visit # / Visits authorized: 11/ 12  FOTO: Next at discharge    Time In: 11:15 AM   Time Out: 12:00 PM   Total Billable Time: 45 minutes (TE-3)  Precautions: Standard and hard of hearing    Subjective     Pt reports: Stiffness/low back pain is worst in the morning. He says it improves with exercise and he has been trying to stay more active at home. He believes therapy is helping back pain and would like to continue with PT.  He was compliant with home exercise program.  Response to previous treatment: No adverse response  Functional change: Improved BLE strength and flexibility    Pain: 5/10  Location: right back      Objective     Kermit received therapeutic exercises to develop strength, ROM and flexibility for 45 minutes including:    -- Single knee to chest  x10 each side with 3" holds  -- Lower trunk rotations  x10B with 3" holds  -- prone quad stretching  2x30"B   -- Standing Hip ABD   2x10 B RTB  -- Standing Hip Ext   2x10 B RTB  -- SAPD    2x15 BTB  -- neutral rows    2x15 BTB  -- seated forward flexion  x30 with red physioball  -- Objective tests/measures (see below)     Updated values in parentheses  L/E MMT Right Left   Hip Flexion 4/5 (5/5) 4-/5 (5/5)   Hip Extension 3+/5 (4/5) 2/5 (4/5)   Hip Abduction 4/5 (5/5) 4/5 (4+/5)   Knee Flexion 4-/5 (4+/5) 4-/5 (4+/5)   Knee Extension 4+/5 (5/5) 5/5 (5/5)   Ankle DF 3+/5 (4/5) 4/5 (4+/5)   Ankle PF 5/5 (5/5) 5/5 (5/5) " "     Flexibility:   -- HS 90/90 (L): Lacking 25 degrees knee extension  -- HS 90/90 (R): Lacking 30 degrees knee extension  -- Ely (L): (+) at 112 degrees knee flexion  -- HS 90/90 (R): (+) at 115 degrees knee flexion    NOT TODAY  -- NuStep Level 3.0   5 minutes at L4'  -- HSS w/strap   2x30" B  -- Hip flexor stretch   3x30" B  -- TA's     15x5"   -- Bridging w/ TA   3x10 Le's on physioball  -- Double knee to chest  x20 with 3" holds w/SB  -- clamshells sidelying  2x10 B RTB    Home Exercises Provided and Patient Education Provided   Education provided:   - keep all exercises in a pain free range.  - Cont with HEP  - Tell PCP about SoB    Written Home Exercises Provided: yes.  Exercises were reviewed and Kermit was able to demonstrate them prior to the end of the session.  Kermit demonstrated good  understanding of the education provided.     See EMR under Patient Instructions for exercises provided 11/25/2020.    Assessment   Since initiating physical therapy, pt has improved in terms of BLE strength and flexibility per brief reassessment today. Pt generally responds positively to exercise in terms of pain symptoms. Occasional verbal/tactile cues required for correct exercise performance, particularly upright posture during standing exercise. Pt occasionally complains of shortness of breath during standing exercise, but this quickly dissipates with brief rest breaks; he does not appear to be in excessive distress during activity however. He was receptive to education to tell PCP about these symptoms. He would benefit from continued PT services to progress toward remaining functional goals.    Kermit is progressing well towards his goals.   Pt prognosis is Fair.     Pt will continue to benefit from skilled outpatient physical therapy to address the deficits listed in the problem list box on initial evaluation, provide pt/family education and to maximize pt's level of independence in the home and community " environment.     Pt's spiritual, cultural and educational needs considered and pt agreeable to plan of care and goals.     Anticipated barriers to physical therapy: Chronicity of low back pain    Goals:  Short Term Goals: 4 weeks   1. Pt will be compliant with HEP in order to maximize PT benefits (MET)  2. Pt will improve BLE MMT grades by >/=1/2 grade in order to improve strength for ADL completion (MET)  3. Pt will report pain at rest as </=4/10 in order to improve comfort with leisure activity (progressing, not met)    Long Term Goals: 8 weeks   4. Pt will score </= 41% on FOTO limitation survey in order to improve self-perception of functional mobility deficits (progressing, not met)  5. Pt will improve BLE MMT grades by >/=1 grade in order to improve strength for ADL completion (progressing, not met)  6. Pt will improve ROM on B HS 90/90 assessment by >/= 5 degrees in order to improve functional mobility for activities such as lower body dressing (progressing, not met)  7. Pt will improve ROM on B Ely assessment by >/= 5 degrees in order to improve functional mobility for activities such as lower body dressing (MET)  8. Pt will complete >/=10 Paloff presses B with Green theraband in order to improve core control during standing ADLs (progressing, not met)  9. Pt will report pain at rest as </=2/10 in order to improve comfort with leisure activity (progressing, not met)  10. Pt will begin some form of home/community fitness in order to sustain progress gained in PT (progressing, not met)    Plan     Continue with Plan Of Care and progress toward PT goals.   Continue flexion program and progress core activity as appropriate.     ALICE BAKER, PT

## 2020-12-30 ENCOUNTER — CLINICAL SUPPORT (OUTPATIENT)
Dept: REHABILITATION | Facility: HOSPITAL | Age: 75
End: 2020-12-30
Payer: MEDICARE

## 2020-12-30 DIAGNOSIS — G89.29 CHRONIC RIGHT-SIDED LOW BACK PAIN WITH RIGHT-SIDED SCIATICA: ICD-10-CM

## 2020-12-30 DIAGNOSIS — M54.41 CHRONIC RIGHT-SIDED LOW BACK PAIN WITH RIGHT-SIDED SCIATICA: ICD-10-CM

## 2020-12-30 DIAGNOSIS — R26.89 DECREASED FUNCTIONAL MOBILITY: ICD-10-CM

## 2020-12-30 PROCEDURE — 97110 THERAPEUTIC EXERCISES: CPT | Mod: PN,CQ

## 2020-12-30 NOTE — PROGRESS NOTES
"  Physical Therapy Treatment Note     Name: Kermit Castro  Clinic Number: [id number]    Therapy Diagnosis:   Encounter Diagnoses   Name Primary?    Chronic right-sided low back pain with right-sided sciatica     Decreased functional mobility      Physician: Gume Grullon MD    Visit Date: 12/30/2020    Physician Orders: PT Eval and Treat  Medical Diagnosis from Referral: M41.80 (ICD-10-CM) - Degenerative scoliosis in adult patient  Evaluation Date: 11/19/2020    Authorization Period Expiration: 01/19/2021  Plan of Care Expiration: 1/15/2021  Visit # / Visits authorized: 12/ 12  FOTO: Next at discharge    Time In: 10:30 AM   Time Out: 11:15 AM   Total Billable Time: 45 minutes (TE-3)  Precautions: Standard and hard of hearing    Subjective     Pt reports: his stiffness/low back pain is about the same.  Patient reports he is pleased with his progress so far.  He was compliant with home exercise program.  Response to previous treatment: No adverse response  Functional change: Improved BLE strength and flexibility    Pain: 5/10  Location: right back      Objective     Kermit received therapeutic exercises to develop strength, ROM and flexibility for 45 minutes including:    -- NuStep Level 3.0   5 minutes at L4'  -- HSS w/strap   2x30" B  -- Hip flexor stretch   3x30" B  -- TA's     15x5"   -- Bridging w/ TA   3x10 Le's on physioball  -- Single knee to chest  x10 each side with 3" holds  -- Lower trunk rotations  x10 B with 3" holds  -- clamshells sidelying  2x10 B RTB  -- prone quad stretching  2x30"B   -- Standing Hip ABD   2x10 B RTB  -- Standing Hip Ext   2x10 B RTB    Not today - OOT  -- Double knee to chest  x20 with 3" holds w/SB  -- SAPD    2x15 BTB  -- neutral rows    2x15 BTB  -- seated forward flexion  x30 with red physioball    Home Exercises Provided and Patient Education Provided   Education provided:   - keep all exercises in a pain free range.  - Cont with HEP  - Tell PCP about SoB    Written Home " Exercises Provided: yes.  Exercises were reviewed and Kermit was able to demonstrate them prior to the end of the session.  Kermit demonstrated good  understanding of the education provided.     See EMR under Patient Instructions for exercises provided 11/25/2020.    Assessment   Patient did not complete all of his exercises as he was moving slowly through them today. Occasional verbal/tactile cues required for correct exercise performance, particularly upright posture during standing exercise.   Kermit is progressing well towards his goals.   Pt prognosis is Fair.     Pt will continue to benefit from skilled outpatient physical therapy to address the deficits listed in the problem list box on initial evaluation, provide pt/family education and to maximize pt's level of independence in the home and community environment.     Pt's spiritual, cultural and educational needs considered and pt agreeable to plan of care and goals.     Anticipated barriers to physical therapy: Chronicity of low back pain    Goals:  Short Term Goals: 4 weeks   1. Pt will be compliant with HEP in order to maximize PT benefits (MET)  2. Pt will improve BLE MMT grades by >/=1/2 grade in order to improve strength for ADL completion (MET)  3. Pt will report pain at rest as </=4/10 in order to improve comfort with leisure activity (progressing, not met)    Long Term Goals: 8 weeks   4. Pt will score </= 41% on FOTO limitation survey in order to improve self-perception of functional mobility deficits (progressing, not met)  5. Pt will improve BLE MMT grades by >/=1 grade in order to improve strength for ADL completion (progressing, not met)  6. Pt will improve ROM on B HS 90/90 assessment by >/= 5 degrees in order to improve functional mobility for activities such as lower body dressing (progressing, not met)  7. Pt will improve ROM on B Ely assessment by >/= 5 degrees in order to improve functional mobility for activities such as lower body  dressing (MET)  8. Pt will complete >/=10 Paloff presses B with Green theraband in order to improve core control during standing ADLs (progressing, not met)  9. Pt will report pain at rest as </=2/10 in order to improve comfort with leisure activity (progressing, not met)  10. Pt will begin some form of home/community fitness in order to sustain progress gained in PT (progressing, not met)    Plan     Continue with Plan Of Care and progress toward PT goals.   Continue flexion program and progress core activity as appropriate.     Manjinder Still, PTA

## 2021-01-04 ENCOUNTER — CLINICAL SUPPORT (OUTPATIENT)
Dept: REHABILITATION | Facility: HOSPITAL | Age: 76
End: 2021-01-04
Payer: MEDICARE

## 2021-01-04 DIAGNOSIS — M54.41 CHRONIC RIGHT-SIDED LOW BACK PAIN WITH RIGHT-SIDED SCIATICA: ICD-10-CM

## 2021-01-04 DIAGNOSIS — R26.89 DECREASED FUNCTIONAL MOBILITY: ICD-10-CM

## 2021-01-04 DIAGNOSIS — G89.29 CHRONIC RIGHT-SIDED LOW BACK PAIN WITH RIGHT-SIDED SCIATICA: ICD-10-CM

## 2021-01-04 PROCEDURE — 97110 THERAPEUTIC EXERCISES: CPT | Mod: PN

## 2021-01-06 ENCOUNTER — CLINICAL SUPPORT (OUTPATIENT)
Dept: REHABILITATION | Facility: HOSPITAL | Age: 76
End: 2021-01-06
Payer: MEDICARE

## 2021-01-06 DIAGNOSIS — R26.89 DECREASED FUNCTIONAL MOBILITY: ICD-10-CM

## 2021-01-06 DIAGNOSIS — M54.41 CHRONIC RIGHT-SIDED LOW BACK PAIN WITH RIGHT-SIDED SCIATICA: ICD-10-CM

## 2021-01-06 DIAGNOSIS — G89.29 CHRONIC RIGHT-SIDED LOW BACK PAIN WITH RIGHT-SIDED SCIATICA: ICD-10-CM

## 2021-01-06 PROCEDURE — 97110 THERAPEUTIC EXERCISES: CPT | Mod: PN

## 2021-01-11 ENCOUNTER — CLINICAL SUPPORT (OUTPATIENT)
Dept: REHABILITATION | Facility: HOSPITAL | Age: 76
End: 2021-01-11
Payer: MEDICARE

## 2021-01-11 DIAGNOSIS — G89.29 CHRONIC RIGHT-SIDED LOW BACK PAIN WITH RIGHT-SIDED SCIATICA: ICD-10-CM

## 2021-01-11 DIAGNOSIS — M54.41 CHRONIC RIGHT-SIDED LOW BACK PAIN WITH RIGHT-SIDED SCIATICA: ICD-10-CM

## 2021-01-11 DIAGNOSIS — R26.89 DECREASED FUNCTIONAL MOBILITY: ICD-10-CM

## 2021-01-11 PROCEDURE — 97110 THERAPEUTIC EXERCISES: CPT | Mod: PN

## 2021-07-25 ENCOUNTER — HOSPITAL ENCOUNTER (EMERGENCY)
Facility: HOSPITAL | Age: 76
Discharge: HOME | End: 2021-07-25
Attending: EMERGENCY MEDICINE
Payer: MEDICARE

## 2021-07-25 VITALS
SYSTOLIC BLOOD PRESSURE: 164 MMHG | TEMPERATURE: 99 F | DIASTOLIC BLOOD PRESSURE: 64 MMHG | OXYGEN SATURATION: 98 % | RESPIRATION RATE: 15 BRPM | BODY MASS INDEX: 28.89 KG/M2 | WEIGHT: 190 LBS | HEART RATE: 80 BPM

## 2021-07-25 DIAGNOSIS — J06.9 UPPER RESPIRATORY TRACT INFECTION, UNSPECIFIED TYPE: Primary | ICD-10-CM

## 2021-07-25 LAB — SARS-COV-2 RDRP RESP QL NAA+PROBE: NEGATIVE

## 2021-07-25 PROCEDURE — U0002 COVID-19 LAB TEST NON-CDC: HCPCS | Mod: ER | Performed by: EMERGENCY MEDICINE

## 2021-07-25 PROCEDURE — 99283 EMERGENCY DEPT VISIT LOW MDM: CPT | Mod: ER

## 2021-07-25 RX ORDER — CETIRIZINE HYDROCHLORIDE 10 MG/1
10 TABLET ORAL DAILY
Qty: 30 TABLET | Refills: 0 | Status: SHIPPED | OUTPATIENT
Start: 2021-07-25 | End: 2022-06-17

## 2021-08-11 ENCOUNTER — TELEPHONE (OUTPATIENT)
Dept: PULMONOLOGY | Facility: CLINIC | Age: 76
End: 2021-08-11

## 2021-08-11 DIAGNOSIS — R06.02 SHORTNESS OF BREATH: ICD-10-CM

## 2021-08-11 DIAGNOSIS — R06.02 SOB (SHORTNESS OF BREATH): Primary | ICD-10-CM

## 2021-08-14 ENCOUNTER — LAB VISIT (OUTPATIENT)
Dept: SPORTS MEDICINE | Facility: CLINIC | Age: 76
End: 2021-08-14
Payer: MEDICARE

## 2021-08-14 DIAGNOSIS — R06.02 SHORTNESS OF BREATH: ICD-10-CM

## 2021-08-14 LAB — SARS-COV-2 RNA RESP QL NAA+PROBE: NOT DETECTED

## 2021-08-14 PROCEDURE — U0005 INFEC AGEN DETEC AMPLI PROBE: HCPCS | Performed by: INTERNAL MEDICINE

## 2021-08-14 PROCEDURE — U0003 INFECTIOUS AGENT DETECTION BY NUCLEIC ACID (DNA OR RNA); SEVERE ACUTE RESPIRATORY SYNDROME CORONAVIRUS 2 (SARS-COV-2) (CORONAVIRUS DISEASE [COVID-19]), AMPLIFIED PROBE TECHNIQUE, MAKING USE OF HIGH THROUGHPUT TECHNOLOGIES AS DESCRIBED BY CMS-2020-01-R: HCPCS | Performed by: INTERNAL MEDICINE

## 2021-08-16 ENCOUNTER — HOSPITAL ENCOUNTER (OUTPATIENT)
Dept: PULMONOLOGY | Facility: CLINIC | Age: 76
Discharge: HOME | End: 2021-08-16
Payer: MEDICARE

## 2021-08-16 ENCOUNTER — OFFICE VISIT (OUTPATIENT)
Dept: PULMONOLOGY | Facility: CLINIC | Age: 76
End: 2021-08-16
Payer: MEDICARE

## 2021-08-16 VITALS
DIASTOLIC BLOOD PRESSURE: 62 MMHG | HEART RATE: 83 BPM | SYSTOLIC BLOOD PRESSURE: 120 MMHG | BODY MASS INDEX: 28.04 KG/M2 | HEIGHT: 68 IN | OXYGEN SATURATION: 96 % | WEIGHT: 185 LBS

## 2021-08-16 DIAGNOSIS — R06.02 SOB (SHORTNESS OF BREATH): ICD-10-CM

## 2021-08-16 DIAGNOSIS — R06.09 DYSPNEA ON EXERTION: Primary | ICD-10-CM

## 2021-08-16 PROCEDURE — 1126F AMNT PAIN NOTED NONE PRSNT: CPT | Mod: CPTII,S$GLB,, | Performed by: INTERNAL MEDICINE

## 2021-08-16 PROCEDURE — 99203 PR OFFICE/OUTPT VISIT, NEW, LEVL III, 30-44 MIN: ICD-10-PCS | Mod: S$GLB,,, | Performed by: INTERNAL MEDICINE

## 2021-08-16 PROCEDURE — 94727 PR PULM FUNCTION TEST BY GAS: ICD-10-PCS | Mod: S$GLB,,, | Performed by: INTERNAL MEDICINE

## 2021-08-16 PROCEDURE — 1100F PR PT FALLS ASSESS DOC 2+ FALLS/FALL W/INJURY/YR: ICD-10-PCS | Mod: CPTII,S$GLB,, | Performed by: INTERNAL MEDICINE

## 2021-08-16 PROCEDURE — 99999 PR PBB SHADOW E&M-EST. PATIENT-LVL V: ICD-10-PCS | Mod: PBBFAC,,, | Performed by: INTERNAL MEDICINE

## 2021-08-16 PROCEDURE — 94729 PR C02/MEMBANE DIFFUSE CAPACITY: ICD-10-PCS | Mod: S$GLB,,, | Performed by: INTERNAL MEDICINE

## 2021-08-16 PROCEDURE — 3288F FALL RISK ASSESSMENT DOCD: CPT | Mod: CPTII,S$GLB,, | Performed by: INTERNAL MEDICINE

## 2021-08-16 PROCEDURE — 99203 OFFICE O/P NEW LOW 30 MIN: CPT | Mod: S$GLB,,, | Performed by: INTERNAL MEDICINE

## 2021-08-16 PROCEDURE — 94010 BREATHING CAPACITY TEST: CPT | Mod: S$GLB,,, | Performed by: INTERNAL MEDICINE

## 2021-08-16 PROCEDURE — 1160F RVW MEDS BY RX/DR IN RCRD: CPT | Mod: CPTII,S$GLB,, | Performed by: INTERNAL MEDICINE

## 2021-08-16 PROCEDURE — 3074F PR MOST RECENT SYSTOLIC BLOOD PRESSURE < 130 MM HG: ICD-10-PCS | Mod: CPTII,S$GLB,, | Performed by: INTERNAL MEDICINE

## 2021-08-16 PROCEDURE — 3074F SYST BP LT 130 MM HG: CPT | Mod: CPTII,S$GLB,, | Performed by: INTERNAL MEDICINE

## 2021-08-16 PROCEDURE — 3078F DIAST BP <80 MM HG: CPT | Mod: CPTII,S$GLB,, | Performed by: INTERNAL MEDICINE

## 2021-08-16 PROCEDURE — 99999 PR PBB SHADOW E&M-EST. PATIENT-LVL V: CPT | Mod: PBBFAC,,, | Performed by: INTERNAL MEDICINE

## 2021-08-16 PROCEDURE — 1160F PR REVIEW ALL MEDS BY PRESCRIBER/CLIN PHARMACIST DOCUMENTED: ICD-10-PCS | Mod: CPTII,S$GLB,, | Performed by: INTERNAL MEDICINE

## 2021-08-16 PROCEDURE — 1159F MED LIST DOCD IN RCRD: CPT | Mod: CPTII,S$GLB,, | Performed by: INTERNAL MEDICINE

## 2021-08-16 PROCEDURE — 1126F PR PAIN SEVERITY QUANTIFIED, NO PAIN PRESENT: ICD-10-PCS | Mod: CPTII,S$GLB,, | Performed by: INTERNAL MEDICINE

## 2021-08-16 PROCEDURE — 3078F PR MOST RECENT DIASTOLIC BLOOD PRESSURE < 80 MM HG: ICD-10-PCS | Mod: CPTII,S$GLB,, | Performed by: INTERNAL MEDICINE

## 2021-08-16 PROCEDURE — 94727 GAS DIL/WSHOT DETER LNG VOL: CPT | Mod: S$GLB,,, | Performed by: INTERNAL MEDICINE

## 2021-08-16 PROCEDURE — 1100F PTFALLS ASSESS-DOCD GE2>/YR: CPT | Mod: CPTII,S$GLB,, | Performed by: INTERNAL MEDICINE

## 2021-08-16 PROCEDURE — 3288F PR FALLS RISK ASSESSMENT DOCUMENTED: ICD-10-PCS | Mod: CPTII,S$GLB,, | Performed by: INTERNAL MEDICINE

## 2021-08-16 PROCEDURE — 1159F PR MEDICATION LIST DOCUMENTED IN MEDICAL RECORD: ICD-10-PCS | Mod: CPTII,S$GLB,, | Performed by: INTERNAL MEDICINE

## 2021-08-16 PROCEDURE — 94010 BREATHING CAPACITY TEST: ICD-10-PCS | Mod: S$GLB,,, | Performed by: INTERNAL MEDICINE

## 2021-08-16 PROCEDURE — 94729 DIFFUSING CAPACITY: CPT | Mod: S$GLB,,, | Performed by: INTERNAL MEDICINE

## 2021-08-16 RX ORDER — DOXYCYCLINE 100 MG/1
CAPSULE ORAL
COMMUNITY
End: 2021-12-21 | Stop reason: SDUPTHER

## 2021-08-16 RX ORDER — PREGABALIN 50 MG/1
CAPSULE ORAL
COMMUNITY
End: 2022-06-17

## 2021-08-16 RX ORDER — SILDENAFIL 100 MG/1
100 TABLET, FILM COATED ORAL
COMMUNITY
Start: 2021-07-09

## 2021-08-16 RX ORDER — DOXYCYCLINE 100 MG/1
100 CAPSULE ORAL 2 TIMES DAILY
COMMUNITY
Start: 2021-04-06 | End: 2021-12-21 | Stop reason: SDUPTHER

## 2021-08-16 RX ORDER — AZITHROMYCIN 250 MG/1
TABLET, FILM COATED ORAL
COMMUNITY
End: 2021-12-21

## 2021-08-16 RX ORDER — PREGABALIN 50 MG/1
CAPSULE ORAL
COMMUNITY
Start: 2021-07-08 | End: 2022-06-17

## 2021-08-16 RX ORDER — GABAPENTIN 100 MG/1
CAPSULE ORAL
COMMUNITY
End: 2021-12-21 | Stop reason: SDUPTHER

## 2021-08-16 RX ORDER — ALPRAZOLAM 0.5 MG/1
0.5 TABLET ORAL 4 TIMES DAILY PRN
COMMUNITY
Start: 2021-05-13 | End: 2022-06-17

## 2021-08-16 RX ORDER — SILDENAFIL 100 MG/1
TABLET, FILM COATED ORAL
COMMUNITY
End: 2021-12-21 | Stop reason: SDUPTHER

## 2021-08-16 RX ORDER — POLYETHYLENE GLYCOL 3350 17 G/17G
POWDER, FOR SOLUTION ORAL
COMMUNITY

## 2021-08-16 RX ORDER — ALPRAZOLAM 0.5 MG/1
TABLET ORAL
COMMUNITY
End: 2021-12-21 | Stop reason: SDUPTHER

## 2021-08-16 RX ORDER — BETAMETHASONE SODIUM PHOSPHATE AND BETAMETHASONE ACETATE 3; 3 MG/ML; MG/ML
1 INJECTION, SUSPENSION INTRA-ARTICULAR; INTRALESIONAL; INTRAMUSCULAR; SOFT TISSUE
COMMUNITY
End: 2022-06-17

## 2021-08-16 RX ORDER — CIPROFLOXACIN 500 MG/1
TABLET ORAL
COMMUNITY
End: 2021-12-21

## 2021-08-16 RX ORDER — SODIUM, POTASSIUM,MAG SULFATES 17.5-3.13G
SOLUTION, RECONSTITUTED, ORAL ORAL
COMMUNITY
Start: 2021-06-07

## 2021-08-16 RX ORDER — LUBIPROSTONE 24 UG/1
CAPSULE ORAL
COMMUNITY

## 2021-08-18 ENCOUNTER — HOSPITAL ENCOUNTER (OUTPATIENT)
Dept: RADIOLOGY | Facility: HOSPITAL | Age: 76
Discharge: HOME | End: 2021-08-18
Attending: INTERNAL MEDICINE
Payer: MEDICARE

## 2021-08-18 ENCOUNTER — TELEPHONE (OUTPATIENT)
Dept: PULMONOLOGY | Facility: CLINIC | Age: 76
End: 2021-08-18

## 2021-08-18 DIAGNOSIS — R06.09 DYSPNEA ON EXERTION: ICD-10-CM

## 2021-08-18 DIAGNOSIS — J84.9 ILD (INTERSTITIAL LUNG DISEASE): Primary | ICD-10-CM

## 2021-08-18 PROCEDURE — 71250 CT THORAX DX C-: CPT | Mod: TC

## 2021-08-18 PROCEDURE — 71250 CT THORAX DX C-: CPT | Mod: 26,,, | Performed by: RADIOLOGY

## 2021-08-18 PROCEDURE — 71250 CT CHEST WITHOUT CONTRAST: ICD-10-PCS | Mod: 26,,, | Performed by: RADIOLOGY

## 2021-08-25 ENCOUNTER — TELEPHONE (OUTPATIENT)
Dept: PULMONOLOGY | Facility: CLINIC | Age: 76
End: 2021-08-25

## 2021-08-27 DIAGNOSIS — J84.9 ILD (INTERSTITIAL LUNG DISEASE): Primary | ICD-10-CM

## 2021-09-13 ENCOUNTER — TELEPHONE (OUTPATIENT)
Dept: PULMONOLOGY | Facility: CLINIC | Age: 76
End: 2021-09-13

## 2021-09-17 ENCOUNTER — TELEPHONE (OUTPATIENT)
Dept: PULMONOLOGY | Facility: CLINIC | Age: 76
End: 2021-09-17

## 2021-09-21 ENCOUNTER — HOSPITAL ENCOUNTER (OUTPATIENT)
Dept: PULMONOLOGY | Facility: CLINIC | Age: 76
Discharge: HOME | End: 2021-09-21
Payer: MEDICARE

## 2021-09-21 ENCOUNTER — HOSPITAL ENCOUNTER (OUTPATIENT)
Dept: CARDIOLOGY | Facility: HOSPITAL | Age: 76
Discharge: HOME | End: 2021-09-21
Attending: INTERNAL MEDICINE
Payer: MEDICARE

## 2021-09-21 VITALS
WEIGHT: 185 LBS | DIASTOLIC BLOOD PRESSURE: 62 MMHG | HEART RATE: 64 BPM | BODY MASS INDEX: 28.04 KG/M2 | HEIGHT: 68 IN | WEIGHT: 185 LBS | SYSTOLIC BLOOD PRESSURE: 120 MMHG | HEIGHT: 69 IN | BODY MASS INDEX: 27.4 KG/M2

## 2021-09-21 DIAGNOSIS — J84.9 ILD (INTERSTITIAL LUNG DISEASE): ICD-10-CM

## 2021-09-21 LAB
ASCENDING AORTA: 2.74 CM
AV INDEX (PROSTH): 0.94
AV MEAN GRADIENT: 4 MMHG
AV PEAK GRADIENT: 6 MMHG
AV VALVE AREA: 5.03 CM2
AV VELOCITY RATIO: 0.92
BSA FOR ECHO PROCEDURE: 2.01 M2
CV ECHO LV RWT: 0.43 CM
DOP CALC AO PEAK VEL: 1.25 M/S
DOP CALC AO VTI: 25.99 CM
DOP CALC LVOT AREA: 5.3 CM2
DOP CALC LVOT DIAMETER: 2.61 CM
DOP CALC LVOT PEAK VEL: 1.15 M/S
DOP CALC LVOT STROKE VOLUME: 130.75 CM3
DOP CALCLVOT PEAK VEL VTI: 24.45 CM
E WAVE DECELERATION TIME: 274.07 MSEC
E/A RATIO: 0.84
E/E' RATIO: 6.63 M/S
ECHO LV POSTERIOR WALL: 1.07 CM (ref 0.6–1.1)
EJECTION FRACTION: 55 %
FRACTIONAL SHORTENING: 37 % (ref 28–44)
INTERVENTRICULAR SEPTUM: 0.95 CM (ref 0.6–1.1)
IVRT: 111.32 MSEC
LA MAJOR: 5.3 CM
LA MINOR: 5.17 CM
LA WIDTH: 4 CM
LEFT ATRIUM SIZE: 4.04 CM
LEFT ATRIUM VOLUME INDEX MOD: 23.9 ML/M2
LEFT ATRIUM VOLUME INDEX: 36.3 ML/M2
LEFT ATRIUM VOLUME MOD: 47.34 CM3
LEFT ATRIUM VOLUME: 71.9 CM3
LEFT INTERNAL DIMENSION IN SYSTOLE: 3.1 CM (ref 2.1–4)
LEFT VENTRICLE DIASTOLIC VOLUME INDEX: 58.31 ML/M2
LEFT VENTRICLE DIASTOLIC VOLUME: 115.45 ML
LEFT VENTRICLE MASS INDEX: 92 G/M2
LEFT VENTRICLE SYSTOLIC VOLUME INDEX: 19.2 ML/M2
LEFT VENTRICLE SYSTOLIC VOLUME: 37.92 ML
LEFT VENTRICULAR INTERNAL DIMENSION IN DIASTOLE: 4.95 CM (ref 3.5–6)
LEFT VENTRICULAR MASS: 181.41 G
LV LATERAL E/E' RATIO: 5.89 M/S
LV SEPTAL E/E' RATIO: 7.57 M/S
MV A" WAVE DURATION": 11.7 MSEC
MV PEAK A VEL: 0.63 M/S
MV PEAK E VEL: 0.53 M/S
MV STENOSIS PRESSURE HALF TIME: 79.48 MS
MV VALVE AREA P 1/2 METHOD: 2.77 CM2
PISA TR MAX VEL: 2.98 M/S
PULM VEIN S/D RATIO: 1.59
PV PEAK D VEL: 0.34 M/S
PV PEAK S VEL: 0.54 M/S
RA MAJOR: 5.18 CM
RA PRESSURE: 3 MMHG
RA WIDTH: 3.72 CM
RIGHT VENTRICULAR END-DIASTOLIC DIMENSION: 2.87 CM
RV TISSUE DOPPLER FREE WALL SYSTOLIC VELOCITY 1 (APICAL 4 CHAMBER VIEW): 13.27 CM/S
SINUS: 3.12 CM
STJ: 2.57 CM
TDI LATERAL: 0.09 M/S
TDI SEPTAL: 0.07 M/S
TDI: 0.08 M/S
TR MAX PG: 36 MMHG
TRICUSPID ANNULAR PLANE SYSTOLIC EXCURSION: 2.14 CM
TV REST PULMONARY ARTERY PRESSURE: 39 MMHG

## 2021-09-21 PROCEDURE — 94618 PULMONARY STRESS TESTING: ICD-10-PCS | Mod: S$GLB,,, | Performed by: INTERNAL MEDICINE

## 2021-09-21 PROCEDURE — 94618 PULMONARY STRESS TESTING: CPT | Mod: S$GLB,,, | Performed by: INTERNAL MEDICINE

## 2021-09-21 PROCEDURE — 93306 ECHO (CUPID ONLY): ICD-10-PCS | Mod: 26,,, | Performed by: INTERNAL MEDICINE

## 2021-09-21 PROCEDURE — 93306 TTE W/DOPPLER COMPLETE: CPT | Mod: 26,,, | Performed by: INTERNAL MEDICINE

## 2021-09-21 PROCEDURE — 93306 TTE W/DOPPLER COMPLETE: CPT

## 2021-09-28 ENCOUNTER — TELEPHONE (OUTPATIENT)
Dept: CRITICAL CARE MEDICINE | Facility: HOSPITAL | Age: 76
End: 2021-09-28

## 2021-09-30 ENCOUNTER — TELEPHONE (OUTPATIENT)
Dept: PULMONOLOGY | Facility: CLINIC | Age: 76
End: 2021-09-30

## 2021-10-04 ENCOUNTER — TELEPHONE (OUTPATIENT)
Dept: PULMONOLOGY | Facility: CLINIC | Age: 76
End: 2021-10-04

## 2021-10-05 ENCOUNTER — OFFICE VISIT (OUTPATIENT)
Dept: PULMONOLOGY | Facility: CLINIC | Age: 76
End: 2021-10-05
Payer: MEDICARE

## 2021-10-05 ENCOUNTER — LAB VISIT (OUTPATIENT)
Dept: LAB | Facility: HOSPITAL | Age: 76
End: 2021-10-05
Attending: INTERNAL MEDICINE
Payer: MEDICARE

## 2021-10-05 VITALS
WEIGHT: 186.94 LBS | BODY MASS INDEX: 27.69 KG/M2 | SYSTOLIC BLOOD PRESSURE: 124 MMHG | OXYGEN SATURATION: 94 % | HEIGHT: 69 IN | HEART RATE: 80 BPM | DIASTOLIC BLOOD PRESSURE: 62 MMHG

## 2021-10-05 DIAGNOSIS — J84.9 ILD (INTERSTITIAL LUNG DISEASE): Primary | ICD-10-CM

## 2021-10-05 DIAGNOSIS — R79.9 ABNORMAL FINDING OF BLOOD CHEMISTRY, UNSPECIFIED: ICD-10-CM

## 2021-10-05 DIAGNOSIS — J43.8 OTHER EMPHYSEMA: ICD-10-CM

## 2021-10-05 DIAGNOSIS — J84.112 IPF (IDIOPATHIC PULMONARY FIBROSIS): ICD-10-CM

## 2021-10-05 DIAGNOSIS — J84.9 ILD (INTERSTITIAL LUNG DISEASE): ICD-10-CM

## 2021-10-05 DIAGNOSIS — R06.09 DYSPNEA ON EXERTION: ICD-10-CM

## 2021-10-05 DIAGNOSIS — I25.111 CORONARY ARTERY DISEASE INVOLVING NATIVE CORONARY ARTERY OF NATIVE HEART WITH ANGINA PECTORIS WITH DOCUMENTED SPASM: ICD-10-CM

## 2021-10-05 LAB
ALBUMIN SERPL BCP-MCNC: 3.7 G/DL (ref 3.5–5.2)
ALP SERPL-CCNC: 77 U/L (ref 55–135)
ALT SERPL W/O P-5'-P-CCNC: 14 U/L (ref 10–44)
ANION GAP SERPL CALC-SCNC: 7 MMOL/L (ref 8–16)
AST SERPL-CCNC: 16 U/L (ref 10–40)
BASOPHILS # BLD AUTO: 0.05 K/UL (ref 0–0.2)
BASOPHILS NFR BLD: 0.7 % (ref 0–1.9)
BILIRUB SERPL-MCNC: 0.8 MG/DL (ref 0.1–1)
BUN SERPL-MCNC: 11 MG/DL (ref 8–23)
CALCIUM SERPL-MCNC: 9.7 MG/DL (ref 8.7–10.5)
CHLORIDE SERPL-SCNC: 105 MMOL/L (ref 95–110)
CHOLEST SERPL-MCNC: 145 MG/DL (ref 120–199)
CHOLEST/HDLC SERPL: 3.4 {RATIO} (ref 2–5)
CO2 SERPL-SCNC: 27 MMOL/L (ref 23–29)
CREAT SERPL-MCNC: 1 MG/DL (ref 0.5–1.4)
DIFFERENTIAL METHOD: ABNORMAL
EOSINOPHIL # BLD AUTO: 0.2 K/UL (ref 0–0.5)
EOSINOPHIL NFR BLD: 2.8 % (ref 0–8)
ERYTHROCYTE [DISTWIDTH] IN BLOOD BY AUTOMATED COUNT: 15.6 % (ref 11.5–14.5)
EST. GFR  (AFRICAN AMERICAN): >60 ML/MIN/1.73 M^2
EST. GFR  (NON AFRICAN AMERICAN): >60 ML/MIN/1.73 M^2
GLUCOSE SERPL-MCNC: 109 MG/DL (ref 70–110)
HCT VFR BLD AUTO: 38.8 % (ref 40–54)
HDLC SERPL-MCNC: 43 MG/DL (ref 40–75)
HDLC SERPL: 29.7 % (ref 20–50)
HGB BLD-MCNC: 12.7 G/DL (ref 14–18)
IMM GRANULOCYTES # BLD AUTO: 0.02 K/UL (ref 0–0.04)
IMM GRANULOCYTES NFR BLD AUTO: 0.3 % (ref 0–0.5)
LDLC SERPL CALC-MCNC: 84.6 MG/DL (ref 63–159)
LYMPHOCYTES # BLD AUTO: 2 K/UL (ref 1–4.8)
LYMPHOCYTES NFR BLD: 26.8 % (ref 18–48)
MCH RBC QN AUTO: 29.7 PG (ref 27–31)
MCHC RBC AUTO-ENTMCNC: 32.7 G/DL (ref 32–36)
MCV RBC AUTO: 91 FL (ref 82–98)
MONOCYTES # BLD AUTO: 0.9 K/UL (ref 0.3–1)
MONOCYTES NFR BLD: 11.5 % (ref 4–15)
NEUTROPHILS # BLD AUTO: 4.3 K/UL (ref 1.8–7.7)
NEUTROPHILS NFR BLD: 57.9 % (ref 38–73)
NONHDLC SERPL-MCNC: 102 MG/DL
NRBC BLD-RTO: 0 /100 WBC
PLATELET # BLD AUTO: 331 K/UL (ref 150–450)
PMV BLD AUTO: 8.8 FL (ref 9.2–12.9)
POTASSIUM SERPL-SCNC: 4.3 MMOL/L (ref 3.5–5.1)
PROT SERPL-MCNC: 6.6 G/DL (ref 6–8.4)
RBC # BLD AUTO: 4.28 M/UL (ref 4.6–6.2)
SODIUM SERPL-SCNC: 139 MMOL/L (ref 136–145)
TRIGL SERPL-MCNC: 87 MG/DL (ref 30–150)
WBC # BLD AUTO: 7.38 K/UL (ref 3.9–12.7)

## 2021-10-05 PROCEDURE — 3288F FALL RISK ASSESSMENT DOCD: CPT | Mod: CPTII,S$GLB,, | Performed by: INTERNAL MEDICINE

## 2021-10-05 PROCEDURE — 1101F PT FALLS ASSESS-DOCD LE1/YR: CPT | Mod: CPTII,S$GLB,, | Performed by: INTERNAL MEDICINE

## 2021-10-05 PROCEDURE — 80053 COMPREHEN METABOLIC PANEL: CPT | Performed by: INTERNAL MEDICINE

## 2021-10-05 PROCEDURE — 3074F PR MOST RECENT SYSTOLIC BLOOD PRESSURE < 130 MM HG: ICD-10-PCS | Mod: CPTII,S$GLB,, | Performed by: INTERNAL MEDICINE

## 2021-10-05 PROCEDURE — 3074F SYST BP LT 130 MM HG: CPT | Mod: CPTII,S$GLB,, | Performed by: INTERNAL MEDICINE

## 2021-10-05 PROCEDURE — 80061 LIPID PANEL: CPT | Performed by: INTERNAL MEDICINE

## 2021-10-05 PROCEDURE — 1126F PR PAIN SEVERITY QUANTIFIED, NO PAIN PRESENT: ICD-10-PCS | Mod: CPTII,S$GLB,, | Performed by: INTERNAL MEDICINE

## 2021-10-05 PROCEDURE — 99214 PR OFFICE/OUTPT VISIT, EST, LEVL IV, 30-39 MIN: ICD-10-PCS | Mod: S$GLB,,, | Performed by: INTERNAL MEDICINE

## 2021-10-05 PROCEDURE — 99999 PR PBB SHADOW E&M-EST. PATIENT-LVL V: ICD-10-PCS | Mod: PBBFAC,,, | Performed by: INTERNAL MEDICINE

## 2021-10-05 PROCEDURE — 1159F MED LIST DOCD IN RCRD: CPT | Mod: CPTII,S$GLB,, | Performed by: INTERNAL MEDICINE

## 2021-10-05 PROCEDURE — 99214 OFFICE O/P EST MOD 30 MIN: CPT | Mod: S$GLB,,, | Performed by: INTERNAL MEDICINE

## 2021-10-05 PROCEDURE — 3078F DIAST BP <80 MM HG: CPT | Mod: CPTII,S$GLB,, | Performed by: INTERNAL MEDICINE

## 2021-10-05 PROCEDURE — 1101F PR PT FALLS ASSESS DOC 0-1 FALLS W/OUT INJ PAST YR: ICD-10-PCS | Mod: CPTII,S$GLB,, | Performed by: INTERNAL MEDICINE

## 2021-10-05 PROCEDURE — 1159F PR MEDICATION LIST DOCUMENTED IN MEDICAL RECORD: ICD-10-PCS | Mod: CPTII,S$GLB,, | Performed by: INTERNAL MEDICINE

## 2021-10-05 PROCEDURE — 36415 COLL VENOUS BLD VENIPUNCTURE: CPT | Performed by: INTERNAL MEDICINE

## 2021-10-05 PROCEDURE — 85025 COMPLETE CBC W/AUTO DIFF WBC: CPT | Performed by: INTERNAL MEDICINE

## 2021-10-05 PROCEDURE — 3078F PR MOST RECENT DIASTOLIC BLOOD PRESSURE < 80 MM HG: ICD-10-PCS | Mod: CPTII,S$GLB,, | Performed by: INTERNAL MEDICINE

## 2021-10-05 PROCEDURE — 99999 PR PBB SHADOW E&M-EST. PATIENT-LVL V: CPT | Mod: PBBFAC,,, | Performed by: INTERNAL MEDICINE

## 2021-10-05 PROCEDURE — 1126F AMNT PAIN NOTED NONE PRSNT: CPT | Mod: CPTII,S$GLB,, | Performed by: INTERNAL MEDICINE

## 2021-10-05 PROCEDURE — 3288F PR FALLS RISK ASSESSMENT DOCUMENTED: ICD-10-PCS | Mod: CPTII,S$GLB,, | Performed by: INTERNAL MEDICINE

## 2021-10-05 RX ORDER — PIRFENIDONE 801 MG/1
1 TABLET, FILM COATED ORAL 3 TIMES DAILY
Qty: 90 TABLET | Refills: 3 | Status: SHIPPED | OUTPATIENT
Start: 2021-11-04 | End: 2022-05-27 | Stop reason: SDUPTHER

## 2021-10-05 RX ORDER — IPRATROPIUM BROMIDE AND ALBUTEROL SULFATE 2.5; .5 MG/3ML; MG/3ML
3 SOLUTION RESPIRATORY (INHALATION) EVERY 6 HOURS PRN
Qty: 3 ML | Refills: 11 | Status: SHIPPED | OUTPATIENT
Start: 2021-10-05 | End: 2022-10-19

## 2021-10-05 RX ORDER — PIRFENIDONE 267 MG/1
CAPSULE ORAL
Qty: 189 CAPSULE | Refills: 0 | Status: SHIPPED | OUTPATIENT
Start: 2021-10-05 | End: 2022-01-28

## 2021-10-11 ENCOUNTER — HOSPITAL ENCOUNTER (EMERGENCY)
Facility: HOSPITAL | Age: 76
Discharge: HOME | End: 2021-10-11
Attending: FAMILY MEDICINE
Payer: MEDICARE

## 2021-10-11 ENCOUNTER — NURSE TRIAGE (OUTPATIENT)
Dept: ADMINISTRATIVE | Facility: CLINIC | Age: 76
End: 2021-10-11

## 2021-10-11 VITALS
RESPIRATION RATE: 18 BRPM | OXYGEN SATURATION: 95 % | SYSTOLIC BLOOD PRESSURE: 134 MMHG | DIASTOLIC BLOOD PRESSURE: 63 MMHG | HEART RATE: 80 BPM | TEMPERATURE: 98 F

## 2021-10-11 DIAGNOSIS — M79.661 PAIN AND SWELLING OF RIGHT LOWER LEG: ICD-10-CM

## 2021-10-11 DIAGNOSIS — M79.89 PAIN AND SWELLING OF RIGHT LOWER LEG: ICD-10-CM

## 2021-10-11 DIAGNOSIS — U07.1 COVID-19: Primary | ICD-10-CM

## 2021-10-11 LAB — SARS-COV-2 RDRP RESP QL NAA+PROBE: POSITIVE

## 2021-10-11 PROCEDURE — 99284 EMERGENCY DEPT VISIT MOD MDM: CPT | Mod: ER

## 2021-10-11 PROCEDURE — U0002 COVID-19 LAB TEST NON-CDC: HCPCS | Mod: ER | Performed by: FAMILY MEDICINE

## 2021-10-11 RX ORDER — BENZONATATE 100 MG/1
100 CAPSULE ORAL 3 TIMES DAILY PRN
Qty: 21 CAPSULE | Refills: 0 | Status: SHIPPED | OUTPATIENT
Start: 2021-10-11 | End: 2021-10-21

## 2021-10-11 RX ORDER — HYDROCODONE BITARTRATE AND ACETAMINOPHEN 5; 325 MG/1; MG/1
1 TABLET ORAL
Status: DISCONTINUED | OUTPATIENT
Start: 2021-10-11 | End: 2021-10-11

## 2021-10-12 ENCOUNTER — INFUSION (OUTPATIENT)
Dept: INFECTIOUS DISEASES | Facility: HOSPITAL | Age: 76
End: 2021-10-12
Attending: INTERNAL MEDICINE
Payer: MEDICARE

## 2021-10-12 VITALS
WEIGHT: 186 LBS | SYSTOLIC BLOOD PRESSURE: 116 MMHG | BODY MASS INDEX: 27.55 KG/M2 | HEART RATE: 60 BPM | HEIGHT: 69 IN | RESPIRATION RATE: 18 BRPM | DIASTOLIC BLOOD PRESSURE: 55 MMHG | OXYGEN SATURATION: 96 % | TEMPERATURE: 98 F

## 2021-10-12 DIAGNOSIS — U07.1 COVID-19: Primary | ICD-10-CM

## 2021-10-12 PROCEDURE — 25000003 PHARM REV CODE 250: Performed by: INTERNAL MEDICINE

## 2021-10-12 PROCEDURE — 63600175 PHARM REV CODE 636 W HCPCS: Performed by: INTERNAL MEDICINE

## 2021-10-12 PROCEDURE — M0243 CASIRIVI AND IMDEVI INFUSION: HCPCS | Performed by: INTERNAL MEDICINE

## 2021-10-12 RX ORDER — SODIUM CHLORIDE 0.9 % (FLUSH) 0.9 %
10 SYRINGE (ML) INJECTION
Status: ACTIVE | OUTPATIENT
Start: 2021-10-12

## 2021-10-12 RX ORDER — ACETAMINOPHEN 325 MG/1
650 TABLET ORAL ONCE AS NEEDED
Status: ACTIVE | OUTPATIENT
Start: 2021-10-12 | End: 2033-03-10

## 2021-10-12 RX ORDER — DIPHENHYDRAMINE HYDROCHLORIDE 50 MG/ML
25 INJECTION INTRAMUSCULAR; INTRAVENOUS ONCE AS NEEDED
Status: DISCONTINUED | OUTPATIENT
Start: 2021-10-12 | End: 2022-01-27 | Stop reason: HOSPADM

## 2021-10-12 RX ORDER — ONDANSETRON 4 MG/1
4 TABLET, ORALLY DISINTEGRATING ORAL ONCE AS NEEDED
Status: ACTIVE | OUTPATIENT
Start: 2021-10-12 | End: 2033-03-10

## 2021-10-12 RX ORDER — EPINEPHRINE 0.3 MG/.3ML
0.3 INJECTION SUBCUTANEOUS
Status: ACTIVE | OUTPATIENT
Start: 2021-10-12

## 2021-10-12 RX ORDER — ALBUTEROL SULFATE 90 UG/1
2 AEROSOL, METERED RESPIRATORY (INHALATION)
Status: DISCONTINUED | OUTPATIENT
Start: 2021-10-12 | End: 2022-06-17

## 2021-10-12 RX ADMIN — CASIRIVIMAB AND IMDEVIMAB 600 MG: 600; 600 INJECTION, SOLUTION, CONCENTRATE INTRAVENOUS at 01:10

## 2021-12-08 ENCOUNTER — TELEPHONE (OUTPATIENT)
Dept: PULMONOLOGY | Facility: CLINIC | Age: 76
End: 2021-12-08
Payer: MEDICARE

## 2021-12-09 ENCOUNTER — PATIENT MESSAGE (OUTPATIENT)
Dept: PHARMACY | Facility: CLINIC | Age: 76
End: 2021-12-09
Payer: MEDICARE

## 2021-12-21 ENCOUNTER — OFFICE VISIT (OUTPATIENT)
Dept: CARDIOLOGY | Facility: CLINIC | Age: 76
End: 2021-12-21
Payer: MEDICARE

## 2021-12-21 ENCOUNTER — LAB VISIT (OUTPATIENT)
Dept: LAB | Facility: HOSPITAL | Age: 76
End: 2021-12-21
Payer: MEDICARE

## 2021-12-21 VITALS
HEIGHT: 68 IN | SYSTOLIC BLOOD PRESSURE: 170 MMHG | DIASTOLIC BLOOD PRESSURE: 82 MMHG | BODY MASS INDEX: 28.57 KG/M2 | OXYGEN SATURATION: 99 % | WEIGHT: 188.5 LBS | HEART RATE: 75 BPM

## 2021-12-21 DIAGNOSIS — M79.89 LEG SWELLING: Primary | ICD-10-CM

## 2021-12-21 DIAGNOSIS — I70.211 ATHEROSCLEROSIS OF NATIVE ARTERY OF RIGHT LOWER EXTREMITY WITH INTERMITTENT CLAUDICATION: ICD-10-CM

## 2021-12-21 DIAGNOSIS — I10 ESSENTIAL HYPERTENSION: ICD-10-CM

## 2021-12-21 DIAGNOSIS — M79.89 LEG SWELLING: ICD-10-CM

## 2021-12-21 DIAGNOSIS — M79.604 PAIN IN BOTH LOWER EXTREMITIES: ICD-10-CM

## 2021-12-21 DIAGNOSIS — E78.2 MIXED HYPERLIPIDEMIA: ICD-10-CM

## 2021-12-21 DIAGNOSIS — Z95.1 HX OF CABG: ICD-10-CM

## 2021-12-21 DIAGNOSIS — I25.111 CORONARY ARTERY DISEASE INVOLVING NATIVE CORONARY ARTERY OF NATIVE HEART WITH ANGINA PECTORIS WITH DOCUMENTED SPASM: ICD-10-CM

## 2021-12-21 DIAGNOSIS — J43.8 OTHER EMPHYSEMA: ICD-10-CM

## 2021-12-21 DIAGNOSIS — M54.16 LUMBAR RADICULOPATHY: ICD-10-CM

## 2021-12-21 DIAGNOSIS — M79.605 PAIN IN BOTH LOWER EXTREMITIES: ICD-10-CM

## 2021-12-21 DIAGNOSIS — J84.9 ILD (INTERSTITIAL LUNG DISEASE): ICD-10-CM

## 2021-12-21 DIAGNOSIS — I73.9 PAD (PERIPHERAL ARTERY DISEASE): ICD-10-CM

## 2021-12-21 PROBLEM — R94.39 ABNORMAL CARDIOVASCULAR STRESS TEST: Status: RESOLVED | Noted: 2019-12-04 | Resolved: 2021-12-21

## 2021-12-21 LAB — BNP SERPL-MCNC: 74 PG/ML (ref 0–99)

## 2021-12-21 PROCEDURE — 99215 OFFICE O/P EST HI 40 MIN: CPT | Mod: GC,S$GLB,, | Performed by: STUDENT IN AN ORGANIZED HEALTH CARE EDUCATION/TRAINING PROGRAM

## 2021-12-21 PROCEDURE — 99999 PR PBB SHADOW E&M-EST. PATIENT-LVL V: CPT | Mod: PBBFAC,GC,, | Performed by: STUDENT IN AN ORGANIZED HEALTH CARE EDUCATION/TRAINING PROGRAM

## 2021-12-21 PROCEDURE — 99215 PR OFFICE/OUTPT VISIT, EST, LEVL V, 40-54 MIN: ICD-10-PCS | Mod: GC,S$GLB,, | Performed by: STUDENT IN AN ORGANIZED HEALTH CARE EDUCATION/TRAINING PROGRAM

## 2021-12-21 PROCEDURE — 99999 PR PBB SHADOW E&M-EST. PATIENT-LVL V: ICD-10-PCS | Mod: PBBFAC,GC,, | Performed by: STUDENT IN AN ORGANIZED HEALTH CARE EDUCATION/TRAINING PROGRAM

## 2021-12-21 PROCEDURE — 83880 ASSAY OF NATRIURETIC PEPTIDE: CPT | Performed by: STUDENT IN AN ORGANIZED HEALTH CARE EDUCATION/TRAINING PROGRAM

## 2021-12-21 PROCEDURE — 36415 COLL VENOUS BLD VENIPUNCTURE: CPT | Performed by: STUDENT IN AN ORGANIZED HEALTH CARE EDUCATION/TRAINING PROGRAM

## 2021-12-21 RX ORDER — ATORVASTATIN CALCIUM 40 MG/1
80 TABLET, FILM COATED ORAL DAILY
Qty: 90 TABLET | Refills: 7 | Status: SHIPPED | OUTPATIENT
Start: 2021-12-21 | End: 2022-06-17

## 2021-12-22 ENCOUNTER — HOSPITAL ENCOUNTER (OUTPATIENT)
Dept: CARDIOLOGY | Facility: HOSPITAL | Age: 76
Discharge: HOME | End: 2021-12-22
Attending: STUDENT IN AN ORGANIZED HEALTH CARE EDUCATION/TRAINING PROGRAM
Payer: MEDICARE

## 2021-12-22 DIAGNOSIS — I73.9 PAD (PERIPHERAL ARTERY DISEASE): ICD-10-CM

## 2021-12-22 DIAGNOSIS — M79.89 LEG SWELLING: ICD-10-CM

## 2021-12-22 DIAGNOSIS — M79.604 PAIN IN BOTH LOWER EXTREMITIES: ICD-10-CM

## 2021-12-22 DIAGNOSIS — M79.605 PAIN IN BOTH LOWER EXTREMITIES: ICD-10-CM

## 2021-12-22 LAB
IMMEDIATE ARM BP: 120 MMHG
IMMEDIATE LEFT ABI: 0.53
IMMEDIATE LEFT TIBIAL: 64 MMHG
IMMEDIATE RIGHT ABI: 0.43
IMMEDIATE RIGHT TIBIAL: 51 MMHG
LEFT ABI: 0.68
LEFT ARM BP: 115 MMHG
LEFT DORSALIS PEDIS: 78 MMHG
LEFT POSTERIOR TIBIAL: 75 MMHG
RIGHT ABI: 0.64
RIGHT ARM BP: 113 MMHG
RIGHT DORSALIS PEDIS: 50 MMHG
RIGHT POSTERIOR TIBIAL: 74 MMHG
TOE RAISES: 40

## 2021-12-22 PROCEDURE — 93924 LWR XTR VASC STDY BILAT: CPT

## 2021-12-22 PROCEDURE — 93924 LWR XTR VASC STDY BILAT: CPT | Mod: 26,,, | Performed by: INTERNAL MEDICINE

## 2021-12-22 PROCEDURE — 93924 ANKLE BRACHIAL INDICES (ABI): ICD-10-PCS | Mod: 26,,, | Performed by: INTERNAL MEDICINE

## 2021-12-28 ENCOUNTER — PATIENT MESSAGE (OUTPATIENT)
Dept: PHARMACY | Facility: CLINIC | Age: 76
End: 2021-12-28
Payer: MEDICARE

## 2021-12-30 ENCOUNTER — SPECIALTY PHARMACY (OUTPATIENT)
Dept: PHARMACY | Facility: CLINIC | Age: 76
End: 2021-12-30
Payer: MEDICARE

## 2021-12-30 DIAGNOSIS — I73.9 PERIPHERAL VASCULAR DISEASE, UNSPECIFIED: ICD-10-CM

## 2022-01-11 ENCOUNTER — LAB VISIT (OUTPATIENT)
Dept: LAB | Facility: HOSPITAL | Age: 77
End: 2022-01-11
Attending: INTERNAL MEDICINE
Payer: MEDICARE

## 2022-01-11 DIAGNOSIS — I73.9 PERIPHERAL VASCULAR DISEASE, UNSPECIFIED: ICD-10-CM

## 2022-01-11 LAB
ALBUMIN SERPL BCP-MCNC: 4.3 G/DL (ref 3.5–5.2)
ALP SERPL-CCNC: 80 U/L (ref 38–126)
ALT SERPL W/O P-5'-P-CCNC: 26 U/L (ref 10–44)
ANION GAP SERPL CALC-SCNC: 9 MMOL/L (ref 8–16)
AST SERPL-CCNC: 30 U/L (ref 15–46)
BILIRUB SERPL-MCNC: 0.6 MG/DL (ref 0.1–1)
CALCIUM SERPL-MCNC: 8.8 MG/DL (ref 8.7–10.5)
CHLORIDE SERPL-SCNC: 109 MMOL/L (ref 95–110)
CO2 SERPL-SCNC: 22 MMOL/L (ref 23–29)
CREAT SERPL-MCNC: 0.98 MG/DL (ref 0.5–1.4)
EST. GFR  (AFRICAN AMERICAN): >60 ML/MIN/1.73 M^2
EST. GFR  (NON AFRICAN AMERICAN): >60 ML/MIN/1.73 M^2
GLUCOSE SERPL-MCNC: 103 MG/DL (ref 70–110)
POTASSIUM SERPL-SCNC: 4.7 MMOL/L (ref 3.5–5.1)
PROT SERPL-MCNC: 7.1 G/DL (ref 6–8.4)
SODIUM SERPL-SCNC: 140 MMOL/L (ref 136–145)
UUN UR-MCNC: 17 MG/DL (ref 2–20)

## 2022-01-11 PROCEDURE — 36415 COLL VENOUS BLD VENIPUNCTURE: CPT | Mod: PO | Performed by: INTERNAL MEDICINE

## 2022-01-11 PROCEDURE — 80053 COMPREHEN METABOLIC PANEL: CPT | Mod: PO | Performed by: INTERNAL MEDICINE

## 2022-01-12 ENCOUNTER — HOSPITAL ENCOUNTER (OUTPATIENT)
Dept: RADIOLOGY | Facility: HOSPITAL | Age: 77
Discharge: HOME | End: 2022-01-12
Attending: INTERNAL MEDICINE
Payer: MEDICARE

## 2022-01-12 DIAGNOSIS — I73.9 PERIPHERAL VASCULAR DISEASE, UNSPECIFIED: ICD-10-CM

## 2022-01-12 PROCEDURE — 75635 CT ANGIO ABDOMINAL ARTERIES: CPT | Mod: 26,,, | Performed by: STUDENT IN AN ORGANIZED HEALTH CARE EDUCATION/TRAINING PROGRAM

## 2022-01-12 PROCEDURE — 75635 CTA RUNOFF ABD PEL BILAT LOWER EXT: ICD-10-PCS | Mod: 26,,, | Performed by: STUDENT IN AN ORGANIZED HEALTH CARE EDUCATION/TRAINING PROGRAM

## 2022-01-12 PROCEDURE — 25500020 PHARM REV CODE 255: Performed by: INTERNAL MEDICINE

## 2022-01-12 PROCEDURE — 75635 CT ANGIO ABDOMINAL ARTERIES: CPT | Mod: TC

## 2022-01-12 RX ADMIN — IOHEXOL 150 ML: 350 INJECTION, SOLUTION INTRAVENOUS at 09:01

## 2022-01-18 ENCOUNTER — DOCUMENTATION ONLY (OUTPATIENT)
Dept: CARDIOLOGY | Facility: CLINIC | Age: 77
End: 2022-01-18
Payer: MEDICARE

## 2022-01-18 ENCOUNTER — OFFICE VISIT (OUTPATIENT)
Dept: CARDIOLOGY | Facility: CLINIC | Age: 77
End: 2022-01-18
Payer: MEDICARE

## 2022-01-18 VITALS
HEIGHT: 68 IN | WEIGHT: 190.69 LBS | OXYGEN SATURATION: 98 % | HEART RATE: 63 BPM | DIASTOLIC BLOOD PRESSURE: 59 MMHG | SYSTOLIC BLOOD PRESSURE: 111 MMHG | BODY MASS INDEX: 28.9 KG/M2

## 2022-01-18 DIAGNOSIS — Z95.1 HX OF CABG: ICD-10-CM

## 2022-01-18 DIAGNOSIS — I10 ESSENTIAL HYPERTENSION: ICD-10-CM

## 2022-01-18 DIAGNOSIS — J43.8 OTHER EMPHYSEMA: ICD-10-CM

## 2022-01-18 DIAGNOSIS — R06.9 UNSPECIFIED ABNORMALITIES OF BREATHING: ICD-10-CM

## 2022-01-18 DIAGNOSIS — I73.9 PAD (PERIPHERAL ARTERY DISEASE): Primary | ICD-10-CM

## 2022-01-18 DIAGNOSIS — E78.2 MIXED HYPERLIPIDEMIA: ICD-10-CM

## 2022-01-18 PROCEDURE — 3074F PR MOST RECENT SYSTOLIC BLOOD PRESSURE < 130 MM HG: ICD-10-PCS | Mod: CPTII,S$GLB,, | Performed by: INTERNAL MEDICINE

## 2022-01-18 PROCEDURE — 1160F PR REVIEW ALL MEDS BY PRESCRIBER/CLIN PHARMACIST DOCUMENTED: ICD-10-PCS | Mod: CPTII,S$GLB,, | Performed by: INTERNAL MEDICINE

## 2022-01-18 PROCEDURE — 1159F PR MEDICATION LIST DOCUMENTED IN MEDICAL RECORD: ICD-10-PCS | Mod: CPTII,S$GLB,, | Performed by: INTERNAL MEDICINE

## 2022-01-18 PROCEDURE — 99999 PR PBB SHADOW E&M-EST. PATIENT-LVL V: CPT | Mod: PBBFAC,,, | Performed by: INTERNAL MEDICINE

## 2022-01-18 PROCEDURE — 99999 PR PBB SHADOW E&M-EST. PATIENT-LVL V: ICD-10-PCS | Mod: PBBFAC,,, | Performed by: INTERNAL MEDICINE

## 2022-01-18 PROCEDURE — 3078F DIAST BP <80 MM HG: CPT | Mod: CPTII,S$GLB,, | Performed by: INTERNAL MEDICINE

## 2022-01-18 PROCEDURE — 1160F RVW MEDS BY RX/DR IN RCRD: CPT | Mod: CPTII,S$GLB,, | Performed by: INTERNAL MEDICINE

## 2022-01-18 PROCEDURE — 99215 PR OFFICE/OUTPT VISIT, EST, LEVL V, 40-54 MIN: ICD-10-PCS | Mod: S$GLB,,, | Performed by: INTERNAL MEDICINE

## 2022-01-18 PROCEDURE — 99215 OFFICE O/P EST HI 40 MIN: CPT | Mod: S$GLB,,, | Performed by: INTERNAL MEDICINE

## 2022-01-18 PROCEDURE — 1125F AMNT PAIN NOTED PAIN PRSNT: CPT | Mod: CPTII,S$GLB,, | Performed by: INTERNAL MEDICINE

## 2022-01-18 PROCEDURE — 3078F PR MOST RECENT DIASTOLIC BLOOD PRESSURE < 80 MM HG: ICD-10-PCS | Mod: CPTII,S$GLB,, | Performed by: INTERNAL MEDICINE

## 2022-01-18 PROCEDURE — 3074F SYST BP LT 130 MM HG: CPT | Mod: CPTII,S$GLB,, | Performed by: INTERNAL MEDICINE

## 2022-01-18 PROCEDURE — 1159F MED LIST DOCD IN RCRD: CPT | Mod: CPTII,S$GLB,, | Performed by: INTERNAL MEDICINE

## 2022-01-18 PROCEDURE — 1125F PR PAIN SEVERITY QUANTIFIED, PAIN PRESENT: ICD-10-PCS | Mod: CPTII,S$GLB,, | Performed by: INTERNAL MEDICINE

## 2022-01-18 RX ORDER — DIPHENHYDRAMINE HCL 50 MG
CAPSULE ORAL
Qty: 3 CAPSULE | Refills: 0 | Status: ON HOLD | OUTPATIENT
Start: 2022-01-18 | End: 2022-01-27 | Stop reason: HOSPADM

## 2022-01-18 RX ORDER — SODIUM CHLORIDE 9 MG/ML
INJECTION, SOLUTION INTRAVENOUS CONTINUOUS
Status: CANCELLED | OUTPATIENT
Start: 2022-01-18 | End: 2022-01-18

## 2022-01-18 RX ORDER — CIMETIDINE 300 MG/1
TABLET, FILM COATED ORAL
Qty: 3 TABLET | Refills: 0 | Status: ON HOLD | OUTPATIENT
Start: 2022-01-18 | End: 2022-01-27 | Stop reason: HOSPADM

## 2022-01-18 RX ORDER — PREDNISONE 50 MG/1
TABLET ORAL
Qty: 3 TABLET | Refills: 0 | Status: SHIPPED | OUTPATIENT
Start: 2022-01-18 | End: 2022-06-17

## 2022-01-18 RX ORDER — DIPHENHYDRAMINE HCL 50 MG
50 CAPSULE ORAL ONCE
Status: CANCELLED | OUTPATIENT
Start: 2022-01-18 | End: 2022-01-18

## 2022-01-18 NOTE — PROGRESS NOTES
Interventional Cardiology Clinic Note  Reason for Visit: PAD  Referring Physician: Moiz Villalta    HPI:   Patient is a 76-year-old gentleman who was referred her for peripheral arterial disease.    Patient has a long history of peripheral arterial disease dating back to the early 2000s in which he has bilateral SFA stenting done by Dr. Mendoza his last angiograms were in 2006 which revealed occluded SFAS bilaterally and he had stenting of the right SFA and the left SFA with good result.  He had done well up until several months ago he now gets severe bilateral claudication right greater than left when ambulating about 50 ft.  This is been very life limiting for him as he has been very active.  He denies any ulcers or skin breakdown at this time.  He does have some shortness of breath but has chronic lung issues and follows up with a pulmonologist for this.  He denies any chest pain, syncope.    Review of Systems   All other systems reviewed and are negative.      PMH:     Past Medical History:   Diagnosis Date    Coronary artery disease     Hyperlipidemia     Hypertension      Past Surgical History:   Procedure Laterality Date    ABDOMINAL SURGERY      Polyps in colon removed (noncancerous)    APPENDECTOMY      back surgery (screws & okate)      CORONARY ARTERY BYPASS GRAFT      1998    TRANSFORAMINAL EPIDURAL INJECTION OF STEROID Right 11/5/2020    Procedure: LUMBAR TRANSFORAMINAL RIGHT L3/4 AND L4/5 DIRECT REFERRAL;  Surgeon: Nicole Toth MD;  Location: [facility];  Service: Pain Management;  Laterality: Right;  NEEDS CONSENT, PT STATES NO LONGER TAKES PLETAL     Allergies:     Review of patient's allergies indicates:   Allergen Reactions    Iodine and iodide containing products Anaphylaxis    Shellfish containing products Anaphylaxis     Medications:     Current Outpatient Medications on File Prior to Visit   Medication Sig Dispense Refill    albuterol-ipratropium (DUO-NEB) 2.5 mg-0.5  mg/3 mL nebulizer solution Take 3 mLs by nebulization every 6 (six) hours as needed for Shortness of Breath. Rescue 3 mL 11    amLODIPine (NORVASC) 10 MG tablet amlodipine besylate  10 mg tabs      aspirin 325 MG tablet aspirin 325 mg tablet   Take 1 tablet every day by oral route.      atorvastatin (LIPITOR) 40 MG tablet Take 2 tablets (80 mg total) by mouth once daily. 90 tablet 7    cilostazoL (PLETAL) 100 MG Tab Take 1 tablet by mouth twice daily 180 tablet 3    lisinopriL (PRINIVIL,ZESTRIL) 20 MG tablet lisinopril  20 mg tabs      metoprolol tartrate (LOPRESSOR) 50 MG tablet metoprolol tartrate  50 mg tabs      multivitamin (THERAGRAN) per tablet Take 1 tablet by mouth once daily.      omega 3-dha-epa-fish oil (FISH OIL) 100-160-1,000 mg Cap Fish Oil   daily      oxyCODONE-acetaminophen (PERCOCET)  mg per tablet oxycod/apap  tab 10-325mg      polyethylene glycol (GLYCOLAX) 17 gram/dose powder Miralax 17 gram/dose oral powder   use as directed      pregabalin (LYRICA) 50 MG capsule       saw palmetto 160 MG capsule Take 160 mg by mouth 2 (two) times daily.      ALPRAZolam (XANAX) 0.5 MG tablet Take 0.5 mg by mouth 4 (four) times daily as needed.      betamethasone acetate-betamethasone sodium phosphate (CELESTONE) 6 mg/mL injection 1 mL.      cetirizine (ZYRTEC) 10 MG tablet Take 1 tablet (10 mg total) by mouth once daily. (Patient not taking: Reported on 1/18/2022) 30 tablet 0    flu vacc [id number],65yr up,PF (FLUZONE HIGH-DOSE 2019-20, PF,) 180 mcg/0.5 mL Syrg Fluzone High-Dose 2019-20 (PF) 180 mcg/0.5 mL intramuscular syringe   PHARMACIST ADMINISTERED IMMUNIZATION ADMINISTERED AT TIME OF DISPENSING      lubiprostone (AMITIZA) 24 MCG Cap Amitiza 24 mcg capsule   Take 1 capsule twice a day by oral route with meals for 30 days.      pirfenidone (ESBRIET) 267 mg Cap Take 1 capsule (267 mg total) by mouth 3 (three) times daily for 7 days, THEN 2 capsules (534 mg total) 3 (three) times daily  for 7 days, THEN 3 capsules (801 mg total) 3 (three) times daily for 14 days. (Patient not taking: Reported on 2022) 189 capsule 0    pirfenidone (ESBRIET) 801 mg Tab Take 1 capsule by mouth 3 (three) times daily. (Patient not taking: Reported on 2022) 90 tablet 3    pregabalin (LYRICA) 50 MG capsule pregabalin 50 mg capsule      pulse oximeter (PULSE OXIMETER) device by Apply Externally route 2 (two) times a day. Use twice daily at 8 AM and 3 PM and record the value in [employer] as directed. 1 each 0    sildenafiL (VIAGRA) 100 MG tablet Take 100 mg by mouth.      SUPREP BOWEL PREP KIT 17.5-3.13-1.6 gram SolR Take by mouth.       Current Facility-Administered Medications on File Prior to Visit   Medication Dose Route Frequency Provider Last Rate Last Admin    acetaminophen tablet 650 mg  650 mg Oral Once PRN Rishabh Ware MD        albuterol inhaler 2 puff  2 puff Inhalation Q20 Min PRN Rishabh Ware MD        diphenhydrAMINE injection 25 mg  25 mg Intravenous Once PRN Rishabh Ware MD        EPINEPHrine (EPIPEN) 0.3 mg/0.3 mL pen injection 0.3 mg  0.3 mg Intramuscular PRN Rishabh Ware MD        methylPREDNISolone sodium succinate injection 40 mg  40 mg Intravenous Once PRN Rishabh Ware MD        ondansetron disintegrating tablet 4 mg  4 mg Oral Once PRN Rishabh Ware MD        sodium chloride 0.9% 500 mL flush bag   Intravenous PRN Rishabh Ware MD        sodium chloride 0.9% flush 10 mL  10 mL Intravenous PRN Rishabh Ware MD         Social History:     Social History     Tobacco Use    Smoking status: Former Smoker     Quit date: 10/5/1998     Years since quittin.3    Smokeless tobacco: Never Used   Substance Use Topics    Alcohol use: Not Currently     Alcohol/week: 0.0 standard drinks     Comment:      Family History:   History reviewed. No pertinent family history.    Physical Exam  BP (!) 111/59 (BP Location: Right arm, Patient  "Position: Sitting, BP Method: Large (Automatic))   Pulse 63   Ht 5' 8" (1.727 m)   Wt 86.5 kg (190 lb 11.2 oz)   SpO2 98%   BMI 29.00 kg/m²    GEN: Alert and oriented in NAD  NECK: no JVD appreciated   CVS: RRR, s1/s2, no MRG  PULM: CTAB no rales  ABD: NT/ND BS +  Extremities: warm and dry,  no edema  NEURO: Alert and oriented x 3  PSYCH: appropriate affect.         Labs:     Lab Results   Component Value Date     01/11/2022    K 4.7 01/11/2022     01/11/2022    CO2 22 (L) 01/11/2022    BUN 17 01/11/2022    CREATININE 0.98 01/11/2022    ANIONGAP 9 01/11/2022     No results found for: HGBA1C  Lab Results   Component Value Date    BNP 74 12/21/2021    Lab Results   Component Value Date    WBC 7.38 10/05/2021    HGB 12.7 (L) 10/05/2021    HCT 38.8 (L) 10/05/2021    HCT 47 08/18/2020     10/05/2021    GRAN 4.3 10/05/2021    GRAN 57.9 10/05/2021     Lab Results   Component Value Date    CHOL 145 10/05/2021    HDL 43 10/05/2021    LDLCALC 84.6 10/05/2021    TRIG 87 10/05/2021          EF   Date Value Ref Range Status   09/21/2021 55 % Final     Nuc Stress EF   Date Value Ref Range Status   10/14/2019 76 % Final     Nuc Rest EF   Date Value Ref Range Status   10/14/2019 59.0  Final         Assessment and Plan  Kermit Castro is a 76 y.o. gentleman who presents for severe peripheral arterial disease.      ICD-10-CM ICD-9-CM    1. PAD (peripheral artery disease)   Patient is a 76-year-old gentleman who presents today with severe bilateral claudication right is greater than left which has been life-limiting for him with walking less than 50 ft.  He underwent an JOAQUINA which is abnormal bilaterally post exercise JOAQUINA was 0.53 on the left in 0.43 on the right.  He then underwent a CTA which reveals bilateral occlusions of the right and left SFA is.  Since his right leg is hurting him more will plan to do intervention on the right SFA and then bring him back for staged procedure of the left if he should need " it.    1. Peripheral angiogram and PTA likely of right SFA  2. Antiplatelets:  Aspirin Plavix  3. Access:  Left common femoral artery  4. Equipment:  Please get a 6 Telugu sheath, get a 6 Telugu crossover sheath, a 4 Telugu ALFREDO catheter, floppy angled Glidewire.  5. The risks, benefits, and alternatives of coronary vascular angiography and possible intervention were discussed with the patient. All questions were answered and informed consent was obtained. I had a detailed discussion with the patient regarding risk of stroke, MI, bleeding access site complications including limb loss, allergy, kidney failure including dialysis and death.  6. The patient understands the risks and benefits and wishes to go ahead with the procedure.  7. All patient's questions were answered       I73.9 443.9 Case Request-Cath Lab: PTA, PERIPHERAL VESSEL      Case Request-Cath Lab: PTA, PERIPHERAL VESSEL   2. Mixed hyperlipidemia   Would continue statin at this time. E78.2 272.2    3. Essential hypertension   Blood pressure is well controlled at this time will continue current medicines. I10 401.9    4. Hx of CABG   Patient had a CABG back in 1990 19 he denies any angina at this time had a recent PET stress a couple years ago which revealed a fixed defect. Z95.1 V45.81    5. Other emphysema   Patient has shortness of breath that is related to his emphysema will follow-up with his pulmonologist. J43.8 492.8         Signed:        Damion Wetzel MD  Interventional Cardiology Fellow  Pager [phone]    Interventional Cardiology Staff  I have personally taken the history and examined this patient. I have discussed and agree with the resident's findings and plan as documented in the resident's note.    Gume Nagy

## 2022-01-18 NOTE — H&P (VIEW-ONLY)
Interventional Cardiology Clinic Note  Reason for Visit: PAD  Referring Physician: Moiz Villalta    HPI:   Patient is a 76-year-old gentleman who was referred her for peripheral arterial disease.    Patient has a long history of peripheral arterial disease dating back to the early 2000s in which he has bilateral SFA stenting done by Dr. Mendoza his last angiograms were in 2006 which revealed occluded SFAS bilaterally and he had stenting of the right SFA and the left SFA with good result.  He had done well up until several months ago he now gets severe bilateral claudication right greater than left when ambulating about 50 ft.  This is been very life limiting for him as he has been very active.  He denies any ulcers or skin breakdown at this time.  He does have some shortness of breath but has chronic lung issues and follows up with a pulmonologist for this.  He denies any chest pain, syncope.    Review of Systems   All other systems reviewed and are negative.      PMH:     Past Medical History:   Diagnosis Date    Coronary artery disease     Hyperlipidemia     Hypertension      Past Surgical History:   Procedure Laterality Date    ABDOMINAL SURGERY      Polyps in colon removed (noncancerous)    APPENDECTOMY      back surgery (screws & okate)      CORONARY ARTERY BYPASS GRAFT      1998    TRANSFORAMINAL EPIDURAL INJECTION OF STEROID Right 11/5/2020    Procedure: LUMBAR TRANSFORAMINAL RIGHT L3/4 AND L4/5 DIRECT REFERRAL;  Surgeon: Nicole Toth MD;  Location: [facility];  Service: Pain Management;  Laterality: Right;  NEEDS CONSENT, PT STATES NO LONGER TAKES PLETAL     Allergies:     Review of patient's allergies indicates:   Allergen Reactions    Iodine and iodide containing products Anaphylaxis    Shellfish containing products Anaphylaxis     Medications:     Current Outpatient Medications on File Prior to Visit   Medication Sig Dispense Refill    albuterol-ipratropium (DUO-NEB) 2.5 mg-0.5  mg/3 mL nebulizer solution Take 3 mLs by nebulization every 6 (six) hours as needed for Shortness of Breath. Rescue 3 mL 11    amLODIPine (NORVASC) 10 MG tablet amlodipine besylate  10 mg tabs      aspirin 325 MG tablet aspirin 325 mg tablet   Take 1 tablet every day by oral route.      atorvastatin (LIPITOR) 40 MG tablet Take 2 tablets (80 mg total) by mouth once daily. 90 tablet 7    cilostazoL (PLETAL) 100 MG Tab Take 1 tablet by mouth twice daily 180 tablet 3    lisinopriL (PRINIVIL,ZESTRIL) 20 MG tablet lisinopril  20 mg tabs      metoprolol tartrate (LOPRESSOR) 50 MG tablet metoprolol tartrate  50 mg tabs      multivitamin (THERAGRAN) per tablet Take 1 tablet by mouth once daily.      omega 3-dha-epa-fish oil (FISH OIL) 100-160-1,000 mg Cap Fish Oil   daily      oxyCODONE-acetaminophen (PERCOCET)  mg per tablet oxycod/apap  tab 10-325mg      polyethylene glycol (GLYCOLAX) 17 gram/dose powder Miralax 17 gram/dose oral powder   use as directed      pregabalin (LYRICA) 50 MG capsule       saw palmetto 160 MG capsule Take 160 mg by mouth 2 (two) times daily.      ALPRAZolam (XANAX) 0.5 MG tablet Take 0.5 mg by mouth 4 (four) times daily as needed.      betamethasone acetate-betamethasone sodium phosphate (CELESTONE) 6 mg/mL injection 1 mL.      cetirizine (ZYRTEC) 10 MG tablet Take 1 tablet (10 mg total) by mouth once daily. (Patient not taking: Reported on 1/18/2022) 30 tablet 0    flu vacc [id number],65yr up,PF (FLUZONE HIGH-DOSE 2019-20, PF,) 180 mcg/0.5 mL Syrg Fluzone High-Dose 2019-20 (PF) 180 mcg/0.5 mL intramuscular syringe   PHARMACIST ADMINISTERED IMMUNIZATION ADMINISTERED AT TIME OF DISPENSING      lubiprostone (AMITIZA) 24 MCG Cap Amitiza 24 mcg capsule   Take 1 capsule twice a day by oral route with meals for 30 days.      pirfenidone (ESBRIET) 267 mg Cap Take 1 capsule (267 mg total) by mouth 3 (three) times daily for 7 days, THEN 2 capsules (534 mg total) 3 (three) times daily  for 7 days, THEN 3 capsules (801 mg total) 3 (three) times daily for 14 days. (Patient not taking: Reported on 2022) 189 capsule 0    pirfenidone (ESBRIET) 801 mg Tab Take 1 capsule by mouth 3 (three) times daily. (Patient not taking: Reported on 2022) 90 tablet 3    pregabalin (LYRICA) 50 MG capsule pregabalin 50 mg capsule      pulse oximeter (PULSE OXIMETER) device by Apply Externally route 2 (two) times a day. Use twice daily at 8 AM and 3 PM and record the value in [employer]t as directed. 1 each 0    sildenafiL (VIAGRA) 100 MG tablet Take 100 mg by mouth.      SUPREP BOWEL PREP KIT 17.5-3.13-1.6 gram SolR Take by mouth.       Current Facility-Administered Medications on File Prior to Visit   Medication Dose Route Frequency Provider Last Rate Last Admin    acetaminophen tablet 650 mg  650 mg Oral Once PRN Rishabh Ware MD        albuterol inhaler 2 puff  2 puff Inhalation Q20 Min PRN Rishabh Ware MD        diphenhydrAMINE injection 25 mg  25 mg Intravenous Once PRN Rishabh Ware MD        EPINEPHrine (EPIPEN) 0.3 mg/0.3 mL pen injection 0.3 mg  0.3 mg Intramuscular PRN Rishabh Ware MD        methylPREDNISolone sodium succinate injection 40 mg  40 mg Intravenous Once PRN Rishabh Ware MD        ondansetron disintegrating tablet 4 mg  4 mg Oral Once PRN Rishabh Ware MD        sodium chloride 0.9% 500 mL flush bag   Intravenous PRN Rishabh Ware MD        sodium chloride 0.9% flush 10 mL  10 mL Intravenous PRN Rishabh Ware MD         Social History:     Social History     Tobacco Use    Smoking status: Former Smoker     Quit date: 10/5/1998     Years since quittin.3    Smokeless tobacco: Never Used   Substance Use Topics    Alcohol use: Not Currently     Alcohol/week: 0.0 standard drinks     Comment:      Family History:   History reviewed. No pertinent family history.    Physical Exam  BP (!) 111/59 (BP Location: Right arm, Patient  "Position: Sitting, BP Method: Large (Automatic))   Pulse 63   Ht 5' 8" (1.727 m)   Wt 86.5 kg (190 lb 11.2 oz)   SpO2 98%   BMI 29.00 kg/m²    GEN: Alert and oriented in NAD  NECK: no JVD appreciated   CVS: RRR, s1/s2, no MRG  PULM: CTAB no rales  ABD: NT/ND BS +  Extremities: warm and dry,  no edema  NEURO: Alert and oriented x 3  PSYCH: appropriate affect.         Labs:     Lab Results   Component Value Date     01/11/2022    K 4.7 01/11/2022     01/11/2022    CO2 22 (L) 01/11/2022    BUN 17 01/11/2022    CREATININE 0.98 01/11/2022    ANIONGAP 9 01/11/2022     No results found for: HGBA1C  Lab Results   Component Value Date    BNP 74 12/21/2021    Lab Results   Component Value Date    WBC 7.38 10/05/2021    HGB 12.7 (L) 10/05/2021    HCT 38.8 (L) 10/05/2021    HCT 47 08/18/2020     10/05/2021    GRAN 4.3 10/05/2021    GRAN 57.9 10/05/2021     Lab Results   Component Value Date    CHOL 145 10/05/2021    HDL 43 10/05/2021    LDLCALC 84.6 10/05/2021    TRIG 87 10/05/2021          EF   Date Value Ref Range Status   09/21/2021 55 % Final     Nuc Stress EF   Date Value Ref Range Status   10/14/2019 76 % Final     Nuc Rest EF   Date Value Ref Range Status   10/14/2019 59.0  Final         Assessment and Plan  Kermit Castro is a 76 y.o. gentleman who presents for severe peripheral arterial disease.      ICD-10-CM ICD-9-CM    1. PAD (peripheral artery disease)   Patient is a 76-year-old gentleman who presents today with severe bilateral claudication right is greater than left which has been life-limiting for him with walking less than 50 ft.  He underwent an JOAQUINA which is abnormal bilaterally post exercise JOAQUINA was 0.53 on the left in 0.43 on the right.  He then underwent a CTA which reveals bilateral occlusions of the right and left SFA is.  Since his right leg is hurting him more will plan to do intervention on the right SFA and then bring him back for staged procedure of the left if he should need " it.    1. Peripheral angiogram and PTA likely of right SFA  2. Antiplatelets:  Aspirin Plavix  3. Access:  Left common femoral artery  4. Equipment:  Please get a 6 Uzbek sheath, get a 6 Uzbek crossover sheath, a 4 Uzbek ALFREDO catheter, floppy angled Glidewire.  5. The risks, benefits, and alternatives of coronary vascular angiography and possible intervention were discussed with the patient. All questions were answered and informed consent was obtained. I had a detailed discussion with the patient regarding risk of stroke, MI, bleeding access site complications including limb loss, allergy, kidney failure including dialysis and death.  6. The patient understands the risks and benefits and wishes to go ahead with the procedure.  7. All patient's questions were answered       I73.9 443.9 Case Request-Cath Lab: PTA, PERIPHERAL VESSEL      Case Request-Cath Lab: PTA, PERIPHERAL VESSEL   2. Mixed hyperlipidemia   Would continue statin at this time. E78.2 272.2    3. Essential hypertension   Blood pressure is well controlled at this time will continue current medicines. I10 401.9    4. Hx of CABG   Patient had a CABG back in 1990 19 he denies any angina at this time had a recent PET stress a couple years ago which revealed a fixed defect. Z95.1 V45.81    5. Other emphysema   Patient has shortness of breath that is related to his emphysema will follow-up with his pulmonologist. J43.8 492.8         Signed:        Damion Wetzel MD  Interventional Cardiology Fellow  Pager [phone]    Interventional Cardiology Staff  I have personally taken the history and examined this patient. I have discussed and agree with the resident's findings and plan as documented in the resident's note.    Gume Nagy

## 2022-01-18 NOTE — PROGRESS NOTES
"OUTPATIENT CATHETERIZATION INSTRUCTIONS    You have been scheduled for a procedure in the catheterization lab on Thursday, January 27, 2022.     Please report to the Cardiology Waiting Area on the Third floor of the hospital and check in at 7:30 AM.   You will then be taken to the SSCU (Short Stay Cardiac Unit) and prepared for your procedure. Please be aware that this is not the time of your procedure but the time you are to arrive. The procedures are scheduled on an hourly basis; however, emergency cases take precedence over all other cases.       You may not have anything to eat or drink after midnight the night before your test. You may take your regular morning medications with water. If there are any medications that you should not take you will be instructed to hold them that morning. If you are diabetic and on Metformin (Glucophage) do not take it the day before, the day of, and for 2 days after your procedure.      The procedure will take 1-2 hours to perform. After the procedure, you will return to SSCU on the third floor of the hospital. You will need to lie still (or keep your arm still) for the next 4 to 6 hours to minimize bleeding from the puncture site. Your family may remain in the room with you during this time.       You may be able to be discharged home that same afternoon if there is someone to drive you home and there were no complications. If you have one of the balloon, stent, or device procedures you may spend the night in the hospital. Your doctor will determine, based on your progress, the date and time of your discharge. The results of your procedure will be discussed with you before you are discharged. Any further testing or procedures will be scheduled for you either before you leave or you will be called with these appointments.       If you should have any questions, concerns, or need to change the date of your procedure, please call "PK Alvarez @ [phone]    Special " Instructions:    Continue your current medications    Take 1 tab at 6 pm & 11 pm on night before your procedure, then 1 tab at 6 am on day of procedure        THE ABOVE INSTRUCTIONS WERE GIVEN TO THE PATIENT VERBALLY AND THEY VERBALIZED UNDERSTANDING.  THEY DO NOT REQUIRE ANY SPECIAL NEEDS AND DO NOT HAVE ANY LEARNING BARRIERS.          Directions for Reporting to Cardiology Waiting Area in the Hospital  If you park in the Parking Garage:  Take elevators to the1st floor of the parking garage.  Continue past the gift shop, coffee shop, and piano.  Take a right and go to the gold elevators. (Elevator B)  Take the elevator to the 3rd floor.  Follow the arrow on the sign on the wall that says Cath Lab Registration/EP Lab Registration.  Follow the long hallway all the way around until you come to a big open area.  This is the registration area.  Check in at Reception Desk.    OR    If family is dropping you off:  Have them drop you off at the front of the Hospital under the green overhang.  Enter through the doors and take a right.  Take the E elevators to the 3rd floor Cardiology Waiting Area.  Check in at the Reception Desk in the waiting room.

## 2022-01-23 ENCOUNTER — LAB VISIT (OUTPATIENT)
Dept: URGENT CARE | Facility: CLINIC | Age: 77
End: 2022-01-23
Payer: MEDICARE

## 2022-01-23 DIAGNOSIS — Z01.818 PRE-OP TESTING: ICD-10-CM

## 2022-01-23 PROCEDURE — U0005 INFEC AGEN DETEC AMPLI PROBE: HCPCS | Performed by: INTERNAL MEDICINE

## 2022-01-23 PROCEDURE — U0003 INFECTIOUS AGENT DETECTION BY NUCLEIC ACID (DNA OR RNA); SEVERE ACUTE RESPIRATORY SYNDROME CORONAVIRUS 2 (SARS-COV-2) (CORONAVIRUS DISEASE [COVID-19]), AMPLIFIED PROBE TECHNIQUE, MAKING USE OF HIGH THROUGHPUT TECHNOLOGIES AS DESCRIBED BY CMS-2020-01-R: HCPCS | Performed by: INTERNAL MEDICINE

## 2022-01-24 LAB
SARS-COV-2 RNA RESP QL NAA+PROBE: NOT DETECTED
SARS-COV-2- CYCLE NUMBER: NORMAL

## 2022-01-25 ENCOUNTER — LAB VISIT (OUTPATIENT)
Dept: LAB | Facility: HOSPITAL | Age: 77
End: 2022-01-25
Attending: INTERNAL MEDICINE
Payer: MEDICARE

## 2022-01-25 DIAGNOSIS — I73.9 PAD (PERIPHERAL ARTERY DISEASE): ICD-10-CM

## 2022-01-25 DIAGNOSIS — R06.9 UNSPECIFIED ABNORMALITIES OF BREATHING: ICD-10-CM

## 2022-01-25 LAB
ABO + RH BLD: NORMAL
ALBUMIN SERPL BCP-MCNC: 3.8 G/DL (ref 3.5–5.2)
ALP SERPL-CCNC: 82 U/L (ref 38–126)
ALT SERPL W/O P-5'-P-CCNC: 23 U/L (ref 10–44)
ANION GAP SERPL CALC-SCNC: 7 MMOL/L (ref 8–16)
AST SERPL-CCNC: 30 U/L (ref 15–46)
BASOPHILS # BLD AUTO: 0.04 K/UL (ref 0–0.2)
BASOPHILS NFR BLD: 0.5 % (ref 0–1.9)
BILIRUB SERPL-MCNC: 0.3 MG/DL (ref 0.1–1)
BLD GP AB SCN CELLS X3 SERPL QL: NORMAL
CALCIUM SERPL-MCNC: 8.8 MG/DL (ref 8.7–10.5)
CHLORIDE SERPL-SCNC: 107 MMOL/L (ref 95–110)
CO2 SERPL-SCNC: 28 MMOL/L (ref 23–29)
CREAT SERPL-MCNC: 0.87 MG/DL (ref 0.5–1.4)
DIFFERENTIAL METHOD: ABNORMAL
EOSINOPHIL # BLD AUTO: 0.3 K/UL (ref 0–0.5)
EOSINOPHIL NFR BLD: 3.8 % (ref 0–8)
ERYTHROCYTE [DISTWIDTH] IN BLOOD BY AUTOMATED COUNT: 14.6 % (ref 11.5–14.5)
EST. GFR  (AFRICAN AMERICAN): >60 ML/MIN/1.73 M^2
EST. GFR  (NON AFRICAN AMERICAN): >60 ML/MIN/1.73 M^2
GLUCOSE SERPL-MCNC: 87 MG/DL (ref 70–110)
HCT VFR BLD AUTO: 39.4 % (ref 40–54)
HGB BLD-MCNC: 12.6 G/DL (ref 14–18)
IMM GRANULOCYTES # BLD AUTO: 0.04 K/UL (ref 0–0.04)
IMM GRANULOCYTES NFR BLD AUTO: 0.5 % (ref 0–0.5)
INR PPP: 1 (ref 0.8–1.2)
LYMPHOCYTES # BLD AUTO: 1.7 K/UL (ref 1–4.8)
LYMPHOCYTES NFR BLD: 20 % (ref 18–48)
MCH RBC QN AUTO: 29.2 PG (ref 27–31)
MCHC RBC AUTO-ENTMCNC: 32 G/DL (ref 32–36)
MCV RBC AUTO: 91 FL (ref 82–98)
MONOCYTES # BLD AUTO: 0.9 K/UL (ref 0.3–1)
MONOCYTES NFR BLD: 10.3 % (ref 4–15)
NEUTROPHILS # BLD AUTO: 5.4 K/UL (ref 1.8–7.7)
NEUTROPHILS NFR BLD: 64.9 % (ref 38–73)
NRBC BLD-RTO: 0 /100 WBC
PLATELET # BLD AUTO: 317 K/UL (ref 150–450)
PMV BLD AUTO: 9.5 FL (ref 9.2–12.9)
POTASSIUM SERPL-SCNC: 3.9 MMOL/L (ref 3.5–5.1)
PROT SERPL-MCNC: 6.5 G/DL (ref 6–8.4)
PROTHROMBIN TIME: 10.6 SEC (ref 9–12.5)
RBC # BLD AUTO: 4.32 M/UL (ref 4.6–6.2)
SODIUM SERPL-SCNC: 142 MMOL/L (ref 136–145)
UUN UR-MCNC: 10 MG/DL (ref 2–20)
WBC # BLD AUTO: 8.33 K/UL (ref 3.9–12.7)

## 2022-01-25 PROCEDURE — 86900 BLOOD TYPING SEROLOGIC ABO: CPT | Performed by: INTERNAL MEDICINE

## 2022-01-25 PROCEDURE — 85610 PROTHROMBIN TIME: CPT | Mod: PO | Performed by: INTERNAL MEDICINE

## 2022-01-25 PROCEDURE — 36415 COLL VENOUS BLD VENIPUNCTURE: CPT | Mod: PO | Performed by: INTERNAL MEDICINE

## 2022-01-25 PROCEDURE — 86850 RBC ANTIBODY SCREEN: CPT | Performed by: INTERNAL MEDICINE

## 2022-01-25 PROCEDURE — 85025 COMPLETE CBC W/AUTO DIFF WBC: CPT | Mod: PO | Performed by: INTERNAL MEDICINE

## 2022-01-25 PROCEDURE — 80053 COMPREHEN METABOLIC PANEL: CPT | Mod: PO | Performed by: INTERNAL MEDICINE

## 2022-01-26 ENCOUNTER — SPECIALTY PHARMACY (OUTPATIENT)
Dept: PHARMACY | Facility: CLINIC | Age: 77
End: 2022-01-26
Payer: MEDICARE

## 2022-01-26 NOTE — TELEPHONE ENCOUNTER
Specialty Pharmacy - Refill Coordination    Specialty Medication Orders Linked to Encounter    Flowsheet Row Most Recent Value   Medication #1 pirfenidone (ESBRIET) 267 mg Cap (Order#[id number], Rx#[id number])          Refill Questions - Documented Responses    Flowsheet Row Most Recent Value   Patient Availability and HIPAA Verification    Does patient want to proceed with activity? Yes   HIPAA/medical authority confirmed? Yes   Relationship to patient of person spoken to? Self   Refill Screening Questions    Changes to allergies? No   Changes to medications? No   New conditions since last clinic visit? No   Unplanned office visit, urgent care, ED, or hospital admission in the last 4 weeks? No   How does patient/caregiver feel medication is working? Good   Financial problems or insurance changes? No   How many doses of your specialty medications were missed in the last 4 weeks? 0   Would patient like to speak to a pharmacist? Yes  [Pt inquired about maintenance dose. Counseled on esbriet 801 mg - take 1 tablet three times a day, to begin after he finishes the 267 mg strength 3 capsule TID taper up. repeated x 2. pt repeated back and confirmed understanding]   When does the patient need to receive the medication? 02/01/22   Refill Delivery Questions    How will the patient receive the medication? Delivery Annie   When does the patient need to receive the medication? 02/01/22   Shipping Address Home   Address in [facility] confirmed and updated if neccessary? Yes   Expected Copay ($) 0   Is the patient able to afford the medication copay? Yes   Payment Method zero copay   Days supply of Refill 30   Supplies needed? No supplies needed   Refill activity completed? Yes   Refill activity plan Refill scheduled   Shipment/Pickup Date: 01/31/22          Current Outpatient Medications   Medication Sig    albuterol-ipratropium (DUO-NEB) 2.5 mg-0.5 mg/3 mL nebulizer solution Take 3 mLs by nebulization every 6 (six)  hours as needed for Shortness of Breath. Rescue    ALPRAZolam (XANAX) 0.5 MG tablet Take 0.5 mg by mouth 4 (four) times daily as needed.    amLODIPine (NORVASC) 10 MG tablet amlodipine besylate  10 mg tabs    aspirin 325 MG tablet aspirin 325 mg tablet   Take 1 tablet every day by oral route.    atorvastatin (LIPITOR) 40 MG tablet Take 2 tablets (80 mg total) by mouth once daily.    betamethasone acetate-betamethasone sodium phosphate (CELESTONE) 6 mg/mL injection 1 mL.    cetirizine (ZYRTEC) 10 MG tablet Take 1 tablet (10 mg total) by mouth once daily. (Patient not taking: Reported on 1/18/2022)    cilostazoL (PLETAL) 100 MG Tab Take 1 tablet by mouth twice daily    cimetidine (TAGAMET) 300 MG tablet 1 tablet PO Q6 hours x 3    diphenhydrAMINE (BENADRYL) 50 MG capsule 1 tablet PO Q6 hours x 3    flu vacc [id number],65yr up,PF (FLUZONE HIGH-DOSE 2019-20, PF,) 180 mcg/0.5 mL Syrg Fluzone High-Dose 2019-20 (PF) 180 mcg/0.5 mL intramuscular syringe   PHARMACIST ADMINISTERED IMMUNIZATION ADMINISTERED AT TIME OF DISPENSING    lisinopriL (PRINIVIL,ZESTRIL) 20 MG tablet lisinopril  20 mg tabs    lubiprostone (AMITIZA) 24 MCG Cap Amitiza 24 mcg capsule   Take 1 capsule twice a day by oral route with meals for 30 days.    metoprolol tartrate (LOPRESSOR) 50 MG tablet metoprolol tartrate  50 mg tabs    multivitamin (THERAGRAN) per tablet Take 1 tablet by mouth once daily.    omega 3-dha-epa-fish oil (FISH OIL) 100-160-1,000 mg Cap Fish Oil   daily    oxyCODONE-acetaminophen (PERCOCET)  mg per tablet oxycod/apap  tab 10-325mg    pirfenidone (ESBRIET) 267 mg Cap Take 1 capsule (267 mg total) by mouth 3 (three) times daily for 7 days, THEN 2 capsules (534 mg total) 3 (three) times daily for 7 days, THEN 3 capsules (801 mg total) 3 (three) times daily for 14 days. (Patient not taking: Reported on 1/18/2022)    pirfenidone (ESBRIET) 801 mg Tab Take 1 capsule by mouth 3 (three) times daily. (Patient not  taking: Reported on 1/18/2022)    polyethylene glycol (GLYCOLAX) 17 gram/dose powder Miralax 17 gram/dose oral powder   use as directed    predniSONE (DELTASONE) 50 MG Tab 1 tablet po Q 6 hours x 3    pregabalin (LYRICA) 50 MG capsule pregabalin 50 mg capsule    pregabalin (LYRICA) 50 MG capsule     pulse oximeter (PULSE OXIMETER) device by Apply Externally route 2 (two) times a day. Use twice daily at 8 AM and 3 PM and record the value in MyChart as directed.    saw palmetto 160 MG capsule Take 160 mg by mouth 2 (two) times daily.    sildenafiL (VIAGRA) 100 MG tablet Take 100 mg by mouth.    SUPREP BOWEL PREP KIT 17.5-3.13-1.6 gram SolR Take by mouth.   Last reviewed on 1/18/2022 11:43 AM by Gume Nagy MD    Review of patient's allergies indicates:   Allergen Reactions    Iodine and iodide containing products Anaphylaxis    Shellfish containing products Anaphylaxis    Last reviewed on  1/18/2022 11:43 AM by Gume Nagy      Tasks added this encounter   2/24/2022 - Refill Call (Auto Added)   Tasks due within next 3 months   3/23/2022 - Clinical - Follow Up Assesement (90 day)     Michelle Soler, PharmD  [facility]  [address] [place] [id number]  Phone: [phone]  Fax: [phone]

## 2022-01-27 ENCOUNTER — HOSPITAL ENCOUNTER (OUTPATIENT)
Facility: HOSPITAL | Age: 77
Discharge: HOME | End: 2022-01-27
Attending: INTERNAL MEDICINE | Admitting: INTERNAL MEDICINE
Payer: MEDICARE

## 2022-01-27 VITALS
OXYGEN SATURATION: 93 % | DIASTOLIC BLOOD PRESSURE: 79 MMHG | RESPIRATION RATE: 16 BRPM | SYSTOLIC BLOOD PRESSURE: 177 MMHG | TEMPERATURE: 98 F | BODY MASS INDEX: 28.04 KG/M2 | HEART RATE: 98 BPM | WEIGHT: 185 LBS | HEIGHT: 68 IN

## 2022-01-27 DIAGNOSIS — I73.9 PAD (PERIPHERAL ARTERY DISEASE): ICD-10-CM

## 2022-01-27 LAB
CTP QC/QA: YES
SARS-COV-2 AG RESP QL IA.RAPID: NEGATIVE

## 2022-01-27 PROCEDURE — 37226 HC FEM/POPL REVASC W/STENT: CPT | Mod: RT,GC | Performed by: INTERNAL MEDICINE

## 2022-01-27 PROCEDURE — C1725 CATH, TRANSLUMIN NON-LASER: HCPCS | Performed by: INTERNAL MEDICINE

## 2022-01-27 PROCEDURE — 25500020 PHARM REV CODE 255: Performed by: INTERNAL MEDICINE

## 2022-01-27 PROCEDURE — 75710 ARTERY X-RAYS ARM/LEG: CPT | Mod: 59,GC | Performed by: INTERNAL MEDICINE

## 2022-01-27 PROCEDURE — 37226 PR FEM/POPL REVASC W/STENT: ICD-10-PCS | Mod: RT,GC,, | Performed by: INTERNAL MEDICINE

## 2022-01-27 PROCEDURE — 99153 MOD SED SAME PHYS/QHP EA: CPT | Performed by: INTERNAL MEDICINE

## 2022-01-27 PROCEDURE — 75710 ARTERY X-RAYS ARM/LEG: CPT | Mod: 26,59,GC, | Performed by: INTERNAL MEDICINE

## 2022-01-27 PROCEDURE — 99152 MOD SED SAME PHYS/QHP 5/>YRS: CPT | Performed by: INTERNAL MEDICINE

## 2022-01-27 PROCEDURE — 63600175 PHARM REV CODE 636 W HCPCS: Performed by: INTERNAL MEDICINE

## 2022-01-27 PROCEDURE — C1874 STENT, COATED/COV W/DEL SYS: HCPCS | Performed by: INTERNAL MEDICINE

## 2022-01-27 PROCEDURE — C1769 GUIDE WIRE: HCPCS | Performed by: INTERNAL MEDICINE

## 2022-01-27 PROCEDURE — 37226 PR FEM/POPL REVASC W/STENT: CPT | Mod: RT,GC,, | Performed by: INTERNAL MEDICINE

## 2022-01-27 PROCEDURE — 27201423 OPTIME MED/SURG SUP & DEVICES STERILE SUPPLY: Performed by: INTERNAL MEDICINE

## 2022-01-27 PROCEDURE — 99152 MOD SED SAME PHYS/QHP 5/>YRS: CPT | Mod: GC,,, | Performed by: INTERNAL MEDICINE

## 2022-01-27 PROCEDURE — C1894 INTRO/SHEATH, NON-LASER: HCPCS | Performed by: INTERNAL MEDICINE

## 2022-01-27 PROCEDURE — 25000003 PHARM REV CODE 250: Performed by: STUDENT IN AN ORGANIZED HEALTH CARE EDUCATION/TRAINING PROGRAM

## 2022-01-27 PROCEDURE — 99152 PR MOD CONSCIOUS SEDATION, SAME PHYS, 5+ YRS, FIRST 15 MIN: ICD-10-PCS | Mod: GC,,, | Performed by: INTERNAL MEDICINE

## 2022-01-27 PROCEDURE — 25000003 PHARM REV CODE 250: Performed by: INTERNAL MEDICINE

## 2022-01-27 PROCEDURE — 75710 PR  ANGIO EXTREMITY UNILAT: ICD-10-PCS | Mod: 26,59,GC, | Performed by: INTERNAL MEDICINE

## 2022-01-27 DEVICE — DRUG-ELUTING VASCULAR STENT SYSTEM
Type: IMPLANTABLE DEVICE | Site: LEG | Status: FUNCTIONAL
Brand: ELUVIA™

## 2022-01-27 RX ORDER — LIDOCAINE HYDROCHLORIDE 20 MG/ML
INJECTION, SOLUTION EPIDURAL; INFILTRATION; INTRACAUDAL; PERINEURAL
Status: DISCONTINUED | OUTPATIENT
Start: 2022-01-27 | End: 2022-01-27 | Stop reason: HOSPADM

## 2022-01-27 RX ORDER — CLOPIDOGREL BISULFATE 75 MG/1
75 TABLET ORAL DAILY
Qty: 30 TABLET | Refills: 0 | Status: SHIPPED | OUTPATIENT
Start: 2022-01-27 | End: 2022-04-19

## 2022-01-27 RX ORDER — DIPHENHYDRAMINE HCL 50 MG
50 CAPSULE ORAL ONCE
Status: DISCONTINUED | OUTPATIENT
Start: 2022-01-27 | End: 2022-01-27 | Stop reason: HOSPADM

## 2022-01-27 RX ORDER — SODIUM CHLORIDE 9 MG/ML
INJECTION, SOLUTION INTRAVENOUS CONTINUOUS
Status: ACTIVE | OUTPATIENT
Start: 2022-01-27 | End: 2022-01-27

## 2022-01-27 RX ORDER — ACETAMINOPHEN 325 MG/1
650 TABLET ORAL EVERY 4 HOURS PRN
Status: DISCONTINUED | OUTPATIENT
Start: 2022-01-27 | End: 2022-01-27 | Stop reason: HOSPADM

## 2022-01-27 RX ORDER — PANTOPRAZOLE SODIUM 40 MG/1
40 TABLET, DELAYED RELEASE ORAL DAILY
Qty: 30 TABLET | Refills: 0 | Status: SHIPPED | OUTPATIENT
Start: 2022-01-27 | End: 2022-06-17

## 2022-01-27 RX ORDER — ONDANSETRON 8 MG/1
8 TABLET, ORALLY DISINTEGRATING ORAL EVERY 8 HOURS PRN
Status: DISCONTINUED | OUTPATIENT
Start: 2022-01-27 | End: 2022-01-27 | Stop reason: HOSPADM

## 2022-01-27 RX ORDER — HEPARIN SOD,PORCINE/0.9 % NACL 1000/500ML
INTRAVENOUS SOLUTION INTRAVENOUS
Status: DISCONTINUED | OUTPATIENT
Start: 2022-01-27 | End: 2022-01-27 | Stop reason: HOSPADM

## 2022-01-27 RX ORDER — FENTANYL CITRATE 50 UG/ML
INJECTION, SOLUTION INTRAMUSCULAR; INTRAVENOUS
Status: DISCONTINUED | OUTPATIENT
Start: 2022-01-27 | End: 2022-01-27 | Stop reason: HOSPADM

## 2022-01-27 RX ORDER — HEPARIN SODIUM 1000 [USP'U]/ML
INJECTION, SOLUTION INTRAVENOUS; SUBCUTANEOUS
Status: DISCONTINUED | OUTPATIENT
Start: 2022-01-27 | End: 2022-01-27 | Stop reason: HOSPADM

## 2022-01-27 RX ORDER — MIDAZOLAM HYDROCHLORIDE 1 MG/ML
INJECTION, SOLUTION INTRAMUSCULAR; INTRAVENOUS
Status: DISCONTINUED | OUTPATIENT
Start: 2022-01-27 | End: 2022-01-27 | Stop reason: HOSPADM

## 2022-01-27 RX ADMIN — SODIUM CHLORIDE: 0.9 INJECTION, SOLUTION INTRAVENOUS at 09:01

## 2022-01-27 NOTE — BRIEF OP NOTE
Brief Operative Note:    : Gume Nagy MD     Referring Physician: Moiz Villalta     All Operators: Surgeon(s):  MD Nila Schneider MD Marloe Prince, MD Nitin Tandan, MD     Preoperative Diagnosis: PAD (peripheral artery disease) [I73.9]     Postop Diagnosis: PAD (peripheral artery disease) [I73.9]    Treatments/Procedures: Procedure(s) (LRB):  PTA, PERIPHERAL VESSEL (N/A)  PTA, Superficial Femoral Artery  Stent, Superficial Femoral Artery    Findings:Severe peripheral disease is present. See catheterization report for full details.    S/p SE balloon x 2 7 x 130 to AT      Estimated Blood loss: 20 cc    Specimens removed: No    Recs:  - exercise regimen   - Plavix x 1 month   - continue statin   - Resume eliquis tomorrow  - follow up with primary cardiologist    Nila Hoover MD  Cardiovascular Fellow, PGY6  Pager: [phone]

## 2022-01-27 NOTE — Clinical Note
An angiography of the  left femoral artery was performed to evaluate for the placement of a closure device.

## 2022-01-27 NOTE — INTERVAL H&P NOTE
The patient has been examined and the H&P has been reviewed:    I concur with the findings and no changes have occurred since H&P was written.    Procedure risks, benefits and alternative options discussed and understood by patient/family.     PAD (peripheral artery disease)   Patient is a 76-year-old gentleman who presents today with severe bilateral claudication right is greater than left which has been life-limiting for him with walking less than 50 ft.  He underwent an JOAQUINA which is abnormal bilaterally post exercise JOAQUINA was 0.53 on the left in 0.43 on the right.  He then underwent a CTA which reveals bilateral occlusions of the right and left SFA is.  Since his right leg is hurting him more will plan to do intervention on the right SFA and then bring him back for staged procedure of the left if he should need it.     1. Peripheral angiogram and PTA likely of right SFA; Iodine allergy and prep completed.  2. Monophasic Left DP Biphasic bilateral PT and Right DP  3. Antiplatelets:  Aspirin Plavix  4. Access:  Left common femoral artery  5. Equipment:  Please get a 6 Bruneian sheath, get a 6 Bruneian crossover sheath, a 4 Bruneian ALFREDO catheter, floppy angled Glidewire.  6. The risks, benefits, and alternatives of coronary vascular angiography and possible intervention were discussed with the patient. All questions were answered and informed consent was obtained. I had a detailed discussion with the patient regarding risk of stroke, MI, bleeding access site complications including limb loss, allergy, kidney failure including dialysis and death.  7. The patient understands the risks and benefits and wishes to go ahead with the procedure.  8. All patient's questions were answered          I73.9         Active Hospital Problems    Diagnosis  POA    *Atherosclerosis of native artery of right lower extremity with intermittent claudication [I70.211]  Yes    PAD (peripheral artery disease) [I73.9]  Yes       2/2006     L SFA   PTAS   8 x 40 mm and 6.0 x 120 Protege SES          5/2006     R SFA  PTA with 4 and 6.0 mm balloons   R EIA PTAS with 7 mm SES for ISR (Resilient trial)      JOAQUINA 6/2019     R 0.77   L 1.06        US 6/2019    Diameters in mm     CFA 9   SFA 7   POP 6   BTK 4   BTA 3          R , , pSFA 81, mSFA 41, dSFA 18, pPOP 26-36, PT 34, AT 28, DP 23  L , , pSFA 219-136, mSFA 89, dSFA 25, POP 35, PT 59, AT 30, DP 03                  Mixed hyperlipidemia [E78.2]  Yes    Essential hypertension [I10]  Yes    Coronary artery disease involving native coronary artery of native heart with angina pectoris with documented spasm [I25.111]  Yes            S/p multiple PCI then CABG 1998     LIMA-LAD   SVG-OM   SVG-PDA        PTA of LIMA-LAD 12/1998      [facility] 2005     3 patent graft   Normal EF   LVEDP 20 mmHg   Patent renal arteries                  Resolved Hospital Problems   No resolved problems to display.

## 2022-01-27 NOTE — DISCHARGE SUMMARY
Discharge Summary  Interventional Cardiology      Admit Date: 1/27/2022    Discharge Date:  1/27/2022    Attending Physician: Gume Nagy MD    Discharge Physician: Nila Hoover MD    Principal Diagnoses: Atherosclerosis of native artery of right lower extremity with intermittent claudication  Indication for Admission: PTA, PERIPHERAL VESSEL (N/A), PTA, Superficial Femoral Artery, Stent, Superficial Femoral Artery    Discharged Condition: Good    Hospital Course:   Patient presented for outpatient peripheral angiogram which went without complication. Peripheral angiogram revealed  of  right AT. See full cath report in Epic for details. Hemostasis of patient's L CFA access site was achieved with manual pressure. Patient was monitored per post-cath protocol, and his L groin access site was c/d/i with no hematoma. Patient was able to ambulate without difficulty. He was feeling well and anticipating discharge home today.     Outpatient Plan:  - Medication changes/ additions include: Plavix for 1 month along with pantoprazole    Diet: Cardiac diet    Activity: Ad lee, wound care instructions provided    Disposition: Home or Self Care    Discharge Medications:      Medication List      START taking these medications    clopidogreL 75 mg tablet  Commonly known as: PLAVIX  Take 1 tablet (75 mg total) by mouth once daily.     pantoprazole 40 MG tablet  Commonly known as: PROTONIX  Take 1 tablet (40 mg total) by mouth once daily.        CONTINUE taking these medications    albuterol-ipratropium 2.5 mg-0.5 mg/3 mL nebulizer solution  Commonly known as: DUO-NEB  Take 3 mLs by nebulization every 6 (six) hours as needed for Shortness of Breath. Rescue     ALPRAZolam 0.5 MG tablet  Commonly known as: XANAX     amLODIPine 10 MG tablet  Commonly known as: NORVASC     aspirin 325 MG tablet     atorvastatin 40 MG tablet  Commonly known as: LIPITOR  Take 2 tablets (80 mg total) by mouth once daily.     betamethasone  acetate-betamethasone sodium phosphate 6 mg/mL injection  Commonly known as: CELESTONE     cetirizine 10 MG tablet  Commonly known as: ZYRTEC  Take 1 tablet (10 mg total) by mouth once daily.     cilostazoL 100 MG Tab  Commonly known as: PLETAL  Take 1 tablet by mouth twice daily     * ESBRIET 267 mg Cap  Generic drug: pirfenidone  Take 1 capsule (267 mg total) by mouth 3 (three) times daily for 7 days, THEN 2 capsules (534 mg total) 3 (three) times daily for 7 days, THEN 3 capsules (801 mg total) 3 (three) times daily for 14 days.  Start taking on: October 5, 2021     * pirfenidone 801 mg Tab  Commonly known as: ESBRIET  Take 1 capsule by mouth 3 (three) times daily.     Fish OiL 100-160-1,000 mg Cap  Generic drug: omega 3-dha-epa-fish oil     FLUZONE HIGH-DOSE 2019-20 (PF) 180 mcg/0.5 mL Syrg  Generic drug: flu vacc [id number](65yr up)PF     lisinopriL 20 MG tablet  Commonly known as: PRINIVIL,ZESTRIL     lubiprostone 24 MCG Cap  Commonly known as: AMITIZA     metoprolol tartrate 50 MG tablet  Commonly known as: LOPRESSOR     multivitamin per tablet  Commonly known as: THERAGRAN     oxyCODONE-acetaminophen  mg per tablet  Commonly known as: PERCOCET     polyethylene glycol 17 gram/dose powder  Commonly known as: GLYCOLAX     predniSONE 50 MG Tab  Commonly known as: DELTASONE  1 tablet po Q 6 hours x 3     * pregabalin 50 MG capsule  Commonly known as: LYRICA     * pregabalin 50 MG capsule  Commonly known as: LYRICA     pulse oximeter device  Commonly known as: pulse oximeter  by Apply Externally route 2 (two) times a day. Use twice daily at 8 AM and 3 PM and record the value in [employer]t as directed.     saw palmetto 160 MG capsule     sildenafiL 100 MG tablet  Commonly known as: VIAGRA     SUPREP BOWEL PREP KIT 17.5-3.13-1.6 gram Solr  Generic drug: sodium,potassium,mag sulfates         * This list has 4 medication(s) that are the same as other medications prescribed for you. Read the directions carefully, and  ask your doctor or other care provider to review them with you.            STOP taking these medications    cimetidine 300 MG tablet  Commonly known as: TAGAMET     diphenhydrAMINE 50 MG capsule  Commonly known as: BENADRYL           Where to Get Your Medications      These medications were sent to [facility]  [address] [place] [id number]    Hours: Mon-Fri 7a-7p, Sat-Sun 10a-4p Phone: [phone]   · clopidogreL 75 mg tablet  · pantoprazole 40 MG tablet       Follow Up:     Primary cardiologist for continuation of care +/- Follow up with Dr. Nagy in 4-6 weeks.

## 2022-01-27 NOTE — PROGRESS NOTES
Patient ambulated in smith with RN. No complaints. Dressing to L groin cdi, soft. Patient voided without difficulty.

## 2022-01-27 NOTE — Clinical Note
105 ml of contrast were injected throughout the case. 95 mL of contrast was the total wasted during the case. 200 mL was the total amount used during the case.

## 2022-01-27 NOTE — Clinical Note
An angiography was performed of the right anterior tibial artery, peroneal artery and posterior tibial artery

## 2022-01-27 NOTE — DISCHARGE INSTRUCTIONS
Medication discharge instructions:  1. Take aspirin 81 mg daily for life.  2. Take Plavix 75 mg daily for 1 month.   3. No other changes made to home medications.      Cath discharge instructions:  1. Do not strain or lift anything greater than 5 lbs for 2-3 days.  2. Do not drive or operate any dangerous machinery for 24 hours.  3. Keep the dressing on, clean, and dry for 24 hours.  4. After 24 hours, the dressing may be removed and a shower is allowed.  5. Clean the area with mild soap and water and then cover it with a bandage.  6. Once the skin has healed, bathing in a tub or swimming is allowed.  7. Inspect the access site daily and report to the physician any swelling at the site that  cannot be controlled with manual pressure for 10 minutes, unusual pain at the  access site or affected extremity, unusual swelling at the access site, or signs or  symptoms of infection such as redness, pain, or fever.    Call 911 if you have:  Bleeding from the puncture site that you cannot stop by doing the following:  Lie down. Keep your leg straight and apply firm pressure to the site using your fingers and a gauze pad. Keep the pressure on for 20 minutes. Continue this until the bleeding stops. This may take awhile. When bleeding stops, cover the site with a sterile bandage and keep your leg still as much as possible.

## 2022-01-27 NOTE — Clinical Note
The groin and right foot was prepped. The site was prepped with ChloraPrep. The patient was draped. The patient was positioned supine.

## 2022-01-27 NOTE — PROGRESS NOTES
Patient discharged per MD orders. Instructions given on medications, wound care, activity, signs of infection, when to call MD, and follow up appointments. Pt verbalized understanding.  Patient AAOx3, VSS, no c/o pain or discomfort at this time. PIV removed. Patient and family waiting for transport.

## 2022-01-27 NOTE — NURSING
Received report from Shakir cath RN. Patient s/p PTA, lightly sedated. VSS, no c/o pain or discomfort at this time, resp even and unlabored. Gauze/tegaderm dressing to L groin is CDI. Pressure dressing CDI. No active bleeding. No hematoma noted. Post procedure protocol reviewed with patient and patient's family. Understanding verbalized. Family members at bedside. Nurse call bell within reach. Will continue to monitor per post procedure protocol.

## 2022-01-30 DIAGNOSIS — I73.9 PAD (PERIPHERAL ARTERY DISEASE): Primary | ICD-10-CM

## 2022-01-31 ENCOUNTER — TELEPHONE (OUTPATIENT)
Dept: PHARMACY | Facility: CLINIC | Age: 77
End: 2022-01-31
Payer: MEDICARE

## 2022-01-31 NOTE — TELEPHONE ENCOUNTER
Outgoing call to pt, spoke to pt and pt's wife (Samira), regarding HW Emmett. Notified pt that pt does have one more fill after this one. [facility] will need to re-access situation after the end of February's fill if emmett is still open or need to go PAP route. Informed pt that when [facility] does call pt at the end of February to set up refill, after the refill is set up, to ask to speak to Financial Assistance team to address if emmett is still open to reapply or go PAP route.    PTL

## 2022-02-24 ENCOUNTER — SPECIALTY PHARMACY (OUTPATIENT)
Dept: PHARMACY | Facility: CLINIC | Age: 77
End: 2022-02-24
Payer: MEDICARE

## 2022-02-24 NOTE — TELEPHONE ENCOUNTER
Specialty Pharmacy - Refill Coordination    Specialty Medication Orders Linked to Encounter    Flowsheet Row Most Recent Value   Medication #1 pirfenidone (ESBRIET) 801 mg Tab (Order#[id number], Rx#[id number])          Refill Questions - Documented Responses    Flowsheet Row Most Recent Value   Patient Availability and HIPAA Verification    Does patient want to proceed with activity? Yes   HIPAA/medical authority confirmed? Yes   Relationship to patient of person spoken to? Spouse/Significant Other   Refill Screening Questions    Changes to allergies? No   Changes to medications? No   New conditions since last clinic visit? No   Unplanned office visit, urgent care, ED, or hospital admission in the last 4 weeks? No   How does patient/caregiver feel medication is working? Good   Financial problems or insurance changes? No   How many doses of your specialty medications were missed in the last 4 weeks? 0   Would patient like to speak to a pharmacist? No   When does the patient need to receive the medication? 03/02/22   Refill Delivery Questions    How will the patient receive the medication? Delivery Annie   When does the patient need to receive the medication? 03/02/22   Shipping Address Prescription   Address in [facility] confirmed and updated if neccessary? Yes   Expected Copay ($) 0   Is the patient able to afford the medication copay? Yes   Payment Method zero copay   Days supply of Refill 30   Supplies needed? No supplies needed   Refill activity completed? Yes   Refill activity plan Refill scheduled   Shipment/Pickup Date: 03/02/22          Current Outpatient Medications   Medication Sig    albuterol-ipratropium (DUO-NEB) 2.5 mg-0.5 mg/3 mL nebulizer solution Take 3 mLs by nebulization every 6 (six) hours as needed for Shortness of Breath. Rescue    ALPRAZolam (XANAX) 0.5 MG tablet Take 0.5 mg by mouth 4 (four) times daily as needed.    amLODIPine (NORVASC) 10 MG tablet amlodipine besylate  10 mg tabs     aspirin 325 MG tablet aspirin 325 mg tablet   Take 1 tablet every day by oral route.    atorvastatin (LIPITOR) 40 MG tablet Take 2 tablets (80 mg total) by mouth once daily.    betamethasone acetate-betamethasone sodium phosphate (CELESTONE) 6 mg/mL injection 1 mL.    cetirizine (ZYRTEC) 10 MG tablet Take 1 tablet (10 mg total) by mouth once daily. (Patient not taking: Reported on 1/18/2022)    cilostazoL (PLETAL) 100 MG Tab Take 1 tablet by mouth twice daily    clopidogreL (PLAVIX) 75 mg tablet Take 1 tablet (75 mg total) by mouth once daily.    flu vacc [id number],65yr up,PF (FLUZONE HIGH-DOSE 2019-20, PF,) 180 mcg/0.5 mL Syrg Fluzone High-Dose 2019-20 (PF) 180 mcg/0.5 mL intramuscular syringe   PHARMACIST ADMINISTERED IMMUNIZATION ADMINISTERED AT TIME OF DISPENSING    lisinopriL (PRINIVIL,ZESTRIL) 20 MG tablet lisinopril  20 mg tabs    lubiprostone (AMITIZA) 24 MCG Cap Amitiza 24 mcg capsule   Take 1 capsule twice a day by oral route with meals for 30 days.    metoprolol tartrate (LOPRESSOR) 50 MG tablet metoprolol tartrate  50 mg tabs    multivitamin (THERAGRAN) per tablet Take 1 tablet by mouth once daily.    omega 3-dha-epa-fish oil (FISH OIL) 100-160-1,000 mg Cap Fish Oil   daily    oxyCODONE-acetaminophen (PERCOCET)  mg per tablet oxycod/apap  tab 10-325mg    pantoprazole (PROTONIX) 40 MG tablet Take 1 tablet (40 mg total) by mouth once daily.    pirfenidone (ESBRIET) 801 mg Tab Take 1 capsule by mouth 3 (three) times daily. (Patient not taking: Reported on 1/18/2022)    polyethylene glycol (GLYCOLAX) 17 gram/dose powder Miralax 17 gram/dose oral powder   use as directed    predniSONE (DELTASONE) 50 MG Tab 1 tablet po Q 6 hours x 3    pregabalin (LYRICA) 50 MG capsule pregabalin 50 mg capsule    pregabalin (LYRICA) 50 MG capsule     pulse oximeter (PULSE OXIMETER) device by Apply Externally route 2 (two) times a day. Use twice daily at 8 AM and 3 PM and record the value in [employer]  as directed.    saw palmetto 160 MG capsule Take 160 mg by mouth 2 (two) times daily.    sildenafiL (VIAGRA) 100 MG tablet Take 100 mg by mouth.    SUPREP BOWEL PREP KIT 17.5-3.13-1.6 gram SolR Take by mouth.   Last reviewed on 1/27/2022  8:49 AM by Leisa Segura RN    Review of patient's allergies indicates:   Allergen Reactions    Iodine and iodide containing products Anaphylaxis    Shellfish containing products Anaphylaxis    Last reviewed on  1/27/2022 8:34 AM by Leisa Segura      Tasks added this encounter   3/25/2022 - Refill Call (Auto Added)   Tasks due within next 3 months   3/23/2022 - Clinical - Follow Up Assesement (90 day)     Meme Mars, PharmD  [facility] - Special[facility] Pharm[facility]  [address] [place] [id number]  Phone: [phone]  Fax: [phone]

## 2022-02-25 ENCOUNTER — TELEPHONE (OUTPATIENT)
Dept: PHARMACY | Facility: CLINIC | Age: 77
End: 2022-02-25
Payer: MEDICARE

## 2022-02-25 PROBLEM — J84.112 IPF (IDIOPATHIC PULMONARY FIBROSIS): Status: ACTIVE | Noted: 2022-02-25

## 2022-02-25 NOTE — TELEPHONE ENCOUNTER
- Outgoing call to pt's wife (Samira) regarding charli. Pt still has a funds on current charli and will not  until . Pt's last month copay is about $558. Dividing that copay amount to his current remaining balance of charli, will give pt another 5-6 months fill. [facility] will follow up with pt in July / August to review charli funds and if need to renew. Pt's wife verbalized understanding.    PTL

## 2022-03-04 NOTE — PROGRESS NOTES
Interventional Cardiology Clinic Note  Reason for Visit: PAD  Referring Physician: Moiz Villalta    HPI:   Patient is a 76-year-old gentleman who was referred her for peripheral arterial disease.    Patient has a long history of peripheral arterial disease dating back to the early 2000s in which he has bilateral SFA stenting done by Dr. Mendoza his last angiograms were in 2006 which revealed occluded SFAS bilaterally and he had stenting of the right SFA and the left SFA with good result.  He had done well up until several months ago he now gets severe bilateral claudication right greater than left when ambulating about 50 ft.  This is been very life limiting for him as he has been very active.  He denies any ulcers or skin breakdown at this time.  He does have some shortness of breath but has chronic lung issues and follows up with a pulmonologist for this.  He denies any chest pain, syncope.    Review of Systems   All other systems reviewed and are negative.      PMH:     Past Medical History:   Diagnosis Date    Coronary artery disease     Hyperlipidemia     Hypertension      Past Surgical History:   Procedure Laterality Date    ABDOMINAL SURGERY      Polyps in colon removed (noncancerous)    APPENDECTOMY      back surgery (screws & okate)      CORONARY ARTERY BYPASS GRAFT      1998    PERCUTANEOUS TRANSLUMINAL ANGIOPLASTY (PTA) OF PERIPHERAL VESSEL N/A 1/27/2022    Procedure: PTA, PERIPHERAL VESSEL;  Surgeon: Gume Nagy MD;  Location: [facility] CATH LAB;  Service: Cardiology;  Laterality: N/A;    TRANSFORAMINAL EPIDURAL INJECTION OF STEROID Right 11/5/2020    Procedure: LUMBAR TRANSFORAMINAL RIGHT L3/4 AND L4/5 DIRECT REFERRAL;  Surgeon: Nicole Toth MD;  Location: [facility] PAIN MGT;  Service: Pain Management;  Laterality: Right;  NEEDS CONSENT, PT STATES NO LONGER TAKES PLETAL     Allergies:     Review of patient's allergies indicates:   Allergen Reactions    Iodine and iodide containing  products Anaphylaxis    Shellfish containing products Anaphylaxis     Medications:     Current Outpatient Medications on File Prior to Visit   Medication Sig Dispense Refill    albuterol-ipratropium (DUO-NEB) 2.5 mg-0.5 mg/3 mL nebulizer solution Take 3 mLs by nebulization every 6 (six) hours as needed for Shortness of Breath. Rescue 3 mL 11    ALPRAZolam (XANAX) 0.5 MG tablet Take 0.5 mg by mouth 4 (four) times daily as needed.      amLODIPine (NORVASC) 10 MG tablet amlodipine besylate  10 mg tabs      aspirin 325 MG tablet aspirin 325 mg tablet   Take 1 tablet every day by oral route.      atorvastatin (LIPITOR) 40 MG tablet Take 2 tablets (80 mg total) by mouth once daily. 90 tablet 7    betamethasone acetate-betamethasone sodium phosphate (CELESTONE) 6 mg/mL injection 1 mL.      cetirizine (ZYRTEC) 10 MG tablet Take 1 tablet (10 mg total) by mouth once daily. (Patient not taking: Reported on 1/18/2022) 30 tablet 0    cilostazoL (PLETAL) 100 MG Tab Take 1 tablet by mouth twice daily 180 tablet 3    clopidogreL (PLAVIX) 75 mg tablet Take 1 tablet (75 mg total) by mouth once daily. 30 tablet 0    flu vacc [id number],65yr up,PF (FLUZONE HIGH-DOSE 2019-20, PF,) 180 mcg/0.5 mL Syrg Fluzone High-Dose 2019-20 (PF) 180 mcg/0.5 mL intramuscular syringe   PHARMACIST ADMINISTERED IMMUNIZATION ADMINISTERED AT TIME OF DISPENSING      lisinopriL (PRINIVIL,ZESTRIL) 20 MG tablet lisinopril  20 mg tabs      lubiprostone (AMITIZA) 24 MCG Cap Amitiza 24 mcg capsule   Take 1 capsule twice a day by oral route with meals for 30 days.      metoprolol tartrate (LOPRESSOR) 50 MG tablet metoprolol tartrate  50 mg tabs      multivitamin (THERAGRAN) per tablet Take 1 tablet by mouth once daily.      omega 3-dha-epa-fish oil (FISH OIL) 100-160-1,000 mg Cap Fish Oil   daily      oxyCODONE-acetaminophen (PERCOCET)  mg per tablet oxycod/apap  tab 10-325mg      pantoprazole (PROTONIX) 40 MG tablet Take 1 tablet (40 mg  total) by mouth once daily. 30 tablet 0    pirfenidone (ESBRIET) 801 mg Tab Take 1 capsule by mouth 3 (three) times daily. (Patient not taking: Reported on 2022) 90 tablet 3    polyethylene glycol (GLYCOLAX) 17 gram/dose powder Miralax 17 gram/dose oral powder   use as directed      predniSONE (DELTASONE) 50 MG Tab 1 tablet po Q 6 hours x 3 3 tablet 0    pregabalin (LYRICA) 50 MG capsule pregabalin 50 mg capsule      pregabalin (LYRICA) 50 MG capsule       pulse oximeter (PULSE OXIMETER) device by Apply Externally route 2 (two) times a day. Use twice daily at 8 AM and 3 PM and record the value in [employer] as directed. 1 each 0    saw palmetto 160 MG capsule Take 160 mg by mouth 2 (two) times daily.      sildenafiL (VIAGRA) 100 MG tablet Take 100 mg by mouth.      SUPREP BOWEL PREP KIT 17.5-3.13-1.6 gram SolR Take by mouth.       Current Facility-Administered Medications on File Prior to Visit   Medication Dose Route Frequency Provider Last Rate Last Admin    acetaminophen tablet 650 mg  650 mg Oral Once PRN Rishabh Ware MD        albuterol inhaler 2 puff  2 puff Inhalation Q20 Min PRN Rishabh Ware MD        EPINEPHrine (EPIPEN) 0.3 mg/0.3 mL pen injection 0.3 mg  0.3 mg Intramuscular PRN Rishabh Ware MD        methylPREDNISolone sodium succinate injection 40 mg  40 mg Intravenous Once PRN Rishabh Ware MD        ondansetron disintegrating tablet 4 mg  4 mg Oral Once PRN Rishabh Ware MD        sodium chloride 0.9% 500 mL flush bag   Intravenous PRN Rishabh Ware MD        sodium chloride 0.9% flush 10 mL  10 mL Intravenous PRN Rishabh Ware MD         Social History:     Social History     Tobacco Use    Smoking status: Former Smoker     Quit date: 10/5/1998     Years since quittin.4    Smokeless tobacco: Never Used   Substance Use Topics    Alcohol use: Not Currently     Alcohol/week: 0.0 standard drinks     Comment:      Family History:   No  family history on file.    Physical Exam  There were no vitals taken for this visit.   GEN: Alert and oriented in NAD  NECK: no JVD appreciated   CVS: RRR, s1/s2, no MRG  PULM: CTAB no rales  ABD: NT/ND BS +  Extremities: warm and dry,  no edema  NEURO: Alert and oriented x 3  PSYCH: appropriate affect.         Labs:     Lab Results   Component Value Date     01/25/2022    K 3.9 01/25/2022     01/25/2022    CO2 28 01/25/2022    BUN 10 01/25/2022    CREATININE 0.87 01/25/2022    ANIONGAP 7 (L) 01/25/2022     No results found for: HGBA1C  Lab Results   Component Value Date    BNP 74 12/21/2021    Lab Results   Component Value Date    WBC 8.33 01/25/2022    HGB 12.6 (L) 01/25/2022    HCT 39.4 (L) 01/25/2022    HCT 47 08/18/2020     01/25/2022    GRAN 5.4 01/25/2022    GRAN 64.9 01/25/2022     Lab Results   Component Value Date    CHOL 145 10/05/2021    HDL 43 10/05/2021    LDLCALC 84.6 10/05/2021    TRIG 87 10/05/2021          EF   Date Value Ref Range Status   09/21/2021 55 % Final     Nuc Stress EF   Date Value Ref Range Status   10/14/2019 76 % Final     Nuc Rest EF   Date Value Ref Range Status   10/14/2019 59.0  Final         Assessment and Plan  Kermit Castro is a 76 y.o. gentleman who presents for severe peripheral arterial disease.      ICD-10-CM ICD-9-CM    1. PAD (peripheral artery disease)   Patient is a 76-year-old gentleman who presents today with severe bilateral claudication right is greater than left which has been life-limiting for him with walking less than 50 ft.  He underwent an JOAQUINA which is abnormal bilaterally post exercise JOAQUINA was 0.53 on the left in 0.43 on the right.  He then underwent a CTA which reveals bilateral occlusions of the right and left SFA is.  Since his right leg is hurting him more will plan to do intervention on the right SFA and then bring him back for staged procedure of the left if he should need it.    1. Peripheral angiogram and PTA likely of right  SFA  2. Antiplatelets:  Aspirin Plavix  3. Access:  Left common femoral artery  4. Equipment:  Please get a 6 Macedonian sheath, get a 6 Macedonian crossover sheath, a 4 Macedonian ALFREDO catheter, floppy angled Glidewire.  5. The risks, benefits, and alternatives of coronary vascular angiography and possible intervention were discussed with the patient. All questions were answered and informed consent was obtained. I had a detailed discussion with the patient regarding risk of stroke, MI, bleeding access site complications including limb loss, allergy, kidney failure including dialysis and death.  6. The patient understands the risks and benefits and wishes to go ahead with the procedure.  7. All patient's questions were answered       I73.9 443.9 Case Request-Cath Lab: PTA, PERIPHERAL VESSEL      Case Request-Cath Lab: PTA, PERIPHERAL VESSEL   2. Mixed hyperlipidemia   Would continue statin at this time. E78.2 272.2    3. Essential hypertension   Blood pressure is well controlled at this time will continue current medicines. I10 401.9    4. Hx of CABG   Patient had a CABG back in 1990 19 he denies any angina at this time had a recent PET stress a couple years ago which revealed a fixed defect. Z95.1 V45.81    5. Other emphysema   Patient has shortness of breath that is related to his emphysema will follow-up with his pulmonologist. J43.8 492.8         Signed:        Damion Wetzel MD  Interventional Cardiology Fellow  Pager [phone]

## 2022-03-08 ENCOUNTER — HOSPITAL ENCOUNTER (OUTPATIENT)
Dept: CARDIOLOGY | Facility: HOSPITAL | Age: 77
Discharge: HOME | End: 2022-03-08
Attending: INTERNAL MEDICINE
Payer: MEDICARE

## 2022-03-08 ENCOUNTER — OFFICE VISIT (OUTPATIENT)
Dept: CARDIOLOGY | Facility: CLINIC | Age: 77
End: 2022-03-08
Payer: MEDICARE

## 2022-03-08 VITALS
HEART RATE: 66 BPM | DIASTOLIC BLOOD PRESSURE: 55 MMHG | SYSTOLIC BLOOD PRESSURE: 111 MMHG | WEIGHT: 192 LBS | OXYGEN SATURATION: 98 % | HEIGHT: 68 IN | BODY MASS INDEX: 29.1 KG/M2

## 2022-03-08 DIAGNOSIS — I73.9 PAD (PERIPHERAL ARTERY DISEASE): ICD-10-CM

## 2022-03-08 DIAGNOSIS — I73.9 PERIPHERAL VASCULAR DISEASE, UNSPECIFIED: Primary | ICD-10-CM

## 2022-03-08 DIAGNOSIS — I73.9 PERIPHERAL VASCULAR DISEASE, UNSPECIFIED: ICD-10-CM

## 2022-03-08 DIAGNOSIS — R06.9 UNSPECIFIED ABNORMALITIES OF BREATHING: Primary | ICD-10-CM

## 2022-03-08 DIAGNOSIS — I73.9 PERIPHERAL ARTERIAL DISEASE: Primary | ICD-10-CM

## 2022-03-08 DIAGNOSIS — I87.2 VENOUS INSUFFICIENCY OF LOWER EXTREMITY, UNSPECIFIED LATERALITY: ICD-10-CM

## 2022-03-08 DIAGNOSIS — M79.89 LEG SWELLING: ICD-10-CM

## 2022-03-08 DIAGNOSIS — R93.1 ABNORMAL FINDINGS ON DIAGNOSTIC IMAGING OF HEART AND CORONARY CIRCULATION: ICD-10-CM

## 2022-03-08 PROCEDURE — 99215 OFFICE O/P EST HI 40 MIN: CPT | Mod: S$GLB,,, | Performed by: INTERNAL MEDICINE

## 2022-03-08 PROCEDURE — 93924 LWR XTR VASC STDY BILAT: CPT | Mod: 26,,, | Performed by: INTERNAL MEDICINE

## 2022-03-08 PROCEDURE — 3078F DIAST BP <80 MM HG: CPT | Mod: CPTII,S$GLB,, | Performed by: INTERNAL MEDICINE

## 2022-03-08 PROCEDURE — 99999 PR PBB SHADOW E&M-EST. PATIENT-LVL V: CPT | Mod: PBBFAC,,, | Performed by: INTERNAL MEDICINE

## 2022-03-08 PROCEDURE — 3074F SYST BP LT 130 MM HG: CPT | Mod: CPTII,S$GLB,, | Performed by: INTERNAL MEDICINE

## 2022-03-08 PROCEDURE — 99999 PR PBB SHADOW E&M-EST. PATIENT-LVL V: ICD-10-PCS | Mod: PBBFAC,,, | Performed by: INTERNAL MEDICINE

## 2022-03-08 PROCEDURE — 93924 LWR XTR VASC STDY BILAT: CPT

## 2022-03-08 PROCEDURE — 1125F AMNT PAIN NOTED PAIN PRSNT: CPT | Mod: CPTII,S$GLB,, | Performed by: INTERNAL MEDICINE

## 2022-03-08 PROCEDURE — 1160F RVW MEDS BY RX/DR IN RCRD: CPT | Mod: CPTII,S$GLB,, | Performed by: INTERNAL MEDICINE

## 2022-03-08 PROCEDURE — 1125F PR PAIN SEVERITY QUANTIFIED, PAIN PRESENT: ICD-10-PCS | Mod: CPTII,S$GLB,, | Performed by: INTERNAL MEDICINE

## 2022-03-08 PROCEDURE — 3074F PR MOST RECENT SYSTOLIC BLOOD PRESSURE < 130 MM HG: ICD-10-PCS | Mod: CPTII,S$GLB,, | Performed by: INTERNAL MEDICINE

## 2022-03-08 PROCEDURE — 1159F MED LIST DOCD IN RCRD: CPT | Mod: CPTII,S$GLB,, | Performed by: INTERNAL MEDICINE

## 2022-03-08 PROCEDURE — 1159F PR MEDICATION LIST DOCUMENTED IN MEDICAL RECORD: ICD-10-PCS | Mod: CPTII,S$GLB,, | Performed by: INTERNAL MEDICINE

## 2022-03-08 PROCEDURE — 99215 PR OFFICE/OUTPT VISIT, EST, LEVL V, 40-54 MIN: ICD-10-PCS | Mod: S$GLB,,, | Performed by: INTERNAL MEDICINE

## 2022-03-08 PROCEDURE — 93924 ANKLE BRACHIAL INDICES (ABI): ICD-10-PCS | Mod: 26,,, | Performed by: INTERNAL MEDICINE

## 2022-03-08 PROCEDURE — 3078F PR MOST RECENT DIASTOLIC BLOOD PRESSURE < 80 MM HG: ICD-10-PCS | Mod: CPTII,S$GLB,, | Performed by: INTERNAL MEDICINE

## 2022-03-08 PROCEDURE — 1160F PR REVIEW ALL MEDS BY PRESCRIBER/CLIN PHARMACIST DOCUMENTED: ICD-10-PCS | Mod: CPTII,S$GLB,, | Performed by: INTERNAL MEDICINE

## 2022-03-08 NOTE — PROGRESS NOTES
Interventional Cardiology Clinic Note  Reason for Visit: PAD  Referring Physician: Moiz Villalta     HPI:   Patient is a 76-year-old gentleman who was referred her for peripheral arterial disease.     Patient has a long history of peripheral arterial disease dating back to the early 2000s in which he has bilateral SFA stenting done by Dr. Mendoza his last angiograms were in 2006 which revealed occluded SFAS bilaterally and he had stenting of the right SFA and the left SFA with good result.  He had done well up until several months ago he now gets severe bilateral claudication right greater than left when ambulating about 50 ft.  He underwent peripheral angiography with PCI to prox R SFA and mid R SFA.  Prox L CFA to mid L CFA was 70% stenosed.    · The Ost R JAMILAH to Dist R JAMILAH lesion was 100% stenosed.  · The Prox L CFA to Mid L CFA lesion was 70% stenosed.  · The Prox R SFA to Mid R SFA lesion was 100% stenosed with 0% stenosis post-intervention.  · A STENT MICKI DRGELUT [id number] stent was successfully placed.  · A STENT MICKI DRGELUT [id number] stent was successfully placed.  · The estimated blood loss was none.  · The PTAs were successful.    Review of Systems   All other systems reviewed and are negative.        PMH:           Past Medical History:   Diagnosis Date    Coronary artery disease      Hyperlipidemia      Hypertension              Past Surgical History:   Procedure Laterality Date    ABDOMINAL SURGERY         Polyps in colon removed (noncancerous)    APPENDECTOMY        back surgery (screws & okate)        CORONARY ARTERY BYPASS GRAFT         1998    PERCUTANEOUS TRANSLUMINAL ANGIOPLASTY (PTA) OF PERIPHERAL VESSEL N/A 1/27/2022     Procedure: PTA, PERIPHERAL VESSEL;  Surgeon: Gume Nagy MD;  Location: [facility] CATH LAB;  Service: Cardiology;  Laterality: N/A;    TRANSFORAMINAL EPIDURAL INJECTION OF STEROID Right 11/5/2020     Procedure: LUMBAR TRANSFORAMINAL RIGHT L3/4 AND L4/5 DIRECT  REFERRAL;  Surgeon: Nicole Toth MD;  Location: [facility];  Service: Pain Management;  Laterality: Right;  NEEDS CONSENT, PT STATES NO LONGER TAKES PLETAL      Allergies:           Review of patient's allergies indicates:   Allergen Reactions    Iodine and iodide containing products Anaphylaxis    Shellfish containing products Anaphylaxis      Medications:             Current Outpatient Medications on File Prior to Visit   Medication Sig Dispense Refill    albuterol-ipratropium (DUO-NEB) 2.5 mg-0.5 mg/3 mL nebulizer solution Take 3 mLs by nebulization every 6 (six) hours as needed for Shortness of Breath. Rescue 3 mL 11    ALPRAZolam (XANAX) 0.5 MG tablet Take 0.5 mg by mouth 4 (four) times daily as needed.        amLODIPine (NORVASC) 10 MG tablet amlodipine besylate  10 mg tabs        aspirin 325 MG tablet aspirin 325 mg tablet   Take 1 tablet every day by oral route.        atorvastatin (LIPITOR) 40 MG tablet Take 2 tablets (80 mg total) by mouth once daily. 90 tablet 7    betamethasone acetate-betamethasone sodium phosphate (CELESTONE) 6 mg/mL injection 1 mL.        cetirizine (ZYRTEC) 10 MG tablet Take 1 tablet (10 mg total) by mouth once daily. (Patient not taking: Reported on 1/18/2022) 30 tablet 0    cilostazoL (PLETAL) 100 MG Tab Take 1 tablet by mouth twice daily 180 tablet 3    clopidogreL (PLAVIX) 75 mg tablet Take 1 tablet (75 mg total) by mouth once daily. 30 tablet 0    flu vacc [id number],65yr up,PF (FLUZONE HIGH-DOSE 2019-20, PF,) 180 mcg/0.5 mL Syrg Fluzone High-Dose 2019-20 (PF) 180 mcg/0.5 mL intramuscular syringe   PHARMACIST ADMINISTERED IMMUNIZATION ADMINISTERED AT TIME OF DISPENSING        lisinopriL (PRINIVIL,ZESTRIL) 20 MG tablet lisinopril  20 mg tabs        lubiprostone (AMITIZA) 24 MCG Cap Amitiza 24 mcg capsule   Take 1 capsule twice a day by oral route with meals for 30 days.        metoprolol tartrate (LOPRESSOR) 50 MG tablet metoprolol tartrate  50 mg tabs         multivitamin (THERAGRAN) per tablet Take 1 tablet by mouth once daily.        omega 3-dha-epa-fish oil (FISH OIL) 100-160-1,000 mg Cap Fish Oil   daily        oxyCODONE-acetaminophen (PERCOCET)  mg per tablet oxycod/apap  tab 10-325mg        pantoprazole (PROTONIX) 40 MG tablet Take 1 tablet (40 mg total) by mouth once daily. 30 tablet 0    pirfenidone (ESBRIET) 801 mg Tab Take 1 capsule by mouth 3 (three) times daily. (Patient not taking: Reported on 1/18/2022) 90 tablet 3    polyethylene glycol (GLYCOLAX) 17 gram/dose powder Miralax 17 gram/dose oral powder   use as directed        predniSONE (DELTASONE) 50 MG Tab 1 tablet po Q 6 hours x 3 3 tablet 0    pregabalin (LYRICA) 50 MG capsule pregabalin 50 mg capsule        pregabalin (LYRICA) 50 MG capsule          pulse oximeter (PULSE OXIMETER) device by Apply Externally route 2 (two) times a day. Use twice daily at 8 AM and 3 PM and record the value in [employer] as directed. 1 each 0    saw palmetto 160 MG capsule Take 160 mg by mouth 2 (two) times daily.        sildenafiL (VIAGRA) 100 MG tablet Take 100 mg by mouth.        SUPREP BOWEL PREP KIT 17.5-3.13-1.6 gram SolR Take by mouth.                    Current Facility-Administered Medications on File Prior to Visit   Medication Dose Route Frequency Provider Last Rate Last Admin    acetaminophen tablet 650 mg  650 mg Oral Once PRN Rishabh Ware MD        albuterol inhaler 2 puff  2 puff Inhalation Q20 Min PRN Rishabh Ware MD        EPINEPHrine (EPIPEN) 0.3 mg/0.3 mL pen injection 0.3 mg  0.3 mg Intramuscular PRN Rishabh Ware MD        methylPREDNISolone sodium succinate injection 40 mg  40 mg Intravenous Once PRN Rishabh Ware MD        ondansetron disintegrating tablet 4 mg  4 mg Oral Once PRN Rishabh Ware MD        sodium chloride 0.9% 500 mL flush bag   Intravenous PRN Rishabh Ware MD        sodium chloride 0.9% flush 10 mL  10 mL Intravenous PRN Rishabh  KELLEN Ware MD          Social History:      Social History            Tobacco Use    Smoking status: Former Smoker       Quit date: 10/5/1998       Years since quittin.4    Smokeless tobacco: Never Used   Substance Use Topics    Alcohol use: Not Currently       Alcohol/week: 0.0 standard drinks       Comment:       Family History:   No family history on file.     Physical Exam  There were no vitals taken for this visit.   GEN: Alert and oriented in NAD  NECK: no JVD appreciated   CVS: RRR, s1/s2, no MRG  PULM: CTAB no rales  ABD: NT/ND BS +  Extremities: warm and dry,  no edema  NEURO: Alert and oriented x 3  PSYCH: appropriate affect.            Labs:            Lab Results   Component Value Date      2022     K 3.9 2022      2022     CO2 28 2022     BUN 10 2022     CREATININE 0.87 2022     ANIONGAP 7 (L) 2022      No results found for: HGBA1C        Lab Results   Component Value Date     BNP 74 2021           Lab Results   Component Value Date     WBC 8.33 2022     HGB 12.6 (L) 2022     HCT 39.4 (L) 2022     HCT 47 2020      2022     GRAN 5.4 2022     GRAN 64.9 2022            Lab Results   Component Value Date     CHOL 145 10/05/2021     HDL 43 10/05/2021     LDLCALC 84.6 10/05/2021     TRIG 87 10/05/2021                   EF   Date Value Ref Range Status   2021 55 % Final            Nuc Stress EF   Date Value Ref Range Status   10/14/2019 76 % Final            Nuc Rest EF   Date Value Ref Range Status   10/14/2019 59.0   Final          Assessment and Plan  Kermit Castro is a 76 y.o. gentleman who presents for severe peripheral arterial disease.         ICD-10-CM ICD-9-CM     1. PAD (peripheral artery disease)   Patient is a 76-year-old gentleman with PAD s/p PCI to pR SFA and m R SFA.  He says he feels much better.  JOAQUINA 0.96 on R and 0.68 on L  - continue daily ASA    I73.9 443.9  Case Request-Cath Lab: PTA, PERIPHERAL VESSEL         Case Request-Cath Lab: PTA, PERIPHERAL VESSEL   2. Mixed hyperlipidemia   Would continue statin at this time. E78.2 272.2     3. Essential hypertension   Blood pressure is well controlled at this time will continue current medicines. I10 401.9     4. Hx of CABG   Patient had a CABG back in 1990 19 he denies any angina at this time had a recent PET stress a couple years ago which revealed a fixed defect. Z95.1 V45.81     5. Other emphysema   Patient has shortness of breath that is related to his emphysema will follow-up with his pulmonologist however has not had a cardiac PET in over 3 years.  Will order a repeat PET stress. J43.8 492.8        Oneal Caldwell, PGY4  Cardiovascular Disease  [facility]  Interventional Cardiology Staff  I have personally taken the history and examined this patient. I have discussed and agree with the resident's findings and plan as documented in the resident's note.    Gume Nagy

## 2022-03-09 LAB
IMMEDIATE ARM BP: 132 MMHG
IMMEDIATE LEFT ABI: 0.45
IMMEDIATE LEFT TIBIAL: 60 MMHG
IMMEDIATE RIGHT ABI: 0.63
IMMEDIATE RIGHT TIBIAL: 83 MMHG
LEFT ABI: 0.68
LEFT ARM BP: 116 MMHG
LEFT DORSALIS PEDIS: 74 MMHG
LEFT POSTERIOR TIBIAL: 79 MMHG
RIGHT ABI: 0.96
RIGHT ARM BP: 108 MMHG
RIGHT DORSALIS PEDIS: 98 MMHG
RIGHT POSTERIOR TIBIAL: 111 MMHG
TOE RAISES: 55

## 2022-03-17 ENCOUNTER — LAB VISIT (OUTPATIENT)
Dept: LAB | Facility: HOSPITAL | Age: 77
End: 2022-03-17
Attending: STUDENT IN AN ORGANIZED HEALTH CARE EDUCATION/TRAINING PROGRAM
Payer: MEDICARE

## 2022-03-17 DIAGNOSIS — E78.2 MIXED HYPERLIPIDEMIA: ICD-10-CM

## 2022-03-17 LAB
CHOLEST SERPL-MCNC: 152 MG/DL (ref 120–199)
CHOLEST/HDLC SERPL: 3 {RATIO} (ref 2–5)
HDLC SERPL-MCNC: 51 MG/DL (ref 40–75)
HDLC SERPL: 33.6 % (ref 20–50)
LDLC SERPL CALC-MCNC: 84.4 MG/DL (ref 63–159)
NONHDLC SERPL-MCNC: 101 MG/DL
TRIGL SERPL-MCNC: 83 MG/DL (ref 30–150)

## 2022-03-17 PROCEDURE — 36415 COLL VENOUS BLD VENIPUNCTURE: CPT | Mod: PO | Performed by: STUDENT IN AN ORGANIZED HEALTH CARE EDUCATION/TRAINING PROGRAM

## 2022-03-17 PROCEDURE — 80061 LIPID PANEL: CPT | Performed by: STUDENT IN AN ORGANIZED HEALTH CARE EDUCATION/TRAINING PROGRAM

## 2022-03-28 ENCOUNTER — PATIENT MESSAGE (OUTPATIENT)
Dept: PHARMACY | Facility: CLINIC | Age: 77
End: 2022-03-28
Payer: MEDICARE

## 2022-03-28 ENCOUNTER — SPECIALTY PHARMACY (OUTPATIENT)
Dept: PHARMACY | Facility: CLINIC | Age: 77
End: 2022-03-28
Payer: MEDICARE

## 2022-03-28 NOTE — TELEPHONE ENCOUNTER
Specialty Pharmacy - Refill Coordination    Specialty Medication Orders Linked to Encounter    Flowsheet Row Most Recent Value   Medication #1 pirfenidone (ESBRIET) 801 mg Tab (Order#[id number], Rx#[id number])          Refill Questions - Documented Responses    Flowsheet Row Most Recent Value   Patient Availability and HIPAA Verification    Does patient want to proceed with activity? Yes   HIPAA/medical authority confirmed? Yes   Relationship to patient of person spoken to? Spouse/Significant Other   Refill Screening Questions    Changes to allergies? No   Changes to medications? No   New conditions since last clinic visit? No   Unplanned office visit, urgent care, ED, or hospital admission in the last 4 weeks? No   How does patient/caregiver feel medication is working? Excellent   Financial problems or insurance changes? No   How many doses of your specialty medications were missed in the last 4 weeks? 0   Would patient like to speak to a pharmacist? No   When does the patient need to receive the medication? 04/04/22   Refill Delivery Questions    How will the patient receive the medication? Pickup   When does the patient need to receive the medication? 04/04/22   Address in [facility] confirmed and updated if neccessary? Yes   Expected Copay ($) 0   Is the patient able to afford the medication copay? Yes   Payment Method zero copay   Days supply of Refill 30   Supplies needed? No supplies needed   Refill activity completed? Yes   Refill activity plan Refill scheduled   Shipment/Pickup Date: 03/29/22          Current Outpatient Medications   Medication Sig    albuterol-ipratropium (DUO-NEB) 2.5 mg-0.5 mg/3 mL nebulizer solution Take 3 mLs by nebulization every 6 (six) hours as needed for Shortness of Breath. Rescue    ALPRAZolam (XANAX) 0.5 MG tablet Take 0.5 mg by mouth 4 (four) times daily as needed.    amLODIPine (NORVASC) 10 MG tablet amlodipine besylate  10 mg tabs    aspirin 325 MG tablet aspirin  325 mg tablet   Take 1 tablet every day by oral route.    atorvastatin (LIPITOR) 40 MG tablet Take 2 tablets (80 mg total) by mouth once daily.    betamethasone acetate-betamethasone sodium phosphate (CELESTONE) 6 mg/mL injection 1 mL.    cetirizine (ZYRTEC) 10 MG tablet Take 1 tablet (10 mg total) by mouth once daily. (Patient not taking: Reported on 1/18/2022)    cilostazoL (PLETAL) 100 MG Tab Take 1 tablet by mouth twice daily    clopidogreL (PLAVIX) 75 mg tablet Take 1 tablet (75 mg total) by mouth once daily. (Patient not taking: Reported on 3/8/2022)    flu vacc [id number],65yr up,PF (FLUZONE HIGH-DOSE 2019-20, PF,) 180 mcg/0.5 mL Syrg Fluzone High-Dose 2019-20 (PF) 180 mcg/0.5 mL intramuscular syringe   PHARMACIST ADMINISTERED IMMUNIZATION ADMINISTERED AT TIME OF DISPENSING    lisinopriL (PRINIVIL,ZESTRIL) 20 MG tablet lisinopril  20 mg tabs    lubiprostone (AMITIZA) 24 MCG Cap Amitiza 24 mcg capsule   Take 1 capsule twice a day by oral route with meals for 30 days.    metoprolol tartrate (LOPRESSOR) 50 MG tablet metoprolol tartrate  50 mg tabs    multivitamin (THERAGRAN) per tablet Take 1 tablet by mouth once daily.    omega 3-dha-epa-fish oil (FISH OIL) 100-160-1,000 mg Cap Fish Oil   daily    oxyCODONE-acetaminophen (PERCOCET)  mg per tablet oxycod/apap  tab 10-325mg    pantoprazole (PROTONIX) 40 MG tablet Take 1 tablet (40 mg total) by mouth once daily. (Patient not taking: Reported on 3/8/2022)    pirfenidone (ESBRIET) 801 mg Tab Take 1 capsule by mouth 3 (three) times daily.    polyethylene glycol (GLYCOLAX) 17 gram/dose powder Miralax 17 gram/dose oral powder   use as directed    predniSONE (DELTASONE) 50 MG Tab 1 tablet po Q 6 hours x 3    pregabalin (LYRICA) 50 MG capsule pregabalin 50 mg capsule    pregabalin (LYRICA) 50 MG capsule     pulse oximeter (PULSE OXIMETER) device by Apply Externally route 2 (two) times a day. Use twice daily at 8 AM and 3 PM and record the value in  MyChart as directed.    saw palmetto 160 MG capsule Take 160 mg by mouth 2 (two) times daily.    sildenafiL (VIAGRA) 100 MG tablet Take 100 mg by mouth.    SUPREP BOWEL PREP KIT 17.5-3.13-1.6 gram SolR Take by mouth.   Last reviewed on 3/8/2022 12:10 PM by Gume Nagy MD    Review of patient's allergies indicates:   Allergen Reactions    Iodine and iodide containing products Anaphylaxis    Shellfish containing products Anaphylaxis    Last reviewed on  3/8/2022 12:10 PM by Gume Nagy      Tasks added this encounter   No tasks added.   Tasks due within next 3 months   3/25/2022 - Refill Call (Auto Added)     [facility] - Specialty Pharmacy  [address] [facility] [address]  Phone: [phone]  Fax: [phone]

## 2022-03-31 ENCOUNTER — TELEPHONE (OUTPATIENT)
Dept: CARDIOLOGY | Facility: HOSPITAL | Age: 77
End: 2022-03-31
Payer: MEDICARE

## 2022-04-04 ENCOUNTER — HOSPITAL ENCOUNTER (OUTPATIENT)
Dept: CARDIOLOGY | Facility: HOSPITAL | Age: 77
Discharge: HOME | End: 2022-04-04
Attending: INTERNAL MEDICINE
Payer: MEDICARE

## 2022-04-04 VITALS
HEIGHT: 68 IN | SYSTOLIC BLOOD PRESSURE: 129 MMHG | BODY MASS INDEX: 29.1 KG/M2 | DIASTOLIC BLOOD PRESSURE: 66 MMHG | HEART RATE: 76 BPM | WEIGHT: 192 LBS

## 2022-04-04 DIAGNOSIS — R93.1 ABNORMAL FINDINGS ON DIAGNOSTIC IMAGING OF HEART AND CORONARY CIRCULATION: ICD-10-CM

## 2022-04-04 DIAGNOSIS — R06.9 UNSPECIFIED ABNORMALITIES OF BREATHING: ICD-10-CM

## 2022-04-04 LAB
CFR FLOW - ANTERIOR: 1.95
CFR FLOW - INFERIOR: 1.83
CFR FLOW - LATERAL: 1.59
CFR FLOW - MAX: 2.26
CFR FLOW - MIN: 1.16
CFR FLOW - SEPTAL: 1.85
CFR FLOW - WHOLE HEART: 1.81
CV STRESS BASE HR: 76 BPM
DIASTOLIC BLOOD PRESSURE: 68 MMHG
EJECTION FRACTION- HIGH: 65 %
END DIASTOLIC INDEX-HIGH: 153 ML/M2
END DIASTOLIC INDEX-LOW: 93 ML/M2
END SYSTOLIC INDEX-HIGH: 71 ML/M2
END SYSTOLIC INDEX-LOW: 31 ML/M2
NUC REST DIASTOLIC VOLUME INDEX: 109
NUC REST EJECTION FRACTION: 54
NUC REST SYSTOLIC VOLUME INDEX: 50
NUC STRESS DIASTOLIC VOLUME INDEX: 109
NUC STRESS EJECTION FRACTION: 55 %
NUC STRESS SYSTOLIC VOLUME INDEX: 49
OHS CV CPX 85 PERCENT MAX PREDICTED HEART RATE MALE: 122
OHS CV CPX MAX PREDICTED HEART RATE: 143
OHS CV CPX PATIENT IS FEMALE: 0
OHS CV CPX PATIENT IS MALE: 1
OHS CV CPX PEAK DIASTOLIC BLOOD PRESSURE: 40 MMHG
OHS CV CPX PEAK HEAR RATE: 80 BPM
OHS CV CPX PEAK RATE PRESSURE PRODUCT: 9280
OHS CV CPX PEAK SYSTOLIC BLOOD PRESSURE: 116 MMHG
OHS CV CPX PERCENT MAX PREDICTED HEART RATE ACHIEVED: 56
OHS CV CPX RATE PRESSURE PRODUCT PRESENTING: 9804
PERFUSION DEFECT 1 SIZE IN %: 16 %
REST FLOW - ANTERIOR: 0.88 CC/MIN/G
REST FLOW - INFERIOR: 0.68 CC/MIN/G
REST FLOW - LATERAL: 0.95 CC/MIN/G
REST FLOW - MAX: 1.43 CC/MIN/G
REST FLOW - MIN: 0.33 CC/MIN/G
REST FLOW - SEPTAL: 1 CC/MIN/G
REST FLOW - WHOLE HEART: 0.88 CC/MIN/G
RETIRED EF AND QEF - SEE NOTES: 53 %
STRESS FLOW - ANTERIOR: 1.72 CC/MIN/G
STRESS FLOW - INFERIOR: 1.25 CC/MIN/G
STRESS FLOW - LATERAL: 1.53 CC/MIN/G
STRESS FLOW - MAX: 2.45 CC/MIN/G
STRESS FLOW - MIN: 0.43 CC/MIN/G
STRESS FLOW - SEPTAL: 1.85 CC/MIN/G
STRESS FLOW - WHOLE HEART: 1.59 CC/MIN/G
SYSTOLIC BLOOD PRESSURE: 129 MMHG

## 2022-04-04 PROCEDURE — 78434 AQMBF PET REST & RX STRESS: CPT

## 2022-04-04 PROCEDURE — 93016 CARDIAC PET SCAN STRESS (CUPID ONLY): ICD-10-PCS | Mod: ,,, | Performed by: INTERNAL MEDICINE

## 2022-04-04 PROCEDURE — 93018 CARDIAC PET SCAN STRESS (CUPID ONLY): ICD-10-PCS | Mod: ,,, | Performed by: INTERNAL MEDICINE

## 2022-04-04 PROCEDURE — 78431 CARDIAC PET SCAN STRESS (CUPID ONLY): ICD-10-PCS | Mod: 26,,, | Performed by: INTERNAL MEDICINE

## 2022-04-04 PROCEDURE — 78431 MYOCRD IMG PET RST&STRS CT: CPT

## 2022-04-04 PROCEDURE — 63600175 PHARM REV CODE 636 W HCPCS: Performed by: INTERNAL MEDICINE

## 2022-04-04 PROCEDURE — 78434 CARDIAC PET SCAN STRESS (CUPID ONLY): ICD-10-PCS | Mod: 26,,, | Performed by: INTERNAL MEDICINE

## 2022-04-04 PROCEDURE — 78431 MYOCRD IMG PET RST&STRS CT: CPT | Mod: 26,,, | Performed by: INTERNAL MEDICINE

## 2022-04-04 PROCEDURE — 93018 CV STRESS TEST I&R ONLY: CPT | Mod: ,,, | Performed by: INTERNAL MEDICINE

## 2022-04-04 PROCEDURE — 93016 CV STRESS TEST SUPVJ ONLY: CPT | Mod: ,,, | Performed by: INTERNAL MEDICINE

## 2022-04-04 PROCEDURE — 78434 AQMBF PET REST & RX STRESS: CPT | Mod: 26,,, | Performed by: INTERNAL MEDICINE

## 2022-04-04 RX ORDER — REGADENOSON 0.08 MG/ML
0.4 INJECTION, SOLUTION INTRAVENOUS ONCE
Status: COMPLETED | OUTPATIENT
Start: 2022-04-04 | End: 2022-04-04

## 2022-04-04 RX ADMIN — REGADENOSON 0.4 MG: 0.08 INJECTION, SOLUTION INTRAVENOUS at 12:04

## 2022-04-18 ENCOUNTER — PATIENT MESSAGE (OUTPATIENT)
Dept: ADMINISTRATIVE | Facility: OTHER | Age: 77
End: 2022-04-18
Payer: MEDICARE

## 2022-04-19 ENCOUNTER — LAB VISIT (OUTPATIENT)
Dept: LAB | Facility: HOSPITAL | Age: 77
End: 2022-04-19
Payer: MEDICARE

## 2022-04-19 ENCOUNTER — OFFICE VISIT (OUTPATIENT)
Dept: CARDIOLOGY | Facility: CLINIC | Age: 77
End: 2022-04-19
Payer: MEDICARE

## 2022-04-19 VITALS
HEART RATE: 77 BPM | DIASTOLIC BLOOD PRESSURE: 84 MMHG | WEIGHT: 201.94 LBS | OXYGEN SATURATION: 96 % | SYSTOLIC BLOOD PRESSURE: 175 MMHG | HEIGHT: 68 IN | BODY MASS INDEX: 30.61 KG/M2

## 2022-04-19 DIAGNOSIS — I73.9 PAD (PERIPHERAL ARTERY DISEASE): Primary | ICD-10-CM

## 2022-04-19 DIAGNOSIS — H91.90 HEARING LOSS, UNSPECIFIED HEARING LOSS TYPE, UNSPECIFIED LATERALITY: ICD-10-CM

## 2022-04-19 DIAGNOSIS — I10 ESSENTIAL HYPERTENSION: ICD-10-CM

## 2022-04-19 DIAGNOSIS — R06.09 DYSPNEA ON EXERTION: ICD-10-CM

## 2022-04-19 DIAGNOSIS — I25.111 CORONARY ARTERY DISEASE INVOLVING NATIVE CORONARY ARTERY OF NATIVE HEART WITH ANGINA PECTORIS WITH DOCUMENTED SPASM: ICD-10-CM

## 2022-04-19 DIAGNOSIS — J84.112 IPF (IDIOPATHIC PULMONARY FIBROSIS): ICD-10-CM

## 2022-04-19 DIAGNOSIS — E78.2 MIXED HYPERLIPIDEMIA: ICD-10-CM

## 2022-04-19 DIAGNOSIS — I50.30 HEART FAILURE WITH PRESERVED EJECTION FRACTION, UNSPECIFIED HF CHRONICITY: ICD-10-CM

## 2022-04-19 DIAGNOSIS — E66.01 MORBID OBESITY: ICD-10-CM

## 2022-04-19 LAB
ANION GAP SERPL CALC-SCNC: 7 MMOL/L (ref 8–16)
BNP SERPL-MCNC: 119 PG/ML (ref 0–99)
BUN SERPL-MCNC: 10 MG/DL (ref 8–23)
CALCIUM SERPL-MCNC: 9.3 MG/DL (ref 8.7–10.5)
CHLORIDE SERPL-SCNC: 107 MMOL/L (ref 95–110)
CO2 SERPL-SCNC: 30 MMOL/L (ref 23–29)
CREAT SERPL-MCNC: 0.9 MG/DL (ref 0.5–1.4)
EST. GFR  (AFRICAN AMERICAN): >60 ML/MIN/1.73 M^2
EST. GFR  (NON AFRICAN AMERICAN): >60 ML/MIN/1.73 M^2
GLUCOSE SERPL-MCNC: 99 MG/DL (ref 70–110)
POTASSIUM SERPL-SCNC: 4.2 MMOL/L (ref 3.5–5.1)
SODIUM SERPL-SCNC: 144 MMOL/L (ref 136–145)

## 2022-04-19 PROCEDURE — 99214 PR OFFICE/OUTPT VISIT, EST, LEVL IV, 30-39 MIN: ICD-10-PCS | Mod: GC,S$GLB,, | Performed by: STUDENT IN AN ORGANIZED HEALTH CARE EDUCATION/TRAINING PROGRAM

## 2022-04-19 PROCEDURE — 99999 PR PBB SHADOW E&M-EST. PATIENT-LVL IV: CPT | Mod: PBBFAC,GC,, | Performed by: STUDENT IN AN ORGANIZED HEALTH CARE EDUCATION/TRAINING PROGRAM

## 2022-04-19 PROCEDURE — 99999 PR PBB SHADOW E&M-EST. PATIENT-LVL IV: ICD-10-PCS | Mod: PBBFAC,GC,, | Performed by: STUDENT IN AN ORGANIZED HEALTH CARE EDUCATION/TRAINING PROGRAM

## 2022-04-19 PROCEDURE — 36415 COLL VENOUS BLD VENIPUNCTURE: CPT | Performed by: STUDENT IN AN ORGANIZED HEALTH CARE EDUCATION/TRAINING PROGRAM

## 2022-04-19 PROCEDURE — 83880 ASSAY OF NATRIURETIC PEPTIDE: CPT | Performed by: STUDENT IN AN ORGANIZED HEALTH CARE EDUCATION/TRAINING PROGRAM

## 2022-04-19 PROCEDURE — 80048 BASIC METABOLIC PNL TOTAL CA: CPT | Performed by: STUDENT IN AN ORGANIZED HEALTH CARE EDUCATION/TRAINING PROGRAM

## 2022-04-19 PROCEDURE — 99214 OFFICE O/P EST MOD 30 MIN: CPT | Mod: GC,S$GLB,, | Performed by: STUDENT IN AN ORGANIZED HEALTH CARE EDUCATION/TRAINING PROGRAM

## 2022-04-19 RX ORDER — LISINOPRIL 20 MG/1
30 TABLET ORAL DAILY
Qty: 90 TABLET | Refills: 3 | Status: SHIPPED | OUTPATIENT
Start: 2022-04-19 | End: 2022-11-08 | Stop reason: DRUGHIGH

## 2022-04-19 RX ORDER — EZETIMIBE 10 MG/1
10 TABLET ORAL DAILY
Qty: 90 TABLET | Refills: 3 | Status: SHIPPED | OUTPATIENT
Start: 2022-04-19 | End: 2023-04-19

## 2022-04-19 RX ORDER — FUROSEMIDE 40 MG/1
40 TABLET ORAL DAILY
Qty: 30 TABLET | Refills: 11 | Status: SHIPPED | OUTPATIENT
Start: 2022-04-19 | End: 2023-10-06

## 2022-04-19 RX ORDER — POTASSIUM CHLORIDE 20 MEQ/1
20 TABLET, EXTENDED RELEASE ORAL DAILY
Qty: 90 TABLET | Refills: 3 | Status: SHIPPED | OUTPATIENT
Start: 2022-04-19 | End: 2022-04-19

## 2022-04-19 RX ORDER — METOPROLOL SUCCINATE 100 MG/1
100 TABLET, EXTENDED RELEASE ORAL DAILY
Qty: 30 TABLET | Refills: 11 | Status: SHIPPED | OUTPATIENT
Start: 2022-04-19 | End: 2023-10-30

## 2022-04-19 NOTE — PROGRESS NOTES
" Patient ID:  Kermit Castro is a 76 y.o. y.o. male who presents for follow-up Follow-up and Shortness of Breath      Office visit 12/21:   Kermit Castro is a 77 yo M with ILD, HTN, HLD, CAD s/p CABG, PAD s/p BL peripheral interventions in 2006 and back pain who presents for LE edema and BL LE pain with ambulation. He states he gets pain in his buttocks, thighs and calves with minimal ambulation that improves with rest. Does not believe he gets these pains with lying in bed or lying still. Also states he intermittently gets LE edema. He was previously indicated to wear compression stockings for venous stasis but has not been wearing these lately because they are too tight. Denies angina, MONTELONGO, orthopnea, PND or any other focal complaints.     Office visit 04/19/22 (today):  Since our last visit, he has had RLE intervention with Dr. Nagy where he was considered for LLE intervention pending symptoms. He originally states he did well, however, he is now experiencing LLE claudication. He states he has been compliant with all medications. He also complains of worsening MONTELONGO that is worse than is normal from him from ILD/emphysema. This has been associated with LE edema as well. States he has also noticed decreased exercise tolerance. No other acute events.       Review of Systems   Respiratory: Positive for shortness of breath.    Cardiovascular: Positive for orthopnea, claudication and leg swelling.   All other systems reviewed and are negative.       Objective:     BP (!) 175/84 (BP Location: Left arm, Patient Position: Sitting, BP Method: Large (Automatic))   Pulse 77   Ht 5' 8" (1.727 m)   Wt 91.6 kg (201 lb 15.1 oz)   SpO2 96%   BMI 30.71 kg/m²     Physical Exam  Vitals and nursing note reviewed.   Constitutional:       Appearance: He is obese.   HENT:      Head: Normocephalic and atraumatic.      Nose: Nose normal.      Mouth/Throat:      Mouth: Mucous membranes are moist.   Eyes:      Pupils: Pupils are " equal, round, and reactive to light.   Cardiovascular:      Rate and Rhythm: Normal rate and regular rhythm.      Pulses: Normal pulses.   Pulmonary:      Effort: Pulmonary effort is normal.      Breath sounds: Rales present.   Abdominal:      General: Abdomen is flat.   Musculoskeletal:         General: Normal range of motion.      Cervical back: Normal range of motion.      Right lower leg: Edema present.      Left lower leg: Edema present.   Skin:     General: Skin is warm.      Capillary Refill: Capillary refill takes less than 2 seconds.   Neurological:      General: No focal deficit present.      Mental Status: He is alert and oriented to person, place, and time.         Labs:     Lab Results   Component Value Date     04/19/2022    K 4.2 04/19/2022     04/19/2022    CO2 30 (H) 04/19/2022    BUN 10 04/19/2022    CREATININE 0.9 04/19/2022    ANIONGAP 7 (L) 04/19/2022     No results found for: HGBA1C  Lab Results   Component Value Date     (H) 04/19/2022    BNP 74 12/21/2021       Lab Results   Component Value Date    WBC 8.33 01/25/2022    HGB 12.6 (L) 01/25/2022    HCT 39.4 (L) 01/25/2022    HCT 47 08/18/2020     01/25/2022    GRAN 5.4 01/25/2022    GRAN 64.9 01/25/2022     Lab Results   Component Value Date    CHOL 152 03/17/2022    HDL 51 03/17/2022    LDLCALC 84.4 03/17/2022    TRIG 83 03/17/2022       Meds:     Current Outpatient Medications:     albuterol-ipratropium (DUO-NEB) 2.5 mg-0.5 mg/3 mL nebulizer solution, Take 3 mLs by nebulization every 6 (six) hours as needed for Shortness of Breath. Rescue, Disp: 3 mL, Rfl: 11    ALPRAZolam (XANAX) 0.5 MG tablet, Take 0.5 mg by mouth 4 (four) times daily as needed., Disp: , Rfl:     amLODIPine (NORVASC) 10 MG tablet, amlodipine besylate  10 mg tabs, Disp: , Rfl:     aspirin 325 MG tablet, aspirin 325 mg tablet  Take 1 tablet every day by oral route., Disp: , Rfl:     atorvastatin (LIPITOR) 40 MG tablet, Take 2 tablets (80 mg  total) by mouth once daily., Disp: 90 tablet, Rfl: 7    betamethasone acetate-betamethasone sodium phosphate (CELESTONE) 6 mg/mL injection, 1 mL., Disp: , Rfl:     cilostazoL (PLETAL) 100 MG Tab, Take 1 tablet by mouth twice daily, Disp: 180 tablet, Rfl: 3    flu vacc [id number],65yr up,PF (FLUZONE HIGH-DOSE 2019-20, PF,) 180 mcg/0.5 mL Syrg, Fluzone High-Dose 2019-20 (PF) 180 mcg/0.5 mL intramuscular syringe  PHARMACIST ADMINISTERED IMMUNIZATION ADMINISTERED AT TIME OF DISPENSING, Disp: , Rfl:     lubiprostone (AMITIZA) 24 MCG Cap, Amitiza 24 mcg capsule  Take 1 capsule twice a day by oral route with meals for 30 days., Disp: , Rfl:     multivitamin (THERAGRAN) per tablet, Take 1 tablet by mouth once daily., Disp: , Rfl:     omega 3-dha-epa-fish oil (FISH OIL) 100-160-1,000 mg Cap, Fish Oil  daily, Disp: , Rfl:     oxyCODONE-acetaminophen (PERCOCET)  mg per tablet, oxycod/apap  tab 10-325mg, Disp: , Rfl:     pirfenidone (ESBRIET) 801 mg Tab, Take 1 capsule by mouth 3 (three) times daily., Disp: 90 tablet, Rfl: 3    polyethylene glycol (GLYCOLAX) 17 gram/dose powder, Miralax 17 gram/dose oral powder  use as directed, Disp: , Rfl:     predniSONE (DELTASONE) 50 MG Tab, 1 tablet po Q 6 hours x 3, Disp: 3 tablet, Rfl: 0    pregabalin (LYRICA) 50 MG capsule, pregabalin 50 mg capsule, Disp: , Rfl:     pregabalin (LYRICA) 50 MG capsule, , Disp: , Rfl:     pulse oximeter (PULSE OXIMETER) device, by Apply Externally route 2 (two) times a day. Use twice daily at 8 AM and 3 PM and [facility]., Disp: 1 each, Rfl: 0    saw palmetto 160 MG capsule, Take 160 mg by mouth 2 (two) times daily., Disp: , Rfl:     sildenafiL (VIAGRA) 100 MG tablet, Take 100 mg by mouth., Disp: , Rfl:     SUPREP BOWEL PREP KIT 17.5-3.13-1.6 gram SolR, Take by mouth., Disp: , Rfl:     cetirizine (ZYRTEC) 10 MG tablet, Take 1 tablet (10 mg total) by mouth once daily. (Patient not taking: Reported on  1/18/2022), Disp: 30 tablet, Rfl: 0    ezetimibe (ZETIA) 10 mg tablet, Take 1 tablet (10 mg total) by mouth once daily., Disp: 90 tablet, Rfl: 3    furosemide (LASIX) 40 MG tablet, Take 1 tablet (40 mg total) by mouth once daily., Disp: 30 tablet, Rfl: 11    lisinopriL (PRINIVIL,ZESTRIL) 20 MG tablet, Take 1.5 tablets (30 mg total) by mouth once daily., Disp: 90 tablet, Rfl: 3    metoprolol succinate (TOPROL-XL) 100 MG 24 hr tablet, Take 1 tablet (100 mg total) by mouth once daily., Disp: 30 tablet, Rfl: 11    pantoprazole (PROTONIX) 40 MG tablet, Take 1 tablet (40 mg total) by mouth once daily. (Patient not taking: Reported on 3/8/2022), Disp: 30 tablet, Rfl: 0    Current Facility-Administered Medications:     acetaminophen tablet 650 mg, 650 mg, Oral, Once PRN, Rishabh Ware MD    albuterol inhaler 2 puff, 2 puff, Inhalation, Q20 Min PRN, Rishabh Ware MD    EPINEPHrine (EPIPEN) 0.3 mg/0.3 mL pen injection 0.3 mg, 0.3 mg, Intramuscular, PRN, Rishabh Ware MD    methylPREDNISolone sodium succinate injection 40 mg, 40 mg, Intravenous, Once PRN, Rishabh Ware MD    ondansetron disintegrating tablet 4 mg, 4 mg, Oral, Once PRN, Rishabh Ware MD    sodium chloride 0.9% 500 mL flush bag, , Intravenous, PRN, Rishabh Ware MD    sodium chloride 0.9% flush 10 mL, 10 mL, Intravenous, PRN, Rishabh Ware MD      Assessment & Plan:     IPF (idiopathic pulmonary fibrosis)  -treatment per pulm    PAD (peripheral artery disease)  -s/p recent RLE intervention with Dr. Nagy and is now complaining of LLE claudication.    Plan:   -refer to Dr. Nagy for consideration of intervention  -continue asa and lipitor, add zetia with LDL not at goal     Mixed hyperlipidemia  -continue lipitor 80, add zetia for LDL not at goal     Essential hypertension  -BP uncontrolled recently with SBP in clinic 175-->158  -increase lisinopril to 30, continue metoprolol 100 and will add second agent at next  visit if still uncontrolled with above and lasix     Dyspnea on exertion  -see HFpEF    Coronary artery disease involving native coronary artery of native heart with angina pectoris with documented spasm  -continue asa, lipitor and metoprolol     Heart failure with preserved ejection fraction  -LE edema on exam and NYHA class III symptoms  -start lasix 40qd and follow up in 2 weeks  -monitor BMP today and in 1 week     Morbid obesity  -counseled on weight loss and exercise

## 2022-04-20 ENCOUNTER — OFFICE VISIT (OUTPATIENT)
Dept: OTOLARYNGOLOGY | Facility: CLINIC | Age: 77
End: 2022-04-20
Payer: MEDICARE

## 2022-04-20 ENCOUNTER — CLINICAL SUPPORT (OUTPATIENT)
Dept: AUDIOLOGY | Facility: CLINIC | Age: 77
End: 2022-04-20
Payer: MEDICARE

## 2022-04-20 VITALS — WEIGHT: 198.19 LBS | BODY MASS INDEX: 30.14 KG/M2

## 2022-04-20 DIAGNOSIS — H90.3 SENSORINEURAL HEARING LOSS (SNHL), BILATERAL: Primary | ICD-10-CM

## 2022-04-20 DIAGNOSIS — H91.90 HEARING LOSS, UNSPECIFIED HEARING LOSS TYPE, UNSPECIFIED LATERALITY: ICD-10-CM

## 2022-04-20 DIAGNOSIS — H93.13 TINNITUS, SUBJECTIVE, BILATERAL: ICD-10-CM

## 2022-04-20 PROCEDURE — 92567 PR TYMPA2METRY: ICD-10-PCS | Mod: S$GLB,,,

## 2022-04-20 PROCEDURE — 1159F PR MEDICATION LIST DOCUMENTED IN MEDICAL RECORD: ICD-10-PCS | Mod: CPTII,S$GLB,, | Performed by: OTOLARYNGOLOGY

## 2022-04-20 PROCEDURE — 1159F MED LIST DOCD IN RCRD: CPT | Mod: CPTII,S$GLB,, | Performed by: OTOLARYNGOLOGY

## 2022-04-20 PROCEDURE — 92557 PR COMPREHENSIVE HEARING TEST: ICD-10-PCS | Mod: S$GLB,,,

## 2022-04-20 PROCEDURE — 1126F AMNT PAIN NOTED NONE PRSNT: CPT | Mod: CPTII,S$GLB,, | Performed by: OTOLARYNGOLOGY

## 2022-04-20 PROCEDURE — 92557 COMPREHENSIVE HEARING TEST: CPT | Mod: S$GLB,,,

## 2022-04-20 PROCEDURE — 3288F FALL RISK ASSESSMENT DOCD: CPT | Mod: CPTII,S$GLB,, | Performed by: OTOLARYNGOLOGY

## 2022-04-20 PROCEDURE — 1101F PT FALLS ASSESS-DOCD LE1/YR: CPT | Mod: CPTII,S$GLB,, | Performed by: OTOLARYNGOLOGY

## 2022-04-20 PROCEDURE — 99999 PR PBB SHADOW E&M-EST. PATIENT-LVL IV: CPT | Mod: PBBFAC,,, | Performed by: OTOLARYNGOLOGY

## 2022-04-20 PROCEDURE — 1126F PR PAIN SEVERITY QUANTIFIED, NO PAIN PRESENT: ICD-10-PCS | Mod: CPTII,S$GLB,, | Performed by: OTOLARYNGOLOGY

## 2022-04-20 PROCEDURE — 99999 PR PBB SHADOW E&M-EST. PATIENT-LVL IV: ICD-10-PCS | Mod: PBBFAC,,, | Performed by: OTOLARYNGOLOGY

## 2022-04-20 PROCEDURE — 92567 TYMPANOMETRY: CPT | Mod: S$GLB,,,

## 2022-04-20 PROCEDURE — 3288F PR FALLS RISK ASSESSMENT DOCUMENTED: ICD-10-PCS | Mod: CPTII,S$GLB,, | Performed by: OTOLARYNGOLOGY

## 2022-04-20 PROCEDURE — 1101F PR PT FALLS ASSESS DOC 0-1 FALLS W/OUT INJ PAST YR: ICD-10-PCS | Mod: CPTII,S$GLB,, | Performed by: OTOLARYNGOLOGY

## 2022-04-20 PROCEDURE — 99203 PR OFFICE/OUTPT VISIT, NEW, LEVL III, 30-44 MIN: ICD-10-PCS | Mod: S$GLB,,, | Performed by: OTOLARYNGOLOGY

## 2022-04-20 PROCEDURE — 99203 OFFICE O/P NEW LOW 30 MIN: CPT | Mod: S$GLB,,, | Performed by: OTOLARYNGOLOGY

## 2022-04-20 NOTE — ASSESSMENT & PLAN NOTE
-s/p recent RLE intervention with Dr. Nagy and is now complaining of LLE claudication.    Plan:   -refer to Dr. Nagy for consideration of intervention  -continue asa and lipitor, add zetia with LDL not at goal

## 2022-04-20 NOTE — ASSESSMENT & PLAN NOTE
-LE edema on exam and NYHA class III symptoms  -start lasix 40qd and follow up in 2 weeks  -monitor BMP today and in 1 week

## 2022-04-20 NOTE — PROGRESS NOTES
Kermit Castro was seen today in the clinic for an audiologic evaluation.  Patient's main complaint was decreased hearing in both ears. Mr. Castro reported he is currently fit with in the ear (ITE) hearing aids from an outside clinic. He reported his right hearing aid is causing soreness in his right ear canal. He endorsed tinnitus. He reported a history of occupational noise exposure without hearing protection. He reported occasional dizziness, with no apparent provoking movement of cause.    Tympanometry revealed Type A in the right ear and Type A in the left ear.     Audiogram results revealed a moderate to severe sensorineural hearing loss (SNHL) in the right ear and a moderate to severe SNHL in the left ear. There was a significant asymmetry noted between ears from 4423-9512 Hz, with the right ear poorer than the left ear.    Speech reception thresholds were noted at 55 dBHL in the right ear and 50 dBHL in the left ear.    Speech discrimination scores were 84% in the right ear and 80% in the left ear.    Recommendations:  1. Otologic evaluation  2. Hearing aid follow-up with fitting provider to adjust devices  3. Daily and consistent use of binaural amplification  4. Annual audiogram or sooner if change perceived  5. Hearing protection in noise

## 2022-04-20 NOTE — PROGRESS NOTES
Subjective:       Patient ID: Kermit Castro is a 76 y.o. male.    Chief Complaint: Otalgia and Hearing Loss    HPI: Pt with R ear itching/ HL.    Prog for ?? Weeks ??    Has HA's Aaron.    Past Medical History: Patient has a past medical history of Coronary artery disease, Hyperlipidemia, and Hypertension.    Past Surgical History: Patient has a past surgical history that includes Coronary artery bypass graft; back surgery (screws & okate); Abdominal surgery; Appendectomy; Transforaminal epidural injection of steroid (Right, 11/5/2020); and Percutaneous transluminal angioplasty (PTA) of peripheral vessel (N/A, 1/27/2022).    Social History: Patient reports that he quit smoking about 23 years ago. He has never used smokeless tobacco. He reports previous alcohol use. He reports that he does not use drugs.    Family History: family history is not on file.    Medications:   Current Outpatient Medications   Medication Sig    albuterol-ipratropium (DUO-NEB) 2.5 mg-0.5 mg/3 mL nebulizer solution Take 3 mLs by nebulization every 6 (six) hours as needed for Shortness of Breath. Rescue    ALPRAZolam (XANAX) 0.5 MG tablet Take 0.5 mg by mouth 4 (four) times daily as needed.    amLODIPine (NORVASC) 10 MG tablet amlodipine besylate  10 mg tabs    aspirin 325 MG tablet aspirin 325 mg tablet   Take 1 tablet every day by oral route.    atorvastatin (LIPITOR) 40 MG tablet Take 2 tablets (80 mg total) by mouth once daily.    betamethasone acetate-betamethasone sodium phosphate (CELESTONE) 6 mg/mL injection 1 mL.    cilostazoL (PLETAL) 100 MG Tab Take 1 tablet by mouth twice daily    ezetimibe (ZETIA) 10 mg tablet Take 1 tablet (10 mg total) by mouth once daily.    flu vacc [id number],65yr up,PF (FLUZONE HIGH-DOSE 2019-20, PF,) 180 mcg/0.5 mL Syrg Fluzone High-Dose 2019-20 (PF) 180 mcg/0.5 mL intramuscular syringe   PHARMACIST ADMINISTERED IMMUNIZATION ADMINISTERED AT TIME OF DISPENSING    furosemide (LASIX) 40 MG tablet Take  1 tablet (40 mg total) by mouth once daily.    lisinopriL (PRINIVIL,ZESTRIL) 20 MG tablet Take 1.5 tablets (30 mg total) by mouth once daily.    lubiprostone (AMITIZA) 24 MCG Cap Amitiza 24 mcg capsule   Take 1 capsule twice a day by oral route with meals for 30 days.    metoprolol succinate (TOPROL-XL) 100 MG 24 hr tablet Take 1 tablet (100 mg total) by mouth once daily.    multivitamin (THERAGRAN) per tablet Take 1 tablet by mouth once daily.    omega 3-dha-epa-fish oil (FISH OIL) 100-160-1,000 mg Cap Fish Oil   daily    oxyCODONE-acetaminophen (PERCOCET)  mg per tablet oxycod/apap  tab 10-325mg    pirfenidone (ESBRIET) 801 mg Tab Take 1 capsule by mouth 3 (three) times daily.    polyethylene glycol (GLYCOLAX) 17 gram/dose powder Miralax 17 gram/dose oral powder   use as directed    predniSONE (DELTASONE) 50 MG Tab 1 tablet po Q 6 hours x 3    pregabalin (LYRICA) 50 MG capsule pregabalin 50 mg capsule    pregabalin (LYRICA) 50 MG capsule     pulse oximeter (PULSE OXIMETER) device by Apply Externally route 2 (two) times a day. Use twice daily at 8 AM and 3 PM and record the value in [facility] as directed.    saw palmetto 160 MG capsule Take 160 mg by mouth 2 (two) times daily.    sildenafiL (VIAGRA) 100 MG tablet Take 100 mg by mouth.    SUPREP BOWEL PREP KIT 17.5-3.13-1.6 gram SolR Take by mouth.    cetirizine (ZYRTEC) 10 MG tablet Take 1 tablet (10 mg total) by mouth once daily. (Patient not taking: Reported on 1/18/2022)    pantoprazole (PROTONIX) 40 MG tablet Take 1 tablet (40 mg total) by mouth once daily. (Patient not taking: Reported on 3/8/2022)     Current Facility-Administered Medications   Medication    acetaminophen tablet 650 mg    albuterol inhaler 2 puff    EPINEPHrine (EPIPEN) 0.3 mg/0.3 mL pen injection 0.3 mg    methylPREDNISolone sodium succinate injection 40 mg    ondansetron disintegrating tablet 4 mg    sodium chloride 0.9% 500 mL flush bag    sodium chloride 0.9%  flush 10 mL       Allergies: Patient is allergic to iodine and iodide containing products and shellfish containing products.    Review of Systems   Constitutional: Negative for activity change, appetite change, chills, diaphoresis, fatigue, fever and unexpected weight change.   HENT: Positive for hearing loss. Negative for nasal congestion, ear discharge, ear pain, facial swelling, nosebleeds, postnasal drip, rhinorrhea, sinus pressure/congestion, sneezing, sore throat, tinnitus, trouble swallowing and voice change.    Eyes: Negative for photophobia, pain, discharge, redness and visual disturbance.   Respiratory: Negative for cough, chest tightness, shortness of breath and wheezing.    Cardiovascular: Negative for chest pain and palpitations.   Gastrointestinal: Negative for abdominal pain, constipation, diarrhea and nausea.   Genitourinary: Negative for dysuria and frequency.   Musculoskeletal: Negative for arthralgias, back pain, gait problem, joint swelling, myalgias, neck pain and neck stiffness.   Integumentary:  Negative for color change, pallor and rash.   Neurological: Negative for dizziness, tremors, seizures, syncope, facial asymmetry, speech difficulty, weakness, light-headedness, numbness and headaches.   Hematological: Negative for adenopathy. Does not bruise/bleed easily.   Psychiatric/Behavioral: Negative for agitation, confusion, decreased concentration, dysphoric mood and sleep disturbance. The patient is not nervous/anxious and is not hyperactive.          Objective:      Physical Exam  Vitals and nursing note reviewed.   Constitutional:       General: He is not in acute distress.     Appearance: Normal appearance. He is well-developed. He is not ill-appearing, toxic-appearing or diaphoretic.   HENT:      Head: Normocephalic and atraumatic. Not macrocephalic and not microcephalic. No raccoon eyes, Franklin's sign, abrasion, contusion, right periorbital erythema, left periorbital erythema or  laceration. Hair is normal.      Right Ear: Ear canal normal. Decreased hearing noted. No laceration, drainage, swelling or tenderness. No middle ear effusion. No foreign body. No mastoid tenderness. No hemotympanum. Tympanic membrane is not injected, scarred, perforated, erythematous, retracted or bulging. Tympanic membrane has normal mobility.      Left Ear: Ear canal normal. Decreased hearing noted. No laceration, drainage, swelling or tenderness.  No middle ear effusion. No foreign body. No mastoid tenderness. No hemotympanum. Tympanic membrane is not injected, scarred, perforated, erythematous, retracted or bulging. Tympanic membrane has normal mobility.      Nose: No nasal deformity, septal deviation, laceration, mucosal edema or rhinorrhea.      Right Sinus: No maxillary sinus tenderness.      Left Sinus: No maxillary sinus tenderness.   Eyes:      General: Lids are normal.      Conjunctiva/sclera: Conjunctivae normal.      Pupils: Pupils are equal, round, and reactive to light.   Neck:      Thyroid: No thyroid mass or thyromegaly.      Vascular: No JVD.      Trachea: No tracheal tenderness or tracheal deviation.   Cardiovascular:      Rate and Rhythm: Normal rate and regular rhythm.   Pulmonary:      Effort: Pulmonary effort is normal. No tachypnea, bradypnea, accessory muscle usage or respiratory distress.      Breath sounds: No stridor.   Abdominal:      Palpations: Abdomen is soft.   Musculoskeletal:         General: Normal range of motion.      Cervical back: Normal range of motion and neck supple. No edema, erythema or rigidity. No muscular tenderness. Normal range of motion.   Lymphadenopathy:      Head:      Right side of head: No submental, submandibular, tonsillar, preauricular or posterior auricular adenopathy.      Left side of head: No submental, submandibular, tonsillar, preauricular or posterior auricular adenopathy.      Cervical: No cervical adenopathy.      Right cervical: No superficial,  deep or posterior cervical adenopathy.     Left cervical: No superficial, deep or posterior cervical adenopathy.   Skin:     General: Skin is warm and dry.      Coloration: Skin is not pale.      Findings: No abrasion, bruising, burn, ecchymosis, erythema, laceration, lesion or rash.   Neurological:      Mental Status: He is alert and oriented to person, place, and time.      Cranial Nerves: No cranial nerve deficit.      Sensory: No sensory deficit.      Motor: No tremor, atrophy, abnormal muscle tone or seizure activity.   Psychiatric:         Speech: He is communicative. Speech is not rapid and pressured or slurred.         Behavior: Behavior normal. Behavior is cooperative.         Thought Content: Thought content normal.         Judgment: Judgment normal.               Has R CI.    Procedure Note:    Patient was brought to the minor procedure room and using the operating microscope the right ear canal  was cleaned of ceruminous debris. There was a significant cerumen impaction.  The forceps and suction were both used to perform this. Tympanic membrane intact. Pt tolerated well. There were no complications.      Assessment:       Problem List Items Addressed This Visit     Hearing loss          Plan:           Patient to see Audiology for hearing aid evaluation.

## 2022-04-20 NOTE — ASSESSMENT & PLAN NOTE
-BP uncontrolled recently with SBP in clinic 175-->158  -increase lisinopril to 30, continue metoprolol 100 and will add second agent at next visit if still uncontrolled with above and lasix

## 2022-04-26 ENCOUNTER — LAB VISIT (OUTPATIENT)
Dept: LAB | Facility: HOSPITAL | Age: 77
End: 2022-04-26
Attending: STUDENT IN AN ORGANIZED HEALTH CARE EDUCATION/TRAINING PROGRAM
Payer: MEDICARE

## 2022-04-26 DIAGNOSIS — I50.30 HEART FAILURE WITH PRESERVED EJECTION FRACTION, UNSPECIFIED HF CHRONICITY: ICD-10-CM

## 2022-04-26 LAB
ANION GAP SERPL CALC-SCNC: 12 MMOL/L (ref 8–16)
CALCIUM SERPL-MCNC: 8.7 MG/DL (ref 8.7–10.5)
CHLORIDE SERPL-SCNC: 103 MMOL/L (ref 95–110)
CO2 SERPL-SCNC: 29 MMOL/L (ref 23–29)
CREAT SERPL-MCNC: 1.03 MG/DL (ref 0.5–1.4)
EST. GFR  (AFRICAN AMERICAN): >60 ML/MIN/1.73 M^2
EST. GFR  (NON AFRICAN AMERICAN): >60 ML/MIN/1.73 M^2
GLUCOSE SERPL-MCNC: 132 MG/DL (ref 70–110)
NT-PROBNP SERPL-MCNC: 152 PG/ML (ref 5–1800)
POTASSIUM SERPL-SCNC: 3.6 MMOL/L (ref 3.5–5.1)
SODIUM SERPL-SCNC: 144 MMOL/L (ref 136–145)
UUN UR-MCNC: 12 MG/DL (ref 2–20)

## 2022-04-26 PROCEDURE — 80048 BASIC METABOLIC PNL TOTAL CA: CPT | Mod: PO | Performed by: STUDENT IN AN ORGANIZED HEALTH CARE EDUCATION/TRAINING PROGRAM

## 2022-04-26 PROCEDURE — 36415 COLL VENOUS BLD VENIPUNCTURE: CPT | Mod: PO | Performed by: STUDENT IN AN ORGANIZED HEALTH CARE EDUCATION/TRAINING PROGRAM

## 2022-04-26 PROCEDURE — 83880 ASSAY OF NATRIURETIC PEPTIDE: CPT | Mod: PO | Performed by: STUDENT IN AN ORGANIZED HEALTH CARE EDUCATION/TRAINING PROGRAM

## 2022-05-02 ENCOUNTER — SPECIALTY PHARMACY (OUTPATIENT)
Dept: PHARMACY | Facility: CLINIC | Age: 77
End: 2022-05-02
Payer: MEDICARE

## 2022-05-02 ENCOUNTER — TELEPHONE (OUTPATIENT)
Dept: CARDIOLOGY | Facility: CLINIC | Age: 77
End: 2022-05-02
Payer: MEDICARE

## 2022-05-02 NOTE — TELEPHONE ENCOUNTER
5/2 - Incoming call from pt to set up refill for Esbriet. Pt had a hard time hearing, gave phone to wife (Samira). Pt's wife stated pt has been feeling bad on Esbriet. Stated his head constantly hurts and he has been dizzy and just feels bad each time after taking the medication. When asked how long has pt been experiencing the side effects, pt's wife stated ever since he has been on the medication but pt tried to manage in hopes of side effects subsiding. Asked have pt tried taking medication with food to help, pt's wife stated she makes sure he eats a full meal before taking any of the dose.     Pt's wife asked if [facility] can reach out to provider regarding side effects, if pt should continue Esbriet 801 mg or change therapy or lower strength. Alerting assigned PharmD to reach out to [facility]. Pt has 4 days left on hand. Advised pt if have not heard back from [facility], to give us a call on Wednesday 5/4. Pt's wife had no other concerns/questions

## 2022-05-02 NOTE — TELEPHONE ENCOUNTER
InBasket sent to inform provider of intolerable Esbriet side effects    Intervention opened to track

## 2022-05-03 ENCOUNTER — DOCUMENTATION ONLY (OUTPATIENT)
Dept: CARDIAC CATH/INVASIVE PROCEDURES | Facility: HOSPITAL | Age: 77
End: 2022-05-03
Payer: MEDICARE

## 2022-05-03 ENCOUNTER — DOCUMENTATION ONLY (OUTPATIENT)
Dept: CARDIOLOGY | Facility: CLINIC | Age: 77
End: 2022-05-03
Payer: MEDICARE

## 2022-05-03 ENCOUNTER — OFFICE VISIT (OUTPATIENT)
Dept: CARDIOLOGY | Facility: CLINIC | Age: 77
End: 2022-05-03
Payer: MEDICARE

## 2022-05-03 VITALS
HEART RATE: 77 BPM | DIASTOLIC BLOOD PRESSURE: 74 MMHG | HEIGHT: 68 IN | WEIGHT: 190.94 LBS | BODY MASS INDEX: 28.94 KG/M2 | OXYGEN SATURATION: 99 % | SYSTOLIC BLOOD PRESSURE: 138 MMHG

## 2022-05-03 DIAGNOSIS — R06.09 DYSPNEA ON EXERTION: ICD-10-CM

## 2022-05-03 DIAGNOSIS — I25.111 CORONARY ARTERY DISEASE INVOLVING NATIVE CORONARY ARTERY OF NATIVE HEART WITH ANGINA PECTORIS WITH DOCUMENTED SPASM: ICD-10-CM

## 2022-05-03 DIAGNOSIS — I25.111 CORONARY ARTERY DISEASE INVOLVING NATIVE CORONARY ARTERY OF NATIVE HEART WITH ANGINA PECTORIS WITH DOCUMENTED SPASM: Primary | ICD-10-CM

## 2022-05-03 DIAGNOSIS — E78.2 MIXED HYPERLIPIDEMIA: ICD-10-CM

## 2022-05-03 DIAGNOSIS — I10 ESSENTIAL HYPERTENSION: ICD-10-CM

## 2022-05-03 DIAGNOSIS — I73.9 PAD (PERIPHERAL ARTERY DISEASE): ICD-10-CM

## 2022-05-03 DIAGNOSIS — I70.211 ATHEROSCLEROSIS OF NATIVE ARTERY OF RIGHT LOWER EXTREMITY WITH INTERMITTENT CLAUDICATION: Primary | ICD-10-CM

## 2022-05-03 PROCEDURE — 3075F PR MOST RECENT SYSTOLIC BLOOD PRESS GE 130-139MM HG: ICD-10-PCS | Mod: CPTII,S$GLB,, | Performed by: INTERNAL MEDICINE

## 2022-05-03 PROCEDURE — 3075F SYST BP GE 130 - 139MM HG: CPT | Mod: CPTII,S$GLB,, | Performed by: INTERNAL MEDICINE

## 2022-05-03 PROCEDURE — 1160F RVW MEDS BY RX/DR IN RCRD: CPT | Mod: CPTII,S$GLB,, | Performed by: INTERNAL MEDICINE

## 2022-05-03 PROCEDURE — 99215 OFFICE O/P EST HI 40 MIN: CPT | Mod: S$GLB,,, | Performed by: INTERNAL MEDICINE

## 2022-05-03 PROCEDURE — 3078F DIAST BP <80 MM HG: CPT | Mod: CPTII,S$GLB,, | Performed by: INTERNAL MEDICINE

## 2022-05-03 PROCEDURE — 99999 PR PBB SHADOW E&M-EST. PATIENT-LVL V: ICD-10-PCS | Mod: PBBFAC,,, | Performed by: INTERNAL MEDICINE

## 2022-05-03 PROCEDURE — 1160F PR REVIEW ALL MEDS BY PRESCRIBER/CLIN PHARMACIST DOCUMENTED: ICD-10-PCS | Mod: CPTII,S$GLB,, | Performed by: INTERNAL MEDICINE

## 2022-05-03 PROCEDURE — 1159F MED LIST DOCD IN RCRD: CPT | Mod: CPTII,S$GLB,, | Performed by: INTERNAL MEDICINE

## 2022-05-03 PROCEDURE — 1125F AMNT PAIN NOTED PAIN PRSNT: CPT | Mod: CPTII,S$GLB,, | Performed by: INTERNAL MEDICINE

## 2022-05-03 PROCEDURE — 3078F PR MOST RECENT DIASTOLIC BLOOD PRESSURE < 80 MM HG: ICD-10-PCS | Mod: CPTII,S$GLB,, | Performed by: INTERNAL MEDICINE

## 2022-05-03 PROCEDURE — 1159F PR MEDICATION LIST DOCUMENTED IN MEDICAL RECORD: ICD-10-PCS | Mod: CPTII,S$GLB,, | Performed by: INTERNAL MEDICINE

## 2022-05-03 PROCEDURE — 1125F PR PAIN SEVERITY QUANTIFIED, PAIN PRESENT: ICD-10-PCS | Mod: CPTII,S$GLB,, | Performed by: INTERNAL MEDICINE

## 2022-05-03 PROCEDURE — 99215 PR OFFICE/OUTPT VISIT, EST, LEVL V, 40-54 MIN: ICD-10-PCS | Mod: S$GLB,,, | Performed by: INTERNAL MEDICINE

## 2022-05-03 PROCEDURE — 99999 PR PBB SHADOW E&M-EST. PATIENT-LVL V: CPT | Mod: PBBFAC,,, | Performed by: INTERNAL MEDICINE

## 2022-05-03 RX ORDER — CLOPIDOGREL BISULFATE 75 MG/1
TABLET ORAL
Qty: 34 TABLET | Refills: 11 | Status: SHIPPED | OUTPATIENT
Start: 2022-05-03

## 2022-05-03 RX ORDER — PREDNISONE 50 MG/1
50 TABLET ORAL EVERY 6 HOURS
Qty: 3 TABLET | Refills: 0 | Status: SHIPPED | OUTPATIENT
Start: 2022-05-03 | End: 2022-06-17

## 2022-05-03 RX ORDER — CIMETIDINE 300 MG/1
TABLET, FILM COATED ORAL
Qty: 3 TABLET | Refills: 0 | Status: SHIPPED | OUTPATIENT
Start: 2022-05-03 | End: 2022-06-17

## 2022-05-03 RX ORDER — DIPHENHYDRAMINE HCL 50 MG
CAPSULE ORAL
Qty: 3 CAPSULE | Refills: 0 | Status: SHIPPED | OUTPATIENT
Start: 2022-05-03 | End: 2022-06-17

## 2022-05-03 NOTE — PROGRESS NOTES
Called patient and discussed labs with him. He is still SOB for which he is going to get an angiogram with Dr. Nagy for before having PAD angioplasty. He is taking the lasix and states he has less edema. He will see one of my colleagues tomorrow.

## 2022-05-03 NOTE — PROGRESS NOTES
OUTPATIENT CATHETERIZATION INSTRUCTIONS    You have been scheduled for a procedure in the catheterization lab on Monday, May 16, 2022.     Please report to the Cardiology Waiting Area on the Third floor of the hospital and check in at 8:30 AM.   You will then be taken to the SSCU (Short Stay Cardiac Unit) and prepared for your procedure. Please be aware that this is not the time of your procedure but the time you are to arrive. The procedures are scheduled on an hourly basis; however, emergency cases take precedence over all other cases.       1. No solid foods 8 hours prior to your procedure.  You may have clear liquids until the time of your admission which should be 2 hours prior to your procedure.  You are encouraged to drink at least 8 ounces of clear liquids prior to your admission to SSCU.  Patients with gastric emptying issues should be fasting for 6- 8 hours prior to the procedure.  Clear liquids include water, black coffee, clear juices, and performance drinks - no pulp or milk.    2. Heart failure or dialysis patients will be limited to 8 ounces (1 cup) of clear liquids up until 2 hours of the procedure.    3.   You may take your regular morning medications with water. If there are any medications that you should not take you will be instructed to hold them that morning. If you         are diabetic and on Metformin (Glucophage) do not take it the day before, the day of, and for 2 days after your procedure.  4.   If you are on Pradaxa, Eliquis or Coumadin , you can hold them 3 days prior to your procedure.      The procedure will take 1-2 hours to perform. After the procedure, you will return to SSCU on the third floor of the hospital. You will need to lie still (or keep your arm still) for the next 2 to 4 hours to minimize bleeding from the puncture site.  This time is determined by your physician.  Your family may remain in the room with you during this time.       You may be able to be discharged home  "that same afternoon if there is someone to drive you home and there are no complications.  Your doctor will determine, based on your progress, the date and time of your discharge. The results of your procedure will be discussed with you before you are discharged. Any further testing or procedures will be scheduled for you either before you leave or after your discharge..       If you should have any questions, concerns, or need to change the date of your procedure, please call "PK Alvarez @ [phone]    Special Instructions:    Take 1 tab at 6 pm & 11 pm on night before your proc, then 1 tab at 6 am on day of proc (Benadryl, Prednisone, Pepcid)    Take your Aspirin the morning of your procedure.    Continue your other current medications.          THE ABOVE INSTRUCTIONS WERE GIVEN TO THE PATIENT VERBALLY AND THEY VERBALIZED UNDERSTANDING.  THEY DO NOT REQUIRE ANY SPECIAL NEEDS AND DO NOT HAVE ANY LEARNING BARRIERS.          Directions for Reporting to Cardiology Waiting Area in the Hospital  If you park in the Parking Garage:  Take elevators to the1st floor of the parking garage.  Continue past the gift shop, coffee shop, and piano.  Take a right and go to the gold elevators. (Elevator B)  Take the elevator to the 3rd floor.  Follow the arrow on the sign on the wall that says Cath Lab Registration/EP Lab Registration.  Follow the long hallway all the way around until you come to a big open area.  This is the registration area.  Check in at Reception Desk.    OR    If family is dropping you off:  Have them drop you off at the front of the Hospital under the green overhang.  Enter through the doors and take a right.  Take the E elevators to the 3rd floor Cardiology Waiting Area.  Check in at the Reception Desk in the waiting room.                "

## 2022-05-03 NOTE — H&P (VIEW-ONLY)
Interventional Cardiology Clinic Note  Reason for Visit: PAD  Referring Physician: Moiz Villalta     HPI:   Patient is a 76-year-old gentleman who was referred her for peripheral arterial disease.     Patient has a long history of peripheral arterial disease dating back to the early 2000s in which he has bilateral SFA stenting done by Dr. Mendoza his last angiograms were in 2006 which revealed occluded SFAS bilaterally and he had stenting of the right SFA and the left SFA with good result.  He had done well up until several months ago he now gets severe bilateral claudication right greater than left when ambulating about 50 ft.  He underwent peripheral angiography with stenting to prox R SFA and mid R SFA.  Prox L CFA to mid L CFA was 70% stenosed. He is continuing to have severe left leg fatigue and associated shortness of breath. He states that he is unable to walk to the  without shortness of breath. His last angiogram was in 2005 by [facility] with patent LIMA to LAD, SVG to OM and SVG to PDA.     Review of Systems   All other systems reviewed and are negative.        PMH:           Past Medical History:   Diagnosis Date    Coronary artery disease      Hyperlipidemia      Hypertension              Past Surgical History:   Procedure Laterality Date    ABDOMINAL SURGERY         Polyps in colon removed (noncancerous)    APPENDECTOMY        back surgery (screws & okate)        CORONARY ARTERY BYPASS GRAFT         1998    PERCUTANEOUS TRANSLUMINAL ANGIOPLASTY (PTA) OF PERIPHERAL VESSEL N/A 1/27/2022     Procedure: PTA, PERIPHERAL VESSEL;  Surgeon: Gume Nagy MD;  [facility] CATH LAB;  Service: Cardiology;  Laterality: N/A;    TRANSFORAMINAL EPIDURAL INJECTION OF STEROID Right 11/5/2020     Procedure: LUMBAR TRANSFORAMINAL RIGHT L3/4 AND L4/5 DIRECT REFERRAL;  Surgeon: Nicole Toth MD;  Location: [facility] PAIN MGT;  Service: Pain Management;  Laterality: Right;  NEEDS CONSENT, PT STATES  NO LONGER TAKES PLETAL      Allergies:           Review of patient's allergies indicates:   Allergen Reactions    Iodine and iodide containing products Anaphylaxis    Shellfish containing products Anaphylaxis      Medications:             Current Outpatient Medications on File Prior to Visit   Medication Sig Dispense Refill    albuterol-ipratropium (DUO-NEB) 2.5 mg-0.5 mg/3 mL nebulizer solution Take 3 mLs by nebulization every 6 (six) hours as needed for Shortness of Breath. Rescue 3 mL 11    ALPRAZolam (XANAX) 0.5 MG tablet Take 0.5 mg by mouth 4 (four) times daily as needed.        amLODIPine (NORVASC) 10 MG tablet amlodipine besylate  10 mg tabs        aspirin 325 MG tablet aspirin 325 mg tablet   Take 1 tablet every day by oral route.        atorvastatin (LIPITOR) 40 MG tablet Take 2 tablets (80 mg total) by mouth once daily. 90 tablet 7    betamethasone acetate-betamethasone sodium phosphate (CELESTONE) 6 mg/mL injection 1 mL.        cetirizine (ZYRTEC) 10 MG tablet Take 1 tablet (10 mg total) by mouth once daily. (Patient not taking: Reported on 1/18/2022) 30 tablet 0    cilostazoL (PLETAL) 100 MG Tab Take 1 tablet by mouth twice daily 180 tablet 3    clopidogreL (PLAVIX) 75 mg tablet Take 1 tablet (75 mg total) by mouth once daily. 30 tablet 0    flu vacc [id number],65yr up,PF (FLUZONE HIGH-DOSE 2019-20, PF,) 180 mcg/0.5 mL Syrg Fluzone High-Dose 2019-20 (PF) 180 mcg/0.5 mL intramuscular syringe   PHARMACIST ADMINISTERED IMMUNIZATION ADMINISTERED AT TIME OF DISPENSING        lisinopriL (PRINIVIL,ZESTRIL) 20 MG tablet lisinopril  20 mg tabs        lubiprostone (AMITIZA) 24 MCG Cap Amitiza 24 mcg capsule   Take 1 capsule twice a day by oral route with meals for 30 days.        metoprolol tartrate (LOPRESSOR) 50 MG tablet metoprolol tartrate  50 mg tabs        multivitamin (THERAGRAN) per tablet Take 1 tablet by mouth once daily.        omega 3-dha-epa-fish oil (FISH OIL) 100-160-1,000 mg Cap  Fish Oil   daily        oxyCODONE-acetaminophen (PERCOCET)  mg per tablet oxycod/apap  tab 10-325mg        pantoprazole (PROTONIX) 40 MG tablet Take 1 tablet (40 mg total) by mouth once daily. 30 tablet 0    pirfenidone (ESBRIET) 801 mg Tab Take 1 capsule by mouth 3 (three) times daily. (Patient not taking: Reported on 1/18/2022) 90 tablet 3    polyethylene glycol (GLYCOLAX) 17 gram/dose powder Miralax 17 gram/dose oral powder   use as directed        predniSONE (DELTASONE) 50 MG Tab 1 tablet po Q 6 hours x 3 3 tablet 0    pregabalin (LYRICA) 50 MG capsule pregabalin 50 mg capsule        pregabalin (LYRICA) 50 MG capsule          pulse oximeter (PULSE OXIMETER) device by Apply Externally route 2 (two) times a day. Use twice daily at 8 AM and 3 PM and record the value in Lucibelhart as directed. 1 each 0    saw palmetto 160 MG capsule Take 160 mg by mouth 2 (two) times daily.        sildenafiL (VIAGRA) 100 MG tablet Take 100 mg by mouth.        SUPREP BOWEL PREP KIT 17.5-3.13-1.6 gram SolR Take by mouth.                    Current Facility-Administered Medications on File Prior to Visit   Medication Dose Route Frequency Provider Last Rate Last Admin    acetaminophen tablet 650 mg  650 mg Oral Once PRN Rishabh Ware MD        albuterol inhaler 2 puff  2 puff Inhalation Q20 Min PRN Rishabh Ware MD        EPINEPHrine (EPIPEN) 0.3 mg/0.3 mL pen injection 0.3 mg  0.3 mg Intramuscular PRN Rishabh Ware MD        methylPREDNISolone sodium succinate injection 40 mg  40 mg Intravenous Once PRN Rishabh Ware MD        ondansetron disintegrating tablet 4 mg  4 mg Oral Once PRN Rishabh Ware MD        sodium chloride 0.9% 500 mL flush bag   Intravenous PRN Rishabh Ware MD        sodium chloride 0.9% flush 10 mL  10 mL Intravenous PRN Rishabh Ware MD          Social History:      Social History            Tobacco Use    Smoking status: Former Smoker       Quit date:  10/5/1998       Years since quittin.4    Smokeless tobacco: Never Used   Substance Use Topics    Alcohol use: Not Currently       Alcohol/week: 0.0 standard drinks       Comment:       Family History:   No family history on file.     Physical Exam  There were no vitals taken for this visit.   GEN: Alert and oriented in NAD  NECK: no JVD appreciated   CVS: RRR, s1/s2, no MRG  PULM: CTAB no rales  ABD: NT/ND BS +  Extremities: warm and dry,  no edema  NEURO: Alert and oriented x 3  PSYCH: appropriate affect.            Labs:            Lab Results   Component Value Date      2022     K 3.9 2022      2022     CO2 28 2022     BUN 10 2022     CREATININE 0.87 2022     ANIONGAP 7 (L) 2022      No results found for: HGBA1C        Lab Results   Component Value Date     BNP 74 2021           Lab Results   Component Value Date     WBC 8.33 2022     HGB 12.6 (L) 2022     HCT 39.4 (L) 2022     HCT 47 2020      2022     GRAN 5.4 2022     GRAN 64.9 2022            Lab Results   Component Value Date     CHOL 145 10/05/2021     HDL 43 10/05/2021     LDLCALC 84.6 10/05/2021     TRIG 87 10/05/2021                   EF   Date Value Ref Range Status   2021 55 % Final            Nuc Stress EF   Date Value Ref Range Status   10/14/2019 76 % Final            Nuc Rest EF   Date Value Ref Range Status   10/14/2019 59.0   Final          Assessment and Plan  Kermit Castro is a 76 y.o. gentleman who presents for severe peripheral arterial disease.         ICD-10-CM ICD-9-CM     1. PAD (peripheral artery disease)   Patient is a 76-year-old gentleman with PAD s/p PCI to pR SFA and m R SFA.  He says he feels much better.  JOAQUINA 0.96 on R and 0.68 on L. Will plan to intervene on the left after coronary angiogram is done.     I73.9 443.9 Case Request-Cath Lab: PTA, PERIPHERAL VESSEL         Case Request-Cath Lab: PTA,  PERIPHERAL VESSEL   2. Mixed hyperlipidemia   Would continue statin at this time. E78.2 272.2     3. Essential hypertension   Blood pressure is well controlled at this time will continue current medicines. I10 401.9     4. Hx of CABG   Patient had a CABG back in 1990 19 he denies any angina at this time had a recent PET stress a couple years ago which revealed a fixed defect. Z95.1 V45.81     5                  6. Other emphysema   Patient has shortness of breath that is related to his emphysema will follow-up with his pulmonologist however has not had a cardiac PET in over 3 years.  Will order a repeat PET stress.    Dyspnea on exertion  Has hx of CABG with LIMA to LAD, SVG to PDA, and SVG to OM. Last angiogram was 2005. PET scan with possible scar in the past however with persistent symptoms would recommend angiogram.     1. Cardiac catheterization with probable PCI.   2. Antiplatelets: ASA/Plavix  3. Access: R CFA  4. Catheters: JL 4 JR 4 ALFREDO.   5. Pt is a ANISA candidate and understands the importance of taking plavix for at least one year, understands that in case of receiving a drug coated stent the failure to comply with dual anti-platelet therapy is likely to result in stent clothing, heart attack and death.   6. The risks, benefits, and alternatives of coronary vascular angiography and possible intervention were discussed with the patient. All questions were answered and informed consent was obtained. I had a detailed discussion with the patient regarding risk of stroke, MI, bleeding access site complications including limb loss, allergy, kidney failure including dialysis and death.  7. The patient understands the risks and benefits and wishes to go ahead with the procedure.  8. All patient's questions were answered         J43.8 492.8      Damion Wetzel MD  Interventional Cardiology Fellow  Pager [phone]    Interventional Cardiology Staff  I have personally taken the history and examined this patient. I  have discussed and agree with the resident's findings and plan as documented in the resident's note.  Recommend left heart catheterization with possible angioplasty and pulmonary clinic which is scheduled prior to lower extremity angioplasty.  I recommend coronary angiography due to history of prior bypassed and dyspnea, shortness of breath on exertion..    Gume Nagy

## 2022-05-03 NOTE — PROGRESS NOTES
Interventional Cardiology Clinic Note  Reason for Visit: PAD  Referring Physician: Moiz Villalta     HPI:   Patient is a 76-year-old gentleman who was referred her for peripheral arterial disease.     Patient has a long history of peripheral arterial disease dating back to the early 2000s in which he has bilateral SFA stenting done by Dr. Mendoza his last angiograms were in 2006 which revealed occluded SFAS bilaterally and he had stenting of the right SFA and the left SFA with good result.  He had done well up until several months ago he now gets severe bilateral claudication right greater than left when ambulating about 50 ft.  He underwent peripheral angiography with stenting to prox R SFA and mid R SFA.  Prox L CFA to mid L CFA was 70% stenosed. He is continuing to have severe left leg fatigue and associated shortness of breath. He states that he is unable to walk to the  without shortness of breath. His last angiogram was in 2005 by [facility] with patent LIMA to LAD, SVG to OM and SVG to PDA.     Review of Systems   All other systems reviewed and are negative.        PMH:           Past Medical History:   Diagnosis Date    Coronary artery disease      Hyperlipidemia      Hypertension              Past Surgical History:   Procedure Laterality Date    ABDOMINAL SURGERY         Polyps in colon removed (noncancerous)    APPENDECTOMY        back surgery (screws & okate)        CORONARY ARTERY BYPASS GRAFT         1998    PERCUTANEOUS TRANSLUMINAL ANGIOPLASTY (PTA) OF PERIPHERAL VESSEL N/A 1/27/2022     Procedure: PTA, PERIPHERAL VESSEL;  Surgeon: Gume Nagy MD;  Location: [facility] CATH LAB;  Service: Cardiology;  Laterality: N/A;    TRANSFORAMINAL EPIDURAL INJECTION OF STEROID Right 11/5/2020     Procedure: LUMBAR TRANSFORAMINAL RIGHT L3/4 AND L4/5 DIRECT REFERRAL;  Surgeon: Nicole Toth MD;  Location: [facility] PAIN MGT;  Service: Pain Management;  Laterality: Right;  NEEDS CONSENT, PT STATES  NO LONGER TAKES PLETAL      Allergies:           Review of patient's allergies indicates:   Allergen Reactions    Iodine and iodide containing products Anaphylaxis    Shellfish containing products Anaphylaxis      Medications:             Current Outpatient Medications on File Prior to Visit   Medication Sig Dispense Refill    albuterol-ipratropium (DUO-NEB) 2.5 mg-0.5 mg/3 mL nebulizer solution Take 3 mLs by nebulization every 6 (six) hours as needed for Shortness of Breath. Rescue 3 mL 11    ALPRAZolam (XANAX) 0.5 MG tablet Take 0.5 mg by mouth 4 (four) times daily as needed.        amLODIPine (NORVASC) 10 MG tablet amlodipine besylate  10 mg tabs        aspirin 325 MG tablet aspirin 325 mg tablet   Take 1 tablet every day by oral route.        atorvastatin (LIPITOR) 40 MG tablet Take 2 tablets (80 mg total) by mouth once daily. 90 tablet 7    betamethasone acetate-betamethasone sodium phosphate (CELESTONE) 6 mg/mL injection 1 mL.        cetirizine (ZYRTEC) 10 MG tablet Take 1 tablet (10 mg total) by mouth once daily. (Patient not taking: Reported on 1/18/2022) 30 tablet 0    cilostazoL (PLETAL) 100 MG Tab Take 1 tablet by mouth twice daily 180 tablet 3    clopidogreL (PLAVIX) 75 mg tablet Take 1 tablet (75 mg total) by mouth once daily. 30 tablet 0    flu vacc [id number],65yr up,PF (FLUZONE HIGH-DOSE 2019-20, PF,) 180 mcg/0.5 mL Syrg Fluzone High-Dose 2019-20 (PF) 180 mcg/0.5 mL intramuscular syringe   PHARMACIST ADMINISTERED IMMUNIZATION ADMINISTERED AT TIME OF DISPENSING        lisinopriL (PRINIVIL,ZESTRIL) 20 MG tablet lisinopril  20 mg tabs        lubiprostone (AMITIZA) 24 MCG Cap Amitiza 24 mcg capsule   Take 1 capsule twice a day by oral route with meals for 30 days.        metoprolol tartrate (LOPRESSOR) 50 MG tablet metoprolol tartrate  50 mg tabs        multivitamin (THERAGRAN) per tablet Take 1 tablet by mouth once daily.        omega 3-dha-epa-fish oil (FISH OIL) 100-160-1,000 mg Cap  Fish Oil   daily        oxyCODONE-acetaminophen (PERCOCET)  mg per tablet oxycod/apap  tab 10-325mg        pantoprazole (PROTONIX) 40 MG tablet Take 1 tablet (40 mg total) by mouth once daily. 30 tablet 0    pirfenidone (ESBRIET) 801 mg Tab Take 1 capsule by mouth 3 (three) times daily. (Patient not taking: Reported on 1/18/2022) 90 tablet 3    polyethylene glycol (GLYCOLAX) 17 gram/dose powder Miralax 17 gram/dose oral powder   use as directed        predniSONE (DELTASONE) 50 MG Tab 1 tablet po Q 6 hours x 3 3 tablet 0    pregabalin (LYRICA) 50 MG capsule pregabalin 50 mg capsule        pregabalin (LYRICA) 50 MG capsule          pulse oximeter (PULSE OXIMETER) device by Apply Externally route 2 (two) times a day. Use twice daily at 8 AM and 3 PM and record the value in [employer] Backoffice[employer] as directed. 1 each 0    saw palmetto 160 MG capsule Take 160 mg by mouth 2 (two) times daily.        sildenafiL (VIAGRA) 100 MG tablet Take 100 mg by mouth.        SUPREP BOWEL PREP KIT 17.5-3.13-1.6 gram SolR Take by mouth.                    Current Facility-Administered Medications on File Prior to Visit   Medication Dose Route Frequency Provider Last Rate Last Admin    acetaminophen tablet 650 mg  650 mg Oral Once PRN Rishabh Ware MD        albuterol inhaler 2 puff  2 puff Inhalation Q20 Min PRN Rishabh Ware MD        EPINEPHrine (EPIPEN) 0.3 mg/0.3 mL pen injection 0.3 mg  0.3 mg Intramuscular PRN Rishabh Ware MD        methylPREDNISolone sodium succinate injection 40 mg  40 mg Intravenous Once PRN Rishabh Ware MD        ondansetron disintegrating tablet 4 mg  4 mg Oral Once PRN Rishabh Ware MD        sodium chloride 0.9% 500 mL flush bag   Intravenous PRN Rishabh Ware MD        sodium chloride 0.9% flush 10 mL  10 mL Intravenous PRN Rishabh Ware MD          Social History:      Social History            Tobacco Use    Smoking status: Former Smoker       Quit date:  10/5/1998       Years since quittin.4    Smokeless tobacco: Never Used   Substance Use Topics    Alcohol use: Not Currently       Alcohol/week: 0.0 standard drinks       Comment:       Family History:   No family history on file.     Physical Exam  There were no vitals taken for this visit.   GEN: Alert and oriented in NAD  NECK: no JVD appreciated   CVS: RRR, s1/s2, no MRG  PULM: CTAB no rales  ABD: NT/ND BS +  Extremities: warm and dry,  no edema  NEURO: Alert and oriented x 3  PSYCH: appropriate affect.            Labs:            Lab Results   Component Value Date      2022     K 3.9 2022      2022     CO2 28 2022     BUN 10 2022     CREATININE 0.87 2022     ANIONGAP 7 (L) 2022      No results found for: HGBA1C        Lab Results   Component Value Date     BNP 74 2021           Lab Results   Component Value Date     WBC 8.33 2022     HGB 12.6 (L) 2022     HCT 39.4 (L) 2022     HCT 47 2020      2022     GRAN 5.4 2022     GRAN 64.9 2022            Lab Results   Component Value Date     CHOL 145 10/05/2021     HDL 43 10/05/2021     LDLCALC 84.6 10/05/2021     TRIG 87 10/05/2021                   EF   Date Value Ref Range Status   2021 55 % Final            Nuc Stress EF   Date Value Ref Range Status   10/14/2019 76 % Final            Nuc Rest EF   Date Value Ref Range Status   10/14/2019 59.0   Final          Assessment and Plan  Kermit Castro is a 76 y.o. gentleman who presents for severe peripheral arterial disease.         ICD-10-CM ICD-9-CM     1. PAD (peripheral artery disease)   Patient is a 76-year-old gentleman with PAD s/p PCI to pR SFA and m R SFA.  He says he feels much better.  JOAQUINA 0.96 on R and 0.68 on L. Will plan to intervene on the left after coronary angiogram is done.     I73.9 443.9 Case Request-Cath Lab: PTA, PERIPHERAL VESSEL         Case Request-Cath Lab: PTA,  PERIPHERAL VESSEL   2. Mixed hyperlipidemia   Would continue statin at this time. E78.2 272.2     3. Essential hypertension   Blood pressure is well controlled at this time will continue current medicines. I10 401.9     4. Hx of CABG   Patient had a CABG back in 1990 19 he denies any angina at this time had a recent PET stress a couple years ago which revealed a fixed defect. Z95.1 V45.81     5                  6. Other emphysema   Patient has shortness of breath that is related to his emphysema will follow-up with his pulmonologist however has not had a cardiac PET in over 3 years.  Will order a repeat PET stress.    Dyspnea on exertion  Has hx of CABG with LIMA to LAD, SVG to PDA, and SVG to OM. Last angiogram was 2005. PET scan with possible scar in the past however with persistent symptoms would recommend angiogram.     1. Cardiac catheterization with probable PCI.   2. Antiplatelets: ASA/Plavix  3. Access: R CFA  4. Catheters: JL 4 JR 4 ALFREDO.   5. Pt is a ANISA candidate and understands the importance of taking plavix for at least one year, understands that in case of receiving a drug coated stent the failure to comply with dual anti-platelet therapy is likely to result in stent clothing, heart attack and death.   6. The risks, benefits, and alternatives of coronary vascular angiography and possible intervention were discussed with the patient. All questions were answered and informed consent was obtained. I had a detailed discussion with the patient regarding risk of stroke, MI, bleeding access site complications including limb loss, allergy, kidney failure including dialysis and death.  7. The patient understands the risks and benefits and wishes to go ahead with the procedure.  8. All patient's questions were answered         J43.8 492.8      Damion Wetzel MD  Interventional Cardiology Fellow  Pager [phone]    Interventional Cardiology Staff  I have personally taken the history and examined this patient. I  have discussed and agree with the resident's findings and plan as documented in the resident's note.  Recommend left heart catheterization with possible angioplasty and pulmonary clinic which is scheduled prior to lower extremity angioplasty.  I recommend coronary angiography due to history of prior bypassed and dyspnea, shortness of breath on exertion..    Gume Nagy

## 2022-05-04 ENCOUNTER — OFFICE VISIT (OUTPATIENT)
Dept: CARDIOLOGY | Facility: CLINIC | Age: 77
End: 2022-05-04
Payer: MEDICARE

## 2022-05-04 ENCOUNTER — TELEPHONE (OUTPATIENT)
Dept: PULMONOLOGY | Facility: CLINIC | Age: 77
End: 2022-05-04
Payer: MEDICARE

## 2022-05-04 VITALS
HEART RATE: 75 BPM | BODY MASS INDEX: 28.63 KG/M2 | DIASTOLIC BLOOD PRESSURE: 72 MMHG | WEIGHT: 188.94 LBS | SYSTOLIC BLOOD PRESSURE: 127 MMHG | HEIGHT: 68 IN | OXYGEN SATURATION: 96 %

## 2022-05-04 DIAGNOSIS — E78.2 MIXED HYPERLIPIDEMIA: ICD-10-CM

## 2022-05-04 DIAGNOSIS — I25.118 CORONARY ARTERY DISEASE OF NATIVE ARTERY OF NATIVE HEART WITH STABLE ANGINA PECTORIS: ICD-10-CM

## 2022-05-04 DIAGNOSIS — I10 ESSENTIAL HYPERTENSION: ICD-10-CM

## 2022-05-04 DIAGNOSIS — J43.8 OTHER EMPHYSEMA: ICD-10-CM

## 2022-05-04 DIAGNOSIS — I70.211 ATHEROSCLEROSIS OF NATIVE ARTERY OF RIGHT LOWER EXTREMITY WITH INTERMITTENT CLAUDICATION: ICD-10-CM

## 2022-05-04 DIAGNOSIS — J84.112 IPF (IDIOPATHIC PULMONARY FIBROSIS): ICD-10-CM

## 2022-05-04 DIAGNOSIS — I50.30 HEART FAILURE WITH PRESERVED EJECTION FRACTION, UNSPECIFIED HF CHRONICITY: ICD-10-CM

## 2022-05-04 DIAGNOSIS — I73.9 PAD (PERIPHERAL ARTERY DISEASE): Primary | ICD-10-CM

## 2022-05-04 DIAGNOSIS — Z95.1 HX OF CABG: ICD-10-CM

## 2022-05-04 PROBLEM — E66.01 MORBID OBESITY: Status: RESOLVED | Noted: 2022-04-19 | Resolved: 2022-05-04

## 2022-05-04 PROCEDURE — 99999 PR PBB SHADOW E&M-EST. PATIENT-LVL V: CPT | Mod: PBBFAC,GC,, | Performed by: INTERNAL MEDICINE

## 2022-05-04 PROCEDURE — 99213 OFFICE O/P EST LOW 20 MIN: CPT | Mod: GC,S$GLB,, | Performed by: INTERNAL MEDICINE

## 2022-05-04 PROCEDURE — 99999 PR PBB SHADOW E&M-EST. PATIENT-LVL V: ICD-10-PCS | Mod: PBBFAC,GC,, | Performed by: INTERNAL MEDICINE

## 2022-05-04 PROCEDURE — 99213 PR OFFICE/OUTPT VISIT, EST, LEVL III, 20-29 MIN: ICD-10-PCS | Mod: GC,S$GLB,, | Performed by: INTERNAL MEDICINE

## 2022-05-04 NOTE — TELEPHONE ENCOUNTER
2nd InBasket sent regarding Esbriet side effects and if [facility] should proceed with refilling. Patient will be out of medication on Friday.

## 2022-05-04 NOTE — TELEPHONE ENCOUNTER
----- Message from Mahesh Sesay PharmD sent at 5/4/2022  8:36 AM CDT -----  Regarding: FW: Esbriet Side Effects  Good morning,    Just following up on the below message. Should we proceed with refilling his Esbriet? Patient will be out medication on Friday.    Thank you,    Mahesh Sesay PharmD  Clinical Pharmacist   [facility]   P: [phone]    ----- Message -----  From: Mahesh Sesay PharmD  Sent: 5/2/2022  11:32 AM CDT  To: Candi Haddad MD, [facility] Staff  Subject: Esbriet Side Effects                             Good morning,    During refill call, the patient's wife stated patient has been feeling bad on Esbriet. His head constantly hurts, he has been dizzy, and just feels bad each time after taking the medication. When asked how long has patient has been experiencing the side effects, patient's wife stated ever since he has been on the medication. They have been trying to manage in hopes of the side effects subsiding. Confirmed patient has tried taking the medication with a full meal as well, but this has not helped. Can someone please follow up regarding these side effects? The patient's wife is requesting either a dosage reduction or change in medication therapy.    Thank you,    Mahesh Sesay PharmD  Clinical Pharmacist   [facility]   P: [phone]

## 2022-05-04 NOTE — PROGRESS NOTES
PCP - Luis Hassan MD  Subjective:     Kermit Castro is a 76 y.o. y.o. male    Problems  PAD s/p bilateral SFA stenting  CAD s/p CABG 90' (LIMA-LAD, SVG-OM, SVG-PDA)  ILD  Emphysema  HFpEF  HTN  HLD    Mr. Castro was seen in IC clinic yesterday for PAD evaluation. He was complaining of MONTELONGO. Last coronary angiogram in 2005. He is scheduled for a [facility] on 5/16. Has IPF & emphysema with moderate restriction on most recent spirometry. Followed by pulm with last visit 10/21. He has a follow up visit in July. Denies angina. Denies palpitations or syncope.      ---------------  Cardiac Studies ---------------    4-2022 Cardiac PET stress    There is a moderate to large sized, mild intensity basal to mid inferolateral resting perfusion abnormality involving 16% of LV myocardium in the distribution of the PLB consistent with a non-transmural scar.    The whole heart absolute myocardial perfusion values averaged 0.88 cc/min/g at rest, which is normal; 1.59 cc/min/g at stress, which is mildly reduced; and CFR is 1.81 , which is mildly reduced.    CT attenuation images demonstrate mild diffuse coronary calcifications in the LAD territory and mild diffuse aortic calcifications in the descending aorta.    The gated perfusion images showed an ejection fraction of 54% at rest and 55% during stress. A normal ejection fraction is greater than 53%.    There is basal inferolateral wall hypokinesis at rest and during stress.    The LV cavity size is normal at rest and stress.    The EKG portion of this study is negative for ischemia.    There were no arrhythmias during stress.    The patient reported no chest pain during the stress test.    When compared to the previous study from 10/14/2019, there are no significant changes.     3-2022 JOAQUINA  · Normal right and abnormal left resting JOAQUINA.  · Severe reduction of JOAQUINA's bilaterally with exercise.    9-2021 TTE  · The left ventricle is normal in size with concentric  remodeling and normal systolic function. The estimated ejection fraction is 55%.  · Grade II left ventricular diastolic dysfunction.  · Normal right ventricular size with normal right ventricular systolic function.  · Mild biatrial enlargement.  · Mild mitral regurgitation.  · Mild tricuspid regurgitation.  · The estimated PA systolic pressure is 39 mmHg.  · Normal central venous pressure (3 mmHg).        History:     Social History     Tobacco Use    Smoking status: Former Smoker     Quit date: 10/5/1998     Years since quittin.5    Smokeless tobacco: Never Used   Substance Use Topics    Alcohol use: Not Currently     Alcohol/week: 0.0 standard drinks     Comment:      No family history on file.    Meds:     Review of patient's allergies indicates:   Allergen Reactions    Iodine and iodide containing products Anaphylaxis    Shellfish containing products Anaphylaxis       Current Outpatient Medications:     albuterol-ipratropium (DUO-NEB) 2.5 mg-0.5 mg/3 mL nebulizer solution, Take 3 mLs by nebulization every 6 (six) hours as needed for Shortness of Breath. Rescue, Disp: 3 mL, Rfl: 11    ALPRAZolam (XANAX) 0.5 MG tablet, Take 0.5 mg by mouth 4 (four) times daily as needed., Disp: , Rfl:     amLODIPine (NORVASC) 10 MG tablet, amlodipine besylate  10 mg tabs, Disp: , Rfl:     aspirin 325 MG tablet, aspirin 325 mg tablet  Take 1 tablet every day by oral route., Disp: , Rfl:     atorvastatin (LIPITOR) 40 MG tablet, Take 2 tablets (80 mg total) by mouth once daily., Disp: 90 tablet, Rfl: 7    betamethasone acetate-betamethasone sodium phosphate (CELESTONE) 6 mg/mL injection, 1 mL., Disp: , Rfl:     cetirizine (ZYRTEC) 10 MG tablet, Take 1 tablet (10 mg total) by mouth once daily. (Patient not taking: Reported on 5/3/2022), Disp: 30 tablet, Rfl: 0    cilostazoL (PLETAL) 100 MG Tab, Take 1 tablet by mouth twice daily, Disp: 180 tablet, Rfl: 3    cimetidine (TAGAMET) 300 MG tablet, 1 tablet po Q6  hours x 3, Disp: 3 tablet, Rfl: 0    clopidogreL (PLAVIX) 75 mg tablet, Take 4 tablets on day 1 then take 1 tablet daily there after., Disp: 34 tablet, Rfl: 11    diphenhydrAMINE (BENADRYL) 50 MG capsule, 1 tablet po Q 6 hours x 3, Disp: 3 capsule, Rfl: 0    ezetimibe (ZETIA) 10 mg tablet, Take 1 tablet (10 mg total) by mouth once daily., Disp: 90 tablet, Rfl: 3    flu vacc [id number],65yr up,PF (FLUZONE HIGH-DOSE 2019-20, PF,) 180 mcg/0.5 mL Syrg, Fluzone High-Dose 2019-20 (PF) 180 mcg/0.5 mL intramuscular syringe  PHARMACIST ADMINISTERED IMMUNIZATION ADMINISTERED AT TIME OF DISPENSING, Disp: , Rfl:     furosemide (LASIX) 40 MG tablet, Take 1 tablet (40 mg total) by mouth once daily., Disp: 30 tablet, Rfl: 11    lisinopriL (PRINIVIL,ZESTRIL) 20 MG tablet, Take 1.5 tablets (30 mg total) by mouth once daily., Disp: 90 tablet, Rfl: 3    lubiprostone (AMITIZA) 24 MCG Cap, Amitiza 24 mcg capsule  Take 1 capsule twice a day by oral route with meals for 30 days., Disp: , Rfl:     metoprolol succinate (TOPROL-XL) 100 MG 24 hr tablet, Take 1 tablet (100 mg total) by mouth once daily., Disp: 30 tablet, Rfl: 11    multivitamin (THERAGRAN) per tablet, Take 1 tablet by mouth once daily., Disp: , Rfl:     omega 3-dha-epa-fish oil (FISH OIL) 100-160-1,000 mg Cap, Fish Oil  daily, Disp: , Rfl:     oxyCODONE-acetaminophen (PERCOCET)  mg per tablet, oxycod/apap  tab 10-325mg, Disp: , Rfl:     pantoprazole (PROTONIX) 40 MG tablet, Take 1 tablet (40 mg total) by mouth once daily. (Patient not taking: Reported on 3/8/2022), Disp: 30 tablet, Rfl: 0    pirfenidone (ESBRIET) 801 mg Tab, Take 1 capsule by mouth 3 (three) times daily., Disp: 90 tablet, Rfl: 3    polyethylene glycol (GLYCOLAX) 17 gram/dose powder, Miralax 17 gram/dose oral powder  use as directed, Disp: , Rfl:     predniSONE (DELTASONE) 50 MG Tab, 1 tablet po Q 6 hours x 3, Disp: 3 tablet, Rfl: 0    predniSONE (DELTASONE) 50 MG Tab, Take 1 tablet (50 mg  total) by mouth every 6 (six) hours., Disp: 3 tablet, Rfl: 0    pregabalin (LYRICA) 50 MG capsule, pregabalin 50 mg capsule, Disp: , Rfl:     pregabalin (LYRICA) 50 MG capsule, , Disp: , Rfl:     pulse oximeter (PULSE OXIMETER) device, by Apply Externally route 2 (two) times a day. Use twice daily at 8 AM and 3 PM and record the value in MyChart as directed., Disp: 1 each, Rfl: 0    saw palmetto 160 MG capsule, Take 160 mg by mouth 2 (two) times daily., Disp: , Rfl:     sildenafiL (VIAGRA) 100 MG tablet, Take 100 mg by mouth., Disp: , Rfl:     SUPREP BOWEL PREP KIT 17.5-3.13-1.6 gram SolR, Take by mouth., Disp: , Rfl:     Current Facility-Administered Medications:     acetaminophen tablet 650 mg, 650 mg, Oral, Once PRN, Rishabh Ware MD    albuterol inhaler 2 puff, 2 puff, Inhalation, Q20 Min PRN, Rishabh Ware MD    EPINEPHrine (EPIPEN) 0.3 mg/0.3 mL pen injection 0.3 mg, 0.3 mg, Intramuscular, PRN, Rishabh Ware MD    methylPREDNISolone sodium succinate injection 40 mg, 40 mg, Intravenous, Once PRN, Rishabh Ware MD    ondansetron disintegrating tablet 4 mg, 4 mg, Oral, Once PRN, Rishabh Ware MD    sodium chloride 0.9% 500 mL flush bag, , Intravenous, PRN, Rishabh Ware MD    sodium chloride 0.9% flush 10 mL, 10 mL, Intravenous, PRN, Rishabh Ware MD    Constitution: Negative for fever or chills. Negative for weight loss or gain.   HENT: Negative for sore throat or headaches. Negative for rhinorrhea.  Eyes: Negative for blurred or double vision.   Cardiovascular: See above  Pulmonary: Negative for SOB. Negative for cough.   Gastrointestinal: Negative for abdominal pain. Negative for nausea/ vomiting. Negative for diarrhea.   : Negative for dysuria.   Neurological: Negative for focal weakness or sensory changes.    Objective:     Vitals:    05/04/22 1047   BP: 127/72   Pulse: 75       General: NAD. AAO.  HENT: No scleral icterus. Extraocular movements  intact.  Neck: No JVD  Cardiovascular: Regular heart rate and rhythm. S1/S2 appreciated. No gallops, rubs, or murmurs. 2+ radial pulses bilaterally  Respiratory: bibasilar rales  Extremities: Warm. No edema.  Skin: no ulceration or wounds present    Labs:     Lab Results   Component Value Date     04/26/2022    K 3.6 04/26/2022     04/26/2022    CO2 29 04/26/2022    BUN 12 04/26/2022    CREATININE 1.03 04/26/2022    ANIONGAP 12 04/26/2022     No results found for: HGBA1C  Lab Results   Component Value Date     (H) 04/19/2022    BNP 74 12/21/2021       Lab Results   Component Value Date    WBC 8.33 01/25/2022    HGB 12.6 (L) 01/25/2022    HCT 39.4 (L) 01/25/2022    HCT 47 08/18/2020     01/25/2022    GRAN 5.4 01/25/2022    GRAN 64.9 01/25/2022     Lab Results   Component Value Date    CHOL 152 03/17/2022    HDL 51 03/17/2022    LDLCALC 84.4 03/17/2022    TRIG 83 03/17/2022       Lab Results   Component Value Date     04/26/2022    K 3.6 04/26/2022     04/26/2022    CO2 29 04/26/2022    BUN 12 04/26/2022    CREATININE 1.03 04/26/2022    ANIONGAP 12 04/26/2022     No results found for: HGBA1C  Lab Results   Component Value Date     (H) 04/19/2022    BNP 74 12/21/2021    Lab Results   Component Value Date    WBC 8.33 01/25/2022    HGB 12.6 (L) 01/25/2022    HCT 39.4 (L) 01/25/2022    HCT 47 08/18/2020     01/25/2022    GRAN 5.4 01/25/2022    GRAN 64.9 01/25/2022     Lab Results   Component Value Date    CHOL 152 03/17/2022    HDL 51 03/17/2022    LDLCALC 84.4 03/17/2022    TRIG 83 03/17/2022            Assessment & Plan:      MONTELONGO: no changes since last visit. Scheduled for [facility] on 5/16. Also, scheduled to see Pulm in July. No evidence of CHF exacerbation on exam. BP well controlled.  o Angiogram   o F/u with pulm     CAD s/p CABG:  o LHC  o ASA  o Statin  o Zetia  o Exercise     HLD: above goal  o Consider PCSK9 inh in the future  o Continue statin and  zetia     HTN: at goal  o Continue current regimen     PAD s/p stenting:  o ASA  o Statin  o F/u interventional cardiology     IPF/COPD:  o F/u pulm    Usama Cleaning M.D.  Pager #: [phone]  [facility] Cardiovascular Fellow PGY-6

## 2022-05-04 NOTE — PROGRESS NOTES
I have personally seen the patient. I reviewed the notes, assessments, and/or procedures performed by Dr Cleaning, I concur with her/his documentation of Kermit Castro. Awaiting [facility]. Continue meds for ASCVD.

## 2022-05-04 NOTE — TELEPHONE ENCOUNTER
Specialty Pharmacy - Refill Coordination  Specialty Pharmacy - Clinical Intervention    Specialty Medication Orders Linked to Encounter    Flowsheet Row Most Recent Value   Medication #1 pirfenidone (ESBRIET) 801 mg Tab (Order#[id number], Rx#[id number])        Provider states she cannot see the patient in office for a few weeks and advised he continue Esbriet as prescribed until his scheduled appointment on 7/20. Patient's wife states they will inform her if any earlier appointment comes available. Advised she reach out to the office if side effects become intolerable at any point. She verbalized understanding. Closing intervention for now.     Refill Questions - Documented Responses    Flowsheet Row Most Recent Value   Patient Availability and HIPAA Verification    Does patient want to proceed with activity? Yes   HIPAA/medical authority confirmed? Yes   Relationship to patient of person spoken to? Spouse/Significant Other   Refill Screening Questions    Changes to allergies? No   Changes to medications? No   New conditions since last clinic visit? No   Unplanned office visit, urgent care, ED, or hospital admission in the last 4 weeks? No   How does patient/caregiver feel medication is working? Fair   Financial problems or insurance changes? No   How many doses of your specialty medications were missed in the last 4 weeks? 0   Would patient like to speak to a pharmacist? No   When does the patient need to receive the medication? 05/06/22   Refill Delivery Questions    How will the patient receive the medication? Pickup   When does the patient need to receive the medication? 05/06/22   Expected Copay ($) 0   Is the patient able to afford the medication copay? Yes   Payment Method zero copay   Days supply of Refill 30   Supplies needed? No supplies needed   Refill activity completed? Yes   Refill activity plan Refill scheduled   Shipment/Pickup Date: 05/04/22          Current Outpatient Medications   Medication Sig     albuterol-ipratropium (DUO-NEB) 2.5 mg-0.5 mg/3 mL nebulizer solution Take 3 mLs by nebulization every 6 (six) hours as needed for Shortness of Breath. Rescue    ALPRAZolam (XANAX) 0.5 MG tablet Take 0.5 mg by mouth 4 (four) times daily as needed.    amLODIPine (NORVASC) 10 MG tablet amlodipine besylate  10 mg tabs    aspirin 325 MG tablet aspirin 325 mg tablet   Take 1 tablet every day by oral route.    atorvastatin (LIPITOR) 40 MG tablet Take 2 tablets (80 mg total) by mouth once daily.    betamethasone acetate-betamethasone sodium phosphate (CELESTONE) 6 mg/mL injection 1 mL.    cetirizine (ZYRTEC) 10 MG tablet Take 1 tablet (10 mg total) by mouth once daily. (Patient not taking: Reported on 5/3/2022)    cilostazoL (PLETAL) 100 MG Tab Take 1 tablet by mouth twice daily    cimetidine (TAGAMET) 300 MG tablet 1 tablet po Q6 hours x 3    clopidogreL (PLAVIX) 75 mg tablet Take 4 tablets on day 1 then take 1 tablet daily there after.    diphenhydrAMINE (BENADRYL) 50 MG capsule 1 tablet po Q 6 hours x 3    ezetimibe (ZETIA) 10 mg tablet Take 1 tablet (10 mg total) by mouth once daily.    flu vacc [id number],65yr up,PF (FLUZONE HIGH-DOSE 2019-20, PF,) 180 mcg/0.5 mL Syrg Fluzone High-Dose 2019-20 (PF) 180 mcg/0.5 mL intramuscular syringe   PHARMACIST ADMINISTERED IMMUNIZATION ADMINISTERED AT TIME OF DISPENSING    furosemide (LASIX) 40 MG tablet Take 1 tablet (40 mg total) by mouth once daily.    lisinopriL (PRINIVIL,ZESTRIL) 20 MG tablet Take 1.5 tablets (30 mg total) by mouth once daily.    lubiprostone (AMITIZA) 24 MCG Cap Amitiza 24 mcg capsule   Take 1 capsule twice a day by oral route with meals for 30 days.    metoprolol succinate (TOPROL-XL) 100 MG 24 hr tablet Take 1 tablet (100 mg total) by mouth once daily.    multivitamin (THERAGRAN) per tablet Take 1 tablet by mouth once daily.    omega 3-dha-epa-fish oil (FISH OIL) 100-160-1,000 mg Cap Fish Oil   daily    oxyCODONE-acetaminophen  (PERCOCET)  mg per tablet oxycod/apap  tab 10-325mg    pantoprazole (PROTONIX) 40 MG tablet Take 1 tablet (40 mg total) by mouth once daily. (Patient not taking: Reported on 3/8/2022)    pirfenidone (ESBRIET) 801 mg Tab Take 1 capsule by mouth 3 (three) times daily.    polyethylene glycol (GLYCOLAX) 17 gram/dose powder Miralax 17 gram/dose oral powder   use as directed    predniSONE (DELTASONE) 50 MG Tab 1 tablet po Q 6 hours x 3    predniSONE (DELTASONE) 50 MG Tab Take 1 tablet (50 mg total) by mouth every 6 (six) hours.    pregabalin (LYRICA) 50 MG capsule pregabalin 50 mg capsule    pregabalin (LYRICA) 50 MG capsule     pulse oximeter (PULSE OXIMETER) device by Apply Externally route 2 (two) times a day. Use twice daily at 8 AM and 3 PM and record the value in MyChart as directed.    saw palmetto 160 MG capsule Take 160 mg by mouth 2 (two) times daily.    sildenafiL (VIAGRA) 100 MG tablet Take 100 mg by mouth.    SUPREP BOWEL PREP KIT 17.5-3.13-1.6 gram SolR Take by mouth.   Last reviewed on 5/3/2022  9:07 AM by Gume Nagy MD    Review of patient's allergies indicates:   Allergen Reactions    Iodine and iodide containing products Anaphylaxis    Shellfish containing products Anaphylaxis    Last reviewed on  5/3/2022 9:30 AM by Kim Eaton    Interventions added this encounter   Open: [facility] Provider Intervention - Drug safety: pirfenidone (ESBRIET) 801 mg Tab     Tasks added this encounter   5/27/2022 - Refill Call (Auto Added)  5/5/2022 - Pickup Reminder   Tasks due within next 3 months   No tasks due.     Mahesh Sesay, PharmD  [facility]  [address] [place] [id number]  Phone: [phone]  Fax: [phone]

## 2022-05-13 ENCOUNTER — LAB VISIT (OUTPATIENT)
Dept: LAB | Facility: HOSPITAL | Age: 77
End: 2022-05-13
Attending: INTERNAL MEDICINE
Payer: MEDICARE

## 2022-05-13 DIAGNOSIS — I25.111 CORONARY ARTERY DISEASE INVOLVING NATIVE CORONARY ARTERY OF NATIVE HEART WITH ANGINA PECTORIS WITH DOCUMENTED SPASM: ICD-10-CM

## 2022-05-13 LAB
ALBUMIN SERPL BCP-MCNC: 3.9 G/DL (ref 3.5–5.2)
ALP SERPL-CCNC: 84 U/L (ref 38–126)
ALT SERPL W/O P-5'-P-CCNC: 20 U/L (ref 10–44)
ANION GAP SERPL CALC-SCNC: 10 MMOL/L (ref 8–16)
AST SERPL-CCNC: 48 U/L (ref 15–46)
BASOPHILS # BLD AUTO: 0.05 K/UL (ref 0–0.2)
BASOPHILS NFR BLD: 0.6 % (ref 0–1.9)
BILIRUB SERPL-MCNC: 0.4 MG/DL (ref 0.1–1)
CALCIUM SERPL-MCNC: 8.6 MG/DL (ref 8.7–10.5)
CHLORIDE SERPL-SCNC: 103 MMOL/L (ref 95–110)
CO2 SERPL-SCNC: 31 MMOL/L (ref 23–29)
CREAT SERPL-MCNC: 0.84 MG/DL (ref 0.5–1.4)
DIFFERENTIAL METHOD: ABNORMAL
EOSINOPHIL # BLD AUTO: 0.3 K/UL (ref 0–0.5)
EOSINOPHIL NFR BLD: 3.7 % (ref 0–8)
ERYTHROCYTE [DISTWIDTH] IN BLOOD BY AUTOMATED COUNT: 14.8 % (ref 11.5–14.5)
EST. GFR  (AFRICAN AMERICAN): >60 ML/MIN/1.73 M^2
EST. GFR  (NON AFRICAN AMERICAN): >60 ML/MIN/1.73 M^2
GLUCOSE SERPL-MCNC: 100 MG/DL (ref 70–110)
HCT VFR BLD AUTO: 41.9 % (ref 40–54)
HGB BLD-MCNC: 13.6 G/DL (ref 14–18)
IMM GRANULOCYTES # BLD AUTO: 0.03 K/UL (ref 0–0.04)
IMM GRANULOCYTES NFR BLD AUTO: 0.4 % (ref 0–0.5)
INR PPP: 1 (ref 0.8–1.2)
LYMPHOCYTES # BLD AUTO: 1.8 K/UL (ref 1–4.8)
LYMPHOCYTES NFR BLD: 22.9 % (ref 18–48)
MCH RBC QN AUTO: 28.9 PG (ref 27–31)
MCHC RBC AUTO-ENTMCNC: 32.5 G/DL (ref 32–36)
MCV RBC AUTO: 89 FL (ref 82–98)
MONOCYTES # BLD AUTO: 0.7 K/UL (ref 0.3–1)
MONOCYTES NFR BLD: 8.7 % (ref 4–15)
NEUTROPHILS # BLD AUTO: 5.1 K/UL (ref 1.8–7.7)
NEUTROPHILS NFR BLD: 63.7 % (ref 38–73)
NRBC BLD-RTO: 0 /100 WBC
PLATELET # BLD AUTO: 327 K/UL (ref 150–450)
PMV BLD AUTO: 9.9 FL (ref 9.2–12.9)
POTASSIUM SERPL-SCNC: 3.1 MMOL/L (ref 3.5–5.1)
PROT SERPL-MCNC: 6.8 G/DL (ref 6–8.4)
PROTHROMBIN TIME: 10.8 SEC (ref 9–12.5)
RBC # BLD AUTO: 4.7 M/UL (ref 4.6–6.2)
SODIUM SERPL-SCNC: 144 MMOL/L (ref 136–145)
UUN UR-MCNC: 11 MG/DL (ref 2–20)
WBC # BLD AUTO: 8.04 K/UL (ref 3.9–12.7)

## 2022-05-13 PROCEDURE — 85610 PROTHROMBIN TIME: CPT | Mod: PO | Performed by: INTERNAL MEDICINE

## 2022-05-13 PROCEDURE — 85025 COMPLETE CBC W/AUTO DIFF WBC: CPT | Mod: PO | Performed by: INTERNAL MEDICINE

## 2022-05-13 PROCEDURE — 80053 COMPREHEN METABOLIC PANEL: CPT | Mod: PO | Performed by: INTERNAL MEDICINE

## 2022-05-13 PROCEDURE — 36415 COLL VENOUS BLD VENIPUNCTURE: CPT | Mod: PO | Performed by: INTERNAL MEDICINE

## 2022-05-16 ENCOUNTER — HOSPITAL ENCOUNTER (OUTPATIENT)
Facility: HOSPITAL | Age: 77
Discharge: HOME | End: 2022-05-16
Attending: INTERNAL MEDICINE | Admitting: INTERNAL MEDICINE
Payer: MEDICARE

## 2022-05-16 VITALS
BODY MASS INDEX: 27.13 KG/M2 | OXYGEN SATURATION: 99 % | TEMPERATURE: 97 F | DIASTOLIC BLOOD PRESSURE: 70 MMHG | HEART RATE: 79 BPM | SYSTOLIC BLOOD PRESSURE: 151 MMHG | RESPIRATION RATE: 18 BRPM | HEIGHT: 68 IN | WEIGHT: 179 LBS

## 2022-05-16 DIAGNOSIS — Z51.89 ENCOUNTER FOR OTHER SPECIFIED AFTERCARE: ICD-10-CM

## 2022-05-16 DIAGNOSIS — Z98.890 STATUS POST LEFT HEART CATHETERIZATION: ICD-10-CM

## 2022-05-16 DIAGNOSIS — I25.10 CORONARY ARTERY DISEASE INVOLVING NATIVE HEART WITHOUT ANGINA PECTORIS, UNSPECIFIED VESSEL OR LESION TYPE: ICD-10-CM

## 2022-05-16 LAB
ABO + RH BLD: NORMAL
BLD GP AB SCN CELLS X3 SERPL QL: NORMAL
CTP QC/QA: YES
SARS-COV-2 AG RESP QL IA.RAPID: NEGATIVE

## 2022-05-16 PROCEDURE — 93010 ELECTROCARDIOGRAM REPORT: CPT | Mod: ,,, | Performed by: INTERNAL MEDICINE

## 2022-05-16 PROCEDURE — 99152 MOD SED SAME PHYS/QHP 5/>YRS: CPT | Mod: GC,,, | Performed by: INTERNAL MEDICINE

## 2022-05-16 PROCEDURE — 93455 CORONARY ART/GRFT ANGIO S&I: CPT | Mod: GC | Performed by: INTERNAL MEDICINE

## 2022-05-16 PROCEDURE — C1894 INTRO/SHEATH, NON-LASER: HCPCS | Performed by: INTERNAL MEDICINE

## 2022-05-16 PROCEDURE — 25500020 PHARM REV CODE 255: Performed by: INTERNAL MEDICINE

## 2022-05-16 PROCEDURE — 99153 MOD SED SAME PHYS/QHP EA: CPT | Performed by: INTERNAL MEDICINE

## 2022-05-16 PROCEDURE — 86901 BLOOD TYPING SEROLOGIC RH(D): CPT | Performed by: INTERNAL MEDICINE

## 2022-05-16 PROCEDURE — 93005 ELECTROCARDIOGRAM TRACING: CPT | Mod: 59

## 2022-05-16 PROCEDURE — 63600175 PHARM REV CODE 636 W HCPCS: Performed by: INTERNAL MEDICINE

## 2022-05-16 PROCEDURE — C1769 GUIDE WIRE: HCPCS | Performed by: INTERNAL MEDICINE

## 2022-05-16 PROCEDURE — 99152 PR MOD CONSCIOUS SEDATION, SAME PHYS, 5+ YRS, FIRST 15 MIN: ICD-10-PCS | Mod: GC,,, | Performed by: INTERNAL MEDICINE

## 2022-05-16 PROCEDURE — 25000003 PHARM REV CODE 250: Performed by: STUDENT IN AN ORGANIZED HEALTH CARE EDUCATION/TRAINING PROGRAM

## 2022-05-16 PROCEDURE — 93455 CORONARY ART/GRFT ANGIO S&I: CPT | Mod: 26,GC,, | Performed by: INTERNAL MEDICINE

## 2022-05-16 PROCEDURE — 99152 MOD SED SAME PHYS/QHP 5/>YRS: CPT | Performed by: INTERNAL MEDICINE

## 2022-05-16 PROCEDURE — 93455 PR CATH PLACE/CORONARY ANGIO, IMG SUPER/INTERP, BYPASS ANGIO: ICD-10-PCS | Mod: 26,GC,, | Performed by: INTERNAL MEDICINE

## 2022-05-16 PROCEDURE — 93010 EKG 12-LEAD: ICD-10-PCS | Mod: ,,, | Performed by: INTERNAL MEDICINE

## 2022-05-16 PROCEDURE — 25000003 PHARM REV CODE 250: Performed by: INTERNAL MEDICINE

## 2022-05-16 RX ORDER — SODIUM CHLORIDE 9 MG/ML
INJECTION, SOLUTION INTRAVENOUS CONTINUOUS
Status: DISCONTINUED | OUTPATIENT
Start: 2022-05-16 | End: 2022-05-16 | Stop reason: HOSPADM

## 2022-05-16 RX ORDER — ACETAMINOPHEN 325 MG/1
650 TABLET ORAL EVERY 4 HOURS PRN
Status: DISCONTINUED | OUTPATIENT
Start: 2022-05-16 | End: 2022-05-16 | Stop reason: HOSPADM

## 2022-05-16 RX ORDER — CLOPIDOGREL 300 MG/1
600 TABLET, FILM COATED ORAL ONCE
Status: COMPLETED | OUTPATIENT
Start: 2022-05-16 | End: 2022-05-16

## 2022-05-16 RX ORDER — SODIUM CHLORIDE 9 MG/ML
INJECTION, SOLUTION INTRAVENOUS
Status: DISCONTINUED | OUTPATIENT
Start: 2022-05-16 | End: 2022-05-16 | Stop reason: HOSPADM

## 2022-05-16 RX ORDER — SODIUM CHLORIDE 9 MG/ML
INJECTION, SOLUTION INTRAVENOUS CONTINUOUS
Status: ACTIVE | OUTPATIENT
Start: 2022-05-16

## 2022-05-16 RX ORDER — DIPHENHYDRAMINE HCL 50 MG
50 CAPSULE ORAL ONCE
Status: COMPLETED | OUTPATIENT
Start: 2022-05-16 | End: 2022-05-16

## 2022-05-16 RX ORDER — HEPARIN SOD,PORCINE/0.9 % NACL 1000/500ML
INTRAVENOUS SOLUTION INTRAVENOUS
Status: DISCONTINUED | OUTPATIENT
Start: 2022-05-16 | End: 2022-05-16 | Stop reason: HOSPADM

## 2022-05-16 RX ORDER — HYDRALAZINE HYDROCHLORIDE 20 MG/ML
INJECTION INTRAMUSCULAR; INTRAVENOUS
Status: DISCONTINUED | OUTPATIENT
Start: 2022-05-16 | End: 2022-05-16 | Stop reason: HOSPADM

## 2022-05-16 RX ORDER — ONDANSETRON 8 MG/1
8 TABLET, ORALLY DISINTEGRATING ORAL EVERY 8 HOURS PRN
Status: DISCONTINUED | OUTPATIENT
Start: 2022-05-16 | End: 2022-05-16 | Stop reason: HOSPADM

## 2022-05-16 RX ORDER — LIDOCAINE HYDROCHLORIDE 20 MG/ML
INJECTION, SOLUTION EPIDURAL; INFILTRATION; INTRACAUDAL; PERINEURAL
Status: DISCONTINUED | OUTPATIENT
Start: 2022-05-16 | End: 2022-05-16 | Stop reason: HOSPADM

## 2022-05-16 RX ORDER — MIDAZOLAM HYDROCHLORIDE 1 MG/ML
INJECTION, SOLUTION INTRAMUSCULAR; INTRAVENOUS
Status: DISCONTINUED | OUTPATIENT
Start: 2022-05-16 | End: 2022-05-16 | Stop reason: HOSPADM

## 2022-05-16 RX ORDER — FENTANYL CITRATE 50 UG/ML
INJECTION, SOLUTION INTRAMUSCULAR; INTRAVENOUS
Status: DISCONTINUED | OUTPATIENT
Start: 2022-05-16 | End: 2022-05-16 | Stop reason: HOSPADM

## 2022-05-16 RX ORDER — CLINDAMYCIN PHOSPHATE 900 MG/50ML
INJECTION, SOLUTION INTRAVENOUS
Status: DISCONTINUED | OUTPATIENT
Start: 2022-05-16 | End: 2022-05-16 | Stop reason: HOSPADM

## 2022-05-16 RX ADMIN — SODIUM CHLORIDE: 0.9 INJECTION, SOLUTION INTRAVENOUS at 10:05

## 2022-05-16 RX ADMIN — DIPHENHYDRAMINE HYDROCHLORIDE 50 MG: 50 CAPSULE ORAL at 10:05

## 2022-05-16 RX ADMIN — CLOPIDOGREL BISULFATE 600 MG: 300 TABLET, FILM COATED ORAL at 11:05

## 2022-05-16 NOTE — BRIEF OP NOTE
Brief Operative Note:    : Gume Nagy MD     Referring Physician: Luis Hassan     All Operators: Surgeon(s):  MD Parul Schneider MD     Preoperative Diagnosis: Coronary artery disease involving native heart without angina pectoris, unspecified vessel or lesion type [I25.10]     Postop Diagnosis: Coronary artery disease involving native heart without angina pectoris, unspecified vessel or lesion type [I25.10]    Treatments/Procedures: Procedure(s) (LRB):  Bypass graft study  ANGIOGRAM, CORONARY ARTERY (N/A)    Access: Right CFA    Findings:Severe coronary artery disease is present. LIMA-LAD, SVG-OM, and SVG-PDA patent.    See catheterization report for full details.    Intervention: None    See catheterization report for full details.    Closure device: Manual pressure         Estimated Blood loss: 20 cc    Specimens removed: No    Parul Goldman MD

## 2022-05-16 NOTE — Clinical Note
25 ml of contrast were injected throughout the case. 5 mL of contrast was the total wasted during the case. 30 mL was the total amount used during the case.

## 2022-05-16 NOTE — PROGRESS NOTES
Pt DC'd per MD order. Discharge instructions given including activity, wound care, S&S of infections, future appointments, and when to call MD. Medications reviewed including when to take next dose. Telemetry and PIV DC'd, catheter tip intact. Pt left unit via wheelchair with transport and family.

## 2022-05-16 NOTE — DISCHARGE SUMMARY
Discharge Summary  Interventional Cardiology      Admit Date: 5/16/2022    Discharge Date:  5/16/2022    Attending Physician: Gume Nagy MD    Discharge Physician: Parul Goldman MD    Principal Diagnoses: Coronary artery disease of native artery of native heart with stable angina pectoris  Indication for Admission: Bypass graft study, ANGIOGRAM, CORONARY ARTERY (N/A)    Discharged Condition: Good    Hospital Course: Patient presented for outpatient coronary angiogram which went without complication. Coronary angiogram revealed severe native coronary artery disease and patent bypass grafts. No interventions done. See full cath report in Epic for details. Hemostasis of patient's R CFA access site was achieved with manual pressure. Patient was monitored per post-cath protocol, and his R groin access site was c/d/i with no hematoma. Patient was able to ambulate without difficulty. He was feeling well and anticipating discharge home today.     Outpatient Plan:  - There were no medication changes   - Follow up with Dr Nagy as outpatient to discuss peripheral intervention of LLE    Diet: Cardiac diet    Activity: Ad lee, wound care instructions provided    Disposition: Home or Self Care      Discharge Medications:      Medication List      CHANGE how you take these medications    * predniSONE 50 MG Tab  Commonly known as: DELTASONE  1 tablet po Q 6 hours x 3  What changed: Another medication with the same name was added. Make sure you understand how and when to take each.     * predniSONE 50 MG Tab  Commonly known as: DELTASONE  Take 1 tablet (50 mg total) by mouth every 6 (six) hours.  What changed: Another medication with the same name was added. Make sure you understand how and when to take each.     * predniSONE 50 MG Tab  Commonly known as: DELTASONE  TAKE 1 TABLET BY MOUTH EVERY 6 HOURS  What changed: You were already taking a medication with the same name, and this prescription was added. Make sure  you understand how and when to take each.         * This list has 3 medication(s) that are the same as other medications prescribed for you. Read the directions carefully, and ask your doctor or other care provider to review them with you.            CONTINUE taking these medications    albuterol-ipratropium 2.5 mg-0.5 mg/3 mL nebulizer solution  Commonly known as: DUO-NEB  Take 3 mLs by nebulization every 6 (six) hours as needed for Shortness of Breath. Rescue     ALPRAZolam 0.5 MG tablet  Commonly known as: XANAX     amLODIPine 10 MG tablet  Commonly known as: NORVASC     aspirin 325 MG tablet     atorvastatin 40 MG tablet  Commonly known as: LIPITOR  Take 2 tablets (80 mg total) by mouth once daily.     betamethasone acetate-betamethasone sodium phosphate 6 mg/mL injection  Commonly known as: CELESTONE     cetirizine 10 MG tablet  Commonly known as: ZYRTEC  Take 1 tablet (10 mg total) by mouth once daily.     cilostazoL 100 MG Tab  Commonly known as: PLETAL  Take 1 tablet by mouth twice daily     cimetidine 300 MG tablet  Commonly known as: TAGAMET  1 tablet po Q6 hours x 3     clopidogreL 75 mg tablet  Commonly known as: PLAVIX  Take 4 tablets on day 1 then take 1 tablet daily there after.     diphenhydrAMINE 50 MG capsule  Commonly known as: BENADRYL  1 tablet po Q 6 hours x 3     ESBRIET 801 mg Tab  Generic drug: pirfenidone  Take 1 capsule by mouth 3 (three) times daily.     ezetimibe 10 mg tablet  Commonly known as: ZETIA  Take 1 tablet (10 mg total) by mouth once daily.     Fish OiL 100-160-1,000 mg Cap  Generic drug: omega 3-dha-epa-fish oil     FLUZONE HIGH-DOSE 2019-20 (PF) 180 mcg/0.5 mL Syrg  Generic drug: flu vacc [id number](65yr up)PF     furosemide 40 MG tablet  Commonly known as: LASIX  Take 1 tablet (40 mg total) by mouth once daily.     lisinopriL 20 MG tablet  Commonly known as: PRINIVIL,ZESTRIL  Take 1.5 tablets (30 mg total) by mouth once daily.     lubiprostone 24 MCG Cap  Commonly known as:  AMITIZA     metoprolol succinate 100 MG 24 hr tablet  Commonly known as: TOPROL-XL  Take 1 tablet (100 mg total) by mouth once daily.     multivitamin per tablet  Commonly known as: THERAGRAN     oxyCODONE-acetaminophen  mg per tablet  Commonly known as: PERCOCET     pantoprazole 40 MG tablet  Commonly known as: PROTONIX  Take 1 tablet (40 mg total) by mouth once daily.     polyethylene glycol 17 gram/dose powder  Commonly known as: GLYCOLAX     * pregabalin 50 MG capsule  Commonly known as: LYRICA     * pregabalin 50 MG capsule  Commonly known as: LYRICA     pulse oximeter device  Commonly known as: pulse oximeter  by Apply Externally route 2 (two) times a day. Use twice daily at 8 AM and 3 PM and record the value in dilitronics as directed.     saw palmetto 160 MG capsule     sildenafiL 100 MG tablet  Commonly known as: VIAGRA     SUPREP BOWEL PREP KIT 17.5-3.13-1.6 gram Solr  Generic drug: sodium,potassium,mag sulfates         * This list has 2 medication(s) that are the same as other medications prescribed for you. Read the directions carefully, and ask your doctor or other care provider to review them with you.               Where to Get Your Medications      These medications were sent to [id number] - [place], [place]  [address] [facility]  [address] [facility] [address]    Phone: [phone]   · predniSONE 50 MG Tab

## 2022-05-16 NOTE — PLAN OF CARE
Received report from Shakir. Patient s/p [facility], AAOx3. VSS, no c/o pain or discomfort at this time, resp even and unlabored. Gauze/tegaderm dressing to R groin is CDI. No active bleeding. No hematoma noted. Post procedure protocol reviewed with patient and patient's family. Understanding verbalized. Family members at bedside. Nurse call bell within reach. Will continue to monitor per post procedure protocol.

## 2022-05-16 NOTE — Clinical Note
The catheter was inserted into the ostial SVG graft. An angiography was performed of the graft. Multiple views were taken.  SVG to PDA

## 2022-05-16 NOTE — INTERVAL H&P NOTE
The patient has been examined and the H&P has been reviewed:    I concur with the findings and no changes have occurred since H&P was written.    Procedure risks, benefits and alternative options discussed and understood by patient/family.    This is a 75 yo M with CAD s/p CABG (LIMA-LAD, SVG-OM, SVG-PDA) and abnormal PET stress (basal to mid inferolateral resting perfusion defect in PLB territory comprising 16% LV myocardium) who presents for coronary angiogram +/- intervention.    Access: R CFA    Antiplatelets: ASA/Plavix    The risks (including death, heart attack, limb loss, paralysis, emergency surgery, bleeding, renal failure, and stroke), the potential benefits of the procedure, and the alternatives to the procedure, were discussed in detail and accepted by the patient. All questions were answered. Patient agrees to proceed.        Active Hospital Problems    Diagnosis  POA    *Coronary artery disease of native artery of native heart with stable angina pectoris [I25.118]  Yes            S/p multiple PCI then CABG 1998     LIMA-LAD   SVG-OM   SVG-PDA        PTA of LIMA-LAD 12/1998      [facility] 2005     3 patent graft   Normal EF   LVEDP 20 mmHg   Patent renal arteries                Hx of CABG [Z95.1]  Not Applicable    PAD (peripheral artery disease) [I73.9]  Yes       2/2006     L SFA  PTAS   8 x 40 mm and 6.0 x 120 Protege SES          5/2006     R SFA  PTA with 4 and 6.0 mm balloons   R EIA PTAS with 7 mm SES for ISR (Resilient trial)      JOAQUINA 6/2019     R 0.77   L 1.06        US 6/2019    Diameters in mm     CFA 9   SFA 7   POP 6   BTK 4   BTA 3          R , , pSFA 81, mSFA 41, dSFA 18, pPOP 26-36, PT 34, AT 28, DP 23  L , , pSFA 219-136, mSFA 89, dSFA 25, POP 35, PT 59, AT 30, DP 03                  Dyspnea on exertion [R06.00]  Yes    Atherosclerosis of native artery of right lower extremity with intermittent claudication [I70.211]  Yes    Mixed hyperlipidemia  [E78.2]  Yes    Essential hypertension [I10]  Yes      Resolved Hospital Problems   No resolved problems to display.

## 2022-05-16 NOTE — Clinical Note
The catheter was repositioned into the ostial SVG graft. An angiography was performed of the graft.  SVG to OM1

## 2022-05-27 RX ORDER — PIRFENIDONE 801 MG/1
1 TABLET, FILM COATED ORAL 3 TIMES DAILY
Qty: 90 TABLET | Refills: 3 | Status: SHIPPED | OUTPATIENT
Start: 2022-05-27 | End: 2022-06-17

## 2022-05-30 ENCOUNTER — PATIENT MESSAGE (OUTPATIENT)
Dept: PHARMACY | Facility: CLINIC | Age: 77
End: 2022-05-30
Payer: MEDICARE

## 2022-06-07 ENCOUNTER — SPECIALTY PHARMACY (OUTPATIENT)
Dept: PHARMACY | Facility: CLINIC | Age: 77
End: 2022-06-07
Payer: MEDICARE

## 2022-06-07 NOTE — TELEPHONE ENCOUNTER
Specialty Pharmacy - Refill Coordination    Specialty Medication Orders Linked to Encounter    Flowsheet Row Most Recent Value   Medication #1 pirfenidone (ESBRIET) 801 mg Tab (Order#[id number], Rx#[id number])        Patient's wife states patient missed a few doses due to not remembering if he took it or not and not wanting to double up. She states she just bought him a pill box to assist with this.Counseled on change to generic Pirfenidone. No questions or concerns at this time.     Refill Questions - Documented Responses    Flowsheet Row Most Recent Value   Patient Availability and HIPAA Verification    Does patient want to proceed with activity? Yes   HIPAA/medical authority confirmed? Yes   Relationship to patient of person spoken to? Spouse/Significant Other   Refill Screening Questions    Changes to allergies? No   Changes to medications? No   New conditions since last clinic visit? No   Unplanned office visit, urgent care, ED, or hospital admission in the last 4 weeks? No   How does patient/caregiver feel medication is working? Good   Financial problems or insurance changes? No   How many doses of your specialty medications were missed in the last 4 weeks? 3   Why were doses missed? Had a change in daily routine   Would patient like to speak to a pharmacist? No   When does the patient need to receive the medication? 06/08/22   Refill Delivery Questions    How will the patient receive the medication? Pickup   When does the patient need to receive the medication? 06/08/22   Expected Copay ($) 0   Is the patient able to afford the medication copay? Yes   Payment Method zero copay   Days supply of Refill 30   Refill activity completed? Yes   Refill activity plan Refill scheduled   Shipment/Pickup Date: 06/08/22          Current Outpatient Medications   Medication Sig    albuterol-ipratropium (DUO-NEB) 2.5 mg-0.5 mg/3 mL nebulizer solution Take 3 mLs by nebulization every 6 (six) hours as needed for Shortness of  Breath. Rescue    ALPRAZolam (XANAX) 0.5 MG tablet Take 0.5 mg by mouth 4 (four) times daily as needed.    amLODIPine (NORVASC) 10 MG tablet amlodipine besylate  10 mg tabs    aspirin 325 MG tablet aspirin 325 mg tablet   Take 1 tablet every day by oral route.    atorvastatin (LIPITOR) 40 MG tablet Take 2 tablets (80 mg total) by mouth once daily.    betamethasone acetate-betamethasone sodium phosphate (CELESTONE) 6 mg/mL injection 1 mL.    cetirizine (ZYRTEC) 10 MG tablet Take 1 tablet (10 mg total) by mouth once daily. (Patient not taking: Reported on 5/3/2022)    cilostazoL (PLETAL) 100 MG Tab Take 1 tablet by mouth twice daily    cimetidine (TAGAMET) 300 MG tablet 1 tablet po Q6 hours x 3    clopidogreL (PLAVIX) 75 mg tablet Take 4 tablets on day 1 then take 1 tablet daily there after.    diphenhydrAMINE (BENADRYL) 50 MG capsule 1 tablet po Q 6 hours x 3    ezetimibe (ZETIA) 10 mg tablet Take 1 tablet (10 mg total) by mouth once daily.    flu vacc [id number],65yr up,PF (FLUZONE HIGH-DOSE 2019-20, PF,) 180 mcg/0.5 mL Syrg Fluzone High-Dose 2019-20 (PF) 180 mcg/0.5 mL intramuscular syringe   PHARMACIST ADMINISTERED IMMUNIZATION ADMINISTERED AT TIME OF DISPENSING    furosemide (LASIX) 40 MG tablet Take 1 tablet (40 mg total) by mouth once daily.    lisinopriL (PRINIVIL,ZESTRIL) 20 MG tablet Take 1.5 tablets (30 mg total) by mouth once daily.    lubiprostone (AMITIZA) 24 MCG Cap Amitiza 24 mcg capsule   Take 1 capsule twice a day by oral route with meals for 30 days.    metoprolol succinate (TOPROL-XL) 100 MG 24 hr tablet Take 1 tablet (100 mg total) by mouth once daily.    multivitamin (THERAGRAN) per tablet Take 1 tablet by mouth once daily.    omega 3-dha-epa-fish oil (FISH OIL) 100-160-1,000 mg Cap Fish Oil   daily    oxyCODONE-acetaminophen (PERCOCET)  mg per tablet oxycod/apap  tab 10-325mg    pantoprazole (PROTONIX) 40 MG tablet Take 1 tablet (40 mg total) by mouth once daily.  (Patient not taking: Reported on 3/8/2022)    pirfenidone (ESBRIET) 801 mg Tab Take 1 capsule by mouth 3 (three) times daily.    polyethylene glycol (GLYCOLAX) 17 gram/dose powder Miralax 17 gram/dose oral powder   use as directed    predniSONE (DELTASONE) 50 MG Tab 1 tablet po Q 6 hours x 3    predniSONE (DELTASONE) 50 MG Tab Take 1 tablet (50 mg total) by mouth every 6 (six) hours.    predniSONE (DELTASONE) 50 MG Tab TAKE 1 TABLET BY MOUTH EVERY 6 HOURS    pregabalin (LYRICA) 50 MG capsule pregabalin 50 mg capsule    pregabalin (LYRICA) 50 MG capsule     pulse oximeter (PULSE OXIMETER) device by Apply Externally route 2 (two) times a day. Use twice daily at 8 AM and 3 PM and record the value in MyChart as directed.    saw palmetto 160 MG capsule Take 160 mg by mouth 2 (two) times daily.    sildenafiL (VIAGRA) 100 MG tablet Take 100 mg by mouth.    SUPREP BOWEL PREP KIT 17.5-3.13-1.6 gram SolR Take by mouth.   Last reviewed on 5/16/2022  9:34 AM by Yamel Anaya, RN    Review of patient's allergies indicates:   Allergen Reactions    Iodine and iodide containing products Anaphylaxis    Shellfish containing products Anaphylaxis    Last reviewed on  5/16/2022 9:19 AM by Yamel Anaya      Tasks added this encounter   7/1/2022 - Refill Call (Auto Added)  6/9/2022 - Pickup Reminder   Tasks due within next 3 months   No tasks due.     Mahesh Sesay, PharmD  [facility]  [address] [place] [id number]  Phone: [phone]  Fax: [phone]

## 2022-06-14 ENCOUNTER — TELEPHONE (OUTPATIENT)
Dept: PULMONOLOGY | Facility: CLINIC | Age: 77
End: 2022-06-14
Payer: MEDICARE

## 2022-06-14 NOTE — TELEPHONE ENCOUNTER
Spoke with patient wife, informed her that I have received her that I have received her message. I advised patient wife that we do not have any available appointments at this time but however, I can contact patient if some one cancels there appointment. Patient wife verbalized that she understand and states that she will like for me to inform Dr Haddad that patient has been feeling bad since he started taking medication (Esbriet 801 mg). Patient wife also state that patient has been experiencing dizziness and having a lot of head aches. I verbalized to patient wife that I understand and advised patient wife that I will forward her message to Dr Haddad to review/advise. Patient wife verbalized that she understand.

## 2022-06-14 NOTE — TELEPHONE ENCOUNTER
----- Message from Jocelyn Lugo sent at 6/14/2022 11:33 AM CDT -----  Type:  Patient Returning Call    Who Called: pt   Who Left Message for Patient: pt   Does the patient know what this is regarding? Pt wife need a call for a sooner appt   Would the patient rather a call back or a response via [facility]?  Call   Best Call Back Number:[phone]  Additional Information: call back

## 2022-06-17 ENCOUNTER — OFFICE VISIT (OUTPATIENT)
Dept: PULMONOLOGY | Facility: CLINIC | Age: 77
End: 2022-06-17
Payer: MEDICARE

## 2022-06-17 ENCOUNTER — SPECIALTY PHARMACY (OUTPATIENT)
Dept: PHARMACY | Facility: CLINIC | Age: 77
End: 2022-06-17
Payer: MEDICARE

## 2022-06-17 VITALS
HEIGHT: 68 IN | BODY MASS INDEX: 27.83 KG/M2 | WEIGHT: 183.63 LBS | SYSTOLIC BLOOD PRESSURE: 140 MMHG | DIASTOLIC BLOOD PRESSURE: 75 MMHG | HEART RATE: 73 BPM | OXYGEN SATURATION: 95 %

## 2022-06-17 DIAGNOSIS — J84.112 IPF (IDIOPATHIC PULMONARY FIBROSIS): ICD-10-CM

## 2022-06-17 DIAGNOSIS — J84.9 ILD (INTERSTITIAL LUNG DISEASE): Primary | ICD-10-CM

## 2022-06-17 DIAGNOSIS — I10 ESSENTIAL HYPERTENSION: ICD-10-CM

## 2022-06-17 PROCEDURE — 1126F AMNT PAIN NOTED NONE PRSNT: CPT | Mod: CPTII,S$GLB,, | Performed by: INTERNAL MEDICINE

## 2022-06-17 PROCEDURE — 1159F MED LIST DOCD IN RCRD: CPT | Mod: CPTII,S$GLB,, | Performed by: INTERNAL MEDICINE

## 2022-06-17 PROCEDURE — 99999 PR PBB SHADOW E&M-EST. PATIENT-LVL V: ICD-10-PCS | Mod: PBBFAC,,, | Performed by: INTERNAL MEDICINE

## 2022-06-17 PROCEDURE — 1101F PT FALLS ASSESS-DOCD LE1/YR: CPT | Mod: CPTII,S$GLB,, | Performed by: INTERNAL MEDICINE

## 2022-06-17 PROCEDURE — 3077F PR MOST RECENT SYSTOLIC BLOOD PRESSURE >= 140 MM HG: ICD-10-PCS | Mod: CPTII,S$GLB,, | Performed by: INTERNAL MEDICINE

## 2022-06-17 PROCEDURE — 3078F PR MOST RECENT DIASTOLIC BLOOD PRESSURE < 80 MM HG: ICD-10-PCS | Mod: CPTII,S$GLB,, | Performed by: INTERNAL MEDICINE

## 2022-06-17 PROCEDURE — 1160F PR REVIEW ALL MEDS BY PRESCRIBER/CLIN PHARMACIST DOCUMENTED: ICD-10-PCS | Mod: CPTII,S$GLB,, | Performed by: INTERNAL MEDICINE

## 2022-06-17 PROCEDURE — 1159F PR MEDICATION LIST DOCUMENTED IN MEDICAL RECORD: ICD-10-PCS | Mod: CPTII,S$GLB,, | Performed by: INTERNAL MEDICINE

## 2022-06-17 PROCEDURE — 99213 PR OFFICE/OUTPT VISIT, EST, LEVL III, 20-29 MIN: ICD-10-PCS | Mod: S$GLB,,, | Performed by: INTERNAL MEDICINE

## 2022-06-17 PROCEDURE — 99999 PR PBB SHADOW E&M-EST. PATIENT-LVL V: CPT | Mod: PBBFAC,,, | Performed by: INTERNAL MEDICINE

## 2022-06-17 PROCEDURE — 1126F PR PAIN SEVERITY QUANTIFIED, NO PAIN PRESENT: ICD-10-PCS | Mod: CPTII,S$GLB,, | Performed by: INTERNAL MEDICINE

## 2022-06-17 PROCEDURE — 99213 OFFICE O/P EST LOW 20 MIN: CPT | Mod: S$GLB,,, | Performed by: INTERNAL MEDICINE

## 2022-06-17 PROCEDURE — 3077F SYST BP >= 140 MM HG: CPT | Mod: CPTII,S$GLB,, | Performed by: INTERNAL MEDICINE

## 2022-06-17 PROCEDURE — 3078F DIAST BP <80 MM HG: CPT | Mod: CPTII,S$GLB,, | Performed by: INTERNAL MEDICINE

## 2022-06-17 PROCEDURE — 3288F PR FALLS RISK ASSESSMENT DOCUMENTED: ICD-10-PCS | Mod: CPTII,S$GLB,, | Performed by: INTERNAL MEDICINE

## 2022-06-17 PROCEDURE — 1101F PR PT FALLS ASSESS DOC 0-1 FALLS W/OUT INJ PAST YR: ICD-10-PCS | Mod: CPTII,S$GLB,, | Performed by: INTERNAL MEDICINE

## 2022-06-17 PROCEDURE — 1160F RVW MEDS BY RX/DR IN RCRD: CPT | Mod: CPTII,S$GLB,, | Performed by: INTERNAL MEDICINE

## 2022-06-17 PROCEDURE — 3288F FALL RISK ASSESSMENT DOCD: CPT | Mod: CPTII,S$GLB,, | Performed by: INTERNAL MEDICINE

## 2022-06-17 RX ORDER — NINTEDANIB 150 MG/1
150 CAPSULE ORAL 2 TIMES DAILY
Qty: 60 CAPSULE | Refills: 11 | Status: SHIPPED | OUTPATIENT
Start: 2022-06-17 | End: 2022-06-23 | Stop reason: SDUPTHER

## 2022-06-17 RX ORDER — ASPIRIN 325 MG
TABLET ORAL
Start: 2022-06-17 | End: 2022-11-08 | Stop reason: DRUGHIGH

## 2022-06-17 NOTE — ASSESSMENT & PLAN NOTE
Stop Esbriet.  Starting Ofev bid.  Recommend pulmonary rehab.  Patient would prefer [facility] as it is closer to his home.   Plan for repeat 6MWT and PFTs.    Explained that given his current level of debility and comorbid conditions that a visit with palliative care for assistance with advanced care planning and symptom management would be beneficial.

## 2022-06-17 NOTE — TELEPHONE ENCOUNTER
Ofev PA approved through 12/31/22  Request Reference Number: [id number]    Test claim shows a $617.26 copay - Forwarding to BI/FA. Svetlana is LDD and will need to be transferred to [facility] once FA is completed.

## 2022-06-17 NOTE — TELEPHONE ENCOUNTER
Provider d/c'd Esbriet due to side effects and changed to Svetlana KRAMER submitted 6/17/22  CM Key: [id number]

## 2022-06-17 NOTE — PROGRESS NOTES
Subjective:       Patient ID: Kermit Castro is a 76 y.o. male.    Chief Complaint: Interstitial Lung Disease    HPI:   Kermit Castro is a 76 y.o. male who presents for follow up.  Accompanied by his wife, Riki     He last seen in the office in October of 2021.    At that visit his imaging, exam and PFTs were consistent with a diagnosisof Idiopathic Pulmonary Fibrosis.  He was started on Esbriet.    Today he reports that he has had difficulty tolerating the Esbriet.  Even when taking it with meals he has significant dizziness and fatigue.  It has limited his ability to participate in his normal activities.     He continues to have shortness of breath with minimal activity.  Symptoms have been persistent for many years.     No recent ED/UC visits.  He is vaccinated against covid.    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  30 years of smoking 1ppd    Brother with lung cancer.     Review of Systems   Constitutional: Positive for fatigue. Negative for fever, chills, weight loss and activity change.   HENT: Negative for postnasal drip, rhinorrhea, sinus pressure and congestion.    Respiratory: Positive for dyspnea on extertion. Negative for snoring, cough, hemoptysis, shortness of breath, wheezing and asthma nighttime symptoms.    Cardiovascular: Positive for leg swelling. Negative for chest pain.   Genitourinary: Negative for difficulty urinating.   Endocrine: Negative for cold intolerance and heat intolerance.    Musculoskeletal: Positive for back pain. Negative for joint swelling and myalgias.   Gastrointestinal: Negative for nausea, vomiting and abdominal pain.   Neurological: Positive for dizziness and weakness. Negative for headaches.   Hematological: Negative for adenopathy. No excessive bruising.   Psychiatric/Behavioral: Negative for confusion and sleep disturbance.         Social History     Tobacco Use     Smoking status: Former Smoker     Quit date: 10/5/1998     Years since quittin.7    Smokeless tobacco: Never Used   Substance Use Topics    Alcohol use: Not Currently     Alcohol/week: 0.0 standard drinks     Comment:        Review of patient's allergies indicates:   Allergen Reactions    Iodine and iodide containing products Anaphylaxis    Shellfish containing products Anaphylaxis     Past Medical History:   Diagnosis Date    Coronary artery disease     Hyperlipidemia     Hypertension      Past Surgical History:   Procedure Laterality Date    ABDOMINAL SURGERY      Polyps in colon removed (noncancerous)    APPENDECTOMY      back surgery (screws & okate)      CORONARY ANGIOGRAPHY N/A 2022    Procedure: ANGIOGRAM, CORONARY ARTERY;  Surgeon: Gume Nagy MD;  Location: [facility] CATH LAB;  Service: Cardiology;  Laterality: N/A;    CORONARY ARTERY BYPASS GRAFT          CORONARY BYPASS GRAFT ANGIOGRAPHY  2022    Procedure: Bypass graft study;  Surgeon: Gume Nagy MD;  Location: [facility] CATH LAB;  Service: Cardiology;;    PERCUTANEOUS TRANSLUMINAL ANGIOPLASTY (PTA) OF PERIPHERAL VESSEL N/A 2022    Procedure: PTA, PERIPHERAL VESSEL;  Surgeon: Gume Nagy MD;  Location: [facility] CATH LAB;  Service: Cardiology;  Laterality: N/A;    TRANSFORAMINAL EPIDURAL INJECTION OF STEROID Right 2020    Procedure: LUMBAR TRANSFORAMINAL RIGHT L3/4 AND L4/5 DIRECT REFERRAL;  Surgeon: Nicole Toth MD;  Location: [facility] PAIN MGT;  Service: Pain Management;  Laterality: Right;  NEEDS CONSENT, PT STATES NO LONGER TAKES PLETAL     Current Outpatient Medications on File Prior to Visit   Medication Sig    albuterol-ipratropium (DUO-NEB) 2.5 mg-0.5 mg/3 mL nebulizer solution Take 3 mLs by nebulization every 6 (six) hours as needed for Shortness of Breath. Rescue    ALPRAZolam (XANAX) 0.5 MG tablet Take 0.5 mg by mouth 4 (four) times daily as needed.    amLODIPine (NORVASC) 10 MG tablet  amlodipine besylate  10 mg tabs    aspirin 325 MG tablet aspirin 325 mg tablet   Take 1 tablet every day by oral route.    atorvastatin (LIPITOR) 40 MG tablet Take 2 tablets (80 mg total) by mouth once daily.    betamethasone acetate-betamethasone sodium phosphate (CELESTONE) 6 mg/mL injection 1 mL.    cilostazoL (PLETAL) 100 MG Tab Take 1 tablet by mouth twice daily    cimetidine (TAGAMET) 300 MG tablet 1 tablet po Q6 hours x 3    clopidogreL (PLAVIX) 75 mg tablet Take 4 tablets on day 1 then take 1 tablet daily there after.    diphenhydrAMINE (BENADRYL) 50 MG capsule 1 tablet po Q 6 hours x 3    ezetimibe (ZETIA) 10 mg tablet Take 1 tablet (10 mg total) by mouth once daily.    flu vacc [id number],65yr up,PF (FLUZONE HIGH-DOSE 2019-20, PF,) 180 mcg/0.5 mL Syrg Fluzone High-Dose 2019-20 (PF) 180 mcg/0.5 mL intramuscular syringe   PHARMACIST ADMINISTERED IMMUNIZATION ADMINISTERED AT TIME OF DISPENSING    furosemide (LASIX) 40 MG tablet Take 1 tablet (40 mg total) by mouth once daily.    lisinopriL (PRINIVIL,ZESTRIL) 20 MG tablet Take 1.5 tablets (30 mg total) by mouth once daily.    lubiprostone (AMITIZA) 24 MCG Cap Amitiza 24 mcg capsule   Take 1 capsule twice a day by oral route with meals for 30 days.    metoprolol succinate (TOPROL-XL) 100 MG 24 hr tablet Take 1 tablet (100 mg total) by mouth once daily.    multivitamin (THERAGRAN) per tablet Take 1 tablet by mouth once daily.    omega 3-dha-epa-fish oil (FISH OIL) 100-160-1,000 mg Cap Fish Oil   daily    oxyCODONE-acetaminophen (PERCOCET)  mg per tablet oxycod/apap  tab 10-325mg    pirfenidone (ESBRIET) 801 mg Tab Take 1 capsule by mouth 3 (three) times daily.    polyethylene glycol (GLYCOLAX) 17 gram/dose powder Miralax 17 gram/dose oral powder   use as directed    predniSONE (DELTASONE) 50 MG Tab 1 tablet po Q 6 hours x 3    predniSONE (DELTASONE) 50 MG Tab Take 1 tablet (50 mg total) by mouth every 6 (six) hours.    predniSONE  "(DELTASONE) 50 MG Tab TAKE 1 TABLET BY MOUTH EVERY 6 HOURS    pregabalin (LYRICA) 50 MG capsule pregabalin 50 mg capsule    pregabalin (LYRICA) 50 MG capsule     pulse oximeter (PULSE OXIMETER) device by Apply Externally route 2 (two) times a day. Use twice daily at 8 AM and 3 PM and record the value in [facility] as directed.    saw palmetto 160 MG capsule Take 160 mg by mouth 2 (two) times daily.    sildenafiL (VIAGRA) 100 MG tablet Take 100 mg by mouth.    SUPREP BOWEL PREP KIT 17.5-3.13-1.6 gram SolR Take by mouth.    cetirizine (ZYRTEC) 10 MG tablet Take 1 tablet (10 mg total) by mouth once daily. (Patient not taking: Reported on 5/3/2022)    pantoprazole (PROTONIX) 40 MG tablet Take 1 tablet (40 mg total) by mouth once daily. (Patient not taking: Reported on 3/8/2022)     Current Facility-Administered Medications on File Prior to Visit   Medication    0.9%  NaCl infusion    acetaminophen tablet 650 mg    albuterol inhaler 2 puff    EPINEPHrine (EPIPEN) 0.3 mg/0.3 mL pen injection 0.3 mg    methylPREDNISolone sodium succinate injection 40 mg    ondansetron disintegrating tablet 4 mg    sodium chloride 0.9% 500 mL flush bag    sodium chloride 0.9% flush 10 mL       Objective:      Vitals:    06/17/22 1050   BP: (!) 140/75   BP Location: Left arm   Patient Position: Sitting   Pulse: 73   SpO2: 95%   Weight: 83.3 kg (183 lb 10.3 oz)   Height: 5' 8" (1.727 m)     Physical Exam   Constitutional: He is oriented to person, place, and time. He appears well-developed and well-nourished. No distress.   HENT:   Head: Normocephalic.   Cardiovascular: Normal rate, regular rhythm and normal heart sounds.   No murmur heard.  Pulmonary/Chest: Effort normal. He has no wheezes. He has no rhonchi (at left base). He has rales (at right base).   Abdominal: Soft. Bowel sounds are normal. He exhibits no distension. There is no abdominal tenderness.   Musculoskeletal:         General: Edema present. Normal range of " motion.      Cervical back: Normal range of motion.   Neurological: He is alert and oriented to person, place, and time.   Skin: Skin is warm and dry. He is not diaphoretic. No cyanosis. Nails show no clubbing.   Psychiatric: He has a normal mood and affect. His behavior is normal. Judgment and thought content normal.     Personal Diagnostic Review    PFTs: 8/16/21: no obstruction, mild restriction, moderately reduced DLCO    6MWT: 9/21/21: decreased exercise tolerance, but no supplemental O2 requirements.    Cardiac catheterization  · The Mid LAD lesion was 100% stenosed.  · The 1st Mrg lesion was 100% stenosed.  · The 2nd Mrg lesion was 90% stenosed.  · The Mid RCA lesion was 100% stenosed.  · The estimated blood loss was none.  · There was severe three vessel native coronary artery disease and a   subtotaled small OMB 2.  · Patent SVG x 2 and ASCENCIO to LAD.     The procedure log was documented by Documenter: Ru Oneill; Gena Grimaldo and verified by Gume Nagy MD.    Date: 5/16/2022  Time: 1:17 PM    CTChest 8/18/21:  I personally reviewed the images and agree with the radiology read as below:     Impression:   Constellation of findings suggestive of interstitial lung disease such as UIP or less likely NSIP.  Centrilobular emphysema in an upper lobe predominant distribution.      Postoperative changes of CABG.        Assessment:     Orders Placed This Encounter   Procedures    Ambulatory referral/consult to Pulmonary Rehab     Standing Status:   Future     Standing Expiration Date:   7/17/2023     Referral Priority:   Routine     Referral Type:   Consultation     Referral Reason:   Specialty Services Required     Requested Specialty:   Pulmonary Disease     Number of Visits Requested:   1    Ambulatory referral/consult to CLINIC Palliative Care     Standing Status:   Future     Standing Expiration Date:   7/17/2023     Referral Priority:   Routine     Referral Type:   Consultation     Requested  Specialty:   Palliative Medicine     Number of Visits Requested:   1    DLCO-Carbon Monoxide Diffusing Capacity     Standing Status:   Future     Standing Expiration Date:   6/17/2023     Order Specific Question:   Release to patient     Answer:   Immediate    Spirometry with/without bronchodilator     Standing Status:   Future     Standing Expiration Date:   6/17/2023     Order Specific Question:   Release to patient     Answer:   Immediate    Stress test, pulmonary     Standing Status:   Future     Standing Expiration Date:   6/17/2023     Order Specific Question:   Reason for study     Answer:   Functional status     Order Specific Question:   Release to patient     Answer:   Immediate     1. ILD (interstitial lung disease)    2. Essential hypertension    3. IPF (idiopathic pulmonary fibrosis)        Plan:       Problem List Items Addressed This Visit        Pulmonary    ILD (interstitial lung disease) - Primary    Relevant Orders    Ambulatory referral/consult to Pulmonary Rehab    DLCO-Carbon Monoxide Diffusing Capacity    Spirometry with/without bronchodilator    Stress test, pulmonary    Ambulatory referral/consult to CLINIC Palliative Care    IPF (idiopathic pulmonary fibrosis)    Current Assessment & Plan     Stop Esbriet.  Starting Ofev bid.  Recommend pulmonary rehab.  Patient would prefer [facility] as it is closer to his home.   Plan for repeat 6MWT and PFTs.    Explained that given his current level of debility and comorbid conditions that a visit with palliative care for assistance with advanced care planning and symptom management would be beneficial.              Cardiac/Vascular    Essential hypertension

## 2022-06-17 NOTE — TELEPHONE ENCOUNTER
Benefit Investigation    Ofev - Tier 5  [facility] Medicare per Arch [facility].   No LIS  Deductible: $480 (satisfied)  Max OOP: $4430 ($75053.19 satisfied)  TrOOP: $7050 ($9275.03 satisfied)  Estimated copay: $617.26   Pt is in catastrophic phase - pt does not have an all medication OOP where plan will pay 100%. Pt will remain in catastrophic phase til plan resets in January. Pt will pay 5% of drug cost   Ofev is LDD - [facility]:    (P): [phone]   (P): [phone]

## 2022-06-17 NOTE — TELEPHONE ENCOUNTER
Outgoing call to pt regarding Ofev, LDD, and high copay. Current emmett with  does not have enough funds to carry pt throughout the year. Able to secure another emmett with PAF for pt. Provided pt with phone number to [facility]     Emmett for PF:  BIN: [id number]  PCN: [id number]  Group: [id number]  ID: [id number]  Effective dates: 11/24/2021 - 11/23/2022  Remaining balance: $1,835.70    PAF Emmett for PF  BIN: [id number]  PCN: [id number]  Group: [id number]  ID: [id number]  Effective dates: 6/17/2022 - 6/17/2023  Remaining balance: $9000    Mailed PAF award letter to pt's address in [facility] ([address]

## 2022-06-18 NOTE — TELEPHONE ENCOUNTER
Rx routed to [facility] mail services ([place], [place]) with both grants processing info    Closing out referral at [facility]

## 2022-06-22 ENCOUNTER — HOSPITAL ENCOUNTER (OUTPATIENT)
Dept: PULMONOLOGY | Facility: CLINIC | Age: 77
Discharge: HOME | End: 2022-06-22
Payer: MEDICARE

## 2022-06-22 VITALS — HEIGHT: 68 IN | BODY MASS INDEX: 27.83 KG/M2 | WEIGHT: 183.63 LBS

## 2022-06-22 DIAGNOSIS — J84.9 ILD (INTERSTITIAL LUNG DISEASE): ICD-10-CM

## 2022-06-22 PROCEDURE — 94618 PULMONARY STRESS TESTING: ICD-10-PCS | Mod: S$GLB,,, | Performed by: INTERNAL MEDICINE

## 2022-06-22 PROCEDURE — 94729 PR C02/MEMBANE DIFFUSE CAPACITY: ICD-10-PCS | Mod: S$GLB,,, | Performed by: INTERNAL MEDICINE

## 2022-06-22 PROCEDURE — 94729 DIFFUSING CAPACITY: CPT | Mod: S$GLB,,, | Performed by: INTERNAL MEDICINE

## 2022-06-22 PROCEDURE — 94060 EVALUATION OF WHEEZING: CPT | Mod: S$GLB,,, | Performed by: INTERNAL MEDICINE

## 2022-06-22 PROCEDURE — 94060 PR EVAL OF BRONCHOSPASM: ICD-10-PCS | Mod: S$GLB,,, | Performed by: INTERNAL MEDICINE

## 2022-06-22 PROCEDURE — 94618 PULMONARY STRESS TESTING: CPT | Mod: S$GLB,,, | Performed by: INTERNAL MEDICINE

## 2022-06-23 RX ORDER — NINTEDANIB 150 MG/1
150 CAPSULE ORAL 2 TIMES DAILY
Qty: 60 CAPSULE | Refills: 11 | Status: SHIPPED | OUTPATIENT
Start: 2022-06-23 | End: 2023-06-07

## 2022-06-23 NOTE — PROCEDURES
Kermit Castro is a 76 y.o.  male patient, who presents for a 6 minute walk test ordered by MD Boo.  The diagnosis is Interstitial Lung Disease.  The patient's BMI is 27.9 kg/m2. Predicted distance (lower limit of normal) is 296.1 meters.    Test Results:    The test was completed with stops.  The patient stopped 2 times for a total of 57 seconds.  The total time walked was 303 seconds.  During walking, the patient reported:  Dyspnea.  The patient used no assistive devices during testing.     06/22/2022---------Distance: 243.84 meters (800 feet)     O2 Sat % Supplemental Oxygen Heart Rate Blood Pressure Neal Scale   Pre-exercise  (Resting) 98 % Room Air 78 bpm 163/73 mmHg 1   During Exercise 88 % Room Air 105 bpm 195/85 mmHg 4   Post-exercise   97 % Room Air  81 bpm 177/81 mmHg      Recovery Time: 140 seconds    Oxygen Qualification:     O2 Sat % Supplemental Oxygen Heart Rate Blood Pressure Neal Scale   Pre-exercise  (Resting) 98 % 2 L/M  80 bpm  160/61 mmHg 1    During Exercise   98 %  2 L/M  91 bpm  159/72 mmHg 3    Post-exercise   97 %  2 L/M  83 bpm        Performing nurse/tech: AVILA Ocasio      PREVIOUS STUDY:   09/21/2021---------Distance: 121.92 meters (400 feet)       O2 Sat % Supplemental Oxygen Heart Rate Blood Pressure Neal Scale   Pre-exercise  (Resting) 95 % Room Air 73 bpm 120/59 mmHg 3   During Exercise 92 % Room Air 92 bpm 129/58 mmHg 5-6   Post-exercise  (Recovery) 94 % Room Air  83 bpm           CLINICAL INTERPRETATION:  Six minute walk distance is 243.84 meters (800 feet) with somewhat heavy dyspnea.  During exercise, there was significant desaturation while breathing room air.  Both blood pressure and heart rate increased significantly with walking.  Hypertension was present prior to exercise.  This may represent a hypertensive response to exercise.  The patient did not report non-pulmonary symptoms during exercise.  Significant exercise impairment is likely due to  desaturation, cardiovascular causes and subjective symptoms.  The patient did complete the study, walking 303 seconds of the 360 second test.  The patient may benefit from using supplemental oxygen during exertion.  Since the previous study in September 2021, exercise capacity is significantly improved.  Based upon age and body mass index, exercise capacity is less than predicted.   Oxygen saturation did improve while breathing supplemental oxygen.

## 2022-06-24 DIAGNOSIS — J96.11 CHRONIC HYPOXEMIC RESPIRATORY FAILURE: Primary | ICD-10-CM

## 2022-06-24 NOTE — TELEPHONE ENCOUNTER
Outgoing call to pt's wife (Samira) regarding Ofev. [facility] stated cannot bill grants at their pharmacy.     Received new rx, routed to [facility], confirmed with Andrews, Sarah is able to bill both grants  Spoke to Carlie, provided processing information for both HW and PAF charli.    Ms. Hogan is aware of above info, provided phone number to Sarah

## 2022-06-24 NOTE — PROGRESS NOTES
Mr. Castro,    Your pulmonary function tests look relatively stable, however your 6MWT did show that your oxygen levels drop considerably when you're moving around. You may feel better wearing oxygen when you are exerting yourself.  I have placed an order for home oxygen.      Dr. GUAMAN

## 2022-06-26 LAB
DLCO SINGLE BREATH LLN: 17.19
DLCO SINGLE BREATH PRE REF: 51.5 %
DLCO SINGLE BREATH REF: 24.12
DLCOC SBVA LLN: 2.39
DLCOC SBVA REF: 3.59
DLCOC SINGLE BREATH LLN: 17.19
DLCOC SINGLE BREATH REF: 24.12
DLCOCSBVAULN: 4.79
DLCOCSINGLEBREATHULN: 31.05
DLCOSINGLEBREATHULN: 31.05
DLCOVA LLN: 2.39
DLCOVA PRE REF: 87.5 %
DLCOVA PRE: 3.14 ML/(MIN*MMHG*L) (ref 2.39–4.79)
DLCOVA REF: 3.59
DLCOVAULN: 4.79
FEF 25 75 LLN: 0.55
FEF 25 75 PRE REF: 151.1 %
FEF 25 75 REF: 1.75
FET100 CHG: 2.3 %
FEV05 LLN: 1.2
FEV05 REF: 2.34
FEV1 CHG: 2 %
FEV1 FVC LLN: 62
FEV1 FVC PRE REF: 107.1 %
FEV1 FVC REF: 76
FEV1 LLN: 1.56
FEV1 PRE REF: 104.9 %
FEV1 REF: 2.36
FEV1 VOL CHG: 0.05
FVC CHG: 3.5 %
FVC LLN: 2.2
FVC PRE REF: 97.4 %
FVC REF: 3.13
FVC VOL CHG: 0.11
IVC PRE: 3.03 L (ref 2.2–4.07)
IVC SINGLE BREATH LLN: 2.2
IVC SINGLE BREATH PRE REF: 97 %
IVC SINGLE BREATH REF: 3.13
IVCSINGLEBREATHULN: 4.07
PEF LLN: 4.73
PEF PRE REF: 137.3 %
PEF REF: 7.24
PHYSICIAN COMMENT: ABNORMAL
POST FEF 25 75: 2.59 L/S (ref 0.55–3.63)
POST FET 100: 6.27 SEC
POST FEV1 FVC: 80.1 % (ref 61.81–88.58)
POST FEV1: 2.53 L (ref 1.56–3.09)
POST FEV5: 2.15 L (ref 1.2–3.47)
POST FVC: 3.15 L (ref 2.2–4.07)
POST PEF: 10.68 L/S (ref 4.73–9.74)
PRE DLCO: 12.41 ML/(MIN*MMHG) (ref 17.19–31.05)
PRE FEF 25 75: 2.64 L/S (ref 0.55–3.63)
PRE FET 100: 6.13 SEC
PRE FEV05 REF: 91.2 %
PRE FEV1 FVC: 81.28 % (ref 61.81–88.58)
PRE FEV1: 2.47 L (ref 1.56–3.09)
PRE FEV5: 2.13 L (ref 1.2–3.47)
PRE FVC: 3.04 L (ref 2.2–4.07)
PRE PEF: 9.94 L/S (ref 4.73–9.74)
VA PRE: 3.95 L (ref 6.57–6.57)
VA SINGLE BREATH LLN: 6.57
VA SINGLE BREATH PRE REF: 60.2 %
VA SINGLE BREATH REF: 6.57
VASINGLEBREATHULN: 6.57

## 2022-06-29 ENCOUNTER — TELEPHONE (OUTPATIENT)
Dept: PALLIATIVE MEDICINE | Facility: CLINIC | Age: 77
End: 2022-06-29
Payer: MEDICARE

## 2022-06-30 ENCOUNTER — OFFICE VISIT (OUTPATIENT)
Dept: PALLIATIVE MEDICINE | Facility: CLINIC | Age: 77
End: 2022-06-30
Payer: MEDICARE

## 2022-06-30 ENCOUNTER — DOCUMENTATION ONLY (OUTPATIENT)
Dept: PALLIATIVE MEDICINE | Facility: CLINIC | Age: 77
End: 2022-06-30

## 2022-06-30 VITALS — SYSTOLIC BLOOD PRESSURE: 130 MMHG | HEART RATE: 68 BPM | DIASTOLIC BLOOD PRESSURE: 72 MMHG

## 2022-06-30 DIAGNOSIS — G89.29 CHRONIC BACK PAIN GREATER THAN 3 MONTHS DURATION: ICD-10-CM

## 2022-06-30 DIAGNOSIS — T40.2X5A CONSTIPATION DUE TO OPIOID THERAPY: ICD-10-CM

## 2022-06-30 DIAGNOSIS — Z71.89 ADVANCE CARE PLANNING: ICD-10-CM

## 2022-06-30 DIAGNOSIS — I10 ESSENTIAL HYPERTENSION: ICD-10-CM

## 2022-06-30 DIAGNOSIS — E78.2 MIXED HYPERLIPIDEMIA: ICD-10-CM

## 2022-06-30 DIAGNOSIS — J96.11 CHRONIC RESPIRATORY FAILURE WITH HYPOXIA: ICD-10-CM

## 2022-06-30 DIAGNOSIS — Z95.1 HX OF CABG: ICD-10-CM

## 2022-06-30 DIAGNOSIS — K59.03 CONSTIPATION DUE TO OPIOID THERAPY: ICD-10-CM

## 2022-06-30 DIAGNOSIS — Z51.5 PALLIATIVE CARE BY SPECIALIST: Primary | ICD-10-CM

## 2022-06-30 DIAGNOSIS — M54.9 CHRONIC BACK PAIN GREATER THAN 3 MONTHS DURATION: ICD-10-CM

## 2022-06-30 DIAGNOSIS — M96.1 FAILED BACK SURGICAL SYNDROME: ICD-10-CM

## 2022-06-30 DIAGNOSIS — J84.9 ILD (INTERSTITIAL LUNG DISEASE): ICD-10-CM

## 2022-06-30 PROCEDURE — 3075F PR MOST RECENT SYSTOLIC BLOOD PRESS GE 130-139MM HG: ICD-10-PCS | Mod: CPTII,S$GLB,, | Performed by: INTERNAL MEDICINE

## 2022-06-30 PROCEDURE — 99205 OFFICE O/P NEW HI 60 MIN: CPT | Mod: 25,S$GLB,, | Performed by: INTERNAL MEDICINE

## 2022-06-30 PROCEDURE — 99205 PR OFFICE/OUTPT VISIT, NEW, LEVL V, 60-74 MIN: ICD-10-PCS | Mod: 25,S$GLB,, | Performed by: INTERNAL MEDICINE

## 2022-06-30 PROCEDURE — 99999 PR PBB SHADOW E&M-EST. PATIENT-LVL IV: CPT | Mod: PBBFAC,,, | Performed by: INTERNAL MEDICINE

## 2022-06-30 PROCEDURE — 1160F RVW MEDS BY RX/DR IN RCRD: CPT | Mod: CPTII,S$GLB,, | Performed by: INTERNAL MEDICINE

## 2022-06-30 PROCEDURE — 1159F MED LIST DOCD IN RCRD: CPT | Mod: CPTII,S$GLB,, | Performed by: INTERNAL MEDICINE

## 2022-06-30 PROCEDURE — 3288F FALL RISK ASSESSMENT DOCD: CPT | Mod: CPTII,S$GLB,, | Performed by: INTERNAL MEDICINE

## 2022-06-30 PROCEDURE — 99497 ADVNCD CARE PLAN 30 MIN: CPT | Mod: S$GLB,,, | Performed by: INTERNAL MEDICINE

## 2022-06-30 PROCEDURE — 1160F PR REVIEW ALL MEDS BY PRESCRIBER/CLIN PHARMACIST DOCUMENTED: ICD-10-PCS | Mod: CPTII,S$GLB,, | Performed by: INTERNAL MEDICINE

## 2022-06-30 PROCEDURE — 3075F SYST BP GE 130 - 139MM HG: CPT | Mod: CPTII,S$GLB,, | Performed by: INTERNAL MEDICINE

## 2022-06-30 PROCEDURE — 1123F PR ADV CARE PLAN DISCUSSED, PLAN OR SURROGATE DOCUMENTED: ICD-10-PCS | Mod: CPTII,S$GLB,, | Performed by: INTERNAL MEDICINE

## 2022-06-30 PROCEDURE — 3078F DIAST BP <80 MM HG: CPT | Mod: CPTII,S$GLB,, | Performed by: INTERNAL MEDICINE

## 2022-06-30 PROCEDURE — 3288F PR FALLS RISK ASSESSMENT DOCUMENTED: ICD-10-PCS | Mod: CPTII,S$GLB,, | Performed by: INTERNAL MEDICINE

## 2022-06-30 PROCEDURE — 99999 PR PBB SHADOW E&M-EST. PATIENT-LVL IV: ICD-10-PCS | Mod: PBBFAC,,, | Performed by: INTERNAL MEDICINE

## 2022-06-30 PROCEDURE — 3078F PR MOST RECENT DIASTOLIC BLOOD PRESSURE < 80 MM HG: ICD-10-PCS | Mod: CPTII,S$GLB,, | Performed by: INTERNAL MEDICINE

## 2022-06-30 PROCEDURE — 1159F PR MEDICATION LIST DOCUMENTED IN MEDICAL RECORD: ICD-10-PCS | Mod: CPTII,S$GLB,, | Performed by: INTERNAL MEDICINE

## 2022-06-30 PROCEDURE — 1101F PR PT FALLS ASSESS DOC 0-1 FALLS W/OUT INJ PAST YR: ICD-10-PCS | Mod: CPTII,S$GLB,, | Performed by: INTERNAL MEDICINE

## 2022-06-30 PROCEDURE — 99497 PR ADVNCD CARE PLAN 30 MIN: ICD-10-PCS | Mod: S$GLB,,, | Performed by: INTERNAL MEDICINE

## 2022-06-30 PROCEDURE — 1125F AMNT PAIN NOTED PAIN PRSNT: CPT | Mod: CPTII,S$GLB,, | Performed by: INTERNAL MEDICINE

## 2022-06-30 PROCEDURE — 1101F PT FALLS ASSESS-DOCD LE1/YR: CPT | Mod: CPTII,S$GLB,, | Performed by: INTERNAL MEDICINE

## 2022-06-30 PROCEDURE — 1125F PR PAIN SEVERITY QUANTIFIED, PAIN PRESENT: ICD-10-PCS | Mod: CPTII,S$GLB,, | Performed by: INTERNAL MEDICINE

## 2022-06-30 PROCEDURE — 1123F ACP DISCUSS/DSCN MKR DOCD: CPT | Mod: CPTII,S$GLB,, | Performed by: INTERNAL MEDICINE

## 2022-06-30 RX ORDER — MORPHINE SULFATE 15 MG/1
15 TABLET, FILM COATED, EXTENDED RELEASE ORAL EVERY 12 HOURS
Qty: 60 TABLET | Refills: 0 | Status: SHIPPED | OUTPATIENT
Start: 2022-06-30 | End: 2022-06-30

## 2022-06-30 RX ORDER — OXYCODONE HYDROCHLORIDE 5 MG/1
5 TABLET ORAL EVERY 4 HOURS PRN
Qty: 30 TABLET | Refills: 0 | Status: SHIPPED | OUTPATIENT
Start: 2022-06-30 | End: 2022-11-08 | Stop reason: DRUGHIGH

## 2022-06-30 NOTE — PROGRESS NOTES
Discussed with pain physician.  He prefers not to add a long-acting MS Contin but instead will prescribe ox see IR 5 mg every 4 hours as needed for shortness of breath and see if the additional dose assists with his shortness of breath.  Order for morphine discontinued new order for Oxy IR 5 number 31 every 4 hours as needed for shortness of breath was added to his regimen and sent to [facility].

## 2022-06-30 NOTE — PROGRESS NOTES
Consult Note  Palliative Care      Consult Requested By: Dr. Candi Haddad  Reason for Consult: Symptom management and discussion about advance care planning      ASSESSMENT/PLAN:     Plan/Recommendations:  Diagnoses and all orders for this visit:    Palliative care by specialist  Reviewed with patient his wife the services that we offer.  We are an extra layer of support for people with series illness and their families.  Informed them that they can call our office any time and we would help them.  We worked together with his pulmonary team to provide the best care possible.  Our goal is to make him have as many good days as he possibly can.  ILD (interstitial lung disease)  -     Ambulatory referral/consult to [facility]IN[facility] Palliative Care  I think at this time is appropriate to begin long-acting low-dose opioids for management of dyspnea.  He is starting a new medication for IL D and I have asked him to hold off starting the MS Contin for a few days to see how that new medication reacts with him.  I think he can use some short-acting opioids as well.  He is followed by pain management for his failed back syndrome and takes Percocet 10s once or twice a day.  He cannot recall whether his shortness of breath is better after taking the Percocet.  I suspect it is because he gets much more active.    Advance care planning  Advance Care Planning     Long discussion with the patient and his wife with regard to advance care planning.  He is not ready to discuss life support options.  He has a life-limiting illness but we should be able to get his symptoms better.  He would want his wife Samira to be his primary decision maker followed by his daughter Miguel Ángel and his son Kermit krishnamurthy.  He did get really sick with COVID but was not hospitalized.  He understands that his life is fragile.         Greater than 16 minutes was spent developing and advance care planning getting documents signed and loaded into the chart.  Chronic  back pain greater than 3 months duration  Followed by [facility].  On Percocet 10s twice a day.  Will discuss further with them the use of low-dose long-acting morphine for dyspnea.  They can continue giving him his short-acting Percocet.  Constipation due to opioid therapy  Currently takes MiraLax and have asked him to take it every day or every other day while he is on opioids.  Discussed mechanism of action of opioid induced constipation.  They appear to understand.  Mixed hyperlipidemia  Continue current medications.  He is on Zetia.  Essential hypertension  Blood pressure normalized after sitting down.  Continue current medications.  Followed by primary care.  Hx of CABG  Currently activity is limited by his lung disease.  No evidence of angina by history.  Failed back surgical syndrome  Followed by [facility].  Wife describes having 2 screws that are bent in his back.  He is not a surgical candidate for any kind of repair.  Chronic respiratory failure with hypoxia  Recent 6 minute walk test is consistent with hypoxia with exercise.  Documentation is present in the medical record.  Oxygen is being ordered.  [facility] needs to authorize.  I have contacted pulmonary to assist with this.        SUBJECTIVE:     History of Present Illness:  Patient is a 76 y.o. year old male presenting with shortness of breath with exertion.  Patient was recently seen by Pulmonary and had a 6 minute walk test.  He is a candidate for supplemental oxygen.  We are waiting on approval by [facility].  Patient says that he is mostly sedentary because he gets so short of breath and his lower back hurts.  He is followed by Dr. Jarvis at [facility] [phone].  I have left a message with Dr. Jarvis to call me regarding adding long-acting opiates for dyspnea to his regimen.  Patient said that if he was able he would be riding his motorcycle with his friends.  He does report that he can make it in to [facility] pharmacy from the  car when he  grabs a cart.  He says that he can walk slowly around the entire store.  He is very involved with his children as well as his [employer].  His oldest son is that a  at the [employer].  He was treated for COVID earlier this year with monoclonal antibody and he is up-to-date on his vaccinations.  We talked about the importance of flu vaccination as well as pneumonia when he sees his primary care doctor later this fall.    Past Medical History:   Diagnosis Date    Coronary artery disease     Hyperlipidemia     Hypertension      Past Surgical History:   Procedure Laterality Date    ABDOMINAL SURGERY      Polyps in colon removed (noncancerous)    APPENDECTOMY      back surgery (screws & okate)      CORONARY ANGIOGRAPHY N/A 2022    Procedure: ANGIOGRAM, CORONARY ARTERY;  Surgeon: Gume Nagy MD;  Location: [facility] CATH LAB;  Service: Cardiology;  Laterality: N/A;    CORONARY ARTERY BYPASS GRAFT          CORONARY BYPASS GRAFT ANGIOGRAPHY  2022    Procedure: Bypass graft study;  Surgeon: Gume Nagy MD;  Location: [facility] CATH LAB;  Service: Cardiology;;    PERCUTANEOUS TRANSLUMINAL ANGIOPLASTY (PTA) OF PERIPHERAL VESSEL N/A 2022    Procedure: PTA, PERIPHERAL VESSEL;  Surgeon: Gume Nagy MD;  Location: [facility] CATH LAB;  Service: Cardiology;  Laterality: N/A;    TRANSFORAMINAL EPIDURAL INJECTION OF STEROID Right 2020    Procedure: LUMBAR TRANSFORAMINAL RIGHT L3/4 AND L4/5 DIRECT REFERRAL;  Surgeon: Nicole Toth MD;  Location: [facility] PAIN MGT;  Service: Pain Management;  Laterality: Right;  NEEDS CONSENT, PT STATES NO LONGER TAKES PLETAL     Social History     Socioeconomic History    Marital status:    Tobacco Use    Smoking status: Former Smoker     Quit date: 10/5/1998     Years since quittin.7    Smokeless tobacco: Never Used   Substance and Sexual Activity    Alcohol use: Not Currently     Alcohol/week: 0.0 standard drinks     Comment:     Drug use:  Never     No family history on file.  Review of patient's allergies indicates:   Allergen Reactions    Iodine and iodide containing products Anaphylaxis    Shellfish containing products Anaphylaxis       Medications:    Current Outpatient Medications:     albuterol-ipratropium (DUO-NEB) 2.5 mg-0.5 mg/3 mL nebulizer solution, Take 3 mLs by nebulization every 6 (six) hours as needed for Shortness of Breath. Rescue, Disp: 3 mL, Rfl: 11    amLODIPine (NORVASC) 10 MG tablet, amlodipine besylate  10 mg tabs, Disp: , Rfl:     aspirin 325 MG tablet, aspirin 325 mg tablet  Take 1 tablet every day by oral route., Disp: , Rfl:     clopidogreL (PLAVIX) 75 mg tablet, Take 4 tablets on day 1 then take 1 tablet daily there after., Disp: 34 tablet, Rfl: 11    ezetimibe (ZETIA) 10 mg tablet, Take 1 tablet (10 mg total) by mouth once daily., Disp: 90 tablet, Rfl: 3    flu vacc [id number] up,PF (FLUZONE HIGH-DOSE 2019-20, PF,) 180 mcg/0.5 mL Syrg, Fluzone High-Dose 2019-20 (PF) 180 mcg/0.5 mL intramuscular syringe  PHARMACIST ADMINISTERED IMMUNIZATION ADMINISTERED AT TIME OF DISPENSING, Disp: , Rfl:     furosemide (LASIX) 40 MG tablet, Take 1 tablet (40 mg total) by mouth once daily., Disp: 30 tablet, Rfl: 11    lisinopriL (PRINIVIL,ZESTRIL) 20 MG tablet, Take 1.5 tablets (30 mg total) by mouth once daily., Disp: 90 tablet, Rfl: 3    lubiprostone (AMITIZA) 24 MCG Cap, Amitiza 24 mcg capsule  Take 1 capsule twice a day by oral route with meals for 30 days., Disp: , Rfl:     metoprolol succinate (TOPROL-XL) 100 MG 24 hr tablet, Take 1 tablet (100 mg total) by mouth once daily., Disp: 30 tablet, Rfl: 11    multivitamin (THERAGRAN) per tablet, Take 1 tablet by mouth once daily., Disp: , Rfl:     nintedanib (OFEV) 150 mg Cap, Take 1 capsule (150 mg total) by mouth 2 (two) times a day., Disp: 60 capsule, Rfl: 11    omega 3-dha-epa-fish oil (FISH OIL) 100-160-1,000 mg Cap, Fish Oil  daily, Disp: , Rfl:      oxyCODONE-acetaminophen (PERCOCET)  mg per tablet, oxycod/apap  tab 10-325mg, Disp: , Rfl:     polyethylene glycol (GLYCOLAX) 17 gram/dose powder, Miralax 17 gram/dose oral powder  use as directed, Disp: , Rfl:     pulse oximeter (PULSE OXIMETER) device, by Apply Externally route 2 (two) times a day. Use twice daily at 8 AM and 3 PM and record the value in MyChart as directed., Disp: 1 each, Rfl: 0    saw palmetto 160 MG capsule, Take 160 mg by mouth 2 (two) times daily., Disp: , Rfl:     sildenafiL (VIAGRA) 100 MG tablet, Take 100 mg by mouth., Disp: , Rfl:     SUPREP BOWEL PREP KIT 17.5-3.13-1.6 gram SolR, Take by mouth., Disp: , Rfl:     Current Facility-Administered Medications:     acetaminophen tablet 650 mg, 650 mg, Oral, Once PRN, Rishabh Ware MD    EPINEPHrine (EPIPEN) 0.3 mg/0.3 mL pen injection 0.3 mg, 0.3 mg, Intramuscular, PRN, Rishabh Ware MD    methylPREDNISolone sodium succinate injection 40 mg, 40 mg, Intravenous, Once PRN, Rishabh Ware MD    ondansetron disintegrating tablet 4 mg, 4 mg, Oral, Once PRN, Rishabh Ware MD    sodium chloride 0.9% 500 mL flush bag, , Intravenous, PRN, Rishabh Ware MD    sodium chloride 0.9% flush 10 mL, 10 mL, Intravenous, PRN, Rishabh Ware MD    Facility-Administered Medications Ordered in Other Visits:     0.9%  NaCl infusion, , Intravenous, Continuous, Gume Nagy MD, Last Rate: 125 mL/hr at 05/16/22 1005, New Bag at 05/16/22 1005    Review of Symptoms    Symptom Assessment (ESAS 0-10 Scale)  Pain:  5  Dyspnea:  8  Anxiety:  0  Nausea:  0  Depression:  2  Anorexia:  0  Fatigue:  2  Insomnia:  0  Restlessness:  0  Agitation:  0     CAM / Delirium:  Negative  Constipation:  Negative  Diarrhea:  Negative    Bowel Management Plan (BMP):  Yes      Comments:  MiraLax    Pain Assessment:  OME in 24 hours:  10  Location(s): back    Back       Location: bilateral        Quality: aching        Quantity: 5/10 in  intensity        Chronicity: Onset 3 year(s) ago, unchanged since Patient reports that he takes Percocet 1 or 2 a day.  He says that sometimes it relieves his pain he has not noticed whether it improves shortness of breath.  He does say he is more active after he takes the Percocet.  His wife corroborates the story.        Aggravating Factors: activity and standing        Alleviating Factors: opiates and sitting up       Associated Symptoms: none    Modified Neal Scale:  0.5    Performance Status:  70    Living Arrangements:  Lives with spouse and Lives in home    Psychosocial/Cultural: Retired.   [employer]  He has 6 children she has 6 children.  [place].    Spiritual:  F - Yvonne and Belief:  [employer] son is a preacher at his [employer]  I - Importance:  Very  C - Community:  Involve  A - Address in Care:  Yes      Advance Care Planning   Advance Directives:   Living Will: No    LaPOST: No    Do Not Resuscitate Status: No    Medical Power of : Yes    Agent's Name:  Samira Castro   Agent's Contact Number:  [phone]    Decision Making:  Patient answered questions and Family answered questions          OBJECTIVE:     ROS:  Review of Systems   Constitutional: Positive for fatigue. Negative for fever.   HENT: Positive for congestion.    Eyes: Negative.    Respiratory: Positive for shortness of breath.         With exertion   Cardiovascular: Negative.    Gastrointestinal: Negative.    Endocrine: Negative.    Genitourinary: Negative.    Musculoskeletal: Positive for back pain.   Skin: Negative.    Allergic/Immunologic: Negative.    Neurological: Negative.    Hematological: Negative.    Psychiatric/Behavioral: The patient is nervous/anxious.        Physical Exam:  Vitals: Pulse: 68 (06/30/22 0839)  BP: 130/72 (06/30/22 1007)  Physical Exam  Vitals and nursing note reviewed.   Constitutional:       Appearance: Normal appearance.   HENT:      Head: Normocephalic and atraumatic.      Right Ear: External ear  normal.      Left Ear: External ear normal.      Nose: Nose normal.      Mouth/Throat:      Mouth: Mucous membranes are moist.      Pharynx: Oropharynx is clear.   Eyes:      Conjunctiva/sclera: Conjunctivae normal.      Pupils: Pupils are equal, round, and reactive to light.   Cardiovascular:      Rate and Rhythm: Normal rate and regular rhythm.      Heart sounds: Normal heart sounds.   Pulmonary:      Effort: Pulmonary effort is normal.      Breath sounds: Rales present.      Comments: Fine crackles in lower lung right greater than left.  Abdominal:      General: Bowel sounds are normal.   Musculoskeletal:         General: Normal range of motion.      Cervical back: Normal range of motion and neck supple.   Skin:     General: Skin is warm and dry.   Neurological:      General: No focal deficit present.      Mental Status: He is alert and oriented to person, place, and time.      Gait: Gait is intact.   Psychiatric:         Mood and Affect: Mood and affect normal.         Behavior: Behavior normal.         Thought Content: Thought content normal.         Cognition and Memory: Memory normal.         Judgment: Judgment normal.         Labs:  CBC:   WBC   Date Value Ref Range Status   05/13/2022 8.04 3.90 - 12.70 K/uL Final     Hemoglobin   Date Value Ref Range Status   05/13/2022 13.6 (L) 14.0 - 18.0 g/dL Final     POC Hematocrit   Date Value Ref Range Status   08/18/2020 47 36 - 54 %PCV Final     Hematocrit   Date Value Ref Range Status   05/13/2022 41.9 40.0 - 54.0 % Final     MCV   Date Value Ref Range Status   05/13/2022 89 82 - 98 fL Final     Platelets   Date Value Ref Range Status   05/13/2022 327 150 - 450 K/uL Final           LFT:   Lab Results   Component Value Date    AST 48 (H) 05/13/2022    ALKPHOS 84 05/13/2022    BILITOT 0.4 05/13/2022       Albumin:   Albumin   Date Value Ref Range Status   05/13/2022 3.9 3.5 - 5.2 g/dL Final     Protein:   Total Protein   Date Value Ref Range Status   05/13/2022 6.8  6.0 - 8.4 g/dL Final       Radiology:I have reviewed all pertinent imaging results/findings within the past 24 hours.      Greater than 16 minutes was spent developing and advance care planning getting documents signed and loaded into the chart.    Signature: Cecily Alexandre MD

## 2022-06-30 NOTE — PROGRESS NOTES
Discussed new RX for OXY IR 5 mg with his wife.  Dr. Jarvis and I discussed increasing his Percocet to 4 times a day if he needs it and then the OXY IR if he runs out before he sees Dr. BLACK again.  She understands and was able to teach back.  Cecily Alexandre MD

## 2022-08-17 ENCOUNTER — SPECIALTY PHARMACY (OUTPATIENT)
Dept: PHARMACY | Facility: CLINIC | Age: 77
End: 2022-08-17
Payer: MEDICARE

## 2022-08-17 NOTE — TELEPHONE ENCOUNTER
Emailed received from \[facility]\"" stating that pt has not used charli and is in jeopardy if no claims within 60 days.     Outgoing call to pt's wife, Samira, is entering class at the moment, requested [facility] to call back in about 1 hour. [facility] will continue to follow up

## 2022-08-19 NOTE — TELEPHONE ENCOUNTER
Incoming call from pt's wife requesting phone number to Sarah GONZALES Provided [phone]. To call back with any other issues.

## 2022-08-19 NOTE — TELEPHONE ENCOUNTER
Outgoing call to pt's wife, Samira, informed wife of email from \[facility]\"" stating charli is in jeopardy. Provided wife with processing information of charli to give to [facility] to process. Instructed pt to let [employer] [facility] know to call [facility] if any issue with processing info and for pt to call [facility] back if any issue with [employer] [facility]. Ms. Hogan verbalized understanding.

## 2022-08-29 NOTE — TELEPHONE ENCOUNTER
Outgoing call returning pt's wife (Samira) call. Pt's wife stated she is having difficulty in filling pt's Ofev with uTaP, the pt is down to 5 tablets left.   Successfully 3-way call conference with pt and [employer] rep, able to set up refill shipment with $0 copay as PAF charli was applied. Pt will receive shipment on 8/31  Pt had no further questions/concerns

## 2022-09-23 ENCOUNTER — TELEPHONE (OUTPATIENT)
Dept: PULMONOLOGY | Facility: CLINIC | Age: 77
End: 2022-09-23
Payer: MEDICARE

## 2022-09-23 NOTE — TELEPHONE ENCOUNTER
Spoke with patient wife, informed her that I have received her message. I also advised patient wife that Dr Haddad does not have any available appointments at this current time but however, I can contact patient if some one cancels there appointment or when Dr Haddad November schedule opens. Patient wife verbalized that she understand.

## 2022-09-23 NOTE — TELEPHONE ENCOUNTER
----- Message from Katherine Fofana sent at 9/23/2022  8:51 AM CDT -----  Contact: @ [phone]  Pt wife called in to make a appointment for a follow up no available please call and adv @ [phone]

## 2022-10-06 ENCOUNTER — HOSPITAL ENCOUNTER (EMERGENCY)
Facility: HOSPITAL | Age: 77
Discharge: HOME | End: 2022-10-06
Attending: EMERGENCY MEDICINE
Payer: MEDICARE

## 2022-10-06 VITALS
BODY MASS INDEX: 27.83 KG/M2 | TEMPERATURE: 99 F | DIASTOLIC BLOOD PRESSURE: 70 MMHG | SYSTOLIC BLOOD PRESSURE: 149 MMHG | HEART RATE: 87 BPM | OXYGEN SATURATION: 96 % | WEIGHT: 183 LBS | RESPIRATION RATE: 18 BRPM

## 2022-10-06 DIAGNOSIS — R10.9 ABDOMINAL PAIN: ICD-10-CM

## 2022-10-06 DIAGNOSIS — K92.1 HEMATOCHEZIA: Primary | ICD-10-CM

## 2022-10-06 LAB
ABO + RH BLD: NORMAL
ALBUMIN SERPL BCP-MCNC: 3.6 G/DL (ref 3.5–5.2)
ALP SERPL-CCNC: 88 U/L (ref 55–135)
ALT SERPL W/O P-5'-P-CCNC: 41 U/L (ref 10–44)
ANION GAP SERPL CALC-SCNC: 11 MMOL/L (ref 8–16)
APTT BLDCRRT: 23.6 SEC (ref 21–32)
AST SERPL-CCNC: 80 U/L (ref 10–40)
BASOPHILS # BLD AUTO: 0.03 K/UL (ref 0–0.2)
BASOPHILS NFR BLD: 0.5 % (ref 0–1.9)
BILIRUB SERPL-MCNC: 0.6 MG/DL (ref 0.1–1)
BLD GP AB SCN CELLS X3 SERPL QL: NORMAL
BUN SERPL-MCNC: 15 MG/DL (ref 8–23)
BUN SERPL-MCNC: 16 MG/DL (ref 6–30)
CALCIUM SERPL-MCNC: 9.2 MG/DL (ref 8.7–10.5)
CHLORIDE SERPL-SCNC: 101 MMOL/L (ref 95–110)
CHLORIDE SERPL-SCNC: 104 MMOL/L (ref 95–110)
CO2 SERPL-SCNC: 28 MMOL/L (ref 23–29)
CREAT SERPL-MCNC: 1.1 MG/DL (ref 0.5–1.4)
CREAT SERPL-MCNC: 1.1 MG/DL (ref 0.5–1.4)
DIFFERENTIAL METHOD: ABNORMAL
EOSINOPHIL # BLD AUTO: 0.1 K/UL (ref 0–0.5)
EOSINOPHIL NFR BLD: 2 % (ref 0–8)
ERYTHROCYTE [DISTWIDTH] IN BLOOD BY AUTOMATED COUNT: 15 % (ref 11.5–14.5)
EST. GFR  (NO RACE VARIABLE): >60 ML/MIN/1.73 M^2
GLUCOSE SERPL-MCNC: 120 MG/DL (ref 70–110)
GLUCOSE SERPL-MCNC: 125 MG/DL (ref 70–110)
HCT VFR BLD AUTO: 41.9 % (ref 40–54)
HCT VFR BLD CALC: 42 %PCV (ref 36–54)
HCV AB SERPL QL IA: NORMAL
HGB BLD-MCNC: 13.5 G/DL (ref 14–18)
HIV 1+2 AB+HIV1 P24 AG SERPL QL IA: NORMAL
IMM GRANULOCYTES # BLD AUTO: 0.01 K/UL (ref 0–0.04)
IMM GRANULOCYTES NFR BLD AUTO: 0.2 % (ref 0–0.5)
INR PPP: 1 (ref 0.8–1.2)
LIPASE SERPL-CCNC: 17 U/L (ref 4–60)
LYMPHOCYTES # BLD AUTO: 1.5 K/UL (ref 1–4.8)
LYMPHOCYTES NFR BLD: 23.6 % (ref 18–48)
MAGNESIUM SERPL-MCNC: 2.1 MG/DL (ref 1.6–2.6)
MCH RBC QN AUTO: 29.2 PG (ref 27–31)
MCHC RBC AUTO-ENTMCNC: 32.2 G/DL (ref 32–36)
MCV RBC AUTO: 91 FL (ref 82–98)
MONOCYTES # BLD AUTO: 0.8 K/UL (ref 0.3–1)
MONOCYTES NFR BLD: 13.2 % (ref 4–15)
NEUTROPHILS # BLD AUTO: 3.7 K/UL (ref 1.8–7.7)
NEUTROPHILS NFR BLD: 60.5 % (ref 38–73)
NRBC BLD-RTO: 0 /100 WBC
PLATELET # BLD AUTO: 298 K/UL (ref 150–450)
PMV BLD AUTO: 9.1 FL (ref 9.2–12.9)
POC IONIZED CALCIUM: 1.16 MMOL/L (ref 1.06–1.42)
POC TCO2 (MEASURED): 31 MMOL/L (ref 23–29)
POTASSIUM BLD-SCNC: 3.9 MMOL/L (ref 3.5–5.1)
POTASSIUM SERPL-SCNC: 4 MMOL/L (ref 3.5–5.1)
PROT SERPL-MCNC: 6.5 G/DL (ref 6–8.4)
PROTHROMBIN TIME: 10.8 SEC (ref 9–12.5)
RBC # BLD AUTO: 4.63 M/UL (ref 4.6–6.2)
SAMPLE: ABNORMAL
SODIUM BLD-SCNC: 141 MMOL/L (ref 136–145)
SODIUM SERPL-SCNC: 143 MMOL/L (ref 136–145)
WBC # BLD AUTO: 6.14 K/UL (ref 3.9–12.7)

## 2022-10-06 PROCEDURE — 86803 HEPATITIS C AB TEST: CPT | Performed by: PHYSICIAN ASSISTANT

## 2022-10-06 PROCEDURE — 99284 PR EMERGENCY DEPT VISIT,LEVEL IV: ICD-10-PCS | Mod: ,,, | Performed by: PHYSICIAN ASSISTANT

## 2022-10-06 PROCEDURE — 63600175 PHARM REV CODE 636 W HCPCS: Performed by: PHYSICIAN ASSISTANT

## 2022-10-06 PROCEDURE — 85025 COMPLETE CBC W/AUTO DIFF WBC: CPT | Performed by: PHYSICIAN ASSISTANT

## 2022-10-06 PROCEDURE — 96361 HYDRATE IV INFUSION ADD-ON: CPT

## 2022-10-06 PROCEDURE — 87389 HIV-1 AG W/HIV-1&-2 AB AG IA: CPT | Performed by: PHYSICIAN ASSISTANT

## 2022-10-06 PROCEDURE — 25000003 PHARM REV CODE 250: Performed by: PHYSICIAN ASSISTANT

## 2022-10-06 PROCEDURE — 83735 ASSAY OF MAGNESIUM: CPT | Performed by: PHYSICIAN ASSISTANT

## 2022-10-06 PROCEDURE — 99284 EMERGENCY DEPT VISIT MOD MDM: CPT | Mod: 25

## 2022-10-06 PROCEDURE — 86901 BLOOD TYPING SEROLOGIC RH(D): CPT | Performed by: PHYSICIAN ASSISTANT

## 2022-10-06 PROCEDURE — 93010 ELECTROCARDIOGRAM REPORT: CPT | Mod: ,,, | Performed by: INTERNAL MEDICINE

## 2022-10-06 PROCEDURE — 96374 THER/PROPH/DIAG INJ IV PUSH: CPT

## 2022-10-06 PROCEDURE — 93005 ELECTROCARDIOGRAM TRACING: CPT

## 2022-10-06 PROCEDURE — 80053 COMPREHEN METABOLIC PANEL: CPT | Performed by: PHYSICIAN ASSISTANT

## 2022-10-06 PROCEDURE — 85610 PROTHROMBIN TIME: CPT | Performed by: PHYSICIAN ASSISTANT

## 2022-10-06 PROCEDURE — 83690 ASSAY OF LIPASE: CPT | Performed by: PHYSICIAN ASSISTANT

## 2022-10-06 PROCEDURE — C9113 INJ PANTOPRAZOLE SODIUM, VIA: HCPCS | Performed by: PHYSICIAN ASSISTANT

## 2022-10-06 PROCEDURE — 99284 EMERGENCY DEPT VISIT MOD MDM: CPT | Mod: ,,, | Performed by: PHYSICIAN ASSISTANT

## 2022-10-06 PROCEDURE — 93010 EKG 12-LEAD: ICD-10-PCS | Mod: ,,, | Performed by: INTERNAL MEDICINE

## 2022-10-06 PROCEDURE — 85730 THROMBOPLASTIN TIME PARTIAL: CPT | Performed by: PHYSICIAN ASSISTANT

## 2022-10-06 RX ORDER — PANTOPRAZOLE SODIUM 40 MG/10ML
80 INJECTION, POWDER, LYOPHILIZED, FOR SOLUTION INTRAVENOUS
Status: COMPLETED | OUTPATIENT
Start: 2022-10-06 | End: 2022-10-06

## 2022-10-06 RX ADMIN — SODIUM CHLORIDE 500 ML: 0.9 INJECTION, SOLUTION INTRAVENOUS at 04:10

## 2022-10-06 RX ADMIN — PANTOPRAZOLE SODIUM 80 MG: 40 INJECTION, POWDER, FOR SOLUTION INTRAVENOUS at 03:10

## 2022-10-06 NOTE — ED NOTES
Patient identifiers for Kermit Castro 77 y.o. male checked and correct.  Chief Complaint   Patient presents with    Abdominal Pain     Generalized abdominal pain since last night. Reports bright blood in stool this morning.      Past Medical History:   Diagnosis Date    Coronary artery disease     Hyperlipidemia     Hypertension      Allergies reported:   Review of patient's allergies indicates:   Allergen Reactions    Iodine and iodide containing products Anaphylaxis    Shellfish containing products Anaphylaxis       LOC: Patient is awake, alert, and aware of environment with an appropriate affect. Patient is oriented x 3 and speaking appropriately.  APPEARANCE: Patient resting comfortably and in no acute distress. Patient is clean and well groomed, patient's clothing is properly fastened.  HEENT: WDL  SKIN: The skin is warm and dry. Patient has normal skin turgor and moist mucus membranes. Skin is intact; no bruising or breakdown noted.  MUSKULOSKELETAL: Patient is moving all extremities well, no obvious deformities noted. Pulses intact.   RESPIRATORY: Airway is open and patent. Respirations are spontaneous and non-labored with normal effort and rate, BBS=clear  CARDIAC: Patient has a normal rate and rhythm. ** on cardiac monitor,No peripheral edema noted.   ABDOMEN: No distention noted. Bowel sounds active in all 4 quadrants. Soft and non-tender upon palpation.  NEUROLOGICAL: PERRL. Facial expression is symmetrical. Hand grasps are equal bilaterally. Normal sensation in all extremities when touched with finger.

## 2022-10-06 NOTE — ED PROVIDER NOTES
Encounter Date: 10/6/2022       History     Chief Complaint   Patient presents with    Abdominal Pain     Generalized abdominal pain since last night. Reports bright blood in stool this morning.      The history is provided by the patient and medical records. No  was used.     Kermit Castro is a 77 y.o. male with medical history of Idiopathic Pulmonary Fibrosis, CAD s/p CABG on Plavix, HTN, HLD, Sigmoid Diverticulosis presenting to the ED with the chief complaint of abdominal pain.     Reports intermittent umbilical abdominal pain for the past few weeks. Episodes occur a few minutes after eating. Pain is localized. Reports having 2 episodes of BRBPR after bowel movements today. No rectal pain. Denies history of hemorrhoids. Denies history of GI bleed. Patient expresses concern the ILD medication he just started can cause GI bleeding (Ofev?). He is additionally on Plavix and ASA. Denies lightheadedness, dizziness, chest pain, SOB, vomiting, hematemesis, diarrhea, dysuria, hematuria.     Review of patient's allergies indicates:   Allergen Reactions    Iodine and iodide containing products Anaphylaxis    Shellfish containing products Anaphylaxis     Past Medical History:   Diagnosis Date    Coronary artery disease     Hyperlipidemia     Hypertension      Past Surgical History:   Procedure Laterality Date    ABDOMINAL SURGERY      Polyps in colon removed (noncancerous)    APPENDECTOMY      back surgery (screws & okate)      CORONARY ANGIOGRAPHY N/A 5/16/2022    Procedure: ANGIOGRAM, CORONARY ARTERY;  Surgeon: Gume Nagy MD;  Location: [facility] CATH LAB;  Service: Cardiology;  Laterality: N/A;    CORONARY ARTERY BYPASS GRAFT      1998    CORONARY BYPASS GRAFT ANGIOGRAPHY  5/16/2022    Procedure: Bypass graft study;  Surgeon: Gume Nagy MD;  Location: [facility] CATH LAB;  Service: Cardiology;;    PERCUTANEOUS TRANSLUMINAL ANGIOPLASTY (PTA) OF PERIPHERAL VESSEL N/A 1/27/2022    Procedure: PTA,  PERIPHERAL VESSEL;  Surgeon: Gume Nagy MD;  Location: [facility] CATH LAB;  Service: Cardiology;  Laterality: N/A;    TRANSFORAMINAL EPIDURAL INJECTION OF STEROID Right 2020    Procedure: LUMBAR TRANSFORAMINAL RIGHT L3/4 AND L4/5 DIRECT REFERRAL;  Surgeon: Nicole Toth MD;  Location: [facility] [place] PAIN MGT;  Service: Pain Management;  Laterality: Right;  NEEDS CONSENT, PT STATES NO LONGER TAKES PLETAL     History reviewed. No pertinent family history.  Social History     Tobacco Use    Smoking status: Former     Types: Cigarettes     Quit date: 10/5/1998     Years since quittin.0    Smokeless tobacco: Never   Substance Use Topics    Alcohol use: Not Currently     Alcohol/week: 0.0 standard drinks     Comment:     Drug use: Never     Review of Systems   Constitutional:  Negative for fever.   HENT:  Negative for sore throat.    Eyes:  Negative for pain.   Respiratory:  Negative for shortness of breath.    Cardiovascular:  Negative for chest pain.   Gastrointestinal:  Positive for abdominal pain and blood in stool. Negative for nausea.   Genitourinary:  Negative for dysuria.   Musculoskeletal:  Negative for back pain.   Skin:  Negative for rash.   Neurological:  Negative for weakness.   Hematological:  Does not bruise/bleed easily.     Physical Exam     Initial Vitals [10/06/22 1406]   BP Pulse Resp Temp SpO2   (!) 149/70 87 18 98.8 °F (37.1 °C) 96 %      MAP       --         Physical Exam    Constitutional: He appears well-developed and well-nourished. He is not diaphoretic. He is easily aroused.   HENT:   Head: Normocephalic and atraumatic.   Mouth/Throat: Oropharynx is clear and moist. No oropharyngeal exudate.   Eyes: EOM and lids are normal. Pupils are equal, round, and reactive to light. No scleral icterus.   Neck: Phonation normal. Neck supple. No stridor present.   Normal range of motion.  Cardiovascular:  Normal rate and regular rhythm.           Pulmonary/Chest: Breath sounds normal. No stridor.  No respiratory distress. He has no wheezes. He has no rales.   Abdominal: Abdomen is soft. He exhibits no distension. There is no abdominal tenderness. There is no rebound.   Genitourinary:    Genitourinary Comments: Rectal - No visible hemorrhoid. Light brown stool on gloved finger. No hematochezia/melena. FOBT positive.      Musculoskeletal:         General: No tenderness or edema. Normal range of motion.      Cervical back: Normal range of motion and neck supple.     Neurological: He is alert, oriented to person, place, and time and easily aroused. He has normal strength. No sensory deficit.   Skin: Skin is warm and dry. No rash noted. No erythema.   Psychiatric: He has a normal mood and affect. His speech is normal.       ED Course   Procedures  Labs Reviewed   CBC W/ AUTO DIFFERENTIAL - Abnormal; Notable for the following components:       Result Value    Hemoglobin 13.5 (*)     RDW 15.0 (*)     MPV 9.1 (*)     All other components within normal limits   COMPREHENSIVE METABOLIC PANEL - Abnormal; Notable for the following components:    Glucose 120 (*)     AST 80 (*)     All other components within normal limits   ISTAT PROCEDURE - Abnormal; Notable for the following components:    POC Glucose 125 (*)     POC TCO2 (MEASURED) 31 (*)     All other components within normal limits   HIV 1 / 2 ANTIBODY    Narrative:     Release to patient->Immediate   HEPATITIS C ANTIBODY    Narrative:     Release to patient->Immediate   MAGNESIUM   PROTIME-INR   APTT   LIPASE   TYPE & SCREEN   ISTAT CHEM8     EKG Readings: (Independently Interpreted)   Initial Reading: No STEMI. Rhythm: Normal Sinus Rhythm. Heart Rate: 70. Conduction: 1st Degree AV Block. Axis: Normal.   Nonspecific ST segment flattening noted to inferior and lateral leads   ECG Results              EKG 12-lead (Final result)  Result time 10/06/22 15:46:15      Final result by Interface, Lab In [facility] (10/06/22 15:46:15)                   Narrative:    Test Reason  : R10.9,    Vent. Rate : 070 BPM     Atrial Rate : 070 BPM     P-R Int : 230 ms          QRS Dur : 106 ms      QT Int : 396 ms       P-R-T Axes : 057 021 070 degrees     QTc Int : 427 ms    Sinus rhythm with 1st degree A-V block  Nonspecific T wave abnormality  Abnormal ECG  When compared with ECG of 16-MAY-2022 09:33,  Premature ventricular complexes are no longer Present    Confirmed by Peter SANCHEZ MD ([id number]) on 10/6/2022 3:46:07 PM    Referred By: [email or web address]   SELF           Confirmed By:Peter SANCHEZ MD                                  Imaging Results    None          Medications   pantoprazole injection 80 mg (80 mg Intravenous Given 10/6/22 0166)   sodium chloride 0.9% bolus 500 mL (0 mLs Intravenous Stopped 10/6/22 9805)     Medical Decision Making:   History:   Old Medical Records: I decided to obtain old medical records.  Old Records Summarized: records from clinic visits.  Independently Interpreted Test(s):   I have ordered and independently interpreted EKG Reading(s) - see summary below  Clinical Tests:   Lab Tests: Ordered and Reviewed  Radiological Study: Ordered and Reviewed  Medical Tests: Ordered and Reviewed     APC / Resident Notes:   77 y.o. male with medical history of Idiopathic Pulmonary Fibrosis, CAD s/p CABG on Plavix, HTN, HLD, Sigmoid Diverticulosis presenting to the ED c/o intermittent abdominal pain for a few weeks. Noticed 2 episodes of hematochezia today. DDx includes but not limited to hemorrhoid, rectal fissure, brisk upper GI bleed, malignancy, diverticular bleed, symptomatic anemia, medication side effect. No active hematochezia on exam and deferring CTA at this time.     Attending Attestation:     Physician Attestation Statement for NP/PA:   I discussed this assessment and plan of this patient with the NP/PA, but I did not personally examine the patient. The face to face encounter was performed by the NP/PA.    Other NP/PA Attestation Additions:    History of Present Illness: 77-year-old  man with comorbidities of pulmonary fibrosis and CAD/CABG as well as chronic aspirin, Plavix, and Ofev therapy presents for evaluation of epigastric discomfort as well as 2 episodes of bright red blood per rectum after stooling.             ED Course as of 10/06/22 1732   Thu Oct 06, 2022   1726 Hemoglobin(!): 13.5  Above patient's baseline [BA]   1727 BUN: 15  WNL [BA]   1727 Hemodynamically stable. Vitals unremarkable. Able to stand and ambulate around without difficulty. No dizziness or lightheadedness. No active bleeding on rectal exam. Abdomen soft and non-tender. No episodes of hematochezia during ED stay.  [BA]   1730 Stable for outpatient follow-up with CRS at this time. Ambulatory referral placed. Patient expresses understanding and agreeable to the plan. Return to ED precautions given for new, worsening, or concerning symptoms. I have discussed the care of this patient with my supervising physician.  [BA]      ED Course User Index  [BA] Armando Degroot PA-C                 Clinical Impression:   Final diagnoses:  [R10.9] Abdominal pain  [K92.1] Hematochezia (Primary)      ED Disposition Condition    Discharge Stable          ED Prescriptions    None       Follow-up Information       Follow up With Specialties Details Why Contact Info Additional Information    [facility] [address] [facility]al [facility]   [address]  [facility] [id number] [phone] [facility] and use [facility] elevator             Armando Degroot PA-C  10/06/22 173

## 2022-10-06 NOTE — DISCHARGE INSTRUCTIONS
Follow-up with colorectal surgery for further evaluation of the blood in your stools. Use the below contact information to obtain an appointment.     Follow-up with your primary care provider for further evaluation.     Return to the emergency room for new, worsening, or concerning symptoms.

## 2022-10-06 NOTE — ED NOTES
I-STAT Chem-8+ Results:   Value Reference Range   Sodium 141 136-145 mmol/L   Potassium  3.9 3.5-5.1 mmol/L   Chloride 101  mmol/L   Ionized Calcium 1.16 1.06-1.42 mmol/L   CO2 (measured) 31 23-29 mmol/L   Glucose 125  mg/dL   BUN 16 6-30 mg/dL   Creatinine 1.1 0.5-1.4 mg/dL   Hematocrit 42 36-54%

## 2022-10-31 ENCOUNTER — TELEPHONE (OUTPATIENT)
Dept: PULMONOLOGY | Facility: CLINIC | Age: 77
End: 2022-10-31
Payer: MEDICARE

## 2022-10-31 NOTE — TELEPHONE ENCOUNTER
----- Message from Bisi Lee sent at 10/31/2022  3:00 PM CDT -----  Type:  Sooner Apoointment Request    Caller is requesting a sooner appointment.  Caller declined first available appointment listed below.  Caller will not accept being placed on the waitlist and is requesting a message be sent to doctor.  Name of Caller:RITCHIE KHAN [[id number]]  When is the first available appointment?12/6  Symptoms:lung f/u  Would the patient rather a call back or a response via [facility]?call   Best Call Back Number:[phone]

## 2022-10-31 NOTE — TELEPHONE ENCOUNTER
Spoke with patient wife, informed her that I have received her message. I advised patient wife that I can schedule patient appointment with Dr Haddad on 12/12/22 for 1:00 pm. Patient wife verbalized that she understand and accepted the appointment.

## 2022-11-01 ENCOUNTER — HOSPITAL ENCOUNTER (EMERGENCY)
Facility: HOSPITAL | Age: 77
Discharge: HOME | End: 2022-11-01
Attending: EMERGENCY MEDICINE
Payer: MEDICARE

## 2022-11-01 VITALS
RESPIRATION RATE: 18 BRPM | HEART RATE: 74 BPM | DIASTOLIC BLOOD PRESSURE: 82 MMHG | BODY MASS INDEX: 27.83 KG/M2 | TEMPERATURE: 99 F | WEIGHT: 183 LBS | SYSTOLIC BLOOD PRESSURE: 138 MMHG | OXYGEN SATURATION: 99 %

## 2022-11-01 DIAGNOSIS — R33.9 URINARY RETENTION: Primary | ICD-10-CM

## 2022-11-01 LAB
BILIRUB UR QL STRIP: NEGATIVE
CLARITY UR: CLEAR
COLOR UR: COLORLESS
GLUCOSE UR QL STRIP: NEGATIVE
HGB UR QL STRIP: NEGATIVE
KETONES UR QL STRIP: NEGATIVE
LEUKOCYTE ESTERASE UR QL STRIP: NEGATIVE
NITRITE UR QL STRIP: NEGATIVE
PH UR STRIP: 5 [PH] (ref 5–8)
PROT UR QL STRIP: NEGATIVE
SP GR UR STRIP: 1.01 (ref 1–1.03)
URN SPEC COLLECT METH UR: ABNORMAL
UROBILINOGEN UR STRIP-ACNC: NEGATIVE EU/DL

## 2022-11-01 PROCEDURE — 51702 INSERT TEMP BLADDER CATH: CPT

## 2022-11-01 PROCEDURE — 99283 EMERGENCY DEPT VISIT LOW MDM: CPT | Mod: 25

## 2022-11-01 PROCEDURE — 81003 URINALYSIS AUTO W/O SCOPE: CPT | Performed by: EMERGENCY MEDICINE

## 2022-11-01 PROCEDURE — 51798 US URINE CAPACITY MEASURE: CPT

## 2022-11-01 RX ORDER — TAMSULOSIN HYDROCHLORIDE 0.4 MG/1
0.4 CAPSULE ORAL DAILY
Qty: 20 CAPSULE | Refills: 0 | Status: SHIPPED | OUTPATIENT
Start: 2022-11-01 | End: 2022-11-08 | Stop reason: SDUPTHER

## 2022-11-01 NOTE — ED PROVIDER NOTES
NAME:  Kermit Castro  CSN:     [id number]  MRN:    [id number]  ADMIT DATE: 11/1/2022        eMERGENCY dEPARTMENT eNCOUnter    CHIEF COMPLAINT    Chief Complaint   Patient presents with    Abdominal Pain     Pt states urinated at 0700, and complains of lower abd pain        HPI      Kermit Castro is a 77 y.o. male who presents to the ED for suprapubic pain.  Patient reports he has been unable to urinate since 7:00 a.m. this morning.  He had a colonoscopy today and afterwards was unable to pee.  He denies any other complaints.  No dysuria previous to the urinary retention.          ALLERGIES    Review of patient's allergies indicates:   Allergen Reactions    Iodine and iodide containing products Anaphylaxis    Shellfish containing products Anaphylaxis       PAST MEDICAL HISTORY  Past Medical History:   Diagnosis Date    Coronary artery disease     Hyperlipidemia     Hypertension        SURGICAL HISTORY    Past Surgical History:   Procedure Laterality Date    ABDOMINAL SURGERY      Polyps in colon removed (noncancerous)    APPENDECTOMY      back surgery (screws & okate)      CORONARY ANGIOGRAPHY N/A 5/16/2022    Procedure: ANGIOGRAM, CORONARY ARTERY;  Surgeon: Gume Nagy MD;  Location: [facility] CATH LAB;  Service: Cardiology;  Laterality: N/A;    CORONARY ARTERY BYPASS GRAFT      1998    CORONARY BYPASS GRAFT ANGIOGRAPHY  5/16/2022    Procedure: Bypass graft study;  Surgeon: Gume Nagy MD;  Location: [facility] CATH LAB;  Service: Cardiology;;    PERCUTANEOUS TRANSLUMINAL ANGIOPLASTY (PTA) OF PERIPHERAL VESSEL N/A 1/27/2022    Procedure: PTA, PERIPHERAL VESSEL;  Surgeon: Gume Nagy MD;  Location: [facility] CATH LAB;  Service: Cardiology;  Laterality: N/A;    TRANSFORAMINAL EPIDURAL INJECTION OF STEROID Right 11/5/2020    Procedure: LUMBAR TRANSFORAMINAL RIGHT L3/4 AND L4/5 DIRECT REFERRAL;  Surgeon: Nicole Toth MD;  Location: [facility] PAIN MGT;  Service: Pain Management;  Laterality: Right;  NEEDS CONSENT, PT  STATES NO LONGER TAKES PLETAL       SOCIAL HISTORY    Social History     Socioeconomic History    Marital status:    Tobacco Use    Smoking status: Former     Types: Cigarettes     Quit date: 10/5/1998     Years since quittin.0    Smokeless tobacco: Never   Substance and Sexual Activity    Alcohol use: Not Currently     Alcohol/week: 0.0 standard drinks     Comment:     Drug use: Never       FAMILY HISTORY    No family history on file.    REVIEW OF SYSTEMS   ROS  All Systems otherwise negative except as noted in the History of Present Illness.        PHYSICAL EXAM    Reviewed Triage Note  VITAL SIGNS:   ED Triage Vitals [22 1559]   Enc Vitals Group      /80      Pulse 74      Resp 18      Temp 98.5 °F (36.9 °C)      Temp src Oral      SpO2 99 %      Weight 183 lb      Height       Head Circumference       Peak Flow       Pain Score       Pain Loc       Pain Edu?       Excl. in GC?        Patient Vitals for the past 24 hrs:   BP Temp Temp src Pulse Resp SpO2 Weight   22 1559 135/80 98.5 °F (36.9 °C) Oral 74 18 99 % 83 kg (183 lb)           Physical Exam    Constitutional:  Well-developed, well-nourished. No acute distress  HENT:  Normocephalic, atraumatic.  Eyes:  EOMI. Conjunctiva normal without discharge.   Neck: Normal range of motion.No stridor. No meningismus.   Respiratory:  No respiratory distress, retractions, or conversational dyspnea.   Cardiovascular:  Normal heart rate. No pitting lower extremity edema.   Abdominal: Suprapubic distension, no tednerenss or guarding  Musculoskeletal:  No gross deformity or limited range of motion of all major joints.   Integument:  Warm and dry. No rash.  Neurologic:  Normal motor function. No focal deficits noted. Alert and Interactive.  Psychiatric:  Affect normal. Mood normal.         LABS  Pertinent labs reviewed. (See chart for details)   Labs Reviewed   URINALYSIS, REFLEX TO URINE CULTURE - Abnormal; Notable for the following  components:       Result Value    Color, UA Colorless (*)     All other components within normal limits    Narrative:     Specimen Source->Urine         RADIOLOGY    Imaging Results    None         PROCEDURES    Procedures      EKG     Interpreted by ERP:         ED COURSE & MEDICAL DECISION MAKING    Pertinent & Imaging studies reviewed. (See chart for details and specific orders.)        Medications - No data to display     Garcia catheter was placed and patient reported immediate relief.  He will be discharged with a leg bag and prescribed Flomax and advised to follow-up with urology         DISPOSITION  Patient discharged in stable condition at No discharge date for patient encounter.      DISCHARGE INSTRUCTIONS & MEDS    @DISCHARGEMEDSLIST(<NOROUTINE> error)@      New Prescriptions    TAMSULOSIN (FLOMAX) 0.4 MG CAP    Take 1 capsule (0.4 mg total) by mouth once daily.           FINAL IMPRESSION    1. Urinary retention              Blood Pressure Follow-Up Advised  Patient advised to follow up with PCP within 3-5 days for blood pressure re-check if blood pressure is equal to or greater than 120/80.         Critical care time spent with this patient (not including separately billable items) was  0 minutes.     DISCLAIMER: This note was prepared with Dragon NaturallySpeaking voice recognition transcription software. Garbled syntax, mangled pronouns, and other bizarre constructions may be attributed to that software system.      Herson Tamez MD  11/01/2022  5:59 PM           Herson Tamez MD  11/01/22 2033

## 2022-11-03 ENCOUNTER — NURSE TRIAGE (OUTPATIENT)
Dept: ADMINISTRATIVE | Facility: CLINIC | Age: 77
End: 2022-11-03
Payer: MEDICARE

## 2022-11-03 ENCOUNTER — TELEPHONE (OUTPATIENT)
Dept: UROLOGY | Facility: CLINIC | Age: 77
End: 2022-11-03
Payer: MEDICARE

## 2022-11-03 NOTE — TELEPHONE ENCOUNTER
OOC Rn   Wife calling for , went to ed bc of  urinary Retention,  cath was put in.  11/1/22   sent home with cath in.   The dr. Horton the appt.today.    Now,  new appt is  11/8/22  She said Patient reports  just a little urine in bag.  Wife is  not will patient at this time.    Nobody with him. He is hard of hearing, does not use phone.   Won't answer the door for anyone.  I advised her to call 911 to have them go to him.  Asked her to have family call 911 or go to him.    She stated did not have family.  I offered to call 911  She said he will not open the door for anyone.  Need 911 If he can't urinate.  Care advise 911 she said she will call 911 when she gets to him. Just left [facility] patient is in reserve.   Wife wanted to take him to urologist today.   I advised 911.    Reason for Disposition   Sounds like a life-threatening emergency to the triager    Additional Information   Negative: Shock suspected (e.g., cold/pale/clammy skin, too weak to stand, low BP, rapid pulse)    Protocols used: Urinary Catheter (e.g., Garcia) Symptoms and [place]-G-EE

## 2022-11-03 NOTE — TELEPHONE ENCOUNTER
----- Message from Phill Maza sent at 11/3/2022  3:21 PM CDT -----  Contact: Pt  .Type:   Sooner/Same Appointment     Who Called: Pt's Wife  Would the patient rather a call back or a response via [facility]? call  Best Call Back Number: [phone]  Additional Information:   Pt's wife stated she needs an appt for pt.  Catheter is out and its starting to hurt him.  Offered the Pt's wife an appt tomorrow declined.

## 2022-11-04 ENCOUNTER — HOSPITAL ENCOUNTER (EMERGENCY)
Facility: HOSPITAL | Age: 77
Discharge: HOME | End: 2022-11-04
Attending: EMERGENCY MEDICINE
Payer: MEDICARE

## 2022-11-04 VITALS
RESPIRATION RATE: 20 BRPM | OXYGEN SATURATION: 98 % | TEMPERATURE: 98 F | SYSTOLIC BLOOD PRESSURE: 139 MMHG | DIASTOLIC BLOOD PRESSURE: 64 MMHG | HEART RATE: 65 BPM

## 2022-11-04 VITALS
TEMPERATURE: 98 F | DIASTOLIC BLOOD PRESSURE: 60 MMHG | SYSTOLIC BLOOD PRESSURE: 122 MMHG | RESPIRATION RATE: 20 BRPM | OXYGEN SATURATION: 98 % | HEART RATE: 70 BPM

## 2022-11-04 DIAGNOSIS — T83.9XXA COMPLICATION OF FOLEY CATHETER, INITIAL ENCOUNTER: Primary | ICD-10-CM

## 2022-11-04 DIAGNOSIS — T83.9XXA PROBLEM WITH FOLEY CATHETER, INITIAL ENCOUNTER: Primary | ICD-10-CM

## 2022-11-04 DIAGNOSIS — R33.9 URINARY RETENTION: ICD-10-CM

## 2022-11-04 LAB
BACTERIA #/AREA URNS HPF: ABNORMAL /HPF
BILIRUB UR QL STRIP: NEGATIVE
CLARITY UR: ABNORMAL
COLOR UR: COLORLESS
GLUCOSE UR QL STRIP: NEGATIVE
HGB UR QL STRIP: ABNORMAL
HYALINE CASTS #/AREA URNS LPF: 14 /LPF
KETONES UR QL STRIP: NEGATIVE
LEUKOCYTE ESTERASE UR QL STRIP: ABNORMAL
MICROSCOPIC COMMENT: ABNORMAL
NITRITE UR QL STRIP: NEGATIVE
PH UR STRIP: 5 [PH] (ref 5–8)
PROT UR QL STRIP: ABNORMAL
RBC #/AREA URNS HPF: >100 /HPF (ref 0–4)
SP GR UR STRIP: 1.01 (ref 1–1.03)
SQUAMOUS #/AREA URNS HPF: 1 /HPF
UNIDENT CRYS URNS QL MICRO: 10
URN SPEC COLLECT METH UR: ABNORMAL
UROBILINOGEN UR STRIP-ACNC: NEGATIVE EU/DL
WBC #/AREA URNS HPF: 27 /HPF (ref 0–5)
WBC CLUMPS URNS QL MICRO: ABNORMAL
YEAST URNS QL MICRO: ABNORMAL

## 2022-11-04 PROCEDURE — 99283 EMERGENCY DEPT VISIT LOW MDM: CPT | Mod: 25,27

## 2022-11-04 PROCEDURE — 87088 URINE BACTERIA CULTURE: CPT | Performed by: PHYSICIAN ASSISTANT

## 2022-11-04 PROCEDURE — 87086 URINE CULTURE/COLONY COUNT: CPT | Performed by: PHYSICIAN ASSISTANT

## 2022-11-04 PROCEDURE — 81000 URINALYSIS NONAUTO W/SCOPE: CPT | Performed by: PHYSICIAN ASSISTANT

## 2022-11-04 PROCEDURE — 87077 CULTURE AEROBIC IDENTIFY: CPT | Performed by: PHYSICIAN ASSISTANT

## 2022-11-04 PROCEDURE — 87186 SC STD MICRODIL/AGAR DIL: CPT | Performed by: PHYSICIAN ASSISTANT

## 2022-11-04 PROCEDURE — 99282 EMERGENCY DEPT VISIT SF MDM: CPT

## 2022-11-04 RX ORDER — CIPROFLOXACIN 500 MG/1
500 TABLET ORAL 2 TIMES DAILY
Qty: 20 TABLET | Refills: 0 | Status: SHIPPED | OUTPATIENT
Start: 2022-11-04 | End: 2022-11-14

## 2022-11-04 NOTE — DISCHARGE INSTRUCTIONS

## 2022-11-04 NOTE — ED PROVIDER NOTES
"Encounter Date: 11/4/2022       History     Chief Complaint   Patient presents with    ríos leak     Patient was here this am had problems with ríos leaking around catheter insertion and pressure. Ríos was not changed and patient went home and the catheter is still leaking around the site       77-year-old male presents to ED with continued concern of complications from Ríos catheter.  Patient had Ríos placed on 11/1 due to urinary retention.  He return to ED today because he was concert fully continues to "get kinked" and leaking.  "Kink" was corrected in ED this morning with no evidence of leaking and patient was discharged home.  Patient reports since returning home he has continued to have significant amount of leaking around catheter.  Denies hematuria or cloudy urine.  No fevers, chills, nausea, vomiting, abdominal pain.  No other acute complaints at this time.    The history is provided by the patient.   Review of patient's allergies indicates:   Allergen Reactions    Iodine and iodide containing products Anaphylaxis    Shellfish containing products Anaphylaxis     Past Medical History:   Diagnosis Date    Coronary artery disease     Hyperlipidemia     Hypertension      Past Surgical History:   Procedure Laterality Date    ABDOMINAL SURGERY      Polyps in colon removed (noncancerous)    APPENDECTOMY      back surgery (screws & okate)      CORONARY ANGIOGRAPHY N/A 5/16/2022    Procedure: ANGIOGRAM, CORONARY ARTERY;  Surgeon: Gume Nagy MD;  Location: [facility] CATH LAB;  Service: Cardiology;  Laterality: N/A;    CORONARY ARTERY BYPASS GRAFT      1998    CORONARY BYPASS GRAFT ANGIOGRAPHY  5/16/2022    Procedure: Bypass graft study;  Surgeon: Gume Nagy MD;  Location: [facility] CATH LAB;  Service: Cardiology;;    PERCUTANEOUS TRANSLUMINAL ANGIOPLASTY (PTA) OF PERIPHERAL VESSEL N/A 1/27/2022    Procedure: PTA, PERIPHERAL VESSEL;  Surgeon: Gume Nagy MD;  Location: [facility] CATH LAB;  Service: " Cardiology;  Laterality: N/A;    TRANSFORAMINAL EPIDURAL INJECTION OF STEROID Right 2020    Procedure: LUMBAR TRANSFORAMINAL RIGHT L3/4 AND L4/5 DIRECT REFERRAL;  Surgeon: Nicole Toth MD;  Location: [facility];  Service: Pain Management;  Laterality: Right;  NEEDS CONSENT, PT STATES NO LONGER TAKES PLETAL     No family history on file.  Social History     Tobacco Use    Smoking status: Former     Types: Cigarettes     Quit date: 10/5/1998     Years since quittin.0    Smokeless tobacco: Never   Substance Use Topics    Alcohol use: Not Currently     Alcohol/week: 0.0 standard drinks     Comment:     Drug use: Never     Review of Systems   Constitutional:  Negative for chills and fever.   Gastrointestinal:  Negative for abdominal pain, nausea and vomiting.   Genitourinary:  Positive for difficulty urinating. Negative for hematuria and penile swelling.   Musculoskeletal:  Negative for back pain.   Skin:  Negative for wound.     Physical Exam     Initial Vitals [22 1248]   BP Pulse Resp Temp SpO2   122/66 74 20 98.1 °F (36.7 °C) 98 %      MAP       --         Physical Exam    Nursing note and vitals reviewed.  Constitutional: Vital signs are normal. He appears well-developed and well-nourished. He is cooperative. He does not have a sickly appearance. He does not appear ill. No distress.   HENT:   Head: Normocephalic and atraumatic.   Eyes: EOM are normal.   Neck:   Normal range of motion.  Cardiovascular:  Regular rhythm.           Pulmonary/Chest: Effort normal.   Abdominal: Abdomen is soft. There is no abdominal tenderness.   No abdominal or suprapubic tenderness.   Genitourinary:    Genitourinary Comments:  exam performed with nurseHero, at bedside.  Garcia appearing appropriately intact at this time.  No current leaking.  Urinary leg bag have full with yellow/clear urine.     Musculoskeletal:      Cervical back: Normal range of motion.     Neurological: He is alert. GCS eye  subscore is 4. GCS verbal subscore is 5. GCS motor subscore is 6.   Psychiatric: He has a normal mood and affect. His speech is normal and behavior is normal.       ED Course   Procedures  Labs Reviewed   URINALYSIS, REFLEX TO URINE CULTURE - Abnormal; Notable for the following components:       Result Value    Color, UA Colorless (*)     Appearance, UA Hazy (*)     Protein, UA Trace (*)     Occult Blood UA 3+ (*)     Leukocytes, UA 3+ (*)     All other components within normal limits    Narrative:     Specimen Source->Urine   URINALYSIS MICROSCOPIC - Abnormal; Notable for the following components:    RBC, UA >100 (*)     WBC, UA 27 (*)     WBC Clumps, UA Few (*)     Yeast, UA Rare (*)     Hyaline Casts, UA 14 (*)     All other components within normal limits    Narrative:     Specimen Source->Urine   CULTURE, URINE          Imaging Results    None          Medications - No data to display  Medical Decision Making:   Initial Assessment:   Patient presents with concern of leaking urinary catheter.  Catheter appearing appropriately fitting on exam with no evidence of leaking at this time.  Differential Diagnosis:   Garcia complication, urinary retention, UTI  ED Management:  UA    Patient continued to report complications with his Garcia catheter.  Will replace catheter in ED.    16 placed by nurse at bedside without complications but noted to have continued leaking. Replaced with 20. No further evidence of leaking at this time    Urine is concern for urinary tract infection, noting 3+ leukocytes, 27 WBC with clumps.  Will plan to treat with prescription for Cipro.  Patient encouraged to continue monitoring closely with close urology/PCP follow-up.  He states he does have follow-up with urology within 1 week.  ED return precautions discussed at length.  Patient states his understanding and agrees with plan.                        Clinical Impression:   Final diagnoses:  [T83.9XXA] Complication of Garcia catheter, initial  encounter (Primary)      ED Disposition Condition    Discharge Stable          ED Prescriptions       Medication Sig Dispense Start Date End Date Auth. Provider    ciprofloxacin HCl (CIPRO) 500 MG tablet Take 1 tablet (500 mg total) by mouth 2 (two) times daily. for 10 days 20 tablet 11/4/2022 11/14/2022 Eran Henderson PA-C          Follow-up Information       Follow up With Specialties Details Why Contact Info    Luis Hassan MD Family Medicine   [address]   [facility] [id number] [phone]               Eran Henderson PA-C  11/04/22 2006

## 2022-11-04 NOTE — ED NOTES
Present to ED for leaking around ríos catheter. Reports there has been urine flowing around the catheter. Was seen earlier today and discharged after discovering a kink in the catheter. Also c/o burning and discomfort at the catheter.     APPEARANCE: Alert, oriented and in no acute distress.  CARDIAC: Normal rate and rhythm, no murmur heard.   RESPIRATORY:Normal rate and effort, breath sounds clear bilaterally throughout chest. Respirations are equal and unlabored no obvious signs of distress.  NEURO: 5/5 strength major flexors/extensors bilaterally. Sensory intact to light touch bilaterally. Jhonny coma scale: eyes open spontaneously-4, oriented & converses-5, obeys commands-6. No neurological abnormalities.   MENTAL STATUS: awake, alert and aware of environment.  G/U: Ríos catheter in place attached to a leg bag with clear yellow urine.

## 2022-11-04 NOTE — ED TRIAGE NOTES
Patient reports urinary retention since last night. Ríos catheter placed x3 days ago. Reports intermittent urination around ríso with mild irritation at insertion site. 150cc clear yellow urine noted to dependent drainage bag which patient and family is new since this morning.    Two patient identifiers have been checked and are correct.      Appearance: Pt awake, alert & oriented to person, place & time. Pt in no acute distress at present time. Pt is clean and well groomed with clothes appropriately fastened.   Skin: Skin warm, dry & intact. Color consistent with ethnicity. Mucous membranes moist. No breakdown or brusing noted.   Musculoskeletal: Patient moving all extremities well, no obvious swelling or deformities noted.   Respiratory: Respirations spontaneous, even, and non-labored. Visible chest rise noted. Airway is open and patent. No accessory muscle use noted.   Neurologic: Sensation is intact. Speech is clear and appropriate. Eyes open spontaneously, behavior appropriate to situation, follows commands, facial expression symmetrical, bilateral hand grasp equal and even, purposeful motor response noted.  Cardiac: All peripheral pulses present. No Bilateral lower extremity edema. Cap refill is <3 seconds.  Abdomen: Abdomen soft, non-tender to palpation.   : Pt reports no dysuria or hematuria.

## 2022-11-04 NOTE — PLAN OF CARE
Future Appointments   Date Time Provider Department Center   11/8/2022 10:30 AM Marivel Luu MD [facility] UROLOGY [facility]   12/12/2022  1:00 PM Candi Haddad MD [facility]        11/04/22 1335   Post-Acute Status   Post-Acute Authorization Other   Other Status Awaiting f/u Appts   Hospital Resources/Appts/Education Provided Appointments scheduled and added to AVS

## 2022-11-04 NOTE — ED PROVIDER NOTES
Encounter Date: 11/4/2022       History     Chief Complaint   Patient presents with    Urinary Retention     Patient had ríos catheter changed out three days ago, urinating around the ríos and bladder pressure. Ríos is not draining.        Kermit Castro is a 77 y.o. male who  has a past medical history of Coronary artery disease, Hyperlipidemia, and Hypertension.    The patient presents to the ED due to ríos catheter issue.   Patient presents with wife who states his catheter occasionally does not drain.  Patient states sometimes the bag is empty, and sometimes it suddenly fills up. He also occasionally notices urine leaking out from around the catheter. If he repositions it sometimes fixes itself.  He reports mild discomfort at the ríos site but denies any abdominal pain, fever, N/V, hematuria, or any other concerns.         Review of patient's allergies indicates:   Allergen Reactions    Iodine and iodide containing products Anaphylaxis    Shellfish containing products Anaphylaxis     Past Medical History:   Diagnosis Date    Coronary artery disease     Hyperlipidemia     Hypertension      Past Surgical History:   Procedure Laterality Date    ABDOMINAL SURGERY      Polyps in colon removed (noncancerous)    APPENDECTOMY      back surgery (screws & okate)      CORONARY ANGIOGRAPHY N/A 5/16/2022    Procedure: ANGIOGRAM, CORONARY ARTERY;  Surgeon: Gume Nagy MD;  Location: [facility] CATH LAB;  Service: Cardiology;  Laterality: N/A;    CORONARY ARTERY BYPASS GRAFT      1998    CORONARY BYPASS GRAFT ANGIOGRAPHY  5/16/2022    Procedure: Bypass graft study;  Surgeon: Gume Nagy MD;  Location: [facility] CATH LAB;  Service: Cardiology;;    PERCUTANEOUS TRANSLUMINAL ANGIOPLASTY (PTA) OF PERIPHERAL VESSEL N/A 1/27/2022    Procedure: PTA, PERIPHERAL VESSEL;  Surgeon: Gume Nagy MD;  Location: [facility] CATH LAB;  Service: Cardiology;  Laterality: N/A;    TRANSFORAMINAL EPIDURAL INJECTION OF STEROID Right 11/5/2020     Procedure: LUMBAR TRANSFORAMINAL RIGHT L3/4 AND L4/5 DIRECT REFERRAL;  Surgeon: Nicole Toth MD;  [facility];  Service: Pain Management;  Laterality: Right;  NEEDS CONSENT, PT STATES NO LONGER TAKES PLETAL     No family history on file.  Social History     Tobacco Use    Smoking status: Former     Types: Cigarettes     Quit date: 10/5/1998     Years since quittin.0    Smokeless tobacco: Never   Substance Use Topics    Alcohol use: Not Currently     Alcohol/week: 0.0 standard drinks     Comment:     Drug use: Never     Review of Systems   Constitutional:  Negative for chills and fever.   HENT:  Negative for sore throat.    Respiratory:  Negative for shortness of breath.    Cardiovascular:  Negative for chest pain.   Gastrointestinal:  Negative for constipation, diarrhea, nausea and vomiting.   Genitourinary:  Positive for difficulty urinating and penile pain. Negative for dysuria, frequency, hematuria, penile discharge, penile swelling and urgency.   Musculoskeletal:  Negative for back pain.   Skin:  Negative for rash and wound.   Neurological:  Negative for weakness.   Hematological:  Does not bruise/bleed easily.   Psychiatric/Behavioral:  Negative for agitation, behavioral problems and confusion.      Physical Exam     Initial Vitals [22 0711]   BP Pulse Resp Temp SpO2   139/64 65 20 98.1 °F (36.7 °C) 98 %      MAP       --         Physical Exam    Constitutional: He appears well-developed and well-nourished. He is not diaphoretic. No distress.   HENT:   Head: Normocephalic and atraumatic.   Eyes: Pupils are equal, round, and reactive to light.   Cardiovascular:  Normal rate.           Pulmonary/Chest: No respiratory distress.   Abdominal: Abdomen is soft. He exhibits no distension and no mass. There is no abdominal tenderness. There is no rebound and no guarding.   Genitourinary:    Testes and penis normal.      Genitourinary Comments: Garcia catheter in place.   Small  superficial blisters to R inner thigh from leg bag straps.        Neurological: He is oriented to person, place, and time.   Skin: Skin is warm and dry.   Psychiatric: He has a normal mood and affect. His behavior is normal. Judgment and thought content normal.       ED Course   Procedures  Labs Reviewed - No data to display       Imaging Results    None          Medications - No data to display  Medical Decision Making:   History:   Old Medical Records: I decided to obtain old medical records.  Old Records Summarized: records from clinic visits and other records.       <> Summary of Records: Seen in ED 11/1 for urinary retention, catheter placed.   Initial Assessment:   78 yo M with concerns about malfunctioning catheter.  On exam, bag has moderate amount of yellow urine. Patient states this morning it was not draining but now appears to be working.  No hematuria.   No evidence of obstruction at this time, ríos is drainage appropriately. Counseled on symptomatic and supportive care, recommend continuing ríos and f/u with Urology as scheduled, return precautions given.   Differential Diagnosis:   Differential Diagnosis includes, but is not limited to:  Urinary retention, ríos malfunction, hematuria, ríos occlusion.    Clinical Tests:   Lab Tests: Reviewed    On re-evaluation, the patient's status has improved.  After complete ED evaluation, clinical impression is most consistent with urinary retention.  Urology follow-up within 2-3 days was recommended.    After taking into careful account the patient's history, physical exam findings, as well as empirical and objective data obtained throughout ED workup, I feel no emergent medical condition has been identified. No further evaluation or admission was felt to be required, and the patient is stable for discharge from the ED. The patient and any additional family present were updated with test results, overall clinical impression, and recommended further plan of  care, including discharge instructions as provided and outpatient follow-up for continued evaluation and management as needed. All questions were answered. The patient expressed understanding and agreed with current plan for discharge and follow-up plan of care. Strict ED return precautions were provided, including return/worsening of current symptoms, new symptoms, or any other concerns.                            Clinical Impression:   Final diagnoses:  [T83.9XXA] Problem with Garcia catheter, initial encounter (Primary)  [R33.9] Urinary retention      ED Disposition Condition    Discharge Stable          ED Prescriptions    None       Follow-up Information       Follow up With Specialties Details Why Contact Info Additional Information    [place] [facility] Urology Schedule an appointment as soon as possible for a visit  as soon as possible for follow-up [address], [facility] [address]  [facility] [address] [phone] Please park in [address] or [address] and use [employer]. Take [facility] elevators.             Inocencio Cota MD  11/04/22 9710

## 2022-11-04 NOTE — ED NOTES
Patient called nurse into room. Upon standing to changed into sweat pants, urine noted to be leaking around ríos catheter site.

## 2022-11-07 ENCOUNTER — TELEPHONE (OUTPATIENT)
Dept: UROLOGY | Facility: CLINIC | Age: 77
End: 2022-11-07
Payer: MEDICARE

## 2022-11-07 ENCOUNTER — HOSPITAL ENCOUNTER (EMERGENCY)
Facility: HOSPITAL | Age: 77
Discharge: HOME | End: 2022-11-07
Attending: EMERGENCY MEDICINE
Payer: MEDICARE

## 2022-11-07 VITALS
BODY MASS INDEX: 27.83 KG/M2 | DIASTOLIC BLOOD PRESSURE: 86 MMHG | TEMPERATURE: 98 F | RESPIRATION RATE: 18 BRPM | HEART RATE: 82 BPM | OXYGEN SATURATION: 98 % | WEIGHT: 183 LBS | SYSTOLIC BLOOD PRESSURE: 132 MMHG

## 2022-11-07 DIAGNOSIS — Z97.8 FOLEY CATHETER IN PLACE: Primary | ICD-10-CM

## 2022-11-07 LAB — BACTERIA UR CULT: ABNORMAL

## 2022-11-07 PROCEDURE — 99283 EMERGENCY DEPT VISIT LOW MDM: CPT

## 2022-11-07 RX ORDER — PHENAZOPYRIDINE HYDROCHLORIDE 200 MG/1
200 TABLET, FILM COATED ORAL 3 TIMES DAILY
Qty: 6 TABLET | Refills: 0 | Status: SHIPPED | OUTPATIENT
Start: 2022-11-07 | End: 2022-11-09

## 2022-11-07 NOTE — TELEPHONE ENCOUNTER
----- Message from Miguel Kelly sent at 11/7/2022  1:03 PM CST -----  Contact: pt  Type: Requesting to speak with nurse        Who Called: PT  Regarding: can't urinate   Would the patient rather a call back or a response via [facility]? Call back  Best Call Back Number: [phone]  Additional Information:

## 2022-11-07 NOTE — ED PROVIDER NOTES
Encounter Date: 11/7/2022       History     Chief Complaint   Patient presents with    Male  Problem     Patient has indwelling catheter that was placed last week for retention. States he was seen twice on Friday due to catheter not draining properly. Patient states last time he noticed drainage in bag was around 2 pm yesterday.      HPI    77-year-old male presents the ER for evaluation of Garcia catheter problem.  Patient reports he has the urge to go but is not going.  No other complaints came the ER for further evaluation.    Review of patient's allergies indicates:   Allergen Reactions    Iodine and iodide containing products Anaphylaxis    Shellfish containing products Anaphylaxis     Past Medical History:   Diagnosis Date    Coronary artery disease     Hyperlipidemia     Hypertension      Past Surgical History:   Procedure Laterality Date    ABDOMINAL SURGERY      Polyps in colon removed (noncancerous)    APPENDECTOMY      back surgery (screws & okate)      CORONARY ANGIOGRAPHY N/A 5/16/2022    Procedure: ANGIOGRAM, CORONARY ARTERY;  Surgeon: Gume Nagy MD;  Location: [facility] CATH LAB;  Service: Cardiology;  Laterality: N/A;    CORONARY ARTERY BYPASS GRAFT      1998    CORONARY BYPASS GRAFT ANGIOGRAPHY  5/16/2022    Procedure: Bypass graft study;  Surgeon: Gume Nagy MD;  Location: [facility] CATH LAB;  Service: Cardiology;;    PERCUTANEOUS TRANSLUMINAL ANGIOPLASTY (PTA) OF PERIPHERAL VESSEL N/A 1/27/2022    Procedure: PTA, PERIPHERAL VESSEL;  Surgeon: Gume Nagy MD;  Location: [facility] CATH LAB;  Service: Cardiology;  Laterality: N/A;    TRANSFORAMINAL EPIDURAL INJECTION OF STEROID Right 11/5/2020    Procedure: LUMBAR TRANSFORAMINAL RIGHT L3/4 AND L4/5 DIRECT REFERRAL;  Surgeon: Nicole Toth MD;  Location: [facility] PAIN MGT;  Service: Pain Management;  Laterality: Right;  NEEDS CONSENT, PT STATES NO LONGER TAKES PLETAL     No family history on file.  Social History     Tobacco Use    Smoking  status: Former     Types: Cigarettes     Quit date: 10/5/1998     Years since quittin.1    Smokeless tobacco: Never   Substance Use Topics    Alcohol use: Not Currently     Alcohol/week: 0.0 standard drinks     Comment:     Drug use: Never     Review of Systems   Genitourinary:  Positive for difficulty urinating.   All other systems reviewed and are negative.    Physical Exam     Initial Vitals [22 0440]   BP Pulse Resp Temp SpO2   137/88 85 17 98.2 °F (36.8 °C) 98 %      MAP       --         Physical Exam    Nursing note and vitals reviewed.  Constitutional:   Elderly chronically ill-appearing no acute distress   HENT:   Head: Normocephalic and atraumatic.   Eyes: Pupils are equal, round, and reactive to light.   Neck:   Normal range of motion.  Abdominal: Abdomen is soft. He exhibits no distension. There is no abdominal tenderness.   Genitourinary:    Genitourinary Comments: Garcia catheter in place draining urine     Musculoskeletal:         General: Normal range of motion.      Cervical back: Normal range of motion.     Neurological: He is alert and oriented to person, place, and time. He has normal strength. GCS score is 15. GCS eye subscore is 4. GCS verbal subscore is 5. GCS motor subscore is 6.   Skin: Skin is warm and dry. Capillary refill takes less than 2 seconds.   Psychiatric: He has a normal mood and affect. Thought content normal.       ED Course   Procedures  Labs Reviewed - No data to display       Imaging Results    None          Medications - No data to display  Medical Decision Making:   Initial Assessment:   77-year-old male presents the ER for evaluation of difficulty urinating.  Reports he has the urge to go but there is nothing in his Garcia.  Came the ER.  No acute distress hemodynamically stable.  Garcia catheter in place, it is draining urine.  Patient had 10 cc of urine in his bladder.  Discussed with patient, he is experiencing dysuria and urethral burning from Garcia catheter  placement.  There is no sign of any urinary obstruction, Garcia obstruction.  I discussed with patient plan for Tylenol Motrin, as well as Pyridium, will follow-up with urology has appointment soon.  Return precautions discussed patient will be discharged.                        Clinical Impression:   Final diagnoses:  [Z97.8] Garcia catheter in place (Primary)        ED Disposition Condition    Discharge Stable          ED Prescriptions       Medication Sig Dispense Start Date End Date Auth. Provider    phenazopyridine (PYRIDIUM) 200 MG tablet Take 1 tablet (200 mg total) by mouth 3 (three) times daily. for 2 days 6 tablet 11/7/2022 11/9/2022 Lalo Mccloud MD          Follow-up Information       Follow up With Specialties Details Why Contact Info    Luis Hassan MD Family Medicine Schedule an appointment as soon as possible for a visit  As needed [address] [place] [id number]  [phone]               Lalo Mccloud MD  11/07/22 0515

## 2022-11-07 NOTE — TELEPHONE ENCOUNTER
"Spoke with patient.  Urine is passing through catheter into urinary bag.  Upon awaking this morning, his bag was full causing him to drain it (same thing happening now)  he is experiencing burning when urine comes out and hydrating with "a lot of water"  he was prescribed pyridium to help with this.  He is questioning why he cannot urinate on his own.  Advised to keep appointment with Dr Luu for tomorrow and she will discuss treatment options/plan.  Patient informed me he is afraid as his father and brother had prostate issues and passed away.  Informed if he does not see urine going into urinary bag, report back to ED.  No suprapubic pain at time of call.  He voiced understanding.  Confirmed appointment for tomorrow.  "

## 2022-11-07 NOTE — ED NOTES
Pt given d/c instructions and prescription with correct return verbalization of understanding. Pt is alert, oriented x4 and in no distress prior to departing ER. Normal respiratory drive noted.

## 2022-11-08 ENCOUNTER — OFFICE VISIT (OUTPATIENT)
Dept: UROLOGY | Facility: CLINIC | Age: 77
End: 2022-11-08
Payer: MEDICARE

## 2022-11-08 VITALS
BODY MASS INDEX: 27.68 KG/M2 | WEIGHT: 182.63 LBS | DIASTOLIC BLOOD PRESSURE: 71 MMHG | SYSTOLIC BLOOD PRESSURE: 138 MMHG | HEIGHT: 68 IN | HEART RATE: 100 BPM

## 2022-11-08 DIAGNOSIS — N40.1 BPH WITH OBSTRUCTION/LOWER URINARY TRACT SYMPTOMS: ICD-10-CM

## 2022-11-08 DIAGNOSIS — N13.8 BPH WITH OBSTRUCTION/LOWER URINARY TRACT SYMPTOMS: ICD-10-CM

## 2022-11-08 DIAGNOSIS — R33.9 URINARY RETENTION: Primary | ICD-10-CM

## 2022-11-08 PROCEDURE — 3288F PR FALLS RISK ASSESSMENT DOCUMENTED: ICD-10-PCS | Mod: CPTII,S$GLB,, | Performed by: STUDENT IN AN ORGANIZED HEALTH CARE EDUCATION/TRAINING PROGRAM

## 2022-11-08 PROCEDURE — 99205 OFFICE O/P NEW HI 60 MIN: CPT | Mod: S$GLB,,, | Performed by: STUDENT IN AN ORGANIZED HEALTH CARE EDUCATION/TRAINING PROGRAM

## 2022-11-08 PROCEDURE — 1157F ADVNC CARE PLAN IN RCRD: CPT | Mod: CPTII,S$GLB,, | Performed by: STUDENT IN AN ORGANIZED HEALTH CARE EDUCATION/TRAINING PROGRAM

## 2022-11-08 PROCEDURE — 1159F PR MEDICATION LIST DOCUMENTED IN MEDICAL RECORD: ICD-10-PCS | Mod: CPTII,S$GLB,, | Performed by: STUDENT IN AN ORGANIZED HEALTH CARE EDUCATION/TRAINING PROGRAM

## 2022-11-08 PROCEDURE — 1160F RVW MEDS BY RX/DR IN RCRD: CPT | Mod: CPTII,S$GLB,, | Performed by: STUDENT IN AN ORGANIZED HEALTH CARE EDUCATION/TRAINING PROGRAM

## 2022-11-08 PROCEDURE — 1157F PR ADVANCE CARE PLAN OR EQUIV PRESENT IN MEDICAL RECORD: ICD-10-PCS | Mod: CPTII,S$GLB,, | Performed by: STUDENT IN AN ORGANIZED HEALTH CARE EDUCATION/TRAINING PROGRAM

## 2022-11-08 PROCEDURE — 1101F PT FALLS ASSESS-DOCD LE1/YR: CPT | Mod: CPTII,S$GLB,, | Performed by: STUDENT IN AN ORGANIZED HEALTH CARE EDUCATION/TRAINING PROGRAM

## 2022-11-08 PROCEDURE — 1125F PR PAIN SEVERITY QUANTIFIED, PAIN PRESENT: ICD-10-PCS | Mod: CPTII,S$GLB,, | Performed by: STUDENT IN AN ORGANIZED HEALTH CARE EDUCATION/TRAINING PROGRAM

## 2022-11-08 PROCEDURE — 3075F SYST BP GE 130 - 139MM HG: CPT | Mod: CPTII,S$GLB,, | Performed by: STUDENT IN AN ORGANIZED HEALTH CARE EDUCATION/TRAINING PROGRAM

## 2022-11-08 PROCEDURE — 3288F FALL RISK ASSESSMENT DOCD: CPT | Mod: CPTII,S$GLB,, | Performed by: STUDENT IN AN ORGANIZED HEALTH CARE EDUCATION/TRAINING PROGRAM

## 2022-11-08 PROCEDURE — 1125F AMNT PAIN NOTED PAIN PRSNT: CPT | Mod: CPTII,S$GLB,, | Performed by: STUDENT IN AN ORGANIZED HEALTH CARE EDUCATION/TRAINING PROGRAM

## 2022-11-08 PROCEDURE — 3075F PR MOST RECENT SYSTOLIC BLOOD PRESS GE 130-139MM HG: ICD-10-PCS | Mod: CPTII,S$GLB,, | Performed by: STUDENT IN AN ORGANIZED HEALTH CARE EDUCATION/TRAINING PROGRAM

## 2022-11-08 PROCEDURE — 3078F DIAST BP <80 MM HG: CPT | Mod: CPTII,S$GLB,, | Performed by: STUDENT IN AN ORGANIZED HEALTH CARE EDUCATION/TRAINING PROGRAM

## 2022-11-08 PROCEDURE — 1159F MED LIST DOCD IN RCRD: CPT | Mod: CPTII,S$GLB,, | Performed by: STUDENT IN AN ORGANIZED HEALTH CARE EDUCATION/TRAINING PROGRAM

## 2022-11-08 PROCEDURE — 99205 PR OFFICE/OUTPT VISIT, NEW, LEVL V, 60-74 MIN: ICD-10-PCS | Mod: S$GLB,,, | Performed by: STUDENT IN AN ORGANIZED HEALTH CARE EDUCATION/TRAINING PROGRAM

## 2022-11-08 PROCEDURE — 99999 PR PBB SHADOW E&M-EST. PATIENT-LVL V: CPT | Mod: PBBFAC,,, | Performed by: STUDENT IN AN ORGANIZED HEALTH CARE EDUCATION/TRAINING PROGRAM

## 2022-11-08 PROCEDURE — 3078F PR MOST RECENT DIASTOLIC BLOOD PRESSURE < 80 MM HG: ICD-10-PCS | Mod: CPTII,S$GLB,, | Performed by: STUDENT IN AN ORGANIZED HEALTH CARE EDUCATION/TRAINING PROGRAM

## 2022-11-08 PROCEDURE — 1101F PR PT FALLS ASSESS DOC 0-1 FALLS W/OUT INJ PAST YR: ICD-10-PCS | Mod: CPTII,S$GLB,, | Performed by: STUDENT IN AN ORGANIZED HEALTH CARE EDUCATION/TRAINING PROGRAM

## 2022-11-08 PROCEDURE — 1160F PR REVIEW ALL MEDS BY PRESCRIBER/CLIN PHARMACIST DOCUMENTED: ICD-10-PCS | Mod: CPTII,S$GLB,, | Performed by: STUDENT IN AN ORGANIZED HEALTH CARE EDUCATION/TRAINING PROGRAM

## 2022-11-08 PROCEDURE — 99999 PR PBB SHADOW E&M-EST. PATIENT-LVL V: ICD-10-PCS | Mod: PBBFAC,,, | Performed by: STUDENT IN AN ORGANIZED HEALTH CARE EDUCATION/TRAINING PROGRAM

## 2022-11-08 RX ORDER — POTASSIUM CHLORIDE 20 MEQ/1
1 TABLET, EXTENDED RELEASE ORAL DAILY
COMMUNITY

## 2022-11-08 RX ORDER — PREGABALIN 50 MG/1
50 CAPSULE ORAL 2 TIMES DAILY
COMMUNITY
Start: 2022-08-24 | End: 2023-01-25

## 2022-11-08 RX ORDER — TAMSULOSIN HYDROCHLORIDE 0.4 MG/1
0.4 CAPSULE ORAL DAILY
Qty: 90 CAPSULE | Refills: 3 | Status: SHIPPED | OUTPATIENT
Start: 2022-11-08 | End: 2023-11-08

## 2022-11-08 RX ORDER — PANTOPRAZOLE SODIUM 40 MG/1
TABLET, DELAYED RELEASE ORAL
COMMUNITY

## 2022-11-08 RX ORDER — LISINOPRIL 40 MG/1
40 TABLET ORAL DAILY
COMMUNITY
Start: 2022-10-07

## 2022-11-08 RX ORDER — FINASTERIDE 5 MG/1
TABLET, FILM COATED ORAL
COMMUNITY
Start: 2022-10-26

## 2022-11-08 RX ORDER — ASPIRIN 81 MG/1
81 TABLET ORAL DAILY
COMMUNITY

## 2022-11-08 NOTE — PROGRESS NOTES
VOIDING TRIAL    INPUT 50 ML    Patient unable to tolerate, balloon deflated, ríos removed.    OUTPUT 100 ML    Patient/wife to contact office if unable to void by 1430 hours.

## 2022-11-08 NOTE — PROGRESS NOTES
Subjective:       Patient ID: Kermit Castro is a 77 y.o. male.    Chief Complaint:  urinary retention  This is a 77 y.o.  male patient that is new to me.  The patient was referred to me by the ED for urinary retention.   Patient went into urinary retention on 11/1/22 and presented to the ER. No output documented but when I went into encounter looks like 600cc documented in I/o sheet.   Presented to ER on 11/4 and 11/7 concerned about ríos not draining well. Sound like he is having bladder spasms around the catheter. At the visit on 11/7, pvr demonstrated 10cc in the bladder.   On his medication list flomax and saw palmetto. He was started on flomax a week before the emergency room visit.   He has a sensation of urinary retention in the past but attributed to a new medication.   He is hydrating well. Having BM, not constipated.     Of note he has a history of CAD, interstitial lung disease and has seen palliative care 6/2022    Lab Results   Component Value Date    CREATININE 1.1 10/06/2022       ---  Past Medical History:   Diagnosis Date    Coronary artery disease     Hyperlipidemia     Hypertension        Past Surgical History:   Procedure Laterality Date    ABDOMINAL SURGERY      Polyps in colon removed (noncancerous)    APPENDECTOMY      back surgery (screws & okate)      CORONARY ANGIOGRAPHY N/A 5/16/2022    Procedure: ANGIOGRAM, CORONARY ARTERY;  Surgeon: Gume Nagy MD;  Location: [facility] CATH LAB;  Service: Cardiology;  Laterality: N/A;    CORONARY ARTERY BYPASS GRAFT      1998    CORONARY BYPASS GRAFT ANGIOGRAPHY  5/16/2022    Procedure: Bypass graft study;  Surgeon: Gume Nagy MD;  Location: [facility] CATH LAB;  Service: Cardiology;;    PERCUTANEOUS TRANSLUMINAL ANGIOPLASTY (PTA) OF PERIPHERAL VESSEL N/A 1/27/2022    Procedure: PTA, PERIPHERAL VESSEL;  Surgeon: Gume Nagy MD;  Location: [facility] CATH LAB;  Service: Cardiology;  Laterality: N/A;    TRANSFORAMINAL EPIDURAL INJECTION OF STEROID  Right 2020    Procedure: LUMBAR TRANSFORAMINAL RIGHT L3/4 AND L4/5 DIRECT REFERRAL;  Surgeon: Nicole Toth MD;  Location: [facility];  Service: Pain Management;  Laterality: Right;  NEEDS CONSENT, PT STATES NO LONGER TAKES PLETAL       History reviewed. No pertinent family history.    Social History     Tobacco Use    Smoking status: Former     Types: Cigarettes     Quit date: 10/5/1998     Years since quittin.1    Smokeless tobacco: Never   Substance Use Topics    Alcohol use: Not Currently     Alcohol/week: 0.0 standard drinks     Comment:     Drug use: Never       Current Outpatient Medications on File Prior to Visit   Medication Sig Dispense Refill    albuterol-ipratropium (DUO-NEB) 2.5 mg-0.5 mg/3 mL nebulizer solution USE 1 AMPULE IN NEBULIZER EVERY 6 HOURS AS NEEDED FOR SHORTNESS OF BREATH (RESCUE) 90 mL 11    amLODIPine (NORVASC) 10 MG tablet amlodipine besylate  10 mg tabs      aspirin (ECOTRIN) 81 MG EC tablet Take 81 mg by mouth once daily.      ciprofloxacin HCl (CIPRO) 500 MG tablet Take 1 tablet (500 mg total) by mouth 2 (two) times daily. for 10 days 20 tablet 0    clopidogreL (PLAVIX) 75 mg tablet Take 4 tablets on day 1 then take 1 tablet daily there after. 34 tablet 11    ezetimibe (ZETIA) 10 mg tablet Take 1 tablet (10 mg total) by mouth once daily. 90 tablet 3    finasteride (PROSCAR) 5 mg tablet SMARTSI Tablet(s) By Mouth Every Evening      flu vacc [id number],65yr up,PF (FLUZONE HIGH-DOSE , PF,) 180 mcg/0.5 mL Syrg Fluzone High-Dose  (PF) 180 mcg/0.5 mL intramuscular syringe   PHARMACIST ADMINISTERED IMMUNIZATION ADMINISTERED AT TIME OF DISPENSING      furosemide (LASIX) 40 MG tablet Take 1 tablet (40 mg total) by mouth once daily. 30 tablet 11    influenza (HIGH-DOSE PF) 240 mcg/0.7 mL vaccine Fluzone High-Dose [id number] (PF) 180 mcg/0.5 mL intramuscular syringe      lisinopriL (PRINIVIL,ZESTRIL) 40 MG tablet Take 40 mg by mouth once daily.       lubiprostone (AMITIZA) 24 MCG Cap Amitiza 24 mcg capsule   Take 1 capsule twice a day by oral route with meals for 30 days.      metoprolol succinate (TOPROL-XL) 100 MG 24 hr tablet Take 1 tablet (100 mg total) by mouth once daily. 30 tablet 11    multivitamin (THERAGRAN) per tablet Take 1 tablet by mouth once daily.      nintedanib (OFEV) 150 mg Cap Take 1 capsule (150 mg total) by mouth 2 (two) times a day. 60 capsule 11    omega 3-dha-epa-fish oil (FISH OIL) 100-160-1,000 mg Cap Fish Oil   daily      oxyCODONE-acetaminophen (PERCOCET)  mg per tablet oxycod/apap  tab 10-325mg      pantoprazole (PROTONIX) 40 MG tablet pantoprazole 40 mg tablet,delayed release   TAKE 1 TABLET BY MOUTH ONCE DAILY      phenazopyridine (PYRIDIUM) 200 MG tablet Take 1 tablet (200 mg total) by mouth 3 (three) times daily. for 2 days 6 tablet 0    polyethylene glycol (GLYCOLAX) 17 gram/dose powder Miralax 17 gram/dose oral powder   use as directed      potassium chloride SA (K-DUR,KLOR-CON) 20 MEQ tablet Take 1 tablet by mouth once daily.      pregabalin (LYRICA) 50 MG capsule Take 50 mg by mouth 2 (two) times daily.      pulse oximeter (PULSE OXIMETER) device by Apply Externally route 2 (two) times a day. Use twice daily at 8 AM and 3 PM and record the value in [employer] as directed. 1 each 0    saw palmetto 160 MG capsule Take 160 mg by mouth 2 (two) times daily.      sildenafiL (VIAGRA) 100 MG tablet Take 100 mg by mouth.      SUPREP BOWEL PREP KIT 17.5-3.13-1.6 gram SolR Take by mouth.      [DISCONTINUED] tamsulosin (FLOMAX) 0.4 mg Cap Take 1 capsule (0.4 mg total) by mouth once daily. 20 capsule 0    aspirin 325 MG tablet aspirin 325 mg tablet   Take 1 tablet every day by oral route. (Patient not taking: Reported on 11/8/2022)      [DISCONTINUED] lisinopriL (PRINIVIL,ZESTRIL) 20 MG tablet Take 1.5 tablets (30 mg total) by mouth once daily. 90 tablet 3    [DISCONTINUED] oxyCODONE (ROXICODONE) 5 MG immediate release tablet Take 1  tablet (5 mg total) by mouth every 4 (four) hours as needed (shortness of breath). 30 tablet 0     Current Facility-Administered Medications on File Prior to Visit   Medication Dose Route Frequency Provider Last Rate Last Admin    0.9%  NaCl infusion   Intravenous Continuous Gume Nagy  mL/hr at 05/16/22 1005 New Bag at 05/16/22 1005    acetaminophen tablet 650 mg  650 mg Oral Once PRN Rishabh Ware MD        EPINEPHrine (EPIPEN) 0.3 mg/0.3 mL pen injection 0.3 mg  0.3 mg Intramuscular PRN Rishabh Ware MD        methylPREDNISolone sodium succinate injection 40 mg  40 mg Intravenous Once PRN Rishabh Ware MD        ondansetron disintegrating tablet 4 mg  4 mg Oral Once PRN Rishabh Ware MD        sodium chloride 0.9% 500 mL flush bag   Intravenous PRN Rishabh Ware MD        sodium chloride 0.9% flush 10 mL  10 mL Intravenous PRN Rishabh Ware MD           Review of patient's allergies indicates:   Allergen Reactions    Iodine and iodide containing products Anaphylaxis    Shellfish containing products Anaphylaxis       Review of Systems   Constitutional:  Negative for chills.   HENT:  Negative for congestion.    Eyes:  Negative for visual disturbance.   Respiratory:  Negative for shortness of breath.    Cardiovascular:  Negative for chest pain.   Gastrointestinal:  Negative for abdominal distention.   Musculoskeletal:  Negative for gait problem.   Skin:  Negative for color change.   Neurological:  Negative for dizziness.   Psychiatric/Behavioral:  Negative for agitation.      Objective:      Physical Exam  Constitutional:       Appearance: He is well-developed.   HENT:      Head: Normocephalic.   Eyes:      Pupils: Pupils are equal, round, and reactive to light.   Pulmonary:      Effort: Pulmonary effort is normal.   Abdominal:      Palpations: Abdomen is soft.   Musculoskeletal:         General: Normal range of motion.      Cervical back: Normal range of motion.   Skin:      General: Skin is warm and dry.   Neurological:      Mental Status: He is alert.       Assessment:       1. Urinary retention    2. BPH with obstruction/lower urinary tract symptoms          Plan:         Voiding trial today, pt has been on flomax x 2 weeks. He has been hydrating well. He is not constipated. Rec continuation of flomax and saw palmetto as long term medications.  If no void by 6 hours he will RTC.      Urinary retention  -     Ambulatory referral/consult to Urology  -     tamsulosin (FLOMAX) 0.4 mg Cap; Take 1 capsule (0.4 mg total) by mouth once daily.  Dispense: 90 capsule; Refill: 3    BPH with obstruction/lower urinary tract symptoms  -     tamsulosin (FLOMAX) 0.4 mg Cap; Take 1 capsule (0.4 mg total) by mouth once daily.  Dispense: 90 capsule; Refill: 3

## 2022-11-14 ENCOUNTER — OFFICE VISIT (OUTPATIENT)
Dept: UROLOGY | Facility: CLINIC | Age: 77
End: 2022-11-14
Payer: MEDICARE

## 2022-11-14 VITALS
HEIGHT: 68 IN | HEART RATE: 71 BPM | SYSTOLIC BLOOD PRESSURE: 138 MMHG | WEIGHT: 182.31 LBS | BODY MASS INDEX: 27.63 KG/M2 | DIASTOLIC BLOOD PRESSURE: 84 MMHG

## 2022-11-14 DIAGNOSIS — R33.9 URINARY RETENTION: Primary | ICD-10-CM

## 2022-11-14 PROCEDURE — 3075F PR MOST RECENT SYSTOLIC BLOOD PRESS GE 130-139MM HG: ICD-10-PCS | Mod: CPTII,S$GLB,, | Performed by: STUDENT IN AN ORGANIZED HEALTH CARE EDUCATION/TRAINING PROGRAM

## 2022-11-14 PROCEDURE — 1126F PR PAIN SEVERITY QUANTIFIED, NO PAIN PRESENT: ICD-10-PCS | Mod: CPTII,S$GLB,, | Performed by: STUDENT IN AN ORGANIZED HEALTH CARE EDUCATION/TRAINING PROGRAM

## 2022-11-14 PROCEDURE — 1157F PR ADVANCE CARE PLAN OR EQUIV PRESENT IN MEDICAL RECORD: ICD-10-PCS | Mod: CPTII,S$GLB,, | Performed by: STUDENT IN AN ORGANIZED HEALTH CARE EDUCATION/TRAINING PROGRAM

## 2022-11-14 PROCEDURE — 1160F RVW MEDS BY RX/DR IN RCRD: CPT | Mod: CPTII,S$GLB,, | Performed by: STUDENT IN AN ORGANIZED HEALTH CARE EDUCATION/TRAINING PROGRAM

## 2022-11-14 PROCEDURE — 99999 PR PBB SHADOW E&M-EST. PATIENT-LVL IV: ICD-10-PCS | Mod: PBBFAC,,, | Performed by: STUDENT IN AN ORGANIZED HEALTH CARE EDUCATION/TRAINING PROGRAM

## 2022-11-14 PROCEDURE — 1157F ADVNC CARE PLAN IN RCRD: CPT | Mod: CPTII,S$GLB,, | Performed by: STUDENT IN AN ORGANIZED HEALTH CARE EDUCATION/TRAINING PROGRAM

## 2022-11-14 PROCEDURE — 1159F MED LIST DOCD IN RCRD: CPT | Mod: CPTII,S$GLB,, | Performed by: STUDENT IN AN ORGANIZED HEALTH CARE EDUCATION/TRAINING PROGRAM

## 2022-11-14 PROCEDURE — 99215 OFFICE O/P EST HI 40 MIN: CPT | Mod: S$GLB,,, | Performed by: STUDENT IN AN ORGANIZED HEALTH CARE EDUCATION/TRAINING PROGRAM

## 2022-11-14 PROCEDURE — 3079F DIAST BP 80-89 MM HG: CPT | Mod: CPTII,S$GLB,, | Performed by: STUDENT IN AN ORGANIZED HEALTH CARE EDUCATION/TRAINING PROGRAM

## 2022-11-14 PROCEDURE — 51798 US URINE CAPACITY MEASURE: CPT | Mod: S$GLB,,, | Performed by: STUDENT IN AN ORGANIZED HEALTH CARE EDUCATION/TRAINING PROGRAM

## 2022-11-14 PROCEDURE — 99215 PR OFFICE/OUTPT VISIT, EST, LEVL V, 40-54 MIN: ICD-10-PCS | Mod: S$GLB,,, | Performed by: STUDENT IN AN ORGANIZED HEALTH CARE EDUCATION/TRAINING PROGRAM

## 2022-11-14 PROCEDURE — 1126F AMNT PAIN NOTED NONE PRSNT: CPT | Mod: CPTII,S$GLB,, | Performed by: STUDENT IN AN ORGANIZED HEALTH CARE EDUCATION/TRAINING PROGRAM

## 2022-11-14 PROCEDURE — 3079F PR MOST RECENT DIASTOLIC BLOOD PRESSURE 80-89 MM HG: ICD-10-PCS | Mod: CPTII,S$GLB,, | Performed by: STUDENT IN AN ORGANIZED HEALTH CARE EDUCATION/TRAINING PROGRAM

## 2022-11-14 PROCEDURE — 51798 PR MEAS,POST-VOID RES,US,NON-IMAGING: ICD-10-PCS | Mod: S$GLB,,, | Performed by: STUDENT IN AN ORGANIZED HEALTH CARE EDUCATION/TRAINING PROGRAM

## 2022-11-14 PROCEDURE — 1160F PR REVIEW ALL MEDS BY PRESCRIBER/CLIN PHARMACIST DOCUMENTED: ICD-10-PCS | Mod: CPTII,S$GLB,, | Performed by: STUDENT IN AN ORGANIZED HEALTH CARE EDUCATION/TRAINING PROGRAM

## 2022-11-14 PROCEDURE — 3075F SYST BP GE 130 - 139MM HG: CPT | Mod: CPTII,S$GLB,, | Performed by: STUDENT IN AN ORGANIZED HEALTH CARE EDUCATION/TRAINING PROGRAM

## 2022-11-14 PROCEDURE — 99999 PR PBB SHADOW E&M-EST. PATIENT-LVL IV: CPT | Mod: PBBFAC,,, | Performed by: STUDENT IN AN ORGANIZED HEALTH CARE EDUCATION/TRAINING PROGRAM

## 2022-11-14 PROCEDURE — 1159F PR MEDICATION LIST DOCUMENTED IN MEDICAL RECORD: ICD-10-PCS | Mod: CPTII,S$GLB,, | Performed by: STUDENT IN AN ORGANIZED HEALTH CARE EDUCATION/TRAINING PROGRAM

## 2022-11-14 NOTE — PROGRESS NOTES
Subjective:       Patient ID: Kermit Castro is a 77 y.o. male.    Chief Complaint: Urinary Retention   This is a 77 y.o.  male patient that is an established patient of mine.         The patient was referred to me by the ED for urinary retention.   Patient went into urinary retention on 11/1/22 and presented to the ER. No output documented but when I went into encounter looks like 600cc documented in I/o sheet.   Presented to ER on 11/4 and 11/7 concerned about ríos not draining well. Sound like he is having bladder spasms around the catheter. At the visit on 11/7, pvr demonstrated 10cc in the bladder.   On his medication list flomax and saw palmetto. He was started on flomax a week before the emergency room visit.   He has a sensation of urinary retention in the past but attributed to a new medication.   He is hydrating well. Having BM, not constipated.     Of note he has a history of CAD, interstitial lung disease and has seen palliative care 6/2022.    He underwent a successful voiding trial in office on 11/8/22.    11/14/22  Made an urgent appt today felt urgency and frequency and did not urinate last night. He was urinating this morning per his report. Initial PVR 160cc, pt was then able to void again today - second PVR 37cc      Lab Results   Component Value Date    CREATININE 1.1 10/06/2022       ---  Past Medical History:   Diagnosis Date    Coronary artery disease     Hyperlipidemia     Hypertension        Past Surgical History:   Procedure Laterality Date    ABDOMINAL SURGERY      Polyps in colon removed (noncancerous)    APPENDECTOMY      back surgery (screws & okate)      CORONARY ANGIOGRAPHY N/A 5/16/2022    Procedure: ANGIOGRAM, CORONARY ARTERY;  Surgeon: Gume Nagy MD;  Location: [facility] CATH LAB;  Service: Cardiology;  Laterality: N/A;    CORONARY ARTERY BYPASS GRAFT      1998    CORONARY BYPASS GRAFT ANGIOGRAPHY  5/16/2022    Procedure: Bypass graft study;  Surgeon: Gume Nagy MD;   Location: [facility] CATH LAB;  Service: Cardiology;;    PERCUTANEOUS TRANSLUMINAL ANGIOPLASTY (PTA) OF PERIPHERAL VESSEL N/A 2022    Procedure: PTA, PERIPHERAL VESSEL;  Surgeon: Gume Nagy MD;  Location: [facility] CATH LAB;  Service: Cardiology;  Laterality: N/A;    TRANSFORAMINAL EPIDURAL INJECTION OF STEROID Right 2020    Procedure: LUMBAR TRANSFORAMINAL RIGHT L3/4 AND L4/5 DIRECT REFERRAL;  Surgeon: Nicole Toth MD;  Location: [facility] PAIN MGT;  Service: Pain Management;  Laterality: Right;  NEEDS CONSENT, PT STATES NO LONGER TAKES PLETAL       No family history on file.    Social History     Tobacco Use    Smoking status: Former     Types: Cigarettes     Quit date: 10/5/1998     Years since quittin.1    Smokeless tobacco: Never   Substance Use Topics    Alcohol use: Not Currently     Alcohol/week: 0.0 standard drinks     Comment:     Drug use: Never       Current Outpatient Medications on File Prior to Visit   Medication Sig Dispense Refill    albuterol-ipratropium (DUO-NEB) 2.5 mg-0.5 mg/3 mL nebulizer solution USE 1 AMPULE IN NEBULIZER EVERY 6 HOURS AS NEEDED FOR SHORTNESS OF BREATH (RESCUE) 90 mL 11    amLODIPine (NORVASC) 10 MG tablet amlodipine besylate  10 mg tabs      aspirin (ECOTRIN) 81 MG EC tablet Take 81 mg by mouth once daily.      ciprofloxacin HCl (CIPRO) 500 MG tablet Take 1 tablet (500 mg total) by mouth 2 (two) times daily. for 10 days 20 tablet 0    clopidogreL (PLAVIX) 75 mg tablet Take 4 tablets on day 1 then take 1 tablet daily there after. 34 tablet 11    ezetimibe (ZETIA) 10 mg tablet Take 1 tablet (10 mg total) by mouth once daily. 90 tablet 3    finasteride (PROSCAR) 5 mg tablet SMARTSI Tablet(s) By Mouth Every Evening      flu vacc [id number],65yr up,PF (FLUZONE HIGH-DOSE , PF,) 180 mcg/0.5 mL Syrg Fluzone High-Dose  (PF) 180 mcg/0.5 mL intramuscular syringe   PHARMACIST ADMINISTERED IMMUNIZATION ADMINISTERED AT TIME OF DISPENSING      furosemide  (LASIX) 40 MG tablet Take 1 tablet (40 mg total) by mouth once daily. 30 tablet 11    influenza (HIGH-DOSE PF) 240 mcg/0.7 mL vaccine Fluzone High-Dose [id number] (PF) 180 mcg/0.5 mL intramuscular syringe      lisinopriL (PRINIVIL,ZESTRIL) 40 MG tablet Take 40 mg by mouth once daily.      lubiprostone (AMITIZA) 24 MCG Cap Amitiza 24 mcg capsule   Take 1 capsule twice a day by oral route with meals for 30 days.      metoprolol succinate (TOPROL-XL) 100 MG 24 hr tablet Take 1 tablet (100 mg total) by mouth once daily. 30 tablet 11    multivitamin (THERAGRAN) per tablet Take 1 tablet by mouth once daily.      nintedanib (OFEV) 150 mg Cap Take 1 capsule (150 mg total) by mouth 2 (two) times a day. 60 capsule 11    omega 3-dha-epa-fish oil (FISH OIL) 100-160-1,000 mg Cap Fish Oil   daily      oxyCODONE-acetaminophen (PERCOCET)  mg per tablet oxycod/apap  tab 10-325mg      pantoprazole (PROTONIX) 40 MG tablet pantoprazole 40 mg tablet,delayed release   TAKE 1 TABLET BY MOUTH ONCE DAILY      polyethylene glycol (GLYCOLAX) 17 gram/dose powder Miralax 17 gram/dose oral powder   use as directed      potassium chloride SA (K-DUR,KLOR-CON) 20 MEQ tablet Take 1 tablet by mouth once daily.      pregabalin (LYRICA) 50 MG capsule Take 50 mg by mouth 2 (two) times daily.      pulse oximeter (PULSE OXIMETER) device by Apply Externally route 2 (two) times a day. Use twice daily at 8 AM and 3 PM and record the value in [employer] as directed. 1 each 0    saw palmetto 160 MG capsule Take 160 mg by mouth 2 (two) times daily.      sildenafiL (VIAGRA) 100 MG tablet Take 100 mg by mouth.      SUPREP BOWEL PREP KIT 17.5-3.13-1.6 gram SolR Take by mouth.      tamsulosin (FLOMAX) 0.4 mg Cap Take 1 capsule (0.4 mg total) by mouth once daily. 90 capsule 3     Current Facility-Administered Medications on File Prior to Visit   Medication Dose Route Frequency Provider Last Rate Last Admin    0.9%  NaCl infusion   Intravenous Continuous Gume FARMER  MD Yakov 125 mL/hr at 05/16/22 1005 New Bag at 05/16/22 1005    acetaminophen tablet 650 mg  650 mg Oral Once PRN Rishabh Ware MD        EPINEPHrine (EPIPEN) 0.3 mg/0.3 mL pen injection 0.3 mg  0.3 mg Intramuscular PRN Rishabh Ware MD        methylPREDNISolone sodium succinate injection 40 mg  40 mg Intravenous Once PRN Rishabh Ware MD        ondansetron disintegrating tablet 4 mg  4 mg Oral Once PRN Rishabh Ware MD        sodium chloride 0.9% 500 mL flush bag   Intravenous PRN Rishabh Ware MD        sodium chloride 0.9% flush 10 mL  10 mL Intravenous PRN Rishabh Ware MD           Review of patient's allergies indicates:   Allergen Reactions    Iodine and iodide containing products Anaphylaxis    Shellfish containing products Anaphylaxis       Review of Systems   Constitutional:  Negative for chills.   HENT:  Negative for congestion.    Eyes:  Negative for visual disturbance.   Respiratory:  Negative for shortness of breath.    Cardiovascular:  Negative for chest pain.   Gastrointestinal:  Negative for abdominal distention.   Musculoskeletal:  Negative for gait problem.   Skin:  Negative for color change.   Neurological:  Negative for dizziness.   Psychiatric/Behavioral:  Negative for agitation.      Objective:      Physical Exam  Constitutional:       Appearance: He is well-developed.   HENT:      Head: Normocephalic.   Eyes:      Pupils: Pupils are equal, round, and reactive to light.   Pulmonary:      Effort: Pulmonary effort is normal.   Abdominal:      Palpations: Abdomen is soft.   Musculoskeletal:         General: Normal range of motion.      Cervical back: Normal range of motion.   Skin:     General: Skin is warm and dry.   Neurological:      Mental Status: He is alert.       Assessment:       1. Urinary retention          Plan:           Continue flomax/finasteride  Reassured him his PVR is low (37cc), no indication for ríos replacement.      Urinary retention  -      POCT Bladder Scan

## 2022-12-12 ENCOUNTER — OFFICE VISIT (OUTPATIENT)
Dept: PULMONOLOGY | Facility: CLINIC | Age: 77
End: 2022-12-12
Payer: MEDICARE

## 2022-12-12 VITALS
BODY MASS INDEX: 28.24 KG/M2 | DIASTOLIC BLOOD PRESSURE: 82 MMHG | HEART RATE: 72 BPM | OXYGEN SATURATION: 96 % | WEIGHT: 186.31 LBS | SYSTOLIC BLOOD PRESSURE: 140 MMHG | HEIGHT: 68 IN

## 2022-12-12 DIAGNOSIS — J96.11 CHRONIC RESPIRATORY FAILURE WITH HYPOXIA: ICD-10-CM

## 2022-12-12 DIAGNOSIS — J84.112 IPF (IDIOPATHIC PULMONARY FIBROSIS): ICD-10-CM

## 2022-12-12 DIAGNOSIS — J84.9 ILD (INTERSTITIAL LUNG DISEASE): Primary | ICD-10-CM

## 2022-12-12 PROCEDURE — 99213 OFFICE O/P EST LOW 20 MIN: CPT | Mod: S$GLB,,, | Performed by: INTERNAL MEDICINE

## 2022-12-12 PROCEDURE — 1101F PR PT FALLS ASSESS DOC 0-1 FALLS W/OUT INJ PAST YR: ICD-10-PCS | Mod: CPTII,S$GLB,, | Performed by: INTERNAL MEDICINE

## 2022-12-12 PROCEDURE — 1126F AMNT PAIN NOTED NONE PRSNT: CPT | Mod: CPTII,S$GLB,, | Performed by: INTERNAL MEDICINE

## 2022-12-12 PROCEDURE — 1157F ADVNC CARE PLAN IN RCRD: CPT | Mod: CPTII,S$GLB,, | Performed by: INTERNAL MEDICINE

## 2022-12-12 PROCEDURE — 1126F PR PAIN SEVERITY QUANTIFIED, NO PAIN PRESENT: ICD-10-PCS | Mod: CPTII,S$GLB,, | Performed by: INTERNAL MEDICINE

## 2022-12-12 PROCEDURE — 1160F PR REVIEW ALL MEDS BY PRESCRIBER/CLIN PHARMACIST DOCUMENTED: ICD-10-PCS | Mod: CPTII,S$GLB,, | Performed by: INTERNAL MEDICINE

## 2022-12-12 PROCEDURE — 3079F DIAST BP 80-89 MM HG: CPT | Mod: CPTII,S$GLB,, | Performed by: INTERNAL MEDICINE

## 2022-12-12 PROCEDURE — 3288F FALL RISK ASSESSMENT DOCD: CPT | Mod: CPTII,S$GLB,, | Performed by: INTERNAL MEDICINE

## 2022-12-12 PROCEDURE — 3288F PR FALLS RISK ASSESSMENT DOCUMENTED: ICD-10-PCS | Mod: CPTII,S$GLB,, | Performed by: INTERNAL MEDICINE

## 2022-12-12 PROCEDURE — 99213 PR OFFICE/OUTPT VISIT, EST, LEVL III, 20-29 MIN: ICD-10-PCS | Mod: S$GLB,,, | Performed by: INTERNAL MEDICINE

## 2022-12-12 PROCEDURE — 99999 PR PBB SHADOW E&M-EST. PATIENT-LVL V: ICD-10-PCS | Mod: PBBFAC,,, | Performed by: INTERNAL MEDICINE

## 2022-12-12 PROCEDURE — 1159F MED LIST DOCD IN RCRD: CPT | Mod: CPTII,S$GLB,, | Performed by: INTERNAL MEDICINE

## 2022-12-12 PROCEDURE — 1159F PR MEDICATION LIST DOCUMENTED IN MEDICAL RECORD: ICD-10-PCS | Mod: CPTII,S$GLB,, | Performed by: INTERNAL MEDICINE

## 2022-12-12 PROCEDURE — 3079F PR MOST RECENT DIASTOLIC BLOOD PRESSURE 80-89 MM HG: ICD-10-PCS | Mod: CPTII,S$GLB,, | Performed by: INTERNAL MEDICINE

## 2022-12-12 PROCEDURE — 1157F PR ADVANCE CARE PLAN OR EQUIV PRESENT IN MEDICAL RECORD: ICD-10-PCS | Mod: CPTII,S$GLB,, | Performed by: INTERNAL MEDICINE

## 2022-12-12 PROCEDURE — 1160F RVW MEDS BY RX/DR IN RCRD: CPT | Mod: CPTII,S$GLB,, | Performed by: INTERNAL MEDICINE

## 2022-12-12 PROCEDURE — 3077F SYST BP >= 140 MM HG: CPT | Mod: CPTII,S$GLB,, | Performed by: INTERNAL MEDICINE

## 2022-12-12 PROCEDURE — 1101F PT FALLS ASSESS-DOCD LE1/YR: CPT | Mod: CPTII,S$GLB,, | Performed by: INTERNAL MEDICINE

## 2022-12-12 PROCEDURE — 99999 PR PBB SHADOW E&M-EST. PATIENT-LVL V: CPT | Mod: PBBFAC,,, | Performed by: INTERNAL MEDICINE

## 2022-12-12 PROCEDURE — 3077F PR MOST RECENT SYSTOLIC BLOOD PRESSURE >= 140 MM HG: ICD-10-PCS | Mod: CPTII,S$GLB,, | Performed by: INTERNAL MEDICINE

## 2022-12-12 RX ORDER — OMEPRAZOLE MAGNESIUM, AMOXICILLIN AND RIFABUTIN 10; 250; 12.5 MG/1; MG/1; MG/1
CAPSULE, DELAYED RELEASE ORAL
COMMUNITY

## 2022-12-12 RX ORDER — PHENAZOPYRIDINE HYDROCHLORIDE 200 MG/1
TABLET, FILM COATED ORAL
COMMUNITY

## 2022-12-12 NOTE — ASSESSMENT & PLAN NOTE
Continue Ofev 150mg bid.  LFTs stable  Encouraged continued physical activity.  PFTs and 6MWT q6months

## 2022-12-12 NOTE — ASSESSMENT & PLAN NOTE
Patient with Hypoxic Respiratory failure which is Chronic.  he is on home oxygen at 2 LPM. Supplemental oxygen was provided and noted-  .   Signs/symptoms of respiratory failure include- increased work of breathing and respiratory distress. Contributing diagnoses includes - Interstitial lung disease Labs and images were reviewed. Patient Has not had a recent ABG. Will treat underlying causes and adjust management of respiratory failure as follows-     Discussed goal O2 Sat of >88%.  Wearing oxygen prn based on pulse ox goal

## 2022-12-12 NOTE — PATIENT INSTRUCTIONS
Continue physical activity as tolerated.  If O2 saturation (oxygen levels) drops below 88% you should wear your oxygen.   Continue Ofev twice daily.

## 2022-12-12 NOTE — PROGRESS NOTES
Subjective:       Patient ID: Kermit Castro is a 77 y.o. male.    Chief Complaint: Interstitial Lung Disease and Follow-up    HPI:   Kermit Castro is a 77 y.o. male who presents for follow up.    He last seen in the office in June 2022; today he appears to be doing much better.   At last visit he was started on ofev. (Did not tolerate pirfenidone due to side effects - fatigue and dizziness)  Denies side effects from ofev.    Has oxygen at home, but doesn't use it much.   Seen by palliative care and started on opioids for dyspnea relief. No clear if he's really using them.     Walked from the parking garage to the office without stopping.  Cutting grass in his yard on the weekends     No recent ED/UC visits.    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  30 years of smoking 1ppd    Brother with lung cancer.     Review of Systems   Constitutional:  Negative for fever, chills, weight loss, activity change and fatigue.   HENT:  Negative for postnasal drip, rhinorrhea, sinus pressure and congestion.    Respiratory:  Negative for snoring, cough, hemoptysis, shortness of breath, wheezing, asthma nighttime symptoms and dyspnea on extertion (occasional (significantly improved)).    Cardiovascular:  Negative for chest pain and leg swelling.   Genitourinary:  Negative for difficulty urinating.   Endocrine:  Negative for cold intolerance and heat intolerance.    Musculoskeletal:  Positive for back pain. Negative for joint swelling and myalgias.   Gastrointestinal:  Negative for nausea, vomiting and abdominal pain.   Neurological:  Negative for dizziness, weakness and headaches.   Hematological:  Negative for adenopathy. No excessive bruising.   Psychiatric/Behavioral:  Negative for confusion and sleep disturbance.        Social History     Tobacco Use    Smoking status: Former     Types: Cigarettes     Quit date: 10/5/1998     Years  since quittin.2    Smokeless tobacco: Never   Substance Use Topics    Alcohol use: Not Currently     Alcohol/week: 0.0 standard drinks     Comment:        Review of patient's allergies indicates:   Allergen Reactions    Iodine and iodide containing products Anaphylaxis    Shellfish containing products Anaphylaxis     Past Medical History:   Diagnosis Date    Coronary artery disease     Hyperlipidemia     Hypertension      Past Surgical History:   Procedure Laterality Date    ABDOMINAL SURGERY      Polyps in colon removed (noncancerous)    APPENDECTOMY      back surgery (screws & okate)      CORONARY ANGIOGRAPHY N/A 2022    Procedure: ANGIOGRAM, CORONARY ARTERY;  Surgeon: Gume Nagy MD;  Location: [facility] CATH LAB;  Service: Cardiology;  Laterality: N/A;    CORONARY ARTERY BYPASS GRAFT          CORONARY BYPASS GRAFT ANGIOGRAPHY  2022    Procedure: Bypass graft study;  Surgeon: Gume Nagy MD;  Location: [facility] CATH LAB;  Service: Cardiology;;    PERCUTANEOUS TRANSLUMINAL ANGIOPLASTY (PTA) OF PERIPHERAL VESSEL N/A 2022    Procedure: PTA, PERIPHERAL VESSEL;  Surgeon: Gume Nagy MD;  Location: [facility] CATH LAB;  Service: Cardiology;  Laterality: N/A;    TRANSFORAMINAL EPIDURAL INJECTION OF STEROID Right 2020    Procedure: LUMBAR TRANSFORAMINAL RIGHT L3/4 AND L4/5 DIRECT REFERRAL;  Surgeon: Nicole Toth MD;  Location: [facility] PAIN MGT;  Service: Pain Management;  Laterality: Right;  NEEDS CONSENT, PT STATES NO LONGER TAKES PLETAL     Current Outpatient Medications on File Prior to Visit   Medication Sig    albuterol-ipratropium (DUO-NEB) 2.5 mg-0.5 mg/3 mL nebulizer solution USE 1 AMPULE IN NEBULIZER EVERY 6 HOURS AS NEEDED FOR SHORTNESS OF BREATH (RESCUE)    amLODIPine (NORVASC) 10 MG tablet amlodipine besylate  10 mg tabs    aspirin (ECOTRIN) 81 MG EC tablet Take 81 mg by mouth once daily.    clopidogreL (PLAVIX) 75 mg tablet Take 4 tablets on day 1 then take 1 tablet  daily there after.    ezetimibe (ZETIA) 10 mg tablet Take 1 tablet (10 mg total) by mouth once daily.    finasteride (PROSCAR) 5 mg tablet SMARTSI Tablet(s) By Mouth Every Evening    flu vacc [id number],65yr up,PF (FLUZONE HIGH-DOSE , PF,) 180 mcg/0.5 mL Syrg Fluzone High-Dose  (PF) 180 mcg/0.5 mL intramuscular syringe   PHARMACIST ADMINISTERED IMMUNIZATION ADMINISTERED AT TIME OF DISPENSING    furosemide (LASIX) 40 MG tablet Take 1 tablet (40 mg total) by mouth once daily.    influenza (HIGH-DOSE PF) 240 mcg/0.7 mL vaccine Fluzone High-Dose [id number] (PF) 180 mcg/0.5 mL intramuscular syringe    lisinopriL (PRINIVIL,ZESTRIL) 40 MG tablet Take 40 mg by mouth once daily.    lubiprostone (AMITIZA) 24 MCG Cap Amitiza 24 mcg capsule   Take 1 capsule twice a day by oral route with meals for 30 days.    metoprolol succinate (TOPROL-XL) 100 MG 24 hr tablet Take 1 tablet (100 mg total) by mouth once daily.    multivitamin (THERAGRAN) per tablet Take 1 tablet by mouth once daily.    nintedanib (OFEV) 150 mg Cap Take 1 capsule (150 mg total) by mouth 2 (two) times a day.    omega 3-dha-epa-fish oil (FISH OIL) 100-160-1,000 mg Cap Fish Oil   daily    oxyCODONE-acetaminophen (PERCOCET)  mg per tablet oxycod/apap  tab 10-325mg    pantoprazole (PROTONIX) 40 MG tablet pantoprazole 40 mg tablet,delayed release   TAKE 1 TABLET BY MOUTH ONCE DAILY    polyethylene glycol (GLYCOLAX) 17 gram/dose powder Miralax 17 gram/dose oral powder   use as directed    potassium chloride SA (K-DUR,KLOR-CON) 20 MEQ tablet Take 1 tablet by mouth once daily.    pregabalin (LYRICA) 50 MG capsule Take 50 mg by mouth 2 (two) times daily.    pulse oximeter (PULSE OXIMETER) device by Apply Externally route 2 (two) times a day. Use twice daily at 8 AM and 3 PM and record the value in [facility] as directed.    saw palmetto 160 MG capsule Take 160 mg by mouth 2 (two) times daily.    sildenafiL (VIAGRA) 100 MG tablet Take 100 mg by mouth.     "SUPREP BOWEL PREP KIT 17.5-3.13-1.6 gram SolR Take by mouth.    tamsulosin (FLOMAX) 0.4 mg Cap Take 1 capsule (0.4 mg total) by mouth once daily.     Current Facility-Administered Medications on File Prior to Visit   Medication    0.9%  NaCl infusion    acetaminophen tablet 650 mg    EPINEPHrine (EPIPEN) 0.3 mg/0.3 mL pen injection 0.3 mg    methylPREDNISolone sodium succinate injection 40 mg    ondansetron disintegrating tablet 4 mg    sodium chloride 0.9% 500 mL flush bag    sodium chloride 0.9% flush 10 mL       Objective:      Vitals:    12/12/22 1301   BP: (!) 140/82   BP Location: Right arm   Patient Position: Sitting   BP Method: Medium (Manual)   Pulse: 72   SpO2: 96%   Weight: 84.5 kg (186 lb 4.6 oz)   Height: 5' 8" (1.727 m)       Physical Exam   Constitutional: He is oriented to person, place, and time. He appears well-developed and well-nourished. No distress.   HENT:   Head: Normocephalic.   Cardiovascular: Normal rate, regular rhythm and normal heart sounds.   No murmur heard.  Pulmonary/Chest: Effort normal. He has no wheezes. He has no rhonchi (at left base). He has rales (bibasilar rales).   Abdominal: Soft. Bowel sounds are normal. He exhibits no distension. There is no abdominal tenderness.   Musculoskeletal:         General: No edema. Normal range of motion.      Cervical back: Normal range of motion.   Neurological: He is alert and oriented to person, place, and time.   Skin: Skin is warm and dry. He is not diaphoretic. No cyanosis. Nails show no clubbing.   Psychiatric: He has a normal mood and affect. His behavior is normal. Judgment and thought content normal.   Personal Diagnostic Review    PFTs: 8/16/21: no obstruction, mild restriction, moderately reduced DLCO    6MWT: 9/21/21: decreased exercise tolerance, but no supplemental O2 requirements.    Cardiac catheterization  · The Mid LAD lesion was 100% stenosed.  · The 1st Mrg lesion was 100% stenosed.  · The 2nd Mrg lesion was 90% " stenosed.  · The Mid RCA lesion was 100% stenosed.  · The estimated blood loss was none.  · There was severe three vessel native coronary artery disease and a   subtotaled small OMB 2.  · Patent SVG x 2 and ASCENCIO to LAD.     The procedure log was documented by Documenter: Ru Oneill; Gena Grimaldo and verified by Gume Nagy MD.    Date: 5/16/2022  Time: 1:17 PM    CTChest 8/18/21:  I personally reviewed the images and agree with the radiology read as below:     Impression:   Constellation of findings suggestive of interstitial lung disease such as UIP or less likely NSIP.  Centrilobular emphysema in an upper lobe predominant distribution.      Postoperative changes of CABG.        Assessment:     Orders Placed This Encounter   Procedures    DLCO-Carbon Monoxide Diffusing Capacity     Standing Status:   Future     Standing Expiration Date:   12/12/2023     Order Specific Question:   Release to patient     Answer:   Immediate    Spirometry with/without bronchodilator     Standing Status:   Future     Standing Expiration Date:   12/12/2023     Order Specific Question:   Release to patient     Answer:   Immediate    Stress test, pulmonary     Standing Status:   Future     Standing Expiration Date:   12/12/2023     Order Specific Question:   Reason for study     Answer:   Functional status     Order Specific Question:   Release to patient     Answer:   Immediate     1. ILD (interstitial lung disease)          Plan:       Problem List Items Addressed This Visit          Pulmonary    ILD (interstitial lung disease) - Primary    Relevant Orders    DLCO-Carbon Monoxide Diffusing Capacity    Spirometry with/without bronchodilator    Stress test, pulmonary    IPF (idiopathic pulmonary fibrosis)    Overview     CTD panel unremarkable.         Current Assessment & Plan     Continue Ofev 150mg bid.  LFTs stable  Encouraged continued physical activity.  PFTs and 6MWT q6months         Chronic respiratory failure  with hypoxia    Current Assessment & Plan     Patient with Hypoxic Respiratory failure which is Chronic.  he is on home oxygen at 2 LPM. Supplemental oxygen was provided and noted-  .   Signs/symptoms of respiratory failure include- increased work of breathing and respiratory distress. Contributing diagnoses includes - Interstitial lung disease Labs and images were reviewed. Patient Has not had a recent ABG. Will treat underlying causes and adjust management of respiratory failure as follows-     Discussed goal O2 Sat of >88%.  Wearing oxygen prn based on pulse ox goal

## 2023-01-19 DIAGNOSIS — I70.213 ATHEROSCLEROSIS OF NATIVE ARTERY OF BOTH LOWER EXTREMITIES WITH INTERMITTENT CLAUDICATION: Primary | ICD-10-CM

## 2023-01-25 ENCOUNTER — HOSPITAL ENCOUNTER (OUTPATIENT)
Dept: RADIOLOGY | Facility: HOSPITAL | Age: 78
Discharge: HOME | End: 2023-01-25
Attending: REGISTERED NURSE
Payer: MEDICARE

## 2023-01-25 ENCOUNTER — OFFICE VISIT (OUTPATIENT)
Dept: ORTHOPEDICS | Facility: CLINIC | Age: 78
End: 2023-01-25
Payer: MEDICARE

## 2023-01-25 VITALS — BODY MASS INDEX: 28.26 KG/M2 | WEIGHT: 186.5 LBS | HEIGHT: 68 IN

## 2023-01-25 DIAGNOSIS — M50.30 DDD (DEGENERATIVE DISC DISEASE), CERVICAL: Primary | ICD-10-CM

## 2023-01-25 DIAGNOSIS — M50.30 DDD (DEGENERATIVE DISC DISEASE), CERVICAL: ICD-10-CM

## 2023-01-25 DIAGNOSIS — M54.12 CERVICAL RADICULOPATHY: Primary | ICD-10-CM

## 2023-01-25 PROCEDURE — 3288F PR FALLS RISK ASSESSMENT DOCUMENTED: ICD-10-PCS | Mod: CPTII,S$GLB,, | Performed by: REGISTERED NURSE

## 2023-01-25 PROCEDURE — 1157F ADVNC CARE PLAN IN RCRD: CPT | Mod: CPTII,S$GLB,, | Performed by: REGISTERED NURSE

## 2023-01-25 PROCEDURE — 1125F AMNT PAIN NOTED PAIN PRSNT: CPT | Mod: CPTII,S$GLB,, | Performed by: REGISTERED NURSE

## 2023-01-25 PROCEDURE — 1101F PR PT FALLS ASSESS DOC 0-1 FALLS W/OUT INJ PAST YR: ICD-10-PCS | Mod: CPTII,S$GLB,, | Performed by: REGISTERED NURSE

## 2023-01-25 PROCEDURE — 1159F MED LIST DOCD IN RCRD: CPT | Mod: CPTII,S$GLB,, | Performed by: REGISTERED NURSE

## 2023-01-25 PROCEDURE — 1160F RVW MEDS BY RX/DR IN RCRD: CPT | Mod: CPTII,S$GLB,, | Performed by: REGISTERED NURSE

## 2023-01-25 PROCEDURE — 72050 X-RAY EXAM NECK SPINE 4/5VWS: CPT | Mod: 26,,, | Performed by: RADIOLOGY

## 2023-01-25 PROCEDURE — 1159F PR MEDICATION LIST DOCUMENTED IN MEDICAL RECORD: ICD-10-PCS | Mod: CPTII,S$GLB,, | Performed by: REGISTERED NURSE

## 2023-01-25 PROCEDURE — 99214 PR OFFICE/OUTPT VISIT, EST, LEVL IV, 30-39 MIN: ICD-10-PCS | Mod: S$GLB,,, | Performed by: REGISTERED NURSE

## 2023-01-25 PROCEDURE — 99214 OFFICE O/P EST MOD 30 MIN: CPT | Mod: S$GLB,,, | Performed by: REGISTERED NURSE

## 2023-01-25 PROCEDURE — 1157F PR ADVANCE CARE PLAN OR EQUIV PRESENT IN MEDICAL RECORD: ICD-10-PCS | Mod: CPTII,S$GLB,, | Performed by: REGISTERED NURSE

## 2023-01-25 PROCEDURE — 99999 PR PBB SHADOW E&M-EST. PATIENT-LVL IV: ICD-10-PCS | Mod: PBBFAC,,, | Performed by: REGISTERED NURSE

## 2023-01-25 PROCEDURE — 1125F PR PAIN SEVERITY QUANTIFIED, PAIN PRESENT: ICD-10-PCS | Mod: CPTII,S$GLB,, | Performed by: REGISTERED NURSE

## 2023-01-25 PROCEDURE — 1160F PR REVIEW ALL MEDS BY PRESCRIBER/CLIN PHARMACIST DOCUMENTED: ICD-10-PCS | Mod: CPTII,S$GLB,, | Performed by: REGISTERED NURSE

## 2023-01-25 PROCEDURE — 3288F FALL RISK ASSESSMENT DOCD: CPT | Mod: CPTII,S$GLB,, | Performed by: REGISTERED NURSE

## 2023-01-25 PROCEDURE — 72050 X-RAY EXAM NECK SPINE 4/5VWS: CPT | Mod: TC

## 2023-01-25 PROCEDURE — 72050 XR CERVICAL SPINE AP LAT WITH FLEX EXTEN: ICD-10-PCS | Mod: 26,,, | Performed by: RADIOLOGY

## 2023-01-25 PROCEDURE — 1101F PT FALLS ASSESS-DOCD LE1/YR: CPT | Mod: CPTII,S$GLB,, | Performed by: REGISTERED NURSE

## 2023-01-25 PROCEDURE — 99999 PR PBB SHADOW E&M-EST. PATIENT-LVL IV: CPT | Mod: PBBFAC,,, | Performed by: REGISTERED NURSE

## 2023-01-25 RX ORDER — PREGABALIN 100 MG/1
100 CAPSULE ORAL 2 TIMES DAILY
Qty: 60 CAPSULE | Refills: 6 | Status: SHIPPED | OUTPATIENT
Start: 2023-01-25 | End: 2023-01-26

## 2023-01-25 NOTE — PROGRESS NOTES
DATE: 1/25/2023  PATIENT: Kermit Castro    Supervising Physician: Hood Rodriguez M.D.    CHIEF COMPLAINT: neck pain    HISTORY:  Kermit Castro is a 77 y.o. male here for initial evaluation of neck and bilateral (L>R) arm pain (Neck - 5, Arm - 6). The pain has been present for about 3 months. Denies specific injury. The patient describes the pain as dull. The pain is worse with neck rotation and improved by rest. There is associated numbness and tingling. There is subjective weakness. Prior treatments have included tylenol, Oxycodone and Lyrica, but no PT, KATIANA or surgery.   The patient endorses myelopathic symptoms such as handwriting changes or difficulty with buttons/coins/keys. Denies perineal paresthesias, bowel/bladder dysfunction.    PAST MEDICAL/SURGICAL HISTORY:  Past Medical History:   Diagnosis Date    Coronary artery disease     Hyperlipidemia     Hypertension      Past Surgical History:   Procedure Laterality Date    ABDOMINAL SURGERY      Polyps in colon removed (noncancerous)    APPENDECTOMY      back surgery (screws & okate)      CORONARY ANGIOGRAPHY N/A 5/16/2022    Procedure: ANGIOGRAM, CORONARY ARTERY;  Surgeon: Gume Nagy MD;  Location: [facility] CATH LAB;  Service: Cardiology;  Laterality: N/A;    CORONARY ARTERY BYPASS GRAFT      1998    CORONARY BYPASS GRAFT ANGIOGRAPHY  5/16/2022    Procedure: Bypass graft study;  Surgeon: Gume Nagy MD;  Location: [facility] CATH LAB;  Service: Cardiology;;    PERCUTANEOUS TRANSLUMINAL ANGIOPLASTY (PTA) OF PERIPHERAL VESSEL N/A 1/27/2022    Procedure: PTA, PERIPHERAL VESSEL;  Surgeon: Gume Nagy MD;  Location: [facility] CATH LAB;  Service: Cardiology;  Laterality: N/A;    TRANSFORAMINAL EPIDURAL INJECTION OF STEROID Right 11/5/2020    Procedure: LUMBAR TRANSFORAMINAL RIGHT L3/4 AND L4/5 DIRECT REFERRAL;  Surgeon: Nicole Toth MD;  Location: [facility] PAIN MGT;  Service: Pain Management;  Laterality: Right;  NEEDS CONSENT, PT STATES NO LONGER TAKES  PLETAL       Medications:  Current Outpatient Medications on File Prior to Visit   Medication Sig Dispense Refill    albuterol-ipratropium (DUO-NEB) 2.5 mg-0.5 mg/3 mL nebulizer solution USE 1 AMPULE IN NEBULIZER EVERY 6 HOURS AS NEEDED FOR SHORTNESS OF BREATH (RESCUE) 90 mL 11    amLODIPine (NORVASC) 10 MG tablet amlodipine besylate  10 mg tabs      aspirin (ECOTRIN) 81 MG EC tablet Take 81 mg by mouth once daily.      clopidogreL (PLAVIX) 75 mg tablet Take 4 tablets on day 1 then take 1 tablet daily there after. 34 tablet 11    ezetimibe (ZETIA) 10 mg tablet Take 1 tablet (10 mg total) by mouth once daily. 90 tablet 3    finasteride (PROSCAR) 5 mg tablet SMARTSI Tablet(s) By Mouth Every Evening      flu vacc [id number],65yr up,PF (FLUZONE HIGH-DOSE , PF,) 180 mcg/0.5 mL Syrg Fluzone High-Dose  (PF) 180 mcg/0.5 mL intramuscular syringe   PHARMACIST ADMINISTERED IMMUNIZATION ADMINISTERED AT TIME OF DISPENSING      furosemide (LASIX) 40 MG tablet Take 1 tablet (40 mg total) by mouth once daily. 30 tablet 11    influenza (HIGH-DOSE PF) 240 mcg/0.7 mL vaccine Fluzone High-Dose [id number] (PF) 180 mcg/0.5 mL intramuscular syringe      lisinopriL (PRINIVIL,ZESTRIL) 40 MG tablet Take 40 mg by mouth once daily.      lubiprostone (AMITIZA) 24 MCG Cap Amitiza 24 mcg capsule   Take 1 capsule twice a day by oral route with meals for 30 days.      metoprolol succinate (TOPROL-XL) 100 MG 24 hr tablet Take 1 tablet (100 mg total) by mouth once daily. 30 tablet 11    multivitamin (THERAGRAN) per tablet Take 1 tablet by mouth once daily.      nintedanib (OFEV) 150 mg Cap Take 1 capsule (150 mg total) by mouth 2 (two) times a day. 60 capsule 11    omega 3-dha-epa-fish oil (FISH OIL) 100-160-1,000 mg Cap Fish Oil   daily      omeprazole-amoxicill-rifabutin (TALICIA) -12.5 mg CpID Talicia 10 mg-250 mg-12.5 mg capsule,immediate - delay release   TAKE 4 CAPSULES BY MOUTH EVERY 8 HOURS WITH MEALS FOR 14 DAYS       oxyCODONE-acetaminophen (PERCOCET)  mg per tablet oxycod/apap  tab 10-325mg      pantoprazole (PROTONIX) 40 MG tablet pantoprazole 40 mg tablet,delayed release   TAKE 1 TABLET BY MOUTH ONCE DAILY      phenazopyridine (PYRIDIUM) 200 MG tablet phenazopyridine 200 mg tablet   TAKE 1 TABLET BY MOUTH THREE TIMES DAILY FOR 2 DAYS      polyethylene glycol (GLYCOLAX) 17 gram/dose powder Miralax 17 gram/dose oral powder   use as directed      potassium chloride SA (K-DUR,KLOR-CON) 20 MEQ tablet Take 1 tablet by mouth once daily.      pulse oximeter (PULSE OXIMETER) device by Apply Externally route 2 (two) times a day. Use twice daily at 8 AM and 3 PM and record the value in [employer] as directed. 1 each 0    saw palmetto 160 MG capsule Take 160 mg by mouth 2 (two) times daily.      sildenafiL (VIAGRA) 100 MG tablet Take 100 mg by mouth.      SUPREP BOWEL PREP KIT 17.5-3.13-1.6 gram SolR Take by mouth.      tamsulosin (FLOMAX) 0.4 mg Cap Take 1 capsule (0.4 mg total) by mouth once daily. 90 capsule 3    [DISCONTINUED] pregabalin (LYRICA) 50 MG capsule Take 50 mg by mouth 2 (two) times daily.       Current Facility-Administered Medications on File Prior to Visit   Medication Dose Route Frequency Provider Last Rate Last Admin    0.9%  NaCl infusion   Intravenous Continuous Gume Nagy  mL/hr at 05/16/22 1005 New Bag at 05/16/22 1005    acetaminophen tablet 650 mg  650 mg Oral Once PRN Rishabh Ware MD        EPINEPHrine (EPIPEN) 0.3 mg/0.3 mL pen injection 0.3 mg  0.3 mg Intramuscular PRN Rishabh Ware MD        methylPREDNISolone sodium succinate injection 40 mg  40 mg Intravenous Once PRN Rishabh Ware MD        ondansetron disintegrating tablet 4 mg  4 mg Oral Once PRN Rishabh Ware MD        sodium chloride 0.9% 500 mL flush bag   Intravenous PRN Rishabh Ware MD        sodium chloride 0.9% flush 10 mL  10 mL Intravenous PRN Rishabh Ware MD           Social History:   Social  "History     Socioeconomic History    Marital status:    Tobacco Use    Smoking status: Former     Types: Cigarettes     Quit date: 10/5/1998     Years since quittin.3    Smokeless tobacco: Never   Substance and Sexual Activity    Alcohol use: Not Currently     Alcohol/week: 0.0 standard drinks     Comment:     Drug use: Never       REVIEW OF SYSTEMS:  Constitution: Negative. Negative for chills, fever and night sweats.   Cardiovascular: Negative for chest pain and syncope.   Respiratory: Negative for cough and shortness of breath.   Gastrointestinal: See HPI. Negative for nausea/vomiting. Negative for abdominal pain.  Genitourinary: See HPI. Negative for discoloration or dysuria.  Skin: Negative for dry skin, itching and rash.   Hematologic/Lymphatic: Negative for bleeding problem. Does not bruise/bleed easily.   Musculoskeletal: Negative for falls and muscle weakness.   Neurological: See HPI. No seizures.   Endocrine: Negative for polydipsia, polyphagia and polyuria.   Allergic/Immunologic: Negative for hives and persistent infections.  Psychiatric/Behavioral: Negative for depression and insomnia.         EXAM:  Ht 5' 8" (1.727 m)   Wt 84.6 kg (186 lb 8.2 oz)   BMI 28.36 kg/m²     General: The patient is a very pleasant 77 y.o. male in no apparent distress, the patient is oriented to person, place and time.  Psych: Normal mood and affect  HEENT: Vision grossly intact, hearing intact to the spoken word.  Lungs: Respirations unlabored.  Gait: Normal station and gait, no difficulty with toe or heel walk.   Skin: Cervical skin negative for rashes, lesions, hairy patches and surgical scars.  Range of motion: Cervical range of motion is acceptable. There is mild tenderness to palpation.  Spinal Balance: Global saggital and coronal spinal balance acceptable, no significant for scoliosis and kyphosis.  Musculoskeletal: No pain with the range of motion of the bilateral shoulders and elbows. Normal bulk and " contour of the bilateral hands.  Vascular: Bilateral hands warm and well perfused, radial pulses 2+ bilaterally.  Neurological: Normal strength and tone in all major motor groups in the bilateral upper and lower extremities. Normal sensation to light touch in the C5-T1 and L2-S1 dermatomes bilaterally.  Deep tendon reflexes symmetric 2+ in the bilateral upper and lower extremities.  Negative Inverted Radial Reflex and Garcia's bilaterally. Negative Babinski bilaterally.     IMAGING:   Today I personally reviewed AP, Lat and Flex/Ex  upright C-spine films that demonstrate severe DDD with reversal of the lordotic curve. 2 mm retrolisthesis of C5 on C6 without instability    Body mass index is 28.36 kg/m².    No results found for: HGBA1C        ASSESSMENT/PLAN:    Kermit ANN was seen today for neck pain, shoulder pain and shoulder pain.    Diagnoses and all orders for this visit:    Cervical radiculopathy  -     pregabalin (LYRICA) 100 MG capsule; Take 1 capsule (100 mg total) by mouth 2 (two) times daily.  -     MRI Cervical Spine Without Contrast; Future    DDD (degenerative disc disease), cervical  -     pregabalin (LYRICA) 100 MG capsule; Take 1 capsule (100 mg total) by mouth 2 (two) times daily.  -     MRI Cervical Spine Without Contrast; Future      Today we discussed at length all of the different treatment options including anti-inflammatories, acetaminophen, rest, ice, heat, physical therapy including strengthening and stretching exercises, home exercises, ROM, aerobic conditioning, aqua therapy, other modalities including ultrasound, massage, and dry needling, epidural steroid injections and finally surgical intervention.  Cervical MRI ordered, I will call with results and likely order an KATIANA at that time.

## 2023-01-26 RX ORDER — PREGABALIN 100 MG/1
100 CAPSULE ORAL 2 TIMES DAILY
Qty: 60 CAPSULE | Refills: 5 | Status: SHIPPED | OUTPATIENT
Start: 2023-01-26 | End: 2023-01-30 | Stop reason: SDUPTHER

## 2023-01-30 RX ORDER — PREGABALIN 100 MG/1
100 CAPSULE ORAL 2 TIMES DAILY
Qty: 60 CAPSULE | Refills: 5 | Status: SHIPPED | OUTPATIENT
Start: 2023-01-30 | End: 2023-10-30

## 2023-02-14 ENCOUNTER — HOSPITAL ENCOUNTER (OUTPATIENT)
Dept: RADIOLOGY | Facility: HOSPITAL | Age: 78
Discharge: HOME | End: 2023-02-14
Attending: INTERNAL MEDICINE
Payer: MEDICARE

## 2023-02-14 DIAGNOSIS — I70.213 ATHEROSCLEROSIS OF NATIVE ARTERY OF BOTH LOWER EXTREMITIES WITH INTERMITTENT CLAUDICATION: ICD-10-CM

## 2023-02-14 LAB
CREAT SERPL-MCNC: 1 MG/DL (ref 0.5–1.4)
SAMPLE: NORMAL

## 2023-02-14 PROCEDURE — 75635 CT ANGIO ABDOMINAL ARTERIES: CPT | Mod: TC

## 2023-02-14 PROCEDURE — 75635 CTA RUNOFF ABD PEL BILAT LOWER EXT: ICD-10-PCS | Mod: 26,,, | Performed by: RADIOLOGY

## 2023-02-14 PROCEDURE — 75635 CT ANGIO ABDOMINAL ARTERIES: CPT | Mod: 26,,, | Performed by: RADIOLOGY

## 2023-02-14 PROCEDURE — 25500020 PHARM REV CODE 255: Performed by: INTERNAL MEDICINE

## 2023-02-14 RX ADMIN — IOHEXOL 150 ML: 350 INJECTION, SOLUTION INTRAVENOUS at 09:02

## 2023-03-13 PROBLEM — J84.112 IPF (IDIOPATHIC PULMONARY FIBROSIS): Status: RESOLVED | Noted: 2022-02-25 | Resolved: 2023-03-13

## 2023-03-15 ENCOUNTER — HOSPITAL ENCOUNTER (EMERGENCY)
Facility: HOSPITAL | Age: 78
Discharge: HOME | End: 2023-03-15
Attending: EMERGENCY MEDICINE
Payer: MEDICARE

## 2023-03-15 VITALS
BODY MASS INDEX: 28.28 KG/M2 | TEMPERATURE: 99 F | HEART RATE: 81 BPM | OXYGEN SATURATION: 96 % | SYSTOLIC BLOOD PRESSURE: 139 MMHG | RESPIRATION RATE: 18 BRPM | DIASTOLIC BLOOD PRESSURE: 71 MMHG | WEIGHT: 186 LBS

## 2023-03-15 DIAGNOSIS — R05.9 COUGH: ICD-10-CM

## 2023-03-15 DIAGNOSIS — U07.1 COVID-19: Primary | ICD-10-CM

## 2023-03-15 LAB
ALBUMIN SERPL BCP-MCNC: 3.7 G/DL (ref 3.5–5.2)
ALP SERPL-CCNC: 69 U/L (ref 55–135)
ALT SERPL W/O P-5'-P-CCNC: 37 U/L (ref 10–44)
ANION GAP SERPL CALC-SCNC: 13 MMOL/L (ref 8–16)
AST SERPL-CCNC: 45 U/L (ref 10–40)
BACTERIA #/AREA URNS HPF: NORMAL /HPF
BASOPHILS # BLD AUTO: 0.04 K/UL (ref 0–0.2)
BASOPHILS NFR BLD: 0.4 % (ref 0–1.9)
BILIRUB SERPL-MCNC: 1.1 MG/DL (ref 0.1–1)
BILIRUB UR QL STRIP: NEGATIVE
BUN SERPL-MCNC: 8 MG/DL (ref 8–23)
CALCIUM SERPL-MCNC: 8.9 MG/DL (ref 8.7–10.5)
CHLORIDE SERPL-SCNC: 96 MMOL/L (ref 95–110)
CLARITY UR: CLEAR
CO2 SERPL-SCNC: 31 MMOL/L (ref 23–29)
COLOR UR: YELLOW
CREAT SERPL-MCNC: 1.1 MG/DL (ref 0.5–1.4)
CTP QC/QA: YES
DIFFERENTIAL METHOD: ABNORMAL
EOSINOPHIL # BLD AUTO: 0 K/UL (ref 0–0.5)
EOSINOPHIL NFR BLD: 0 % (ref 0–8)
ERYTHROCYTE [DISTWIDTH] IN BLOOD BY AUTOMATED COUNT: 14.8 % (ref 11.5–14.5)
EST. GFR  (NO RACE VARIABLE): >60 ML/MIN/1.73 M^2
GLUCOSE SERPL-MCNC: 103 MG/DL (ref 70–110)
GLUCOSE UR QL STRIP: NEGATIVE
HCT VFR BLD AUTO: 41.2 % (ref 40–54)
HGB BLD-MCNC: 13.5 G/DL (ref 14–18)
HGB UR QL STRIP: ABNORMAL
HYALINE CASTS #/AREA URNS LPF: 0 /LPF
IMM GRANULOCYTES # BLD AUTO: 0.03 K/UL (ref 0–0.04)
IMM GRANULOCYTES NFR BLD AUTO: 0.3 % (ref 0–0.5)
KETONES UR QL STRIP: NEGATIVE
LEUKOCYTE ESTERASE UR QL STRIP: NEGATIVE
LYMPHOCYTES # BLD AUTO: 1.1 K/UL (ref 1–4.8)
LYMPHOCYTES NFR BLD: 11.9 % (ref 18–48)
MCH RBC QN AUTO: 28.7 PG (ref 27–31)
MCHC RBC AUTO-ENTMCNC: 32.8 G/DL (ref 32–36)
MCV RBC AUTO: 88 FL (ref 82–98)
MICROSCOPIC COMMENT: NORMAL
MONOCYTES # BLD AUTO: 1.1 K/UL (ref 0.3–1)
MONOCYTES NFR BLD: 11.8 % (ref 4–15)
NEUTROPHILS # BLD AUTO: 6.7 K/UL (ref 1.8–7.7)
NEUTROPHILS NFR BLD: 75.6 % (ref 38–73)
NITRITE UR QL STRIP: NEGATIVE
NRBC BLD-RTO: 0 /100 WBC
PH UR STRIP: 7 [PH] (ref 5–8)
PLATELET # BLD AUTO: 283 K/UL (ref 150–450)
PMV BLD AUTO: 9.1 FL (ref 9.2–12.9)
POTASSIUM SERPL-SCNC: 2.9 MMOL/L (ref 3.5–5.1)
PROT SERPL-MCNC: 6.9 G/DL (ref 6–8.4)
PROT UR QL STRIP: ABNORMAL
RBC # BLD AUTO: 4.71 M/UL (ref 4.6–6.2)
RBC #/AREA URNS HPF: 1 /HPF (ref 0–4)
SARS-COV-2 RDRP RESP QL NAA+PROBE: POSITIVE
SODIUM SERPL-SCNC: 140 MMOL/L (ref 136–145)
SP GR UR STRIP: 1.02 (ref 1–1.03)
SQUAMOUS #/AREA URNS HPF: 0 /HPF
URN SPEC COLLECT METH UR: ABNORMAL
UROBILINOGEN UR STRIP-ACNC: ABNORMAL EU/DL
WBC # BLD AUTO: 8.89 K/UL (ref 3.9–12.7)
WBC #/AREA URNS HPF: 1 /HPF (ref 0–5)

## 2023-03-15 PROCEDURE — 81000 URINALYSIS NONAUTO W/SCOPE: CPT | Performed by: PHYSICIAN ASSISTANT

## 2023-03-15 PROCEDURE — 80053 COMPREHEN METABOLIC PANEL: CPT | Performed by: PHYSICIAN ASSISTANT

## 2023-03-15 PROCEDURE — 99284 EMERGENCY DEPT VISIT MOD MDM: CPT | Mod: 25

## 2023-03-15 PROCEDURE — 85025 COMPLETE CBC W/AUTO DIFF WBC: CPT | Performed by: PHYSICIAN ASSISTANT

## 2023-03-15 RX ORDER — ALBUTEROL SULFATE 2.5 MG/.5ML
2.5 SOLUTION RESPIRATORY (INHALATION) EVERY 4 HOURS PRN
Qty: 30 EACH | Refills: 0 | Status: SHIPPED | OUTPATIENT
Start: 2023-03-15 | End: 2024-03-14

## 2023-03-15 NOTE — FIRST PROVIDER EVALUATION
Emergency Department TeleTriage Encounter Note      CHIEF COMPLAINT    Chief Complaint   Patient presents with    Fatigue     Generalized weakness that began last night, pt reports difficulty with urination, denies pain with urination        VITAL SIGNS   Initial Vitals [03/15/23 1841]   BP Pulse Resp Temp SpO2   139/71 81 18 99.4 °F (37.4 °C) 96 %      MAP       --            ALLERGIES    Review of patient's allergies indicates:   Allergen Reactions    Shellfish containing products Anaphylaxis       PROVIDER TRIAGE NOTE  Patient presents with generalized weakness and difficulty with urination. He is still producing urine, but having hesitancy. No dysuria.       ORDERS  Labs Reviewed - No data to display    ED Orders (720h ago, onward)      None              Virtual Visit Note: The provider triage portion of this emergency department evaluation and documentation was performed via [employer], a HIPAA-compliant telemedicine application, in concert with a tele-presenter in the room. A face to face patient evaluation with one of my colleagues will occur once the patient is placed in an emergency department room.      DISCLAIMER: This note was prepared with [employer] voice recognition transcription software. Garbled syntax, mangled pronouns, and other bizarre constructions may be attributed to that software system.

## 2023-03-16 DIAGNOSIS — U07.1 COVID-19 VIRUS DETECTED: ICD-10-CM

## 2023-03-16 NOTE — ED NOTES
Pt given IS at discharge for home use. Return demonstration used for teaching, all questions answered.

## 2023-03-16 NOTE — DISCHARGE INSTRUCTIONS
TAKE: CORIDICIN HBP for cough or congestion.    Avoid sudafed, dayquil, nyquil or similar as they will raise your blood pressure and increase risk of heart attack or stroke.

## 2023-03-16 NOTE — ED PROVIDER NOTES
Encounter Date: 3/15/2023       History     Chief Complaint   Patient presents with    Fatigue     Generalized weakness that began last night, pt reports difficulty with urination, denies pain with urination      HPI  This is a 77 y.o. male who has a past medical history of Coronary artery disease, Hyperlipidemia, and Hypertension.     The patient presents to the Emergency Department with generalized weakness that began last night, associated with subjective fever, cough, nasal congestion.  Patient also reports difficulty with urination however with no pain.  Denies any headache, dizziness, chest pain, shortness of breath, nausea, vomiting, diarrhea.  No sick contacts.      Review of patient's allergies indicates:   Allergen Reactions    Shellfish containing products Anaphylaxis     Past Medical History:   Diagnosis Date    Coronary artery disease     Hyperlipidemia     Hypertension      Past Surgical History:   Procedure Laterality Date    ABDOMINAL SURGERY      Polyps in colon removed (noncancerous)    APPENDECTOMY      back surgery (screws & okate)      CORONARY ANGIOGRAPHY N/A 5/16/2022    Procedure: ANGIOGRAM, CORONARY ARTERY;  Surgeon: Gume Nagy MD;  Location: [facility] CATH LAB;  Service: Cardiology;  Laterality: N/A;    CORONARY ARTERY BYPASS GRAFT      1998    CORONARY BYPASS GRAFT ANGIOGRAPHY  5/16/2022    Procedure: Bypass graft study;  Surgeon: Gume Nagy MD;  Location: [facility] CATH LAB;  Service: Cardiology;;    PERCUTANEOUS TRANSLUMINAL ANGIOPLASTY (PTA) OF PERIPHERAL VESSEL N/A 1/27/2022    Procedure: PTA, PERIPHERAL VESSEL;  Surgeon: Gume Nagy MD;  Location: [facility] CATH LAB;  Service: Cardiology;  Laterality: N/A;    TRANSFORAMINAL EPIDURAL INJECTION OF STEROID Right 11/5/2020    Procedure: LUMBAR TRANSFORAMINAL RIGHT L3/4 AND L4/5 DIRECT REFERRAL;  Surgeon: Nicole Toth MD;  Location: [facility] PAIN MGT;  Service: Pain Management;  Laterality: Right;  NEEDS CONSENT, PT STATES NO LONGER  TAKES PLETAL     No family history on file.  Social History     Tobacco Use    Smoking status: Former     Types: Cigarettes     Quit date: 10/5/1998     Years since quittin.5    Smokeless tobacco: Never   Substance Use Topics    Alcohol use: Not Currently     Alcohol/week: 0.0 standard drinks     Comment:     Drug use: Never     Review of Systems   All other systems reviewed and are negative.    Physical Exam     Initial Vitals [03/15/23 1841]   BP Pulse Resp Temp SpO2   139/71 81 18 99.4 °F (37.4 °C) 96 %      MAP       --         Physical Exam    Nursing note and vitals reviewed.  Constitutional: He appears well-developed and well-nourished. No distress.   HENT:   Head: Normocephalic and atraumatic.   Mouth/Throat: Oropharynx is clear and moist.   Eyes: Conjunctivae are normal. Pupils are equal, round, and reactive to light.   Neck: Neck supple.   Normal range of motion.  Cardiovascular:  Normal rate, regular rhythm, normal heart sounds and intact distal pulses.           Pulmonary/Chest: Breath sounds normal. No respiratory distress.   Abdominal: Abdomen is soft. Bowel sounds are normal. He exhibits no distension. There is no abdominal tenderness.   Musculoskeletal:         General: No tenderness or edema. Normal range of motion.      Cervical back: Normal range of motion and neck supple.     Lymphadenopathy:     He has no cervical adenopathy.   Neurological: He is alert and oriented to person, place, and time.   Skin: Skin is warm and dry. Capillary refill takes less than 2 seconds. No rash noted. No erythema.   Psychiatric: He has a normal mood and affect. Thought content normal.       ED Course   Procedures  Labs Reviewed   CBC W/ AUTO DIFFERENTIAL - Abnormal; Notable for the following components:       Result Value    Hemoglobin 13.5 (*)     RDW 14.8 (*)     MPV 9.1 (*)     Mono # 1.1 (*)     Gran % 75.6 (*)     Lymph % 11.9 (*)     All other components within normal limits   COMPREHENSIVE METABOLIC  PANEL - Abnormal; Notable for the following components:    Potassium 2.9 (*)     CO2 31 (*)     Total Bilirubin 1.1 (*)     AST 45 (*)     All other components within normal limits   URINALYSIS, REFLEX TO URINE CULTURE - Abnormal; Notable for the following components:    Protein, UA 2+ (*)     Occult Blood UA 3+ (*)     Urobilinogen, UA 2.0-3.0 (*)     All other components within normal limits    Narrative:     Specimen Source->Urine   SARS-COV-2 RDRP GENE - Abnormal; Notable for the following components:    POC Rapid COVID Positive (*)     All other components within normal limits   URINALYSIS MICROSCOPIC    Narrative:     Specimen Source->Urine          Imaging Results              X-Ray Chest AP Portable (Final result)  Result time 03/15/23 22:03:33      Final result by Julianne Pringle MD (03/15/23 22:03:33)                   Impression:      Increased linear interstitial opacities in the left lung base raise question of subsegmental atelectasis or pneumonitis.  There is no consolidation or other acute finding.    Additional stable findings as above.      Electronically signed by: Julianne Pringle  Date:    03/15/2023  Time:    22:03               Narrative:    EXAMINATION:  XR CHEST AP PORTABLE    CLINICAL HISTORY:  Cough, unspecified    TECHNIQUE:  Single frontal view of the chest was performed.    COMPARISON:  10/05/2019, 08/08/2005 chest x-ray    FINDINGS:  The cardiac silhouette is stable and not significantly enlarged with postoperative changes of sternotomy again noted.  There is decreased lung expansion and mild increased prominence in the left infrahilar region.  There is and underlying chronic interstitial component and overlap of atelectasis questioned.  Pneumonitis without consolidation is also considered.    Right lung appears clear and there is no acute pleural effusion or pneumothorax.  Osseous structures appear stable with degenerative changes of the spine and shoulders bilaterally.                                     X-Rays:   Independently Interpreted Readings:   Chest X-Ray: Normal heart size.  No infiltrates.  No acute abnormalities.   Medications - No data to display  Medical Decision Making:   History:   Old Medical Records: I decided to obtain old medical records.  Old Records Summarized: other records.       <> Summary of Records:   History of urinary retention requiring Garcia catheterization      Initial Assessment:   This is an emergent evaluation of a 77 y.o.male patient with presentation of generalized weakness associated with cough, congestion.  Patient also having some difficulty with urination however not painful.     Initial differentials include but are not limited to:  URI, bronchitis, COVID, UTI, BPH, prostate CA.     Plan:  CBC, CMP, UA, COVID, chest x-ray           Attending Attestation:             Attending ED Notes:       Labs reviewed.  Patient has COVID.  X-ray report by Radiology states possible pneumonitis versus atelectasis.  Will place patient on Paxlovid.    Urinalysis shows hematuria.  History of urinary retention in the past.  Will need PCP and likely urology follow-up.    Discussed results, impression and plan with patient and family at bedside.  They expressed understanding, including return precautions and are welcome to return at any time.                 Clinical Impression:   Final diagnoses:  [R05.9] Cough  [U07.1] COVID-19 (Primary)        ED Disposition Condition    Discharge Stable          ED Prescriptions       Medication Sig Dispense Start Date End Date Auth. Provider    albuterol sulfate 2.5 mg/0.5 mL Nebu Take 2.5 mg by nebulization every 4 (four) hours as needed (wheezing/shortness of breath). Rescue 30 each 3/15/2023 3/14/2024 Kaiden Junior MD          Follow-up Information       Follow up With Specialties Details Why Contact Info    Luis Hassan MD Family Medicine Schedule an appointment as soon as possible for a visit   [address]   [place]  [id number]  [phone]               Kaiden Junior MD  04/04/23 [id number]

## 2023-03-16 NOTE — ED NOTES
Adult Physical Assessment  LOC: Kermit Castro, 77 y.o. male verified via two identifiers.  The patient is awake, alert, oriented and speaking appropriately at this time. Complains of generalized weakness.  APPEARANCE: Patient resting comfortably and appears to be in no acute distress at this time. Patient is clean and well groomed, patient's clothing is properly fastened.  SKIN:The skin is warm and dry, color consistent with ethnicity, patient has normal skin turgor and moist mucus membranes, skin intact, no breakdown or brusing noted.  MUSCULOSKELETAL: Patient moving all extremities well, no obvious swelling or deformities noted.  RESPIRATORY: Airway is open and patent, respirations are spontaneous, patient has a normal effort and rate, no accessory muscle use noted.  CARDIAC: Patient has a normal rate and rhythm, no periphreal edema noted in any extremity, capillary refill < 3 seconds in all extremities  ABDOMEN: Soft and non tender to palpation, no abdominal distention noted. Bowel sounds present in all four quadrants.  GENITOURINARY: C/o of urinary difficulty.   NEUROLOGIC: Eyes open spontaneously, behavior appropriate to situation, follows commands, facial expression symmetrical, bilateral hand grasp equal and even, purposeful motor response noted, normal sensation in all extremities when touched with a finger. No focal deficits.

## 2023-03-16 NOTE — ED TRIAGE NOTES
The patient complains of generalized weakness and fatigue x 1 day. Reports nasal congestion. Denies fever, chills, SOB, cough, abdominal pain, nausea, vomiting, and diarrhea.

## 2023-05-09 NOTE — ED PROVIDER NOTES
[facility] Occupational Therapy  Initial Evaluation     Date: 5/9/2023  Patient: Mari Peters  Chart Number: [id number]    Therapy Diagnosis: R wrist pain, decreased ROM R wrist, decreased /pinch/ADL  Physician: Sin Haddad, EDMUND    Physician Orders: Patient is approximately 8 weeks status post right wrist proximal row carpectomy.  Patient requires occupational therapy to increase the function and range of motion of the right wrist/hand.     Duration:  6 weeks  Frequency:  2-3 times per week     Please initiate proximal row carpectomy therapy protocol.  Thanks!  Medical Diagnosis: Z98.890 (ICD-10-CM) - Status post surgery  Evaluation Date: 5/9/2023  Insurance Authorization period Expiration: 5/2/2024  Plan of Care Expiration Period: 8/7/2023/23    Visit # / Visits Authortized: 1 / 1  Time In:1002  Time Out: 1100  Total Billable Time: 20 minutes    Surgical Procedure:   R proximal row carpectomy    Precautions: Standard and Diabetes and Weightbearing, post op PRC precautions     Date of Surgery: 3/7/2023    S/P: 9 weeks on 5/9/23    Subjective     Involved Side: Right   Dominant Side: Right   Date of Onset: late Dec 2018  Mechanism of Injury/ History of Current Condition: Pt with a history of R S-L repair in 2019 after falling down a flight of stairs.  Sought medical attention a few years later due to continued pain in the R wrist and was diagnosed with SLAC. Pt received injections to manage symptoms and decision to proceed with surgery was made. Pt has been casted x 3 weeks. Then transitioned to prefab wrist brace     Imaging: X rays: There is a grossly stable appearance to the carpus in this patient status post resection of the 1st carpal row.  Some osteophyte formation and joint space narrowing is seen at the 1st metacarpophalangeal joint and there is some osteophyte formation about the distal radioulnar joint.  Vascular calcifications are noted.     MRI: 2019There is mixed  Encounter Date: 2020       History     Chief Complaint   Patient presents with    Leg Pain     Pt reports has been having pain to R hip/buttocks radiating down RLE. Seen for same a couple days ago without relief.      74-year-old male presents emergency department complaining of persistent low back pain radiating down his right leg.  There is a note about possible swelling.  Patient states that when he stands on his leg too long, the veins seem swollen. Denies any actual leg swelling.  No recent injury reported.  Denies any focal numbness or weakness.  Notes onset a few weeks ago, with persistent pain to the right low back/buttock radiating down the back of the left leg described as aching and throbbing.  Worse with ambulation and certain movements/positions, somewhat better when lying on his left side.  Denies any loss of continence, difficulty urinating or defecating, focal numbness or weakness, or saddle paresthesias.  No relief with recently prescribed medications.        Review of patient's allergies indicates:   Allergen Reactions    Iodine and iodide containing products Anaphylaxis    Shellfish containing products Anaphylaxis     Past Medical History:   Diagnosis Date    Coronary artery disease     Hyperlipidemia     Hypertension      Past Surgical History:   Procedure Laterality Date    ABDOMINAL SURGERY      Polyps in colon removed (noncancerous)    APPENDECTOMY      back surgery (screws & okate)      CORONARY ARTERY BYPASS GRAFT           No family history on file.  Social History     Tobacco Use    Smoking status: Former Smoker     Quit date: 10/5/1998     Years since quittin.8   Substance Use Topics    Alcohol use: Not Currently     Alcohol/week: 0.0 standard drinks     Comment:     Drug use: Never     Review of Systems   Constitutional: Negative for chills, fatigue and fever.   HENT: Negative for congestion.    Respiratory: Negative for cough and shortness of breath.     Cardiovascular: Negative for chest pain.   Gastrointestinal: Negative for nausea and vomiting.   Musculoskeletal: Positive for back pain. Negative for neck pain and neck stiffness.   Neurological: Negative for light-headedness, numbness and headaches.       Physical Exam     Initial Vitals [08/14/20 1813]   BP Pulse Resp Temp SpO2   (!) 147/69 94 20 97.8 °F (36.6 °C) 97 %      MAP       --         Physical Exam    Nursing note and vitals reviewed.  Constitutional: He appears well-developed and well-nourished. No distress.   HENT:   Head: Normocephalic.   Eyes: Conjunctivae and EOM are normal. Pupils are equal, round, and reactive to light.   Neck: Normal range of motion. Neck supple. No tracheal deviation present.   Cardiovascular: Normal rate and intact distal pulses.   Pulmonary/Chest: No respiratory distress.   Musculoskeletal: Normal range of motion. Tenderness present. No edema.        Back:    Neurological: He is alert and oriented to person, place, and time. He has normal strength. No cranial nerve deficit. GCS score is 15. GCS eye subscore is 4. GCS verbal subscore is 5. GCS motor subscore is 6.   Skin: Skin is warm and dry.         ED Course   Procedures  Labs Reviewed - No data to display            Medical Decision Making:   Initial Assessment:   74-year-old male presents emergency department complaining of persistent right low back pain radiating down the back of his right leg  Differential Diagnosis:   Radiculopathy, sciatica, cauda equina, contusion, sprain  ED Management:  Exam consistent with sciatica.  Given injections Decadron and Compazine.  States he is feeling somewhat better.  Vital signs remained stable.  Informed patient of plan to discharge with prescription Flexeril, instructions on home management, follow up with pain management, reasons to return to the emergency room.                                  Clinical Impression:       ICD-10-CM ICD-9-CM   1. Lumbar radiculopathy  M54.16 724.4  intra-articular extra-articular injection of contrast.  There is a small volume of contrast radiocarpal row level.  There is contrast also demonstrated of the dorsal aspect of the wrist along the radial aspect of the wrist.  Possibility joint capsule tear along the radial aspect of the radiocarpal articulation cannot be excluded.     There is widening of the scapholunate interval measuring 4.5 mm at the closest interval.  There is a full-thickness tear of the scapholunate ligament identified.  The lunotriquetral ligament appears intact.  There is partial-thickness tearing identified of the triangular fibrocartilage along the radial attachment.  There is negative ulnar variance.     There is mild sub chondral marrow edema along the proximal aspect of the lunate and within the distal radius along the ulnar most peripheral aspect of the articular surface.  There is chondral thinning within the radiocarpal articulation.  Additional subtle degenerative subcortical marrow edema identified within additional bones of the carpus.  There is sub cortical cystic change identified within the proximal capitate.  There is no fracture or dislocation identified.  No infiltrative marrow process is identified.     The carpal tunnel appears unremarkable.  The flexor tendons appear intact without tear or tenosynovitis.  The median nerve and ulnar nerve demonstrates normal caliber and signal.  Evaluation of the extensor tendons is limited with contrast within the tendon sheathes of multiple compartments limiting evaluation for tenosynovitis.  However, no discrete tendon tear is identified.     There is small joint effusion distal radioulnar joint.     IMPRESSION:      1. There is a full-thickness scapholunate ligament tear with concomitant widening of the scapholunate interval.  2. There is a partial-thickness tear demonstrated of the triangular fibrocartilage radial aspect.  3. Mild degenerative changes noted radio carpal articulation          Disposition:   Disposition: Discharged  Condition: Stable                        Nicholas Bowen MD  08/14/20 2016     "and intercarpal articulations.  4. Negative ulnar variance.  5. No acute osseous abnormalities appreciated.     Previous Therapy: Hand therapy for S-L repair, none since     Patient's Goals for Therapy: to get as much AROM as possible     Pain:  Functional Pain Scale Rating 0-10:   0/10 now  0/10 at best  8/10 at worst  Location: R ulnar wrist and some at R radial wrist   Description: "hits a nerve and makes you jerk" sharp and shocking   Aggravating Factors: forearm at wrist   Easing Factors: rest    Occupation:  Retired due to injury       Functional Limitations/Social History:    Previous functional status includes: Independent with all ADLs.     Current FunctionalStatus   Home/Living environment : lives with their spouse      Limitation of Functional Status as follows:   ADLs/IADLs: Difficulty with household maintenance, yardwork     - Feeding:  Difficulty turning silverware, picking up glass     - Bathing:  Difficulty using washcloth     - Dressing/Grooming:  Difficulty pulling shoes, socks, pants, managing fasteners     - Driving: I, using L hand more frequently      Leisure: Fishing, Hunting, and drag racing, caring for horse       Past Medical History/Physical Systems Review:   Mari Peters  has a past medical history of Anticoagulant long-term use, Colon polyps, Depression, Diabetes mellitus, Diabetes mellitus, type 2, History of pulmonary embolism, HTN (hypertension), Hyperlipidemia, Hypothyroidism, Injury of right wrist, Thromboembolism, and Thyroid disease.    Mari Peters  has a past surgical history that includes Ankle surgery (Right); Tendon transfer (Right, 3/25/2019); Reconstruction of ligament (Right, 3/25/2019); and Wrist Arthroplasty (Right, 3/7/2023).    Mari has a current medication list which includes the following prescription(s): alogliptin, apixaban, atorvastatin, blood sugar diagnostic, dabigatran etexilate, dapagliflozin, dextroamphetamine-amphetamine, ergocalciferol, escitalopram " oxalate, gabapentin, basaglar kwikpen u-100 insulin, insulin aspart u-100, lancets, levothyroxine, lisinopril, ondansetron, oxycodone, pen needle, diabetic, ozempic, and trazodone, and the following Facility-Administered Medications: fentanyl, hydromorphone (pf), lactated ringers, lidocaine (pf) 10 mg/ml (1%), midazolam, and oxycodone.    Review of patient's allergies indicates:   Allergen Reactions    Bee sting [allergen ext-venom-honey bee] Itching and Rash          Objective     Observation/Inspection:  presents with prefab wrist brace     Sensation: Pt denies parestheasias. Intact to light touch.    Scar No  tenderness to palpation. Scar is  mildly thickened and raised, with mild -mod adherence.        Edema:            (in cm) L R   Proximal Wrist Crease 16.1 17.5   MPs     Long Proximal Phalanx 7.0 7.1        Range of Motion:  Pt is able to make a full fist, but reports tightness. Difficulty performing straight fist.       Left/ Right   Forearm S/P  90/90 87/90   Wrist E/F 64/75 50/30   Wrist RD/UD 29/45 11/15    Thumb R/P Abd 66/60 57/50   Thumb MP flex 0/70 0/54   Thumb IP flex 0/58 0/37   Thumb Opp 0 cm 0 cm                            Manual Muscle Test:  Not tested                                       and Pinch Strength: not tested at this time due to post operative status.      Special Tests: none performed         FOTO Score: 37  Predicted Result at 14th visit: 57      Treatment     Treatment Time In: 1040  Treatment Time Out: 1100  Total Treatment time separate from Evaluation time:20 min    Saman received the following manual therapy techniques for 5 minutes:   -Retrograde massage to R digits/hand/wrist x 3 min to stimulate lymphatics to decrease pain,  edema and increase AROM and functional use.    -Performed scar massage to incision for 2 minutes to decrease adhesions and improve tensile glide.       Saman received therapeutic exercises for 15 minutes including:  -AROM x 10 reps each:   wave,  hook, fist, straight fist, spreads, lifts,   isolated FDS IF-RF (not on HEP)   Composite EDC, isolated EDC  Thumb AROM: IPJ blocks, composite flexion, circumduction, palmar and radial abduction, retroposition, opposition with slide,   Wrist flex/ext, RD/UD, FA rotation      Home Exercise Program/Education:  Issued HEP (see patient instructions in EMR) and educated on modality use for pain management . Exercises were reviewed and Saman was able to demonstrate them prior to the end of the session.   Pt received a written copy of exercises to perform at home. Saman demonstrated good  understanding of the education provided.  Pt was advised to perform these exercises free of pain, and to stop performing them if pain occurs.    Patient/Family Education: role of OT, goals for OT,- pt verbalized understanding.   Additional Education provided: use of cold pack, expected AROM outcomes, goals of salvage procedure     Assessment     Mari Peters is a 58 y.o. male referred to outpatient occupational/hand therapy and presents with a medical diagnosis of 9 weeks s/p R PRC, resulting in  pain, edema, decreased A/PROM, strength, and functional use of R dominant UE and demonstrates limitations as described in the chart below.     The patient's rehab potential is Good.     Anticipated barriers to occupational therapy: none   Pt has no cultural, educational or language barriers to learning provided.    Profile and History Assessment of Occupational Performance Level of Clinical Decision Making Complexity Score   Occupational Profile:   Mari Peters is a 58 y.o. male who lives with their spouse and is currently retired. Mari Peters has difficulty with  bathing, grooming, and dressing  driving/transportation management, phone/computer use, housework/household chores, and yardwork, household maintenance  affecting his/her daily functional abilities. His/her main goal for therapy is to get as much AROM as possible .      Comorbidities:    has a past medical history of Anticoagulant long-term use, Colon polyps, Depression, Diabetes mellitus, Diabetes mellitus, type 2, History of pulmonary embolism, HTN (hypertension), Hyperlipidemia, Hypothyroidism, Injury of right wrist, Thromboembolism, and Thyroid disease.    Medical and Therapy History Review:   Expanded               Performance Deficits    Physical:  Joint Mobility  Joint Stability  Muscle Power/Strength  Muscle Endurance  Skin Integrity/Scar Formation  Edema   Strength  Pinch Strength  Fine Motor Coordination  Pain    Cognitive:  No Deficits    Psychosocial:    No Deficits     Clinical Decision Making:  high    Assessment Process:  Detailed Assessments    Modification/Need for Assistance:  Minimal-Moderate Modifications/Assistance    Intervention Selection:  Several Treatment Options       moderate  Based on PMHX, co morbidities , data from assessments and functional level of assistance required with task and clinical presentation directly impacting function.       The following goals were discussed with the patient and patient is in agreement with them as to be addressed in the treatment plan.     Goals:   Short Term Goals: (4 weeks)  1. Pt will be independent with HEP in 2 visits.  2. Pt will report decreased pain to a 6-7/10 at worst.   3. Pt will increase R wrist flexion and UD AROM by 3-5 degrees to enable dressing, grooming activities.  4. Pt will increase R thumb flexion JUDD by 15-20 degrees to assist with managing fasteners for dressing.      Long Term Goals: (by discharge)  1. Pt will report decreased pain to 1-2/10 with ADLs.   2. Pt will exhibit 35-40 degrees of R wrist flexion and 20 degrees of UD  to enable independence with self-care and meal preparation.  ( Per expected outcome E/F 50%, RD 25%, UD 70%)   3. Pt will exhibit R  at 50-80% of L  strength to allow opening jars, gripping steering wheel, holding drinking glass, etc.  4. Pt will exhibit  9# of functional pinch strength to allow writing, opening containers, and turning keys, and donning/doffing clothes and shoes.   5. Pt will exhibit a significant increase (20+ points) in the FOTO assessment.  6. Pt will return to PLOF.     Plan     Certification Period/Plan of care expiration: 5/9/2023 to 8/7/2023.    Outpatient Occupational Therapy 2-3 times weekly for 6 weeks to include the following interventions:     Modalities to decrease pain (moist heat, paraffin, fluidotherapy, US ), edema control, scar mobilization, soft tissue mobilization, manual therapy/joint mobilizations,A/PROM, therapeutic exercises/activities, strengthening, orthotic fitting/fabrication/training PRN, joint protections/energry conservation/adaptive equipment/activity modification  HEP/education as well as any other treatments deemed necessary based on the patient's needs or progress.    Updates Next Visit: review HEP, initiate light functional exercises     SINDHU Devries, JENNIFERT

## 2023-06-06 DIAGNOSIS — J84.10 PULMONARY FIBROSIS, UNSPECIFIED: ICD-10-CM

## 2023-06-07 DIAGNOSIS — R06.02 SHORTNESS OF BREATH: Primary | ICD-10-CM

## 2023-06-07 RX ORDER — NINTEDANIB 150 MG/1
CAPSULE ORAL
Qty: 60 CAPSULE | Refills: 11 | Status: SHIPPED | OUTPATIENT
Start: 2023-06-07 | End: 2023-08-08 | Stop reason: SDUPTHER

## 2023-06-15 ENCOUNTER — HOSPITAL ENCOUNTER (OUTPATIENT)
Dept: PULMONOLOGY | Facility: HOSPITAL | Age: 78
Discharge: HOME | End: 2023-06-15
Payer: MEDICARE

## 2023-06-15 DIAGNOSIS — R06.02 SHORTNESS OF BREATH: ICD-10-CM

## 2023-06-15 PROCEDURE — 94729 DIFFUSING CAPACITY: CPT

## 2023-06-15 PROCEDURE — 94010 BREATHING CAPACITY TEST: CPT

## 2023-06-19 LAB
BRPFT: ABNORMAL
DLCO ADJ PRE: 12.53 ML/(MIN*MMHG) (ref 17.09–30.94)
DLCO SINGLE BREATH LLN: 17.09
DLCO SINGLE BREATH PRE REF: 50.5 %
DLCO SINGLE BREATH REF: 24.01
DLCOC SBVA LLN: 2.36
DLCOC SBVA PRE REF: 84.2 %
DLCOC SBVA REF: 3.56
DLCOC SINGLE BREATH LLN: 17.09
DLCOC SINGLE BREATH PRE REF: 52.2 %
DLCOC SINGLE BREATH REF: 24.01
DLCOVA LLN: 2.36
DLCOVA PRE REF: 81.5 %
DLCOVA PRE: 2.9 ML/(MIN*MMHG*L) (ref 2.36–4.76)
DLCOVA REF: 3.56
DLVAADJ PRE: 3 ML/(MIN*MMHG*L) (ref 2.36–4.76)
FEF 25 75 LLN: 0.53
FEF 25 75 PRE REF: 137.7 %
FEF 25 75 REF: 1.72
FEV1 FVC LLN: 62
FEV1 FVC PRE REF: 103.4 %
FEV1 FVC REF: 76
FEV1 LLN: 1.54
FEV1 PRE REF: 106.1 %
FEV1 REF: 2.34
FVC LLN: 2.18
FVC PRE REF: 101.9 %
FVC REF: 3.11
IVC PRE: 2.92 L (ref 2.18–4.04)
IVC SINGLE BREATH LLN: 2.18
IVC SINGLE BREATH PRE REF: 93.7 %
IVC SINGLE BREATH REF: 3.11
PEF LLN: 4.71
PEF PRE REF: 118.6 %
PEF REF: 7.22
PRE DLCO: 12.12 ML/(MIN*MMHG) (ref 17.09–30.94)
PRE FEF 25 75: 2.37 L/S (ref 0.53–2.92)
PRE FET 100: 7.55 SEC
PRE FEV1 FVC: 78.33 % (ref 61.54–89.91)
PRE FEV1: 2.48 L (ref 1.54–3.14)
PRE FVC: 3.17 L (ref 2.18–4.04)
PRE PEF: 8.57 L/S (ref 4.71–9.73)
VA PRE: 4.18 L (ref 6.59–6.59)
VA SINGLE BREATH LLN: 6.59
VA SINGLE BREATH PRE REF: 63.4 %
VA SINGLE BREATH REF: 6.59

## 2023-06-19 PROCEDURE — 94729 PR C02/MEMBANE DIFFUSE CAPACITY: ICD-10-PCS | Mod: 26,,, | Performed by: INTERNAL MEDICINE

## 2023-06-19 PROCEDURE — 94010 BREATHING CAPACITY TEST: ICD-10-PCS | Mod: 26,,, | Performed by: INTERNAL MEDICINE

## 2023-06-19 PROCEDURE — 94010 BREATHING CAPACITY TEST: CPT | Mod: 26,,, | Performed by: INTERNAL MEDICINE

## 2023-06-19 PROCEDURE — 94729 DIFFUSING CAPACITY: CPT | Mod: 26,,, | Performed by: INTERNAL MEDICINE

## 2023-08-07 DIAGNOSIS — J84.10 PULMONARY FIBROSIS, UNSPECIFIED: ICD-10-CM

## 2023-08-08 RX ORDER — NINTEDANIB 150 MG/1
CAPSULE ORAL
Qty: 60 CAPSULE | Refills: 11 | Status: SHIPPED | OUTPATIENT
Start: 2023-08-08 | End: 2023-08-29 | Stop reason: SDUPTHER

## 2023-08-09 ENCOUNTER — HOSPITAL ENCOUNTER (OUTPATIENT)
Dept: RADIOLOGY | Facility: HOSPITAL | Age: 78
Discharge: HOME | End: 2023-08-09
Attending: INTERNAL MEDICINE
Payer: MEDICARE

## 2023-08-09 DIAGNOSIS — R06.02 SOB (SHORTNESS OF BREATH): ICD-10-CM

## 2023-08-09 PROCEDURE — 71046 XR CHEST PA AND LATERAL: ICD-10-PCS | Mod: 26,,, | Performed by: RADIOLOGY

## 2023-08-09 PROCEDURE — 71046 X-RAY EXAM CHEST 2 VIEWS: CPT | Mod: 26,,, | Performed by: RADIOLOGY

## 2023-08-09 PROCEDURE — 71046 X-RAY EXAM CHEST 2 VIEWS: CPT | Mod: TC,FY,PO

## 2023-08-29 DIAGNOSIS — J84.10 PULMONARY FIBROSIS, UNSPECIFIED: ICD-10-CM

## 2023-08-30 RX ORDER — NINTEDANIB 150 MG/1
CAPSULE ORAL
Qty: 60 CAPSULE | Refills: 11 | Status: SHIPPED | OUTPATIENT
Start: 2023-08-30 | End: 2023-08-30 | Stop reason: SDUPTHER

## 2023-09-14 ENCOUNTER — TELEPHONE (OUTPATIENT)
Dept: PULMONOLOGY | Facility: CLINIC | Age: 78
End: 2023-09-14
Payer: MEDICARE

## 2023-09-14 DIAGNOSIS — R06.02 SOB (SHORTNESS OF BREATH): Primary | ICD-10-CM

## 2023-10-06 ENCOUNTER — HOSPITAL ENCOUNTER (OUTPATIENT)
Dept: PULMONOLOGY | Facility: CLINIC | Age: 78
Discharge: HOME | End: 2023-10-06
Payer: MEDICARE

## 2023-10-06 ENCOUNTER — OFFICE VISIT (OUTPATIENT)
Dept: PULMONOLOGY | Facility: CLINIC | Age: 78
End: 2023-10-06
Payer: MEDICARE

## 2023-10-06 ENCOUNTER — TELEPHONE (OUTPATIENT)
Dept: PULMONOLOGY | Facility: CLINIC | Age: 78
End: 2023-10-06
Payer: MEDICARE

## 2023-10-06 VITALS
HEIGHT: 68 IN | BODY MASS INDEX: 26.07 KG/M2 | BODY MASS INDEX: 26.83 KG/M2 | OXYGEN SATURATION: 97 % | HEIGHT: 68 IN | HEART RATE: 67 BPM | DIASTOLIC BLOOD PRESSURE: 63 MMHG | WEIGHT: 172 LBS | WEIGHT: 177 LBS | SYSTOLIC BLOOD PRESSURE: 178 MMHG

## 2023-10-06 DIAGNOSIS — J84.9 ILD (INTERSTITIAL LUNG DISEASE): Primary | ICD-10-CM

## 2023-10-06 DIAGNOSIS — R06.02 SOB (SHORTNESS OF BREATH): Primary | ICD-10-CM

## 2023-10-06 DIAGNOSIS — R06.02 SOB (SHORTNESS OF BREATH): ICD-10-CM

## 2023-10-06 DIAGNOSIS — J96.11 CHRONIC RESPIRATORY FAILURE WITH HYPOXIA: ICD-10-CM

## 2023-10-06 LAB
ERV LLN: -16449.11
ERV PRE REF: 148 %
ERV REF: 0.89
ERVULN: ABNORMAL
FRCPLETH LLN: 2.68
FRCPLETH PREREF: 66.5 %
FRCPLETH REF: 3.66
FRCPLETHULN: 4.65
LLN IC: -9999997.22
PHYSICIAN COMMENT: ABNORMAL
PRE ERV: 1.32 L (ref -16449.11–16450.89)
PRE FRC PL: 2.43 L (ref 2.68–4.65)
PRE IC: 1.67 L (ref -9999997.22–#######.####)
PRE REF IC: 59.9 %
PRE RV: 1.11 L (ref 2.1–3.45)
PRE TLC: 4.1 L (ref 5.57–7.87)
RAW PRE REF: 81.3 %
RAW PRE: 2.49 CMH2O*S/L (ref 3.06–3.06)
RAW REF: 3.06
REF IC: 2.78
RV LLN: 2.1
RV PRE REF: 40.2 %
RV REF: 2.77
RVTLC LLN: 36
RVTLC PRE REF: 60.7 %
RVTLC PRE: 27.16 % (ref 35.79–53.75)
RVTLC REF: 45
RVTLCULN: 54
RVULN: 3.45
SGAW PRE REF: 140.9 %
SGAW PRE: 0.12 1/(CMH2O*S) (ref 0.08–0.08)
SGAW REF: 0.08
TLC LLN: 5.57
TLC PRE REF: 61 %
TLC REF: 6.72
TLC ULN: 7.87
ULN IC: ABNORMAL
VC LLN: 2.17
VC PRE REF: 96.7 %
VC PRE: 2.99 L (ref 2.17–4.03)
VC REF: 3.09
VC ULN: 4.03

## 2023-10-06 PROCEDURE — 3077F SYST BP >= 140 MM HG: CPT | Mod: CPTII,GC,S$GLB, | Performed by: INTERNAL MEDICINE

## 2023-10-06 PROCEDURE — 94726 PLETHYSMOGRAPHY LUNG VOLUMES: CPT | Mod: S$GLB,,, | Performed by: INTERNAL MEDICINE

## 2023-10-06 PROCEDURE — 1101F PT FALLS ASSESS-DOCD LE1/YR: CPT | Mod: CPTII,GC,S$GLB, | Performed by: INTERNAL MEDICINE

## 2023-10-06 PROCEDURE — 3077F PR MOST RECENT SYSTOLIC BLOOD PRESSURE >= 140 MM HG: ICD-10-PCS | Mod: CPTII,GC,S$GLB, | Performed by: INTERNAL MEDICINE

## 2023-10-06 PROCEDURE — 94726 PULM FUNCT TST PLETHYSMOGRAP: ICD-10-PCS | Mod: S$GLB,,, | Performed by: INTERNAL MEDICINE

## 2023-10-06 PROCEDURE — 3078F PR MOST RECENT DIASTOLIC BLOOD PRESSURE < 80 MM HG: ICD-10-PCS | Mod: CPTII,GC,S$GLB, | Performed by: INTERNAL MEDICINE

## 2023-10-06 PROCEDURE — 94618 PULMONARY STRESS TESTING: ICD-10-PCS | Mod: S$GLB,,, | Performed by: INTERNAL MEDICINE

## 2023-10-06 PROCEDURE — 1160F PR REVIEW ALL MEDS BY PRESCRIBER/CLIN PHARMACIST DOCUMENTED: ICD-10-PCS | Mod: CPTII,GC,S$GLB, | Performed by: INTERNAL MEDICINE

## 2023-10-06 PROCEDURE — 1160F RVW MEDS BY RX/DR IN RCRD: CPT | Mod: CPTII,GC,S$GLB, | Performed by: INTERNAL MEDICINE

## 2023-10-06 PROCEDURE — 94618 PULMONARY STRESS TESTING: CPT | Mod: S$GLB,,, | Performed by: INTERNAL MEDICINE

## 2023-10-06 PROCEDURE — 1101F PR PT FALLS ASSESS DOC 0-1 FALLS W/OUT INJ PAST YR: ICD-10-PCS | Mod: CPTII,GC,S$GLB, | Performed by: INTERNAL MEDICINE

## 2023-10-06 PROCEDURE — 3288F FALL RISK ASSESSMENT DOCD: CPT | Mod: CPTII,GC,S$GLB, | Performed by: INTERNAL MEDICINE

## 2023-10-06 PROCEDURE — 99999 PR PBB SHADOW E&M-EST. PATIENT-LVL IV: CPT | Mod: PBBFAC,GC,, | Performed by: STUDENT IN AN ORGANIZED HEALTH CARE EDUCATION/TRAINING PROGRAM

## 2023-10-06 PROCEDURE — 99214 OFFICE O/P EST MOD 30 MIN: CPT | Mod: 25,GC,S$GLB, | Performed by: INTERNAL MEDICINE

## 2023-10-06 PROCEDURE — 3078F DIAST BP <80 MM HG: CPT | Mod: CPTII,GC,S$GLB, | Performed by: INTERNAL MEDICINE

## 2023-10-06 PROCEDURE — 3288F PR FALLS RISK ASSESSMENT DOCUMENTED: ICD-10-PCS | Mod: CPTII,GC,S$GLB, | Performed by: INTERNAL MEDICINE

## 2023-10-06 PROCEDURE — 1159F PR MEDICATION LIST DOCUMENTED IN MEDICAL RECORD: ICD-10-PCS | Mod: CPTII,GC,S$GLB, | Performed by: INTERNAL MEDICINE

## 2023-10-06 PROCEDURE — 1159F MED LIST DOCD IN RCRD: CPT | Mod: CPTII,GC,S$GLB, | Performed by: INTERNAL MEDICINE

## 2023-10-06 PROCEDURE — 1157F PR ADVANCE CARE PLAN OR EQUIV PRESENT IN MEDICAL RECORD: ICD-10-PCS | Mod: CPTII,GC,S$GLB, | Performed by: INTERNAL MEDICINE

## 2023-10-06 PROCEDURE — 99214 PR OFFICE/OUTPT VISIT, EST, LEVL IV, 30-39 MIN: ICD-10-PCS | Mod: 25,GC,S$GLB, | Performed by: INTERNAL MEDICINE

## 2023-10-06 PROCEDURE — 1157F ADVNC CARE PLAN IN RCRD: CPT | Mod: CPTII,GC,S$GLB, | Performed by: INTERNAL MEDICINE

## 2023-10-06 PROCEDURE — 99999 PR PBB SHADOW E&M-EST. PATIENT-LVL IV: ICD-10-PCS | Mod: PBBFAC,GC,, | Performed by: STUDENT IN AN ORGANIZED HEALTH CARE EDUCATION/TRAINING PROGRAM

## 2023-10-06 NOTE — PROGRESS NOTES
Subjective:      Patient ID: Kermit Castro is a 78 y.o. male.    Chief Complaint: No chief complaint on file.    78M with ILD; last saw Dr. Haddad 12/2022.      Started on ofev 6/22 and doing well; some occasional nausea if he does not take the medication with food (did not tolerate pirfenidone due to side effects - fatigue and dizziness).  All else stable; has oxygen at home, but doesn't always use it.   6MWT today with good sats on 2L.   Volumes obtained for reference.  DLCO and spirometry stable.   Seen by palliative care and started on opioids for dyspnea relief; not really using them.   Says he has good days and bad days, but what really limits his mobility is leg pain. Complains that his arms and legs get tired and he has to stop moving and rest. As well has new excoriated skin lesions on his left arm and trunk; is going to see Dermatology next week.       Retired.  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  30 years of smoking 1ppd     Brother with lung cancer.      Review of Systems   Constitutional:  Positive for fatigue and weakness.   HENT: Negative.     Eyes: Negative.    Respiratory:  Positive for dyspnea on extertion.    Cardiovascular: Negative.    Genitourinary: Negative.    Endocrine: endocrine negative    Musculoskeletal: Negative.    Skin:  Positive for rash.   Gastrointestinal: Negative.    Neurological:  Negative for dizziness, syncope, weakness, light-headedness and headaches.   Psychiatric/Behavioral: Negative.       Objective:     Physical Exam   Constitutional: He is oriented to person, place, and time. He appears well-developed. No distress.   HENT:   Head: Normocephalic.   Neck: No JVD present.   Cardiovascular: Normal rate and regular rhythm.   Pulmonary/Chest: Normal expansion. No stridor.   Inspiratory crackles, most at RLL   Abdominal: He exhibits no distension. There is no abdominal tenderness.  "  Musculoskeletal:         General: No edema. Normal range of motion.      Cervical back: Normal range of motion.   Neurological: He is alert and oriented to person, place, and time.   Skin: No cyanosis. Nails show no clubbing.   Scattered excoriated lesions on right arm going up to his shoulder and chest   Psychiatric: He has a normal mood and affect. His behavior is normal. Thought content normal.     Personal Diagnostic Review        3/15/2023     6:41 PM 2023     1:50 PM 2022     1:01 PM 2022    12:02 PM 2022     8:39 AM 2022     5:24 AM 2022     4:40 AM   Pulmonary Function Tests   SpO2 96 %  96 %   98 % 98 %   Height  5' 8" (1.727 m) 5' 8" (1.727 m) 5' 8" (1.727 m) 5' 8" (1.727 m)     Weight 84.4 kg (186 lb) 84.6 kg (186 lb 8.2 oz) 84.5 kg (186 lb 4.6 oz) 82.7 kg (182 lb 5.1 oz) 82.9 kg (182 lb 10.4 oz)  83 kg (183 lb)   BMI (Calculated)  28.4 28.3 27.7 27.8          Assessment:     No diagnosis found.     Outpatient Encounter Medications as of 10/6/2023   Medication Sig Dispense Refill    albuterol sulfate 2.5 mg/0.5 mL Nebu Take 2.5 mg by nebulization every 4 (four) hours as needed (wheezing/shortness of breath). Rescue 30 each 0    albuterol-ipratropium (DUO-NEB) 2.5 mg-0.5 mg/3 mL nebulizer solution USE 1 AMPULE IN NEBULIZER EVERY 6 HOURS AS NEEDED FOR SHORTNESS OF BREATH (RESCUE) 90 mL 11    amLODIPine (NORVASC) 10 MG tablet amlodipine besylate  10 mg tabs      aspirin (ECOTRIN) 81 MG EC tablet Take 81 mg by mouth once daily.      clopidogreL (PLAVIX) 75 mg tablet Take 4 tablets on day 1 then take 1 tablet daily there after. 34 tablet 11    ezetimibe (ZETIA) 10 mg tablet Take 1 tablet (10 mg total) by mouth once daily. 90 tablet 3    finasteride (PROSCAR) 5 mg tablet SMARTSI Tablet(s) By Mouth Every Evening      flu vacc [id number] up,PF (FLUZONE HIGH-DOSE , PF,) 180 mcg/0.5 mL Syrg Fluzone High-Dose  (PF) 180 mcg/0.5 mL intramuscular syringe   " PHARMACIST ADMINISTERED IMMUNIZATION ADMINISTERED AT TIME OF DISPENSING      furosemide (LASIX) 40 MG tablet Take 1 tablet (40 mg total) by mouth once daily. 30 tablet 11    influenza (HIGH-DOSE PF) 240 mcg/0.7 mL vaccine Fluzone High-Dose [id number] (PF) 180 mcg/0.5 mL intramuscular syringe      lisinopriL (PRINIVIL,ZESTRIL) 40 MG tablet Take 40 mg by mouth once daily.      lubiprostone (AMITIZA) 24 MCG Cap Amitiza 24 mcg capsule   Take 1 capsule twice a day by oral route with meals for 30 days.      metoprolol succinate (TOPROL-XL) 100 MG 24 hr tablet Take 1 tablet (100 mg total) by mouth once daily. 30 tablet 11    multivitamin (THERAGRAN) per tablet Take 1 tablet by mouth once daily.      nintedanib (OFEV) 150 mg Cap Take 1 capsule (150 mg total) by mouth 2 (two) times a day. 60 capsule 11    omega 3-dha-epa-fish oil (FISH OIL) 100-160-1,000 mg Cap Fish Oil   daily      omeprazole-amoxicill-rifabutin (TALICIA) -12.5 mg CpID Talicia 10 mg-250 mg-12.5 mg capsule,immediate - delay release   TAKE 4 CAPSULES BY MOUTH EVERY 8 HOURS WITH MEALS FOR 14 DAYS      oxyCODONE-acetaminophen (PERCOCET)  mg per tablet oxycod/apap  tab 10-325mg      pantoprazole (PROTONIX) 40 MG tablet pantoprazole 40 mg tablet,delayed release   TAKE 1 TABLET BY MOUTH ONCE DAILY      phenazopyridine (PYRIDIUM) 200 MG tablet phenazopyridine 200 mg tablet   TAKE 1 TABLET BY MOUTH THREE TIMES DAILY FOR 2 DAYS      polyethylene glycol (GLYCOLAX) 17 gram/dose powder Miralax 17 gram/dose oral powder   use as directed      potassium chloride SA (K-DUR,KLOR-CON) 20 MEQ tablet Take 1 tablet by mouth once daily.      pregabalin (LYRICA) 100 MG capsule Take 1 capsule (100 mg total) by mouth 2 (two) times daily. 60 capsule 5    pulse oximeter (PULSE OXIMETER) device by Apply Externally route 2 (two) times a day. Use twice daily at 8 AM and 3 PM and record the value in [facility] as directed. 1 each 0    saw palmetto 160 MG capsule Take 160 mg by  mouth 2 (two) times daily.      sildenafiL (VIAGRA) 100 MG tablet Take 100 mg by mouth.      SUPREP BOWEL PREP KIT 17.5-3.13-1.6 gram SolR Take by mouth.      tamsulosin (FLOMAX) 0.4 mg Cap Take 1 capsule (0.4 mg total) by mouth once daily. 90 capsule 3     Facility-Administered Encounter Medications as of 10/6/2023   Medication Dose Route Frequency Provider Last Rate Last Admin    0.9%  NaCl infusion   Intravenous Continuous Gume Nagy  mL/hr at 05/16/22 1005 New Bag at 05/16/22 1005    acetaminophen tablet 650 mg  650 mg Oral Once PRN Rishabh Ware MD        EPINEPHrine (EPIPEN) 0.3 mg/0.3 mL pen injection 0.3 mg  0.3 mg Intramuscular PRN Rishabh Ware MD        methylPREDNISolone sodium succinate injection 40 mg  40 mg Intravenous Once PRN Rishabh Ware MD        ondansetron disintegrating tablet 4 mg  4 mg Oral Once PRN Rishabh Ware MD        sodium chloride 0.9% 500 mL flush bag   Intravenous PRN Rishabh Ware MD        sodium chloride 0.9% flush 10 mL  10 mL Intravenous PRN Rishabh Ware MD         No orders of the defined types were placed in this encounter.      Plan:     Problem List Items Addressed This Visit          Pulmonary    ILD (interstitial lung disease) - Primary    Current Assessment & Plan     On ofev, started 6/2022  Minimal side effects; tolerable   DLCO and spirometry stable  Exercise capacity stable per patient    Continue Ofev  Repeat PFTs and 6MWT in 6 mo  Encouraged exercise  Myositis panel ordered as he is c/o arm and leg weakness         Relevant Orders    NATHEN    ANTI-NEUTROPHILIC CYTOPLASMIC ANTIBODY    RHEUMATOID FACTOR    ANTI-DNA ANTIBODY, DOUBLE-STRANDED    Sedimentation rate    C-REACTIVE PROTEIN    Myositis Specific 11 AB Panel    Chronic respiratory failure with hypoxia    Current Assessment & Plan     Patient with Hypoxic Respiratory failure which is Chronic.  he is on home oxygen at 2 LPM. Supplemental oxygen was provided and  noted- [unfilled].   Signs/symptoms of respiratory failure include- respiratory distress. Contributing diagnoses includes - Interstitial lung disease Labs and images were reviewed. Patient Has not had a recent ABG. Will treat underlying causes and adjust management of respiratory failure as follows-     Goal sat 88% or above, can use oxygen PRN on exertion to maintain sat           Seen and examined with Dr. Gonzalez attending.  RTC 6 mo with repeat PFTs, or PRN.    Carlos Kenyon MD  [facility] Critical [facility] Fellow

## 2023-10-06 NOTE — ASSESSMENT & PLAN NOTE
On ofev, started 6/2022  Minimal side effects; tolerable   DLCO and spirometry stable  Exercise capacity stable per patient    Continue Ofev  Repeat PFTs and 6MWT in 6 mo  Encouraged exercise  Myositis panel ordered as he is c/o arm and leg weakness

## 2023-10-06 NOTE — PROCEDURES
Kermit Castro is a 78 y.o.  male patient, who presents for a 6 minute walk test ordered by MD Kostas.  The diagnosis is Shortness of Breath; Interstitial Lung Disease.  The patient's BMI is 26.2 kg/m2.  Predicted distance (lower limit of normal) is 291.76 meters.      Test Results:    The test was completed without stopping. The total time walked was 360 seconds. During walking, the patient reported:  Dyspnea, Leg pain. The patient used supplemental oxygen during testing.     10/06/2023---------Distance: 267.31 meters (877 feet)     O2 Sat % Supplemental Oxygen Heart Rate Blood Pressure Neal Scale   Pre-exercise  (Resting) 98 % 2 L/M 84 bpm 160/80 mmHg 0   During Exercise 97 % 2 L/M 90 bpm 196/87 mmHg 2   Post-exercise  (Recovery) 98 % 2 L/M  80 bpm 178/63 mmHg      Recovery Time: 241 seconds    Performing nurse/tech: Sarahi GRAMAJO      PREVIOUS STUDY:   06/22/2022---------Distance: 243.84 meters (800 feet)       O2 Sat % Supplemental Oxygen Heart Rate Blood Pressure Neal Scale   Pre-exercise  (Resting) 98 % Room Air 78 bpm 163/73 mmHg 1   During Exercise 88 % Room Air 105 bpm 195/85 mmHg 4   Post-exercise    97 % Room Air  81 bpm 177/81 mmHg        CLINICAL INTERPRETATION:  Six minute walk distance is 267.31 meters (877 feet) with light dyspnea.  During exercise, there was no significant desaturation while breathing supplemental oxygen.  Blood pressure increased significantly and Heart rate remained stable with walking.  Hypertension was present prior to exercise.  This may represent a hypertensive response to exercise.  The patient reported non-pulmonary symptoms during exercise.  Significant exercise impairment is likely due to cardiovascular causes and subjective symptoms.  The patient did complete the study, walking 360 seconds of the 360 second test.  Since the previous study in June 2022, exercise capacity is unchanged.  Based upon age and body mass index, exercise capacity is less  than predicted.

## 2023-10-06 NOTE — ASSESSMENT & PLAN NOTE
Patient with Hypoxic Respiratory failure which is Chronic.  he is on home oxygen at 2 LPM. Supplemental oxygen was provided and noted- [unfilled].   Signs/symptoms of respiratory failure include- respiratory distress. Contributing diagnoses includes - Interstitial lung disease Labs and images were reviewed. Patient Has not had a recent ABG. Will treat underlying causes and adjust management of respiratory failure as follows-     Goal sat 88% or above, can use oxygen PRN on exertion to maintain sat

## 2023-10-09 ENCOUNTER — TELEPHONE (OUTPATIENT)
Dept: ADMINISTRATIVE | Facility: HOSPITAL | Age: 78
End: 2023-10-09
Payer: MEDICARE

## 2023-10-09 NOTE — PROGRESS NOTES
I have seen the patient, reviewed the Fellow's assessment and plan. I have personally interviewed and examined the patient at bedside and agree with the findings.     History of IPF, currently tolerating ofev.  Warrants 2LPM with exercise, continue.  PFTs have been stable for 2 years.  Continue ofev and follow PFTs q6months.  If worsening noted, will repeat CT imaging.  Sending ILD workup labs.    Esteban Gonzalez MD  Pulmonary Disease  [facility]

## 2023-10-10 ENCOUNTER — OFFICE VISIT (OUTPATIENT)
Dept: DERMATOLOGY | Facility: CLINIC | Age: 78
End: 2023-10-10
Payer: MEDICARE

## 2023-10-10 DIAGNOSIS — L28.1 PRURIGO NODULARIS: Primary | ICD-10-CM

## 2023-10-10 PROCEDURE — 1157F ADVNC CARE PLAN IN RCRD: CPT | Mod: CPTII,S$GLB,, | Performed by: STUDENT IN AN ORGANIZED HEALTH CARE EDUCATION/TRAINING PROGRAM

## 2023-10-10 PROCEDURE — 99999 PR PBB SHADOW E&M-EST. PATIENT-LVL IV: ICD-10-PCS | Mod: PBBFAC,,, | Performed by: STUDENT IN AN ORGANIZED HEALTH CARE EDUCATION/TRAINING PROGRAM

## 2023-10-10 PROCEDURE — 1126F AMNT PAIN NOTED NONE PRSNT: CPT | Mod: CPTII,S$GLB,, | Performed by: STUDENT IN AN ORGANIZED HEALTH CARE EDUCATION/TRAINING PROGRAM

## 2023-10-10 PROCEDURE — 1126F PR PAIN SEVERITY QUANTIFIED, NO PAIN PRESENT: ICD-10-PCS | Mod: CPTII,S$GLB,, | Performed by: STUDENT IN AN ORGANIZED HEALTH CARE EDUCATION/TRAINING PROGRAM

## 2023-10-10 PROCEDURE — 1160F RVW MEDS BY RX/DR IN RCRD: CPT | Mod: CPTII,S$GLB,, | Performed by: STUDENT IN AN ORGANIZED HEALTH CARE EDUCATION/TRAINING PROGRAM

## 2023-10-10 PROCEDURE — 1159F MED LIST DOCD IN RCRD: CPT | Mod: CPTII,S$GLB,, | Performed by: STUDENT IN AN ORGANIZED HEALTH CARE EDUCATION/TRAINING PROGRAM

## 2023-10-10 PROCEDURE — 99999 PR PBB SHADOW E&M-EST. PATIENT-LVL IV: CPT | Mod: PBBFAC,,, | Performed by: STUDENT IN AN ORGANIZED HEALTH CARE EDUCATION/TRAINING PROGRAM

## 2023-10-10 PROCEDURE — 1159F PR MEDICATION LIST DOCUMENTED IN MEDICAL RECORD: ICD-10-PCS | Mod: CPTII,S$GLB,, | Performed by: STUDENT IN AN ORGANIZED HEALTH CARE EDUCATION/TRAINING PROGRAM

## 2023-10-10 PROCEDURE — 99204 PR OFFICE/OUTPT VISIT, NEW, LEVL IV, 45-59 MIN: ICD-10-PCS | Mod: S$GLB,,, | Performed by: STUDENT IN AN ORGANIZED HEALTH CARE EDUCATION/TRAINING PROGRAM

## 2023-10-10 PROCEDURE — 99204 OFFICE O/P NEW MOD 45 MIN: CPT | Mod: S$GLB,,, | Performed by: STUDENT IN AN ORGANIZED HEALTH CARE EDUCATION/TRAINING PROGRAM

## 2023-10-10 PROCEDURE — 1157F PR ADVANCE CARE PLAN OR EQUIV PRESENT IN MEDICAL RECORD: ICD-10-PCS | Mod: CPTII,S$GLB,, | Performed by: STUDENT IN AN ORGANIZED HEALTH CARE EDUCATION/TRAINING PROGRAM

## 2023-10-10 PROCEDURE — 3288F FALL RISK ASSESSMENT DOCD: CPT | Mod: CPTII,S$GLB,, | Performed by: STUDENT IN AN ORGANIZED HEALTH CARE EDUCATION/TRAINING PROGRAM

## 2023-10-10 PROCEDURE — 1160F PR REVIEW ALL MEDS BY PRESCRIBER/CLIN PHARMACIST DOCUMENTED: ICD-10-PCS | Mod: CPTII,S$GLB,, | Performed by: STUDENT IN AN ORGANIZED HEALTH CARE EDUCATION/TRAINING PROGRAM

## 2023-10-10 PROCEDURE — 3288F PR FALLS RISK ASSESSMENT DOCUMENTED: ICD-10-PCS | Mod: CPTII,S$GLB,, | Performed by: STUDENT IN AN ORGANIZED HEALTH CARE EDUCATION/TRAINING PROGRAM

## 2023-10-10 PROCEDURE — 1101F PT FALLS ASSESS-DOCD LE1/YR: CPT | Mod: CPTII,S$GLB,, | Performed by: STUDENT IN AN ORGANIZED HEALTH CARE EDUCATION/TRAINING PROGRAM

## 2023-10-10 PROCEDURE — 1101F PR PT FALLS ASSESS DOC 0-1 FALLS W/OUT INJ PAST YR: ICD-10-PCS | Mod: CPTII,S$GLB,, | Performed by: STUDENT IN AN ORGANIZED HEALTH CARE EDUCATION/TRAINING PROGRAM

## 2023-10-10 RX ORDER — BETAMETHASONE DIPROPIONATE 0.5 MG/G
OINTMENT TOPICAL 2 TIMES DAILY
Qty: 135 G | Refills: 2 | Status: SHIPPED | OUTPATIENT
Start: 2023-10-10

## 2023-10-10 NOTE — PATIENT INSTRUCTIONS
XEROSIS (DRY SKIN)        Definition    Xerosis is the term for dry skin.  We all have a natural oil coating over our skin produced by the skin oil glands.  If this oil is removed, the skin becomes dry which can lead to cracking, which can lead to inflammation.  Xerosis is usually a long-term problem that recurs often, especially in the winter.    Cause    Long hot baths or showers can remove our natural oil and lead to xerosis.  One should never take more than one bath or shower a day and for no longer than ten minutes.  Use of harsh soaps such as Zest, Dial, and Ivory can worsen and cause xerosis.  Cold winter weather worsens xerosis because the amount of moisture contained in cold air is much less than the amount of moisture in warm air.    Treatment    Treatment is intended to restore the natural oil to your skin.  Keep the skin lubricated.    Do not take more than one bath or shower a day.  Use lukewarm water, not hot.  Hot water dries out the skin.    Use a gentle moisturizing soap such as Cetaphil soap, Oil of Olay, Dove, Basis, Ivory moisture care, Restoraderm cleanser.    When toweling dry, dont rub.  Blot the skin so there is still some water left on the skin.  You should apply a moisturizing cream to all of the skin such as Cerave cream, Cetaphil cream, Lipikar [place] AP+ Intense Repair Moisturizing Cream or Restoraderm or Eucerin Original Formula cream.   Alpha hydroxyacid lotions, i.e., AmLactin, also work very well for preventing dry skin, but may burn when used on inflamed or reddened skin.    If you like to swim during the winter months, you should not use soap when getting out of the pool.  When you have finished swimming, rinse off the chlorine with cool to warm water.  If this will be the only shower of the day, then you may use Cetaphil or another mild soap to cleanse your skin.  After the shower, apply a moisturizing cream to all of the skin as above.        [address] [facility] [place] [address]/ [phone] FAX/ [email or web address]

## 2023-10-10 NOTE — PROGRESS NOTES
Subjective:      Patient ID:  Kermit Castro is a 78 y.o. male who presents for   Chief Complaint   Patient presents with    Lesion     Lt wrist and spreading up his  Lt arm.     Patient states he has a lesion on his Lt wrist and spreading up his arm since 1 yr ago, itching and burns.      Patient reports that he scratches in his sleep and wakes up with blood on sheets    Review of Systems   Skin:  Positive for itching and wears hat (50%). Negative for daily sunscreen use.   Hematologic/Lymphatic: Bruises/bleeds easily.       Objective:   Physical Exam   Constitutional: He appears well-developed and well-nourished. No distress.   Neurological: He is alert and oriented to person, place, and time. He is not disoriented.   Psychiatric: He has a normal mood and affect.   Skin:   Areas Examined (abnormalities noted in diagram):   RUE Inspected  LUE Inspection Performed            Diagram Legend     Erythematous scaling macule/papule c/w actinic keratosis       Vascular papule c/w angioma      Pigmented verrucoid papule/plaque c/w seborrheic keratosis      Yellow umbilicated papule c/w sebaceous hyperplasia      Irregularly shaped tan macule c/w lentigo     1-2 mm smooth white papules consistent with Milia      Movable subcutaneous cyst with punctum c/w epidermal inclusion cyst      Subcutaneous movable cyst c/w pilar cyst      Firm pink to brown papule c/w dermatofibroma      Pedunculated fleshy papule(s) c/w skin tag(s)      Evenly pigmented macule c/w junctional nevus     Mildly variegated pigmented, slightly irregular-bordered macule c/w mildly atypical nevus      Flesh colored to evenly pigmented papule c/w intradermal nevus       Pink pearly papule/plaque c/w basal cell carcinoma      Erythematous hyperkeratotic cursted plaque c/w SCC      Surgical scar with no sign of skin cancer recurrence      Open and closed comedones      Inflammatory papules and pustules      Verrucoid papule consistent consistent with wart      Erythematous eczematous patches and plaques     Dystrophic onycholytic nail with subungual debris c/w onychomycosis     Umbilicated papule    Erythematous-base heme-crusted tan verrucoid plaque consistent with inflamed seborrheic keratosis     Erythematous Silvery Scaling Plaque c/w Psoriasis     See annotation      Assessment / Plan:        Prurigo nodularis  -     betamethasone dipropionate (DIPROLENE) 0.05 % ointment; Apply topically 2 (two) times daily. Use to affected areas for up to 2 weeks then take a 1 week break or decrease to 3 times weekly. Do not apply to groin or face. Apply to itchy bumps on arms  Dispense: 135 g; Refill: 2    Prurigo nodularis--prurigo nodules on left arm, abdomen, shoulder. Sparing mid back and right arm  - Only secondary changes of scratching present on exam today. Counseled on nature and etiology and that picking will sustain lesions and delay healing causing them to be painful and itchy. Counseled to avoid picking, scratching / trauma to break the itch-scratch cycle. TCS as below. Can cover with bandage to assist with penetration of medication and prevent trauma. Reason for picking in PN can be multifactorial including pruritus from underlying primary dermatosis (eg, atopic dermatitis), from cause of pruritus with a rash, or from habitual picking / psychiatric condition, such as anxiety  - Keep nails clipped short, wear gloves as night, wear bandages over lesions  - TCS under occlusion  -  OTC antiitch topicals--Sarna, sarna sensitive / cerave anti-itch, capsaicin             Follow up if symptoms worsen or fail to improve.

## 2023-10-30 ENCOUNTER — OFFICE VISIT (OUTPATIENT)
Dept: ALLERGY | Facility: CLINIC | Age: 78
End: 2023-10-30
Payer: MEDICARE

## 2023-10-30 ENCOUNTER — PATIENT MESSAGE (OUTPATIENT)
Dept: ALLERGY | Facility: CLINIC | Age: 78
End: 2023-10-30

## 2023-10-30 VITALS — BODY MASS INDEX: 26.8 KG/M2 | HEIGHT: 68 IN | WEIGHT: 176.81 LBS

## 2023-10-30 DIAGNOSIS — L28.1 PRURIGO NODULARIS: Primary | ICD-10-CM

## 2023-10-30 PROCEDURE — 99205 PR OFFICE/OUTPT VISIT, NEW, LEVL V, 60-74 MIN: ICD-10-PCS | Mod: S$GLB,,, | Performed by: ALLERGY & IMMUNOLOGY

## 2023-10-30 PROCEDURE — 99999 PR PBB SHADOW E&M-EST. PATIENT-LVL IV: ICD-10-PCS | Mod: PBBFAC,,, | Performed by: ALLERGY & IMMUNOLOGY

## 2023-10-30 PROCEDURE — 1125F AMNT PAIN NOTED PAIN PRSNT: CPT | Mod: CPTII,S$GLB,, | Performed by: ALLERGY & IMMUNOLOGY

## 2023-10-30 PROCEDURE — 99205 OFFICE O/P NEW HI 60 MIN: CPT | Mod: S$GLB,,, | Performed by: ALLERGY & IMMUNOLOGY

## 2023-10-30 PROCEDURE — 1157F PR ADVANCE CARE PLAN OR EQUIV PRESENT IN MEDICAL RECORD: ICD-10-PCS | Mod: CPTII,S$GLB,, | Performed by: ALLERGY & IMMUNOLOGY

## 2023-10-30 PROCEDURE — 1159F PR MEDICATION LIST DOCUMENTED IN MEDICAL RECORD: ICD-10-PCS | Mod: CPTII,S$GLB,, | Performed by: ALLERGY & IMMUNOLOGY

## 2023-10-30 PROCEDURE — 1125F PR PAIN SEVERITY QUANTIFIED, PAIN PRESENT: ICD-10-PCS | Mod: CPTII,S$GLB,, | Performed by: ALLERGY & IMMUNOLOGY

## 2023-10-30 PROCEDURE — 1159F MED LIST DOCD IN RCRD: CPT | Mod: CPTII,S$GLB,, | Performed by: ALLERGY & IMMUNOLOGY

## 2023-10-30 PROCEDURE — 99999 PR PBB SHADOW E&M-EST. PATIENT-LVL IV: CPT | Mod: PBBFAC,,, | Performed by: ALLERGY & IMMUNOLOGY

## 2023-10-30 PROCEDURE — 1157F ADVNC CARE PLAN IN RCRD: CPT | Mod: CPTII,S$GLB,, | Performed by: ALLERGY & IMMUNOLOGY

## 2023-10-30 RX ORDER — ROSUVASTATIN CALCIUM 40 MG/1
40 TABLET, COATED ORAL
COMMUNITY
Start: 2023-10-08

## 2023-10-30 RX ORDER — FLUTICASONE FUROATE, UMECLIDINIUM BROMIDE AND VILANTEROL TRIFENATATE 100; 62.5; 25 UG/1; UG/1; UG/1
1 POWDER RESPIRATORY (INHALATION)
COMMUNITY
Start: 2023-10-11

## 2023-10-30 NOTE — PROGRESS NOTES
Subjective:       Patient ID: Kermit Castro is a 78 y.o. male.    Chief Complaint:  Rash      HPI    Pt presents w wife. Has hx of prurigo nodularis. Evaluated by dermatology for this on 10/10. Minimal improvement w topical diprolene. Has not tried Sarna as recommended.  Dermatology note reviewed.  Pt and his wife concerned that prurigo nodularis may be 2/2 to his medication. They do not identify a consistent temporal relationship between a particular medication and onset of lesions or flare of pruritus.    No asthma, no AR  Denies hx eczema      Past Medical History:   Diagnosis Date    Coronary artery disease     Hyperlipidemia     Hypertension    Interstitial lung disease    History reviewed. No pertinent family history.      Review of Systems   Constitutional:  Negative for activity change, fatigue and fever.   HENT:  Negative for congestion, postnasal drip, rhinorrhea, sinus pressure and sneezing.    Eyes:  Negative for discharge, redness and itching.   Respiratory:  Negative for cough.    Cardiovascular:  Negative for chest pain.   Gastrointestinal:  Negative for constipation, diarrhea, nausea and vomiting.   Genitourinary:  Negative for difficulty urinating.   Musculoskeletal:  Negative for joint swelling and myalgias.   Skin:  Positive for rash.   Neurological:  Negative for headaches.   Hematological:  Does not bruise/bleed easily.   Psychiatric/Behavioral:  Negative for agitation and behavioral problems.         Objective:   Physical Exam  Constitutional:       General: He is not in acute distress.     Appearance: He is well-developed. He is not diaphoretic.   HENT:      Head: Normocephalic and atraumatic.      Right Ear: External ear normal.      Left Ear: External ear normal.   Eyes:      General:         Right eye: No discharge.         Left eye: No discharge.   Neck:      Thyroid: No thyromegaly.   Cardiovascular:      Rate and Rhythm: Normal rate.   Pulmonary:      Effort: Pulmonary effort is  "normal. No respiratory distress.      Breath sounds: No wheezing.   Abdominal:      General: There is no distension.   Musculoskeletal:         General: Normal range of motion.      Cervical back: Normal range of motion.   Skin:     General: Skin is warm.      Findings: No erythema or rash.   Neurological:      Mental Status: He is alert and oriented to person, place, and time.      Motor: No abnormal muscle tone.   Psychiatric:         Behavior: Behavior normal.         Thought Content: Thought content normal.         Judgment: Judgment normal.           No results found for: "IGGSERUM", "IGM", "IGA", "IGE"  Eos #   Date Value Ref Range Status   03/15/2023 0.0 0.0 - 0.5 K/uL Final   10/06/2022 0.1 0.0 - 0.5 K/uL Final   05/13/2022 0.3 0.0 - 0.5 K/uL Final     Eosinophil %   Date Value Ref Range Status   03/15/2023 0.0 0.0 - 8.0 % Final   10/06/2022 2.0 0.0 - 8.0 % Final   05/13/2022 3.7 0.0 - 8.0 % Final     Pneumococcal titers:  No results found for this or any previous visit.       Assessment:       1. Prurigo nodularis         Plan:       Kermit ANN was seen today for rash.    Diagnoses and all orders for this visit:    Prurigo nodularis    Reviewed dermatology management plan w pt and wife: prescribed topical steroid and otc Sarna or Cerave anti-itch. Re-eval if no improvement    Counseled that physical findings of prurigo nodularis are not typically attributed to drug allergy.  Anxiety and chronic pruritus though, could lead to PN.  Reviewed his meds and those that listed pruritus as poss SE, including oxycodone, clopidogrel, rouvustatin. Finasteride has anxiety listed as poss SE.  Counseled that testing not available to determine if any of these meds are responsible for PN. Can observe if there is any temporal relation between taking any of these and increase in pruritus.   Counseled that PN is commonly attributed to drug allergy or SE.    Fu prn       Total of 60 minutes on encounter the day of the visit. This " includes face to face time and non-face to face time preparing to see the patient (eg, review of tests), obtaining and/or reviewing separately obtained history, documenting clinical information in the electronic or other health record, independently interpreting results and communicating results to the patient/family/caregiver, or care coordinator.

## 2024-02-19 DIAGNOSIS — J43.8 OTHER EMPHYSEMA: ICD-10-CM

## 2024-02-19 RX ORDER — IPRATROPIUM BROMIDE AND ALBUTEROL SULFATE 2.5; .5 MG/3ML; MG/3ML
3 SOLUTION RESPIRATORY (INHALATION) EVERY 6 HOURS PRN
Qty: 90 ML | Refills: 11 | Status: SHIPPED | OUTPATIENT
Start: 2024-02-19

## 2024-02-19 NOTE — TELEPHONE ENCOUNTER
----- Message from Brianna Corrigan sent at 2/19/2024 10:44 AM CST -----  Regarding: Refill  Contact: Irving/wife [phone]  Calling to request new prescription or refill on medication Rx albuterol-ipratropium (DUO-NEB) 2.5 mg-0.5 mg/3 mL nebulizer solution to be sent to pharmacy. Please call patient to confirm prescription has been sent to [facility] pharmacy, patient has been without medication over a month despite numerous request.    [facility] [place] - [address]  [address]  [place] [address]  Phone: [phone] Fax: [phone]

## 2024-02-23 DIAGNOSIS — J84.9 ILD (INTERSTITIAL LUNG DISEASE): Primary | ICD-10-CM

## 2024-02-23 DIAGNOSIS — J84.10 PULMONARY FIBROSIS, UNSPECIFIED: ICD-10-CM

## 2024-02-26 RX ORDER — NINTEDANIB 150 MG/1
150 CAPSULE ORAL 2 TIMES DAILY
Qty: 60 CAPSULE | Refills: 11 | Status: ACTIVE | OUTPATIENT
Start: 2024-02-26

## 2024-04-26 ENCOUNTER — OFFICE VISIT (OUTPATIENT)
Dept: PULMONOLOGY | Facility: CLINIC | Age: 79
End: 2024-04-26
Payer: MEDICARE

## 2024-04-26 VITALS
HEART RATE: 77 BPM | HEIGHT: 67 IN | OXYGEN SATURATION: 98 % | DIASTOLIC BLOOD PRESSURE: 78 MMHG | BODY MASS INDEX: 28.34 KG/M2 | WEIGHT: 180.56 LBS | SYSTOLIC BLOOD PRESSURE: 144 MMHG

## 2024-04-26 DIAGNOSIS — J84.9 ILD (INTERSTITIAL LUNG DISEASE): ICD-10-CM

## 2024-04-26 DIAGNOSIS — G47.33 OSA (OBSTRUCTIVE SLEEP APNEA): Primary | ICD-10-CM

## 2024-04-26 DIAGNOSIS — J84.9 INTERSTITIAL PULMONARY DISEASE, UNSPECIFIED: ICD-10-CM

## 2024-04-26 DIAGNOSIS — I73.9 PAD (PERIPHERAL ARTERY DISEASE): ICD-10-CM

## 2024-04-26 DIAGNOSIS — J96.11 CHRONIC RESPIRATORY FAILURE WITH HYPOXIA: ICD-10-CM

## 2024-04-26 PROCEDURE — 3078F DIAST BP <80 MM HG: CPT | Mod: CPTII,S$GLB,, | Performed by: INTERNAL MEDICINE

## 2024-04-26 PROCEDURE — 99999 PR PBB SHADOW E&M-EST. PATIENT-LVL V: CPT | Mod: PBBFAC,,, | Performed by: INTERNAL MEDICINE

## 2024-04-26 PROCEDURE — 1101F PT FALLS ASSESS-DOCD LE1/YR: CPT | Mod: CPTII,S$GLB,, | Performed by: INTERNAL MEDICINE

## 2024-04-26 PROCEDURE — 99214 OFFICE O/P EST MOD 30 MIN: CPT | Mod: S$GLB,,, | Performed by: INTERNAL MEDICINE

## 2024-04-26 PROCEDURE — 1157F ADVNC CARE PLAN IN RCRD: CPT | Mod: CPTII,S$GLB,, | Performed by: INTERNAL MEDICINE

## 2024-04-26 PROCEDURE — 3077F SYST BP >= 140 MM HG: CPT | Mod: CPTII,S$GLB,, | Performed by: INTERNAL MEDICINE

## 2024-04-26 PROCEDURE — 1159F MED LIST DOCD IN RCRD: CPT | Mod: CPTII,S$GLB,, | Performed by: INTERNAL MEDICINE

## 2024-04-26 PROCEDURE — 3288F FALL RISK ASSESSMENT DOCD: CPT | Mod: CPTII,S$GLB,, | Performed by: INTERNAL MEDICINE

## 2024-04-26 NOTE — ASSESSMENT & PLAN NOTE
Concern that his condition may be evolving despite use of an anti-fibrotic.  CT Chest to evaluate parenchyma.  Continue ofev.

## 2024-04-26 NOTE — PROGRESS NOTES
Subjective:       Patient ID: Kermit Castro is a 78 y.o. male.    Chief Complaint: Interstitial Lung Disease, Shortness of Breath, and Chest Pain    HPI:   Kermit Castro is a 78 y.o. male who presents for follow up.  He was last seen in the fellows clinic on 10/9/23.     Despite extensive explanation regarding his disease process he still doesn't seem to have a good handle on his diagnosis.  He reports that he isn't walking much because his legs hurt and his veins swell. He is also concerned the the ofev he is prescribed for his ILD is causing his skin to change colors and itch- particularly when he is in the bathtub.     His wife is reporting that recently he has episodes of waking up gasping for air.    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     He and his wife are reporting that his home oxygen is very expensive and they wonder if he really needs it at all.   He is trying to decide if he should have it removed from the home.       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  30 years of smoking 1ppd    Brother with lung cancer.     Review of Systems   Constitutional:  Positive for weight loss. Negative for fever, chills, activity change and fatigue.   HENT:  Negative for postnasal drip, rhinorrhea, sinus pressure and congestion.    Respiratory:  Positive for snoring and Paroxysmal Nocturnal Dyspnea. Negative for cough, hemoptysis, shortness of breath, wheezing, asthma nighttime symptoms and dyspnea on extertion (occasional (significantly improved)).    Cardiovascular:  Positive for leg swelling. Negative for chest pain.   Genitourinary:  Negative for difficulty urinating.   Endocrine:  Negative for cold intolerance and heat intolerance.    Musculoskeletal:  Positive for back pain.  Negative for joint swelling and myalgias.        Leg pain   Gastrointestinal:  Negative for nausea, vomiting and abdominal pain.   Neurological:  Negative for dizziness, weakness and headaches.   Hematological:  Negative for adenopathy. No excessive bruising.   Psychiatric/Behavioral:  Negative for confusion and sleep disturbance.          Social History     Tobacco Use    Smoking status: Former     Current packs/day: 0.00     Types: Cigarettes     Quit date: 10/5/1998     Years since quittin.5     Passive exposure: Never    Smokeless tobacco: Never   Substance Use Topics    Alcohol use: Not Currently     Alcohol/week: 0.0 standard drinks of alcohol     Comment:        Review of patient's allergies indicates:   Allergen Reactions    Shellfish containing products Anaphylaxis     Past Medical History:   Diagnosis Date    Coronary artery disease     Hyperlipidemia     Hypertension      Past Surgical History:   Procedure Laterality Date    ABDOMINAL SURGERY      Polyps in colon removed (noncancerous)    APPENDECTOMY      back surgery (screws & okate)      CORONARY ANGIOGRAPHY N/A 2022    Procedure: ANGIOGRAM, CORONARY ARTERY;  Surgeon: Gume Nagy MD;  Location: [facility] CATH LAB;  Service: Cardiology;  Laterality: N/A;    CORONARY ARTERY BYPASS GRAFT          CORONARY BYPASS GRAFT ANGIOGRAPHY  2022    Procedure: Bypass graft study;  Surgeon: Gume Nagy MD;  Location: [facility] CATH LAB;  Service: Cardiology;;    PERCUTANEOUS TRANSLUMINAL ANGIOPLASTY (PTA) OF PERIPHERAL VESSEL N/A 2022    Procedure: PTA, PERIPHERAL VESSEL;  Surgeon: Gume Nagy MD;  Location: [facility] CATH LAB;  Service: Cardiology;  Laterality: N/A;    TRANSFORAMINAL EPIDURAL INJECTION OF STEROID Right 2020    Procedure: LUMBAR TRANSFORAMINAL RIGHT L3/4 AND L4/5 DIRECT REFERRAL;  Surgeon: Nicole Toth MD;  Location: [facility] PAIN MGT;  Service: Pain Management;  Laterality: Right;  NEEDS CONSENT, PT STATES NO  LONGER TAKES PLETAL     Current Outpatient Medications   Medication Sig Dispense Refill    albuterol sulfate 2.5 mg/0.5 mL Nebu Take 2.5 mg by nebulization every 4 (four) hours as needed (wheezing/shortness of breath). Rescue 30 each 0    albuterol-ipratropium (DUO-NEB) 2.5 mg-0.5 mg/3 mL nebulizer solution Take 3 mLs by nebulization every 6 (six) hours as needed for Wheezing. Rescue 90 mL 11    amLODIPine (NORVASC) 10 MG tablet amlodipine besylate  10 mg tabs      aspirin (ECOTRIN) 81 MG EC tablet Take 81 mg by mouth once daily.      betamethasone dipropionate (DIPROLENE) 0.05 % ointment Apply topically 2 (two) times daily. Use to affected areas for up to 2 weeks then take a 1 week break or decrease to 3 times weekly. Do not apply to groin or face. Apply to itchy bumps on arms 135 g 2    clopidogreL (PLAVIX) 75 mg tablet Take 4 tablets on day 1 then take 1 tablet daily there after. 34 tablet 11    ezetimibe (ZETIA) 10 mg tablet Take 1 tablet (10 mg total) by mouth once daily. 90 tablet 3    finasteride (PROSCAR) 5 mg tablet SMARTSI Tablet(s) By Mouth Every Evening      flu vacc [id number] up,PF (FLUZONE HIGH-DOSE , PF,) 180 mcg/0.5 mL Syrg Fluzone High-Dose  (PF) 180 mcg/0.5 mL intramuscular syringe   PHARMACIST ADMINISTERED IMMUNIZATION ADMINISTERED AT TIME OF DISPENSING      furosemide (LASIX) 40 MG tablet Take 1 tablet (40 mg total) by mouth once daily. 30 tablet 11    influenza (HIGH-DOSE PF) 240 mcg/0.7 mL vaccine Fluzone High-Dose [id number] (PF) 180 mcg/0.5 mL intramuscular syringe      lisinopriL (PRINIVIL,ZESTRIL) 40 MG tablet Take 40 mg by mouth once daily.      lubiprostone (AMITIZA) 24 MCG Cap Amitiza 24 mcg capsule   Take 1 capsule twice a day by oral route with meals for 30 days.      metoprolol succinate (TOPROL-XL) 100 MG 24 hr tablet Take 1 tablet (100 mg total) by mouth once daily. 30 tablet 11    multivitamin (THERAGRAN) per tablet Take 1 tablet by mouth once daily.       nintedanib (OFEV) 150 mg Cap Take 1 capsule (150 mg total) by mouth 2 (two) times a day. 60 capsule 11    omega 3-dha-epa-fish oil (FISH OIL) 100-160-1,000 mg Cap Fish Oil   daily      omeprazole-amoxicill-rifabutin (TALICIA) -12.5 mg CpID Talicia 10 mg-250 mg-12.5 mg capsule,immediate - delay release   TAKE 4 CAPSULES BY MOUTH EVERY 8 HOURS WITH MEALS FOR 14 DAYS      oxyCODONE-acetaminophen (PERCOCET)  mg per tablet oxycod/apap  tab 10-325mg      pantoprazole (PROTONIX) 40 MG tablet pantoprazole 40 mg tablet,delayed release   TAKE 1 TABLET BY MOUTH ONCE DAILY      phenazopyridine (PYRIDIUM) 200 MG tablet phenazopyridine 200 mg tablet   TAKE 1 TABLET BY MOUTH THREE TIMES DAILY FOR 2 DAYS      polyethylene glycol (GLYCOLAX) 17 gram/dose powder Miralax 17 gram/dose oral powder   use as directed      potassium chloride SA (K-DUR,KLOR-CON) 20 MEQ tablet Take 1 tablet by mouth once daily.      pregabalin (LYRICA) 100 MG capsule Take 1 capsule (100 mg total) by mouth 2 (two) times daily. 60 capsule 5    pulse oximeter (PULSE OXIMETER) device by Apply Externally route 2 (two) times a day. Use twice daily at 8 AM and 3 PM and record the value in [facility] as directed. (Patient not taking: Reported on 10/6/2023) 1 each 0    rosuvastatin (CRESTOR) 40 MG Tab Take 40 mg by mouth.      saw palmetto 160 MG capsule Take 160 mg by mouth 2 (two) times daily.      sildenafiL (VIAGRA) 100 MG tablet Take 100 mg by mouth.      SUPREP BOWEL PREP KIT 17.5-3.13-1.6 gram SolR Take by mouth.      tamsulosin (FLOMAX) 0.4 mg Cap Take 1 capsule (0.4 mg total) by mouth once daily. 90 capsule 3    TRELEGY ELLIPTA 100-62.5-25 mcg DsDv Inhale 1 puff into the lungs.       Current Facility-Administered Medications   Medication Dose Route Frequency Provider Last Rate Last Admin    acetaminophen tablet 650 mg  650 mg Oral Once PRN Rishabh Ware MD        EPINEPHrine (EPIPEN) 0.3 mg/0.3 mL pen injection 0.3 mg  0.3 mg Intramuscular PRN  "Rishabh Ware MD        methylPREDNISolone sodium succinate injection 40 mg  40 mg Intravenous Once PRN Rishabh Ware MD        ondansetron disintegrating tablet 4 mg  4 mg Oral Once PRN Rishabh Ware MD        sodium chloride 0.9% 500 mL flush bag   Intravenous PRN Rishabh Ware MD        sodium chloride 0.9% flush 10 mL  10 mL Intravenous PRN Rishabh Ware MD         Facility-Administered Medications Ordered in Other Visits   Medication Dose Route Frequency Provider Last Rate Last Admin    0.9%  NaCl infusion   Intravenous Continuous Gume Nagy  mL/hr at 05/16/22 1005 New Bag at 05/16/22 1005       Objective:      Vitals:    04/26/24 1113   BP: (!) 144/78   BP Location: Right arm   Patient Position: Sitting   Pulse: 77   SpO2: 98%   Weight: 81.9 kg (180 lb 8.9 oz)   Height: 5' 7" (1.702 m)         Physical Exam   Constitutional: He is oriented to person, place, and time. He appears well-developed and well-nourished. No distress.   HENT:   Head: Normocephalic.   Cardiovascular: Normal rate, regular rhythm and normal heart sounds.   No murmur heard.  Pulmonary/Chest: Effort normal. He has no wheezes. He has no rhonchi. He has rales (bilateral  rales to mid lung zone).   Abdominal: Soft. Bowel sounds are normal. He exhibits no distension. There is no abdominal tenderness.   Musculoskeletal:         General: Edema present. Normal range of motion.      Cervical back: Normal range of motion.   Neurological: He is alert and oriented to person, place, and time.   Skin: Skin is warm and dry. He is not diaphoretic. No cyanosis. Nails show no clubbing.   Psychiatric: He has a normal mood and affect. His behavior is normal. Judgment and thought content normal.     Personal Diagnostic Review    PFTs: 8/16/21: no obstruction, mild restriction, moderately reduced DLCO    6MWT: 9/21/21: decreased exercise tolerance, but no supplemental O2 requirements.    X-Ray Chest PA And " Lateral  Narrative: EXAMINATION:  XR CHEST PA AND LATERAL    CLINICAL HISTORY:  Shortness of breath    COMPARISON:  Chest x-ray, 03/15/2023    FINDINGS:  Stable mild reticular opacities in the lung bases.  Lungs are hyperexpanded indicating emphysema.  Prior CABG.  Heart size top normal.  No pleural effusion or pneumothorax. The bones are intact.  Impression: Emphysema and chronic lung scarring in the lung bases but no acute lung infiltrate or pulmonary edema.    Electronically signed by: Joshua Davidson MD  Date:    08/09/2023  Time:    11:57    CTChest 8/18/21:  I personally reviewed the images and agree with the radiology read as below:     Impression:   Constellation of findings suggestive of interstitial lung disease such as UIP or less likely NSIP.  Centrilobular emphysema in an upper lobe predominant distribution.      Postoperative changes of CABG.        Assessment:     Orders Placed This Encounter   Procedures    CT Chest Without Contrast     Standing Status:   Future     Standing Expiration Date:   4/26/2025     Order Specific Question:   May the Radiologist modify the order per protocol to meet the clinical needs of the patient?     Answer:   Yes    Stress test, pulmonary     Please do walk test on room air.   Patient is looking to discontinue oxygen if possible.     Standing Status:   Future     Standing Expiration Date:   4/26/2025     Order Specific Question:   Reason for study     Answer:   Oxygen prescription     Order Specific Question:   Release to patient     Answer:   Immediate     1. JACQUELYN (obstructive sleep apnea)    2. ILD (interstitial lung disease)    3. Interstitial pulmonary disease, unspecified            Plan:       Problem List Items Addressed This Visit          Pulmonary    ILD (interstitial lung disease)    Current Assessment & Plan     Concern that his condition may be evolving despite use of an anti-fibrotic.  CT Chest to evaluate parenchyma.  Continue ofev.          Relevant Orders     Stress test, pulmonary    CT Chest Without Contrast    Chronic respiratory failure with hypoxia    Current Assessment & Plan     2/2 ILD  Will repeat walk test to determine if patient really needs oxygen supplementation with exertion.             Cardiac/Vascular    PAD (peripheral artery disease)    Overview       2/2006     L SFA  PTAS   8 x 40 mm and 6.0 x 120 Protege SES          5/2006     R SFA  PTA with 4 and 6.0 mm balloons   R EIA PTAS with 7 mm SES for ISR (Resilient trial)      JOAQUINA 6/2019     R 0.77   L 1.06        US 6/2019    Diameters in mm     CFA 9   SFA 7   POP 6   BTK 4   BTA 3          R , , pSFA 81, mSFA 41, dSFA 18, pPOP 26-36, PT 34, AT 28, DP 23  L , , pSFA 219-136, mSFA 89, dSFA 25, POP 35, PT 59, AT 30, DP 03                     Current Assessment & Plan     Advised patient to follow up with his cardiologist to address PAD.            Other    JACQUELYN (obstructive sleep apnea) - Primary     Other Visit Diagnoses       Interstitial pulmonary disease, unspecified        Relevant Orders    CT Chest Without Contrast

## 2024-04-29 ENCOUNTER — HOSPITAL ENCOUNTER (OUTPATIENT)
Dept: RADIOLOGY | Facility: HOSPITAL | Age: 79
Discharge: HOME | End: 2024-04-29
Attending: INTERNAL MEDICINE
Payer: MEDICARE

## 2024-04-29 ENCOUNTER — HOSPITAL ENCOUNTER (OUTPATIENT)
Dept: PULMONOLOGY | Facility: CLINIC | Age: 79
Discharge: HOME | End: 2024-04-29
Payer: MEDICARE

## 2024-04-29 VITALS — BODY MASS INDEX: 25.76 KG/M2 | HEIGHT: 68 IN | WEIGHT: 170 LBS

## 2024-04-29 DIAGNOSIS — J84.9 ILD (INTERSTITIAL LUNG DISEASE): ICD-10-CM

## 2024-04-29 DIAGNOSIS — J84.9 INTERSTITIAL PULMONARY DISEASE, UNSPECIFIED: ICD-10-CM

## 2024-04-29 PROCEDURE — 71250 CT THORAX DX C-: CPT | Mod: TC

## 2024-04-29 PROCEDURE — 71250 CT THORAX DX C-: CPT | Mod: 26,,, | Performed by: RADIOLOGY

## 2024-04-29 PROCEDURE — 94618 PULMONARY STRESS TESTING: CPT | Mod: S$GLB,,, | Performed by: INTERNAL MEDICINE

## 2024-04-29 NOTE — PROCEDURES
Kermit Castro is a 78 y.o.  male patient, who presents for a 6 minute walk test ordered by MD Boo.  The diagnosis is Interstitial Lung Disease.  The patient's BMI is 25.9 kg/m2.  Predicted distance (lower limit of normal) is 293.44 meters.      Test Results:    The test was completed with stops.  The patient stopped 1 time for a total of 20 seconds.  The total time walked was 340 seconds.  During walking, the patient reported:  Dyspnea, Leg pain, Lightheadedness. The patient used supplemental oxygen and a cane for assistance during testing.     04/29/2024---------Distance: 243.84 meters (800 feet)     O2 Sat % Supplemental Oxygen Heart Rate Blood Pressure Neal Scale   Pre-exercise  (Resting) 100 % 2 L/M 63 bpm 152/92 mmHg 0   During Exercise 98 % 2 L/M 77 bpm 199/78 mmHg 1   Post-exercise  (Recovery) 100 % 2 L/M  61 bpm 163/85 mmHg      Recovery Time: 252 seconds    Performing nurse/tech: AVILA Murphy      PREVIOUS STUDY:   10/06/2023---------Distance: 267.31 meters (877 feet)       O2 Sat % Supplemental Oxygen Heart Rate Blood Pressure Nael Scale   Pre-exercise  (Resting) 98 % 2 L/M 84 bpm 160/80 mmHg 0   During Exercise 97 % 2 L/M 90 bpm 196/87 mmHg 2   Post-exercise  (Recovery) 98 % 2 L/M  80 bpm 178/63 mmHg         CLINICAL INTERPRETATION:  Six minute walk distance is 243.84 meters (800 feet) with very light dyspnea.  During exercise, there was no significant desaturation while breathing supplemental oxygen.  Blood pressure increased significantly and Heart rate remained stable with walking.  Hypertension was present prior to exercise.  This may represent a hypertensive response to exercise.  The patient reported non-pulmonary symptoms during exercise.  Significant exercise impairment is likely due to cardiovascular causes and subjective symptoms.  The patient did complete the study, walking 340 seconds of the 360 second test.  Since the previous study in October 2023, exercise capacity  is unchanged.  Based upon age and body mass index, exercise capacity is less than predicted.

## 2024-10-09 ENCOUNTER — TELEPHONE (OUTPATIENT)
Dept: PULMONOLOGY | Facility: CLINIC | Age: 79
End: 2024-10-09
Payer: MEDICARE

## 2024-10-09 DIAGNOSIS — R06.02 SOB (SHORTNESS OF BREATH): Primary | ICD-10-CM

## 2024-10-09 NOTE — TELEPHONE ENCOUNTER
----- Message from Alicia sent at 10/9/2024  1:02 PM CDT -----  Regarding: Appt  Contact: Sloane  [phone]  Sloane/ wife is calling to schedule appt for pt to see if oxygen machine is still needed please call

## 2024-10-09 NOTE — TELEPHONE ENCOUNTER
Call was returned to patient wife (Ms. Anton) in regards to scheduling a follow up visit. Patient wife states he haven't been using his oxygen concentrator. I informed her that patient will need a 6MWT and visit. Patient is scheduled with the first available appointment on 11/12/24 at 3 PM with Dr Mesa. Appointment mailed. She confirmed and verbalized understanding.

## 2024-11-11 ENCOUNTER — OFFICE VISIT (OUTPATIENT)
Dept: PULMONOLOGY | Facility: CLINIC | Age: 79
End: 2024-11-11
Payer: MEDICARE

## 2024-11-11 ENCOUNTER — HOSPITAL ENCOUNTER (OUTPATIENT)
Dept: PULMONOLOGY | Facility: CLINIC | Age: 79
Discharge: HOME | End: 2024-11-11
Payer: MEDICARE

## 2024-11-11 VITALS — HEIGHT: 68 IN | WEIGHT: 185 LBS | BODY MASS INDEX: 28.04 KG/M2

## 2024-11-11 VITALS
HEART RATE: 88 BPM | WEIGHT: 185 LBS | OXYGEN SATURATION: 96 % | SYSTOLIC BLOOD PRESSURE: 156 MMHG | DIASTOLIC BLOOD PRESSURE: 90 MMHG | BODY MASS INDEX: 28.04 KG/M2 | HEIGHT: 68 IN

## 2024-11-11 DIAGNOSIS — J84.10 COMBINED PULMONARY FIBROSIS AND EMPHYSEMA (CPFE): Primary | ICD-10-CM

## 2024-11-11 DIAGNOSIS — J84.9 ILD (INTERSTITIAL LUNG DISEASE): ICD-10-CM

## 2024-11-11 DIAGNOSIS — J43.9 COMBINED PULMONARY FIBROSIS AND EMPHYSEMA (CPFE): Primary | ICD-10-CM

## 2024-11-11 DIAGNOSIS — R06.02 SOB (SHORTNESS OF BREATH): ICD-10-CM

## 2024-11-11 PROCEDURE — 94618 PULMONARY STRESS TESTING: CPT | Mod: S$GLB,,, | Performed by: INTERNAL MEDICINE

## 2024-11-11 PROCEDURE — 3077F SYST BP >= 140 MM HG: CPT | Mod: CPTII,S$GLB,, | Performed by: INTERNAL MEDICINE

## 2024-11-11 PROCEDURE — 1157F ADVNC CARE PLAN IN RCRD: CPT | Mod: CPTII,S$GLB,, | Performed by: INTERNAL MEDICINE

## 2024-11-11 PROCEDURE — 99999 PR PBB SHADOW E&M-EST. PATIENT-LVL IV: CPT | Mod: PBBFAC,,, | Performed by: INTERNAL MEDICINE

## 2024-11-11 PROCEDURE — 1126F AMNT PAIN NOTED NONE PRSNT: CPT | Mod: CPTII,S$GLB,, | Performed by: INTERNAL MEDICINE

## 2024-11-11 PROCEDURE — 1159F MED LIST DOCD IN RCRD: CPT | Mod: CPTII,S$GLB,, | Performed by: INTERNAL MEDICINE

## 2024-11-11 PROCEDURE — 99214 OFFICE O/P EST MOD 30 MIN: CPT | Mod: 25,S$GLB,, | Performed by: INTERNAL MEDICINE

## 2024-11-11 PROCEDURE — 3080F DIAST BP >= 90 MM HG: CPT | Mod: CPTII,S$GLB,, | Performed by: INTERNAL MEDICINE

## 2024-11-11 NOTE — PROGRESS NOTES
History & Physical  [facility]    SUBJECTIVE:     Chief Complaint:   ILD    History of Present Illness:  Kermit Castro is a 79 y.o. male who presents for follow up of ILD. Today is my first visit with the patient. He was previously seen by other pulmonary providers in the department.    He experiences dyspnea with exertion.     He takes ofev BID.     He has seen dermatology for Prurigo nodularis.     He and his wife are reporting that his home oxygen is very expensive and they wonder if he really needs it at all.      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     Quit smoking smoking in 1996.  30 years of smoking 1ppd    PMH: CAD s/p CABG 1998     Review of patient's allergies indicates:   Allergen Reactions    Shellfish containing products Anaphylaxis       Past Medical History:   Diagnosis Date    Coronary artery disease     Hyperlipidemia     Hypertension      Past Surgical History:   Procedure Laterality Date    ABDOMINAL SURGERY      Polyps in colon removed (noncancerous)    APPENDECTOMY      back surgery (screws & okate)      CORONARY ANGIOGRAPHY N/A 5/16/2022    Procedure: ANGIOGRAM, CORONARY ARTERY;  Surgeon: Gume Nagy MD;  Location: [facility] CATH LAB;  Service: Cardiology;  Laterality: N/A;    CORONARY ARTERY BYPASS GRAFT      1998    CORONARY BYPASS GRAFT ANGIOGRAPHY  5/16/2022    Procedure: Bypass graft study;  Surgeon: Gume Nagy MD;  Location: [facility] CATH LAB;  Service: Cardiology;;    PERCUTANEOUS TRANSLUMINAL ANGIOPLASTY (PTA) OF PERIPHERAL VESSEL N/A 1/27/2022    Procedure: PTA, PERIPHERAL VESSEL;  Surgeon: Gume Nagy MD;  Location: [facility] CATH LAB;  Service: Cardiology;  Laterality: N/A;    TRANSFORAMINAL EPIDURAL INJECTION OF STEROID Right 11/5/2020    Procedure: LUMBAR TRANSFORAMINAL RIGHT L3/4 AND L4/5 DIRECT REFERRAL;  Surgeon: Nicole Toth MD;  Location: [facility] PAIN MGT;  Service: Pain Management;   "Laterality: Right;  NEEDS CONSENT, PT STATES NO LONGER TAKES PLETAL     No family history on file.  Social History     Socioeconomic History    Marital status:    Tobacco Use    Smoking status: Former     Current packs/day: 0.00     Types: Cigarettes     Quit date: 10/5/1998     Years since quittin.1     Passive exposure: Never    Smokeless tobacco: Never   Substance and Sexual Activity    Alcohol use: Not Currently     Alcohol/week: 0.0 standard drinks of alcohol     Comment:     Drug use: Never     Review of Systems:  No pertinent positives.    OBJECTIVE:     Vital Signs  Vitals:    24 1502   BP: (!) 156/90   Pulse: 88   SpO2: 96%   Weight: 83.9 kg (185 lb)   Height: 5' 8" (1.727 m)     Body mass index is 28.13 kg/m².    Physical Exam:  General: no distress  Eyes:  conjunctivae/corneas clear  Nose: no discharge  Neck: trachea midline with no masses appreciated  Lungs:  normal respiratory effort, no wheezes, +rales bilateral bases  Heart: regular rate and rhythm and no murmur  Abdomen: non-distended  Extremities: no cyanosis, no edema, no clubbing  Skin: No rashes or lesions. 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  - Continue ofev as prescribed.    Personal history of nicotine dependence  - Repeat chest CT 2025.    Total professional time spent for the encounter: 30 minutes  Time was spent " preparing to see the patient, reviewing results of prior testing, obtaining and/or reviewing separately obtained history, performing a medically appropriate examination and interview, counseling and educating the patient/family, ordering medications/tests/procedures, referring and communicating with other health care professionals, documenting clinical information in the electronic health record, and independently interpreting results.      Divide Scioto, MD  [facility]

## 2024-11-12 DIAGNOSIS — J84.89 PROGRESSIVE FIBROSING INTERSTITIAL LUNG DISEASE: ICD-10-CM

## 2024-11-12 DIAGNOSIS — J84.9 ILD (INTERSTITIAL LUNG DISEASE): Primary | ICD-10-CM

## 2024-11-12 RX ORDER — NINTEDANIB 150 MG/1
150 CAPSULE ORAL 2 TIMES DAILY
Qty: 60 CAPSULE | Refills: 11 | Status: ACTIVE | OUTPATIENT
Start: 2024-11-12 | End: 2025-11-07

## 2024-11-12 NOTE — PROCEDURES
Kermit Castro is a 79 y.o.   male patient, who presents for a 6 minute walk test ordered by MD Chevy.  The diagnosis is Shortness of Breath.  The patient's BMI is 28.1 kg/m2.  Predicted distance (lower limit of normal) is 274.16 meters.      Test Results:    The test was completed with stops.  The patient stopped 1 times for a total of 57 seconds.  The total time walked was 303 seconds.  During walking, the patient reported:  Dyspnea. The patient used a cane  during testing.     11/12/2024---------Distance: 267.31 meters (877 feet)     O2 Sat % Supplemental Oxygen Heart Rate Blood Pressure Neal Scale   Pre-exercise  (Resting) 98 % Room Air 86 bpm (!) 156/91 mmHg 2   During Exercise 93 % Room Air 125 bpm 198/76 mmHg 5-6   Post-exercise  (Recovery) 98 % Room Air  89 bpm (!) 166/92 mmHg       Recovery Time: 242 seconds    Performing nurse/tech: AVILA Murphy      PREVIOUS STUDY:   The patient had a previous study.  04/29/2024---------Distance: 243.84 meters (800 feet)       O2 Sat % Supplemental Oxygen Heart Rate Blood Pressure Neal Scale   Pre-exercise  (Resting) 100 % 2 L/M 63 bpm 152/92 mmHg 0   During Exercise 98 % 2 L/M 77 bpm 199/78 mmHg 1   Post-exercise  (Recovery) 100 % 2 L/M  61 bpm 163/85 mmHg           CLINICAL INTERPRETATION:  Six minute walk distance is 267.31 meters (877 feet) with heavy dyspnea.  During exercise, there was significant desaturation while breathing room air.  Both blood pressure and heart rate increased significantly with walking.  Hypertension was present prior to exercise.  The patient did not report non-pulmonary symptoms during exercise.  The patient did complete the study, walking 303 seconds of the 360 second test.  Since the previous study in 4/2024, exercise capacity may be somewhat worse.  Based upon age and body mass index, exercise capacity is less than predicted.

## 2025-01-13 ENCOUNTER — LAB VISIT (OUTPATIENT)
Dept: LAB | Facility: HOSPITAL | Age: 80
End: 2025-01-13
Attending: INTERNAL MEDICINE
Payer: MEDICARE

## 2025-01-13 ENCOUNTER — OFFICE VISIT (OUTPATIENT)
Dept: DERMATOLOGY | Facility: CLINIC | Age: 80
End: 2025-01-13
Payer: MEDICARE

## 2025-01-13 ENCOUNTER — TELEPHONE (OUTPATIENT)
Dept: PULMONOLOGY | Facility: CLINIC | Age: 80
End: 2025-01-13
Payer: MEDICARE

## 2025-01-13 DIAGNOSIS — J84.9 ILD (INTERSTITIAL LUNG DISEASE): ICD-10-CM

## 2025-01-13 DIAGNOSIS — L28.2 PRURIGO: ICD-10-CM

## 2025-01-13 DIAGNOSIS — I10 HYPERTENSION, UNSPECIFIED TYPE: Primary | ICD-10-CM

## 2025-01-13 LAB
CCP AB SER IA-ACNC: <0.5 U/ML
CK SERPL-CCNC: 86 U/L (ref 20–200)
CRP SERPL-MCNC: 16.3 MG/L (ref 0–8.2)
RHEUMATOID FACT SERPL-ACNC: <13 IU/ML (ref 0–15)

## 2025-01-13 PROCEDURE — 82550 ASSAY OF CK (CPK): CPT | Performed by: INTERNAL MEDICINE

## 2025-01-13 PROCEDURE — 1159F MED LIST DOCD IN RCRD: CPT | Mod: CPTII,S$GLB,, | Performed by: DERMATOLOGY

## 2025-01-13 PROCEDURE — 83516 IMMUNOASSAY NONANTIBODY: CPT | Mod: 59 | Performed by: INTERNAL MEDICINE

## 2025-01-13 PROCEDURE — 1126F AMNT PAIN NOTED NONE PRSNT: CPT | Mod: CPTII,S$GLB,, | Performed by: DERMATOLOGY

## 2025-01-13 PROCEDURE — 86431 RHEUMATOID FACTOR QUANT: CPT | Performed by: INTERNAL MEDICINE

## 2025-01-13 PROCEDURE — 1160F RVW MEDS BY RX/DR IN RCRD: CPT | Mod: CPTII,S$GLB,, | Performed by: DERMATOLOGY

## 2025-01-13 PROCEDURE — 1157F ADVNC CARE PLAN IN RCRD: CPT | Mod: CPTII,S$GLB,, | Performed by: DERMATOLOGY

## 2025-01-13 PROCEDURE — 86140 C-REACTIVE PROTEIN: CPT | Performed by: INTERNAL MEDICINE

## 2025-01-13 PROCEDURE — 99999 PR PBB SHADOW E&M-EST. PATIENT-LVL IV: CPT | Mod: PBBFAC,,, | Performed by: DERMATOLOGY

## 2025-01-13 PROCEDURE — 86200 CCP ANTIBODY: CPT | Performed by: INTERNAL MEDICINE

## 2025-01-13 PROCEDURE — 1101F PT FALLS ASSESS-DOCD LE1/YR: CPT | Mod: CPTII,S$GLB,, | Performed by: DERMATOLOGY

## 2025-01-13 PROCEDURE — 36415 COLL VENOUS BLD VENIPUNCTURE: CPT | Performed by: INTERNAL MEDICINE

## 2025-01-13 PROCEDURE — 86038 ANTINUCLEAR ANTIBODIES: CPT | Performed by: INTERNAL MEDICINE

## 2025-01-13 PROCEDURE — G2211 COMPLEX E/M VISIT ADD ON: HCPCS | Mod: S$GLB,,, | Performed by: DERMATOLOGY

## 2025-01-13 PROCEDURE — 99213 OFFICE O/P EST LOW 20 MIN: CPT | Mod: S$GLB,,, | Performed by: DERMATOLOGY

## 2025-01-13 PROCEDURE — 3288F FALL RISK ASSESSMENT DOCD: CPT | Mod: CPTII,S$GLB,, | Performed by: DERMATOLOGY

## 2025-01-13 RX ORDER — BETAMETHASONE DIPROPIONATE 0.5 MG/G
CREAM TOPICAL
Qty: 90 G | Refills: 1 | Status: SHIPPED | OUTPATIENT
Start: 2025-01-13 | End: 2025-01-27

## 2025-01-13 NOTE — PROGRESS NOTES
Subjective:      Patient ID:  Kermit Castro is a 79 y.o. male who presents for   Chief Complaint   Patient presents with    Follow-up     Follow up prurigo visit 2023 Dr Bernal:     Prurigo nodularis  -     betamethasone dipropionate (DIPROLENE) 0.05 % ointment; Apply topically 2 (two) times daily. Use to affected areas for up to 2 weeks then take a 1 week break or decrease to 3 times weekly. Do not apply to groin or face. Apply to itchy bumps on arms  Dispense: 135 g; Refill: 2     Prurigo nodularis--prurigo nodules on left arm, abdomen, shoulder. Sparing mid back and right arm  - Only secondary changes of scratching present on exam today. Counseled on nature and etiology and that picking will sustain lesions and delay healing causing them to be painful and itchy. Counseled to avoid picking, scratching / trauma to break the itch-scratch cycle. TCS as below. Can cover with bandage to assist with penetration of medication and prevent trauma. Reason for picking in PN can be multifactorial including pruritus from underlying primary dermatosis (eg, atopic dermatitis), from cause of pruritus with a rash, or from habitual picking / psychiatric condition, such as anxiety  - Keep nails clipped short, wear gloves as night, wear bandages over lesions  - TCS under occlusion  -  OTC antiitch topicals--Sarna, sarna sensitive / cerave anti-itch, capsaicin    He did not think this helped.     Allergy appt  10/2023:  Reviewed dermatology management plan w pt and wife: prescribed topical steroid and otc Sarna or Cerave anti-itch. Re-eval if no improvement     Counseled that physical findings of prurigo nodularis are not typically attributed to drug allergy.  Anxiety and chronic pruritus though, could lead to PN.  Reviewed his meds and those that listed pruritus as poss SE, including oxycodone, clopidogrel, rouvustatin. Finasteride has anxiety listed as poss SE.  Counseled that testing not available to determine if any of these  meds are responsible for PN. Can observe if there is any temporal relation between taking any of these and increase in pruritus.   Counseled that PN is commonly attributed to drug allergy or SE.     Fu prn      He states not much better.  His is followed in pulmonary med for his respiratory failure, due for labs (see note Dr Mesa)  He has multiple medical problems had been seen per FP in [place] no recent visit, [place] [facility] [place]   [place] labs 3/2023.  Patient Active Problem List:     PAD (peripheral artery disease)     SOB (shortness of breath)     Atherosclerosis of native artery of right lower extremity with intermittent claudication     Mixed hyperlipidemia     Essential hypertension     Coronary artery disease of native artery of native heart with stable angina pectoris     Chronic pain syndrome     Failed back surgical syndrome     Chronic, continuous use of opioids     Lumbar radiculopathy     Chronic pain     ILD (interstitial lung disease)     Abnormal finding of blood chemistry, unspecified      Hx of CABG     Leg swelling     Pain in both lower extremities     Hearing loss     Heart failure with preserved ejection fraction     Chronic respiratory failure with hypoxia     JACQUELYN (obstructive sleep apnea)     Combined pulmonary fibrosis and emphysema (CPFE)         Itching - Initial  Affected locations: scalp, left arm, right arm, right ear, left ear, chest, torso, back and abdomen  Signs / symptoms: itching      Review of Systems   Constitutional:  Negative for fever, chills, weight loss, weight gain, fatigue, night sweats and malaise.   Skin:  Positive for itching, daily sunscreen use and activity-related sunscreen use. Negative for wears hat.   Hematologic/Lymphatic: Bruises/bleeds easily.       Objective:   Physical Exam   Constitutional: He appears well-developed and well-nourished.   Neurological: He is alert and oriented to person, place, and time.   Psychiatric: He has a normal mood and  affect.   Skin:   Areas Examined (abnormalities noted in diagram):   Back Inspection Performed  LUE Inspection Performed            Diagram Legend     Erythematous scaling macule/papule c/w actinic keratosis       Vascular papule c/w angioma      Pigmented verrucoid papule/plaque c/w seborrheic keratosis      Yellow umbilicated papule c/w sebaceous hyperplasia      Irregularly shaped tan macule c/w lentigo     1-2 mm smooth white papules consistent with Milia      Movable subcutaneous cyst with punctum c/w epidermal inclusion cyst      Subcutaneous movable cyst c/w pilar cyst      Firm pink to brown papule c/w dermatofibroma      Pedunculated fleshy papule(s) c/w skin tag(s)      Evenly pigmented macule c/w junctional nevus     Mildly variegated pigmented, slightly irregular-bordered macule c/w mildly atypical nevus      Flesh colored to evenly pigmented papule c/w intradermal nevus       Pink pearly papule/plaque c/w basal cell carcinoma      Erythematous hyperkeratotic cursted plaque c/w SCC      Surgical scar with no sign of skin cancer recurrence      Open and closed comedones      Inflammatory papules and pustules      Verrucoid papule consistent consistent with wart     Erythematous eczematous patches and plaques     Dystrophic onycholytic nail with subungual debris c/w onychomycosis     Umbilicated papule    Erythematous-base heme-crusted tan verrucoid plaque consistent with inflamed seborrheic keratosis     Erythematous Silvery Scaling Plaque c/w Psoriasis     See annotation      Assessment / Plan:    Prurigo  Try dermeleve lotion  -     betamethasone dipropionate 0.05 % cream; Use bid on arm  Dispense: 90 g; Refill: 1  Explained to pt and wife needs follow up by PCP chronic medical conditions can cause the pruritus, referrral made:        Ambulatory referral/consult to Internal Medicine; Future; Expected date: 01/20/2025  (see HPI)    No hot water  Can consider nUV tx here. However pt lives in [place] so  would be difficult to use.

## 2025-01-13 NOTE — TELEPHONE ENCOUNTER
----- Message from Judith Mcguire MD sent at 1/13/2025 10:17 AM CST -----  Regarding: labs  He needs to schedule labs ordered last fall,( he did not understand how to schedule)  Thank you

## 2025-01-14 LAB — ANA SER QL IF: NORMAL

## 2025-01-27 ENCOUNTER — OFFICE VISIT (OUTPATIENT)
Dept: PRIMARY CARE CLINIC | Facility: CLINIC | Age: 80
End: 2025-01-27
Payer: MEDICARE

## 2025-01-27 ENCOUNTER — TELEPHONE (OUTPATIENT)
Dept: PRIMARY CARE CLINIC | Facility: CLINIC | Age: 80
End: 2025-01-27
Payer: MEDICARE

## 2025-01-27 VITALS
DIASTOLIC BLOOD PRESSURE: 70 MMHG | WEIGHT: 187.94 LBS | OXYGEN SATURATION: 98 % | BODY MASS INDEX: 28.58 KG/M2 | HEART RATE: 78 BPM | SYSTOLIC BLOOD PRESSURE: 142 MMHG

## 2025-01-27 DIAGNOSIS — J84.9 ILD (INTERSTITIAL LUNG DISEASE): ICD-10-CM

## 2025-01-27 DIAGNOSIS — J84.10 COMBINED PULMONARY FIBROSIS AND EMPHYSEMA (CPFE): ICD-10-CM

## 2025-01-27 DIAGNOSIS — N13.8 BPH WITH OBSTRUCTION/LOWER URINARY TRACT SYMPTOMS: ICD-10-CM

## 2025-01-27 DIAGNOSIS — E78.2 MIXED HYPERLIPIDEMIA: ICD-10-CM

## 2025-01-27 DIAGNOSIS — R73.03 PREDIABETES: ICD-10-CM

## 2025-01-27 DIAGNOSIS — Z76.89 ENCOUNTER TO ESTABLISH CARE: Primary | ICD-10-CM

## 2025-01-27 DIAGNOSIS — J96.11 CHRONIC RESPIRATORY FAILURE WITH HYPOXIA: ICD-10-CM

## 2025-01-27 DIAGNOSIS — N40.1 BPH WITH OBSTRUCTION/LOWER URINARY TRACT SYMPTOMS: ICD-10-CM

## 2025-01-27 DIAGNOSIS — J43.9 COMBINED PULMONARY FIBROSIS AND EMPHYSEMA (CPFE): ICD-10-CM

## 2025-01-27 DIAGNOSIS — I10 ESSENTIAL HYPERTENSION: ICD-10-CM

## 2025-01-27 DIAGNOSIS — K21.9 GASTROESOPHAGEAL REFLUX DISEASE, UNSPECIFIED WHETHER ESOPHAGITIS PRESENT: ICD-10-CM

## 2025-01-27 DIAGNOSIS — Z95.1 HX OF CABG: ICD-10-CM

## 2025-01-27 DIAGNOSIS — I73.9 PAD (PERIPHERAL ARTERY DISEASE): ICD-10-CM

## 2025-01-27 DIAGNOSIS — L28.1 PRURIGO NODULARIS: ICD-10-CM

## 2025-01-27 DIAGNOSIS — M54.16 LUMBAR RADICULOPATHY: ICD-10-CM

## 2025-01-27 DIAGNOSIS — R79.9 ABNORMAL FINDING OF BLOOD CHEMISTRY, UNSPECIFIED: ICD-10-CM

## 2025-01-27 DIAGNOSIS — R33.9 URINARY RETENTION: ICD-10-CM

## 2025-01-27 LAB
ALBUMIN SERPL BCP-MCNC: 3.5 G/DL (ref 3.5–5.2)
ALP SERPL-CCNC: 63 U/L (ref 40–150)
ALT SERPL W/O P-5'-P-CCNC: 9 U/L (ref 10–44)
ANION GAP SERPL CALC-SCNC: 11 MMOL/L (ref 8–16)
AST SERPL-CCNC: 13 U/L (ref 10–40)
BILIRUB SERPL-MCNC: 0.8 MG/DL (ref 0.1–1)
BUN SERPL-MCNC: 7 MG/DL (ref 8–23)
CALCIUM SERPL-MCNC: 8.7 MG/DL (ref 8.7–10.5)
CHLORIDE SERPL-SCNC: 106 MMOL/L (ref 95–110)
CHOLEST SERPL-MCNC: 191 MG/DL (ref 120–199)
CHOLEST/HDLC SERPL: 3.7 {RATIO} (ref 2–5)
CO2 SERPL-SCNC: 27 MMOL/L (ref 23–29)
CREAT SERPL-MCNC: 0.8 MG/DL (ref 0.5–1.4)
EST. GFR  (NO RACE VARIABLE): >60 ML/MIN/1.73 M^2
ESTIMATED AVG GLUCOSE: 131 MG/DL (ref 68–131)
GLUCOSE SERPL-MCNC: 99 MG/DL (ref 70–110)
HBA1C MFR BLD: 6.2 % (ref 4–5.6)
HDLC SERPL-MCNC: 52 MG/DL (ref 40–75)
HDLC SERPL: 27.2 % (ref 20–50)
LDLC SERPL CALC-MCNC: 119.4 MG/DL (ref 63–159)
NONHDLC SERPL-MCNC: 139 MG/DL
POTASSIUM SERPL-SCNC: 3.7 MMOL/L (ref 3.5–5.1)
PROT SERPL-MCNC: 6.8 G/DL (ref 6–8.4)
SODIUM SERPL-SCNC: 144 MMOL/L (ref 136–145)
TRIGL SERPL-MCNC: 98 MG/DL (ref 30–150)
TSH SERPL DL<=0.005 MIU/L-ACNC: 1.72 UIU/ML (ref 0.4–4)

## 2025-01-27 PROCEDURE — 1159F MED LIST DOCD IN RCRD: CPT | Mod: CPTII,S$GLB,,

## 2025-01-27 PROCEDURE — 1157F ADVNC CARE PLAN IN RCRD: CPT | Mod: CPTII,S$GLB,,

## 2025-01-27 PROCEDURE — 80061 LIPID PANEL: CPT

## 2025-01-27 PROCEDURE — 3077F SYST BP >= 140 MM HG: CPT | Mod: CPTII,S$GLB,,

## 2025-01-27 PROCEDURE — 85025 COMPLETE CBC W/AUTO DIFF WBC: CPT

## 2025-01-27 PROCEDURE — 83036 HEMOGLOBIN GLYCOSYLATED A1C: CPT

## 2025-01-27 PROCEDURE — 84443 ASSAY THYROID STIM HORMONE: CPT

## 2025-01-27 PROCEDURE — 1160F RVW MEDS BY RX/DR IN RCRD: CPT | Mod: CPTII,S$GLB,,

## 2025-01-27 PROCEDURE — 80053 COMPREHEN METABOLIC PANEL: CPT

## 2025-01-27 PROCEDURE — 3078F DIAST BP <80 MM HG: CPT | Mod: CPTII,S$GLB,,

## 2025-01-27 PROCEDURE — 99999 PR PBB SHADOW E&M-EST. PATIENT-LVL V: CPT | Mod: PBBFAC,,,

## 2025-01-27 PROCEDURE — 99205 OFFICE O/P NEW HI 60 MIN: CPT | Mod: S$GLB,,,

## 2025-01-27 PROCEDURE — 36415 COLL VENOUS BLD VENIPUNCTURE: CPT

## 2025-01-27 PROCEDURE — 1125F AMNT PAIN NOTED PAIN PRSNT: CPT | Mod: CPTII,S$GLB,,

## 2025-01-27 RX ORDER — TAMSULOSIN HYDROCHLORIDE 0.4 MG/1
0.4 CAPSULE ORAL DAILY
Qty: 90 CAPSULE | Refills: 3 | Status: SHIPPED | OUTPATIENT
Start: 2025-01-27 | End: 2025-01-31

## 2025-01-27 RX ORDER — AMLODIPINE BESYLATE 10 MG/1
10 TABLET ORAL DAILY
Qty: 30 TABLET | Refills: 11 | Status: SHIPPED | OUTPATIENT
Start: 2025-01-27 | End: 2025-01-31

## 2025-01-27 RX ORDER — IPRATROPIUM BROMIDE AND ALBUTEROL SULFATE 2.5; .5 MG/3ML; MG/3ML
3 SOLUTION RESPIRATORY (INHALATION) EVERY 6 HOURS PRN
Qty: 90 ML | Refills: 11 | Status: SHIPPED | OUTPATIENT
Start: 2025-01-27 | End: 2025-01-31

## 2025-01-27 RX ORDER — FINASTERIDE 5 MG/1
5 TABLET, FILM COATED ORAL DAILY
Qty: 30 TABLET | Refills: 11 | Status: SHIPPED | OUTPATIENT
Start: 2025-01-27 | End: 2025-01-31

## 2025-01-27 RX ORDER — ALBUTEROL SULFATE 2.5 MG/.5ML
2.5 SOLUTION RESPIRATORY (INHALATION) EVERY 4 HOURS PRN
Qty: 30 EACH | Refills: 0 | Status: SHIPPED | OUTPATIENT
Start: 2025-01-27 | End: 2025-01-31

## 2025-01-27 RX ORDER — METOPROLOL SUCCINATE 100 MG/1
100 TABLET, EXTENDED RELEASE ORAL DAILY
Qty: 30 TABLET | Refills: 11 | Status: SHIPPED | OUTPATIENT
Start: 2025-01-27 | End: 2025-01-31

## 2025-01-27 RX ORDER — BETAMETHASONE DIPROPIONATE 0.5 MG/G
OINTMENT TOPICAL 2 TIMES DAILY
Qty: 135 G | Refills: 2 | Status: SHIPPED | OUTPATIENT
Start: 2025-01-27 | End: 2025-01-31

## 2025-01-27 RX ORDER — ROSUVASTATIN CALCIUM 40 MG/1
40 TABLET, COATED ORAL NIGHTLY
Qty: 30 TABLET | Refills: 11 | Status: SHIPPED | OUTPATIENT
Start: 2025-01-27 | End: 2025-01-31

## 2025-01-27 RX ORDER — PANTOPRAZOLE SODIUM 40 MG/1
40 TABLET, DELAYED RELEASE ORAL
Qty: 30 TABLET | Refills: 11 | Status: SHIPPED | OUTPATIENT
Start: 2025-01-27 | End: 2025-01-31

## 2025-01-27 RX ORDER — CLOPIDOGREL BISULFATE 75 MG/1
TABLET ORAL
Qty: 34 TABLET | Refills: 11 | Status: SHIPPED | OUTPATIENT
Start: 2025-01-27 | End: 2025-01-31

## 2025-01-27 RX ORDER — LISINOPRIL 40 MG/1
40 TABLET ORAL DAILY
Qty: 30 TABLET | Refills: 11 | Status: SHIPPED | OUTPATIENT
Start: 2025-01-27 | End: 2025-01-31

## 2025-01-27 RX ORDER — FLUTICASONE FUROATE, UMECLIDINIUM BROMIDE AND VILANTEROL TRIFENATATE 100; 62.5; 25 UG/1; UG/1; UG/1
1 POWDER RESPIRATORY (INHALATION) DAILY
Qty: 60 EACH | Refills: 11 | Status: SHIPPED | OUTPATIENT
Start: 2025-01-27 | End: 2025-01-31

## 2025-01-27 NOTE — TELEPHONE ENCOUNTER
----- Message from Saray sent at 1/27/2025  2:56 PM CST -----  Contact: [phone] Patient  [email or web address]     Patient is calling for Medical Advice regarding: Pt states left walking cane after appt and will have to come tomorrow for pickup. Cannot fight that traffic from [place].    How long has patient had these symptoms:    Pharmacy name and phone#:    Patient wants a call back or thru [facility]: call back, if necessary    Comments:    Please advise patient replies from provider may take up to 48 hours.

## 2025-01-28 PROBLEM — L28.1 PRURIGO NODULARIS: Status: ACTIVE | Noted: 2025-01-28

## 2025-01-28 LAB
BASOPHILS # BLD AUTO: 0.03 K/UL (ref 0–0.2)
BASOPHILS NFR BLD: 0.4 % (ref 0–1.9)
DIFFERENTIAL METHOD BLD: ABNORMAL
EOSINOPHIL # BLD AUTO: 0.1 K/UL (ref 0–0.5)
EOSINOPHIL NFR BLD: 1.5 % (ref 0–8)
ERYTHROCYTE [DISTWIDTH] IN BLOOD BY AUTOMATED COUNT: 15.2 % (ref 11.5–14.5)
HCT VFR BLD AUTO: 42.3 % (ref 40–54)
HGB BLD-MCNC: 13.8 G/DL (ref 14–18)
IMM GRANULOCYTES # BLD AUTO: 0.13 K/UL (ref 0–0.04)
IMM GRANULOCYTES NFR BLD AUTO: 1.7 % (ref 0–0.5)
LYMPHOCYTES # BLD AUTO: 1.6 K/UL (ref 1–4.8)
LYMPHOCYTES NFR BLD: 20.7 % (ref 18–48)
MCH RBC QN AUTO: 28.7 PG (ref 27–31)
MCHC RBC AUTO-ENTMCNC: 32.6 G/DL (ref 32–36)
MCV RBC AUTO: 88 FL (ref 82–98)
MONOCYTES # BLD AUTO: 0.7 K/UL (ref 0.3–1)
MONOCYTES NFR BLD: 9 % (ref 4–15)
NEUTROPHILS # BLD AUTO: 5 K/UL (ref 1.8–7.7)
NEUTROPHILS NFR BLD: 66.7 % (ref 38–73)
NRBC BLD-RTO: 0 /100 WBC
PLATELET # BLD AUTO: 353 K/UL (ref 150–450)
PMV BLD AUTO: 10.3 FL (ref 9.2–12.9)
RBC # BLD AUTO: 4.81 M/UL (ref 4.6–6.2)
WBC # BLD AUTO: 7.55 K/UL (ref 3.9–12.7)

## 2025-01-28 NOTE — ASSESSMENT & PLAN NOTE
- Severe reduced DLCO as seen in emphysema with notable reduction in TLC seen in fibrosis  - Manage with current therapies and f/u with pulmonology as indicated

## 2025-01-28 NOTE — ASSESSMENT & PLAN NOTE
Lab Results   Component Value Date    CHOL 191 01/27/2025    HDL 52 01/27/2025    LDLCALC 119.4 01/27/2025    TRIG 98 01/27/2025   - Cholesterol/triglycerides well managed  - Continue statin therapy and low cholesterol diet  - Aerobic exercise as tolerated, goal 150min/week  - Trend future lipid panel.

## 2025-01-28 NOTE — PROGRESS NOTES
[facility]      Subjective     Patient Name: Kermit Castro  YOB: 1945  Patient Age: 79 y.o.  Patient Sex: male  Patient Phone: [phone]  PCP: Abdiaziz Lim MD  Last PCP Appointment: 1/27/2025    History of Present Illness    CHIEF COMPLAINT:  Mr. Castro presents today with his wife to establish care.    DERMATOLOGIC:  He presents with rash and lesions on multiple areas of the body including around the ears, head, and oral cavity (specifically under the tongue). 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    PULMONARY:  He has a history of recurrent pneumonia, including episodes of bilateral pneumonia with persistent cough following these infections. He currently takes Ofev for lung fibrosis and obstructive pathology but reports no improvement. He denies acute changes or significant worsening in breathing. 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    CARDIOVASCULAR:  He has a cardiac history including multiple stents. He was formerly under the care of Dr. Nagy Interventional Cardiology. He is on long term DAPT therapy but recently stopped taking plavix.    MUSCULOSKELETAL:  He has chronic back pain with history of back surgery. The pain is constant, worsens at night, with no alleviating factors. Multiple attempts at physical therapy were discontinued due to increased pain. He currently takes Percocet for pain management. He reports limited mobility and exercise tolerance, particularly with walking. Ambulates with cane assistance.    GENITOURINARY:  He reports difficulty urinating, currently managed with Tamsulosin and Finasteride with good response.    CURRENT MEDICATIONS:  He takes Lasix, Metoprolol, and Lisinopril for blood pressure management, Albuterol inhaler, Ofev, Percocet, Tamsulosin, and Finasteride. He previously took Lyrica (pregabalin) but has  discontinued.    ROS:  ROS is negative unless otherwise indicated in HPI.         Health Maintenance Summary            Overdue - TETANUS VACCINE (Every 10 Years) Never done      No completion history exists for this topic.              Overdue - RSV Vaccine (Age 60+ and Pregnant patients) (1 - 1-dose 75+ series) Never done      No completion history exists for this topic.              Overdue - Shingles Vaccine (3 of 3) Overdue since 9/22/2023 07/28/2023  Outside Immunization: zoster recombinant    09/11/2017  Outside Immunization: zoster live              Overdue - Influenza Vaccine (1) Overdue since 9/1/2024 12/12/2022  SmartData: WORKFLOW - HEALTHY PLANET - EXTERNAL DATA - EXTERNAL PROCEDURE DATE - INFLUENZA    10/19/2020  Outside Immunization: Influenza, high-dose, quadrivalent, PF    11/18/2019  Outside Immunization: Influenza, high-dose, trivalent, PF    10/24/2018  Outside Immunization: Influenza, high-dose, trivalent, PF    09/11/2017  Outside Immunization: Influenza, high-dose, trivalent, PF    Only the first 5 history entries have been loaded, but more history exists.              Overdue - COVID-19 Vaccine (4 - 2024-25 season) Overdue since 9/1/2024 02/24/2022  Outside Immunization: COVID-19, mRNA, LNP-S, PF, 100 mcg/0.5mL dose or 50 mcg/0.25mL dose    02/12/2021  Outside Immunization: COVID-19, mRNA, LNP-S, PF, 100 mcg/0.5mL dose or 50 mcg/0.25mL dose    01/12/2021  Outside Immunization: COVID-19, mRNA, LNP-S, PF, 100 mcg/0.5mL dose or 50 mcg/0.25mL dose              Lipid Panel (Every 5 Years) Next due on 1/27/2030 01/27/2025  Cholesterol Total component of LIPID PANEL    03/17/2022  Cholesterol Total component of Lipid Panel    10/05/2021  Cholesterol Total component of LIPID PANEL    08/27/2019  Cholesterol Total component of Lipid panel    08/09/2005  Cholesterol Total component of Lipid panel    Only the first 5 history entries have been loaded, but more history exists.               Pneumococcal Vaccines (Age 50+) (Series Information) Completed      07/28/2023  Outside Immunization: Pneumococcal conjugate PCV20, polysaccharide [place]084 conjugate, adjuvant, PF    10/19/2020  Outside Immunization: Pneumococcal conjugate PCV 13                    4Ms for Medical Decision-Making in Older Adults    Last Completed EAWV:  None    MEDICATIONS:  High Risk Medications:  Total Active Medications: 1  oxyCODONE-acetaminophen -  mg    MOBILITY:  Activities of Daily Living:       No data to display              Fall Risk:      1/16/2025     9:00 AM 1/13/2025     9:00 AM 4/26/2024    11:00 AM   Fall Risk Assessment - Outpatient   Mobility Status Ambulatory w/ assistance Ambulatory w/ assistance Ambulatory   Number of falls 2+ 0 1   Identified as fall risk True True False     Disability Status:       No data to display              Nutrition Screening:       No data to display             Screening Score: 0-7 Malnourished, 8-11 At Risk, 12-14 Normal  Get Up and Go:       No data to display              Whisper Test:       No data to display                    MENTATION:   Has Dementia Dx: No  Has Anxiety Dx: No    Depression Patient Health Questionnaire:      1/16/2025     9:14 AM   Depression Patient Health Questionnaire   Over the last two weeks how often have you been bothered by little interest or pleasure in doing things Not at all   Over the last two weeks how often have you been bothered by feeling down, depressed or hopeless Not at all   PHQ-2 Total Score 0     Cognitive Function Screening:       No data to display              Cognitive Function Screening Total - Less than 4 = Abnormal,  Greater than or equal to 4 = Normal        WHAT MATTERS MOST:  Advance Care Planning   ACP Status:   Patient has had an ACP conversation  Living Will: No  Power of : Yes  LaPOST: No    What is most important right now is to focus on avoiding the hospital, remaining as independent as possible, and  symptom/pain control    Accordingly, we have decided that the best plan to meet the patient's goals includes continuing with treatment    What matters most to patient today is: establishing care               Social History     Socioeconomic History    Marital status:    Tobacco Use    Smoking status: Former     Current packs/day: 0.00     Types: Cigarettes     Quit date: 10/5/1998     Years since quittin.3     Passive exposure: Never    Smokeless tobacco: Never   Substance and Sexual Activity    Alcohol use: Not Currently     Alcohol/week: 0.0 standard drinks of alcohol     Comment:     Drug use: Never       Past Medical History:   Diagnosis Date    Coronary artery disease     Hyperlipidemia     Hypertension        Prior to Admission medications    Medication Sig Start Date End Date Taking? Authorizing Provider   aspirin (ECOTRIN) 81 MG EC tablet Take 81 mg by mouth once daily.   Yes Provider, Historical   flu vacc [id number] up,PF (FLUZONE HIGH-DOSE , PF,) 180 mcg/0.5 mL Syrg Fluzone High-Dose  (PF) 180 mcg/0.5 mL intramuscular syringe   PHARMACIST ADMINISTERED IMMUNIZATION ADMINISTERED AT TIME OF DISPENSING   Yes Provider, Historical   multivitamin (THERAGRAN) per tablet Take 1 tablet by mouth once daily.   Yes Provider, Historical   oxyCODONE-acetaminophen (PERCOCET)  mg per tablet oxycod/apap  tab 10-325mg   Yes Provider, Historical   pulse oximeter (PULSE OXIMETER) device by Apply Externally route 2 (two) times a day. Use twice daily at 8 AM and 3 PM and record the value in MyChart as directed. 10/11/21  Yes Julisa Garza PA   albuterol sulfate 2.5 mg/0.5 mL Nebu Take 2.5 mg by nebulization every 4 (four) hours as needed (wheezing/shortness of breath). Rescue 25  Abdiaziz Lim MD   albuterol-ipratropium (DUO-NEB) 2.5 mg-0.5 mg/3 mL nebulizer solution Take 3 mLs by nebulization every 6 (six) hours as needed for Wheezing. Rescue 25   Abdiaziz Lim,  MD   amLODIPine (NORVASC) 10 MG tablet Take 1 tablet (10 mg total) by mouth once daily. 1/27/25   Abdiaziz Lim MD   betamethasone dipropionate (BETANATE) 0.05 % ointment Apply topically 2 (two) times daily. Use to affected areas for up to 2 weeks then take a 1 week break or decrease to 3 times weekly. Do not apply to groin or face. Apply to itchy bumps on arms 1/27/25   Abdiaziz Lim MD   clopidogreL (PLAVIX) 75 mg tablet Take 4 tablets on day 1 then take 1 tablet daily there after. 1/27/25   Abdiaziz iLm MD   ezetimibe (ZETIA) 10 mg tablet Take 1 tablet (10 mg total) by mouth once daily. 4/19/22 4/19/23  Moiz Villalta MD   finasteride (PROSCAR) 5 mg tablet Take 1 tablet (5 mg total) by mouth once daily. 1/27/25   Abdiaziz Lim MD   furosemide (LASIX) 40 MG tablet Take 1 tablet (40 mg total) by mouth once daily. 4/19/22 10/6/23  Moiz Villalta MD   influenza (HIGH-DOSE PF) 240 mcg/0.7 mL vaccine Fluzone High-Dose [id number] (PF) 180 mcg/0.5 mL intramuscular syringe    Provider, Historical   lisinopriL (PRINIVIL,ZESTRIL) 40 MG tablet Take 1 tablet (40 mg total) by mouth once daily. 1/27/25   Abdiaziz Lim MD   lubiprostone (AMITIZA) 24 MCG Cap Amitiza 24 mcg capsule   Take 1 capsule twice a day by oral route with meals for 30 days.  Patient not taking: Reported on 1/27/2025    Provider, Historical   metoprolol succinate (TOPROL-XL) 100 MG 24 hr tablet Take 1 tablet (100 mg total) by mouth once daily. 1/27/25 1/27/26  Abdiaziz Lim MD   nintedanib (OFEV) 150 mg Cap Take 1 capsule (150 mg total) by mouth 2 (two) times a day. 11/12/24 11/7/25  [place] MD   pantoprazole (PROTONIX) 40 MG tablet Take 1 tablet (40 mg total) by mouth before breakfast. 1/27/25   Abdiaziz Lim MD   potassium chloride SA (K-DUR,KLOR-CON) 20 MEQ tablet Take 1 tablet by mouth once daily.    Provider, Historical   rosuvastatin (CRESTOR) 40 MG Tab Take 1 tablet (40 mg total) by mouth every evening. 1/27/25   Abdiaziz Lim MD    sildenafiL (VIAGRA) 100 MG tablet Take 100 mg by mouth. 7/9/21   Provider, Historical   tamsulosin (FLOMAX) 0.4 mg Cap Take 1 capsule (0.4 mg total) by mouth once daily. 1/27/25 1/27/26  Abdiaziz Lim MD   KETTY ELLIPTA 100-62.5-25 mcg DsDv Inhale 1 puff into the lungs once daily. 1/27/25   Abdiaziz Lim MD       OBJECTIVE:     Vitals:    01/27/25 0837   BP: (!) 142/70   BP Location: Left arm   Patient Position: Sitting   Pulse: 78   SpO2: 98%   Weight: 85.2 kg (187 lb 15.1 oz)       Body mass index is 28.58 kg/m².     PHYSICAL EXAM  GEN - A+OX4, NAD, ambulates with walking cane   HEENT - PERRL, EOMI, OP clear  Neck - No thyromegaly or cervical LAD. No thyroid masses felt.  CV - RRR, no m/r   Chest - CTAB, bilateral diffuse crackles with intermittent expiratory wheeze  Abd - S/NT/ND/+BS.   Ext - 2+BDP and radial pulses. 1+ BLEPE.  MSK - normal ROM, no tenderness  Neuro - 5/5 BUE and BLE strength.  LN - No axillary or inguinal LAD appreciated.  Skin - Prurigo nodularis on BLUE, back and head    LABS  Lab Results   Component Value Date    WBC 7.55 01/27/2025    HGB 13.8 (L) 01/27/2025    HCT 42.3 01/27/2025    MCV 88 01/27/2025     01/27/2025         CMP  Sodium   Date Value Ref Range Status   01/27/2025 144 136 - 145 mmol/L Final     Potassium   Date Value Ref Range Status   01/27/2025 3.7 3.5 - 5.1 mmol/L Final     Chloride   Date Value Ref Range Status   01/27/2025 106 95 - 110 mmol/L Final     CO2   Date Value Ref Range Status   01/27/2025 27 23 - 29 mmol/L Final     Glucose   Date Value Ref Range Status   01/27/2025 99 70 - 110 mg/dL Final     BUN   Date Value Ref Range Status   01/27/2025 7 (L) 8 - 23 mg/dL Final     Creatinine   Date Value Ref Range Status   01/27/2025 0.8 0.5 - 1.4 mg/dL Final     Calcium   Date Value Ref Range Status   01/27/2025 8.7 8.7 - 10.5 mg/dL Final     Total Protein   Date Value Ref Range Status   01/27/2025 6.8 6.0 - 8.4 g/dL Final     Albumin   Date Value Ref Range Status    01/27/2025 3.5 3.5 - 5.2 g/dL Final     Total Bilirubin   Date Value Ref Range Status   01/27/2025 0.8 0.1 - 1.0 mg/dL Final     Comment:     For infants and newborns, interpretation of results should be based  on gestational age, weight and in agreement with clinical  observations.    Premature Infant recommended reference ranges:  Up to 24 hours.............<8.0 mg/dL  Up to 48 hours............<12.0 mg/dL  3-5 days..................<15.0 mg/dL  6-29 days.................<15.0 mg/dL       Alkaline Phosphatase   Date Value Ref Range Status   01/27/2025 63 40 - 150 U/L Final     AST   Date Value Ref Range Status   01/27/2025 13 10 - 40 U/L Final     ALT   Date Value Ref Range Status   01/27/2025 9 (L) 10 - 44 U/L Final     Anion Gap   Date Value Ref Range Status   01/27/2025 11 8 - 16 mmol/L Final     eGFR   Date Value Ref Range Status   01/27/2025 >60.0 >60 mL/min/1.73 m^2 Final       ASSESSMENT & PLAN:   Mr. Kermit Castro is a 79 y.o. male who was seen in clinic today to establish care. Diagnosed perrygondularis, a dermatologic inflammation of unknown cause, based on Dr. Mcguire's assessment; manage with betamethasone dipropionate ointment. 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 Evaluated current medication regimen, noting need for organization and potential adjustments. Assessed ongoing back pain, considering referral for physical therapy. Routine fasting labs ordered and will follow up with pt in one week.    1. Encounter to establish care  -     COMPREHENSIVE METABOLIC PANEL; Future; Expected date: 01/27/2025  -     HEMOGLOBIN A1C; Future; Expected date: 01/27/2025  -     CBC W/ AUTO DIFFERENTIAL; Future; Expected date: 01/27/2025  -     TSH; Future; Expected date: 01/27/2025  -     LIPID PANEL; Future; Expected date: 01/27/2025    2. Chronic respiratory failure with  hypoxia  Overview:  - Patient with Hypoxic Respiratory failure which is chronic, not on home oxygen.   - Signs/symptoms of respiratory failure include- increased work of breathing on exertion.   - Contributing diagnoses includes - COPD and Interstitial lung disease   - Labs and images were reviewed.   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     Assessment & Plan:  - Severe reduced DLCO as seen in emphysema with notable reduction in TLC seen in fibrosis  - Manage with current therapies and f/u with pulmonology as indicated    Orders:  -     albuterol-ipratropium (DUO-NEB) 2.5 mg-0.5 mg/3 mL nebulizer solution; Take 3 mLs by nebulization every 6 (six) hours as needed for Wheezing. Rescue  Dispense: 90 mL; Refill: 11  -     albuterol sulfate 2.5 mg/0.5 mL Nebu; Take 2.5 mg by nebulization every 4 (four) hours as needed (wheezing/shortness of breath). Rescue  Dispense: 30 each; Refill: 0  -     TRELEGY ELLIPTA 100-62.5-25 mcg DsDv; Inhale 1 puff into the lungs once daily.  Dispense: 60 each; Refill: 11    4. PAD (peripheral artery disease)  Overview:  - BLE arterial US in 2019 s/f; severe peripheral arterial disease in the left lower extremity,  left anterior distal tibial artery is occluded, left dorsalis pedis artery has retrograde flow, moderate peripheral arterial disease in the right lower extremity, mild amount of atherosclerosis in both lower extremities.  - Severely decreased bilateral ABIs  - Multiple PCIs and coronary stents  - Managed with DAPT (ASA 81mg qd and plavix 75mg qd), ezetimibe 10mg  qd, and rosuvastatin 40mg qhs   - Followed by Dr Nagy (Cardiology)    Assessment & Plan:  - Continue current therapies  - Continue BP and HLD management  - Follow up with Cardiology as scheduled      5. Mixed hyperlipidemia  Overview:  - HLD managed with ezetimibe 10 qd and rosuvastatin 40mg qhs    Assessment & Plan:  Lab Results   Component Value Date    CHOL 191 01/27/2025    HDL 52 01/27/2025    LDLCALC 119.4 01/27/2025    TRIG 98 01/27/2025   - Cholesterol/triglycerides well managed  - Continue statin therapy and low cholesterol diet  - Aerobic exercise as tolerated, goal 150min/week  - Trend future lipid panel.    Orders:  -     rosuvastatin (CRESTOR) 40 MG Tab; Take 1 tablet (40 mg total) by mouth every evening.  Dispense: 30 tablet; Refill: 11  -     amLODIPine (NORVASC) 10 MG tablet; Take 1 tablet (10 mg total) by mouth once daily.  Dispense: 30 tablet; Refill: 11    6. Essential hypertension  Overview:  - HTN managed with toprol 100mg qd, lisinopril 40mg qd, lasix 40mg qd, and amlodipine 10mg qd    Assessment & Plan:  BP Readings from Last 5 Encounters:   01/27/25 (!) 142/70   01/16/25 132/72   11/11/24 (!) 156/90   04/26/24 (!) 144/78   10/06/23 (!) 178/63   - BP elevated but pt is out of some antihypertensive medications  - Reviewed med lists; continue appropriate BP regimen  - Daily home BP checks/logs  - Cardiac diet encouraged  - Aerobic exercise as tolerated, goal 150min/week.    Orders:  -     clopidogreL (PLAVIX) 75 mg tablet; Take 4 tablets on day 1 then take 1 tablet daily there after.  Dispense: 34 tablet; Refill: 11  -     lisinopriL (PRINIVIL,ZESTRIL) 40 MG tablet; Take 1 tablet (40 mg total) by mouth once daily.  Dispense: 30 tablet; Refill: 11  -     metoprolol succinate (TOPROL-XL) 100 MG 24 hr tablet; Take 1 tablet (100 mg total) by mouth once daily.  Dispense: 30 tablet; Refill: 11    7. Hx of CABG  Overview:  - Hx of PCIs with numerous stents placed  - Managed with rosuvastatin 40mg  qhs, ezetimibe 10mg qd, and toprol 100mg qd  - BP controlled with amlodipine 10mg qd, lisinopril 40mg qd, and lasix 40mg qd  - DAPT with ASA 81mg qd, plavix 75mg qd,     Assessment & Plan:  - No recurrent angina  - Continue current therapies    Orders:  -     clopidogreL (PLAVIX) 75 mg tablet; Take 4 tablets on day 1 then take 1 tablet daily there after.  Dispense: 34 tablet; Refill: 11    8. ILD (interstitial lung disease)  Overview:  - Followed by [facility] (Pulmonology)  - Not on home oxygen  - Controlled with Trelegy Ellipta 1 puff qd and Duo-nebs 3ml q6h  - Rescue with PRN CARMELO  - Not longer taking lubiprostone 25mcg qd  - 6MWT 11/11/24: 267 meters, no oxygen desaturation  - PFT 2023: FVC 3.17 (102% of predicted), TLC 4.10 (61% of predicted), DLCO 12.1 (50% of predicted)     Assessment & Plan:  - Continue therapies as indicated  - Refills provided  - F/U with Pulmonology as scheduled    Orders:  -     albuterol-ipratropium (DUO-NEB) 2.5 mg-0.5 mg/3 mL nebulizer solution; Take 3 mLs by nebulization every 6 (six) hours as needed for Wheezing. Rescue  Dispense: 90 mL; Refill: 11  -     albuterol sulfate 2.5 mg/0.5 mL Nebu; Take 2.5 mg by nebulization every 4 (four) hours as needed (wheezing/shortness of breath). Rescue  Dispense: 30 each; Refill: 0    9. Lumbar radiculopathy  Overview:  - Degenerative joint disease  - Lumbar fusion of L2-L3 in 2020  - Associated lumbar radicular pain  - Pt not in PT/OT  - Pain managed with acetaminophen and NSAID therapy with Percocet for breakthrough    Assessment & Plan:  - Encouraged regularly exercise  - Referral placed to outpatient PT/OT  - Discussed first line agents for neuropathic; pt previously tried gabapentin and pregablin but but was unable to tolerate them  - Continue current meds and forllow up with PT?OT     Orders:  -     Ambulatory referral/consult to [facility]; Future; Expected date: 02/03/2025  -     Ambulatory Referral/Consult to  Physical/Occupational Therapy; Future; Expected date: 02/03/2025    10. Prurigo nodularis  Overview:  - Followed by Dr Mcguire (Dermatology)  - Managed with betamethasone dipropionate, topical      Assessment & Plan:  - Continue therapy and f/u as indicated    Orders:  -     betamethasone dipropionate (BETANATE) 0.05 % ointment; Apply topically 2 (two) times daily. Use to affected areas for up to 2 weeks then take a 1 week break or decrease to 3 times weekly. Do not apply to groin or face. Apply to itchy bumps on arms  Dispense: 135 g; Refill: 2    11. BPH with obstruction/lower urinary tract symptoms  -     finasteride (PROSCAR) 5 mg tablet; Take 1 tablet (5 mg total) by mouth once daily.  Dispense: 30 tablet; Refill: 11  -     tamsulosin (FLOMAX) 0.4 mg Cap; Take 1 capsule (0.4 mg total) by mouth once daily.  Dispense: 90 capsule; Refill: 3    12. Urinary retention  -     tamsulosin (FLOMAX) 0.4 mg Cap; Take 1 capsule (0.4 mg total) by mouth once daily.  Dispense: 90 capsule; Refill: 3    13. Gastroesophageal reflux disease, unspecified whether esophagitis present  -     pantoprazole (PROTONIX) 40 MG tablet; Take 1 tablet (40 mg total) by mouth before breakfast.  Dispense: 30 tablet; Refill: 11    14. Abnormal finding of blood chemistry, unspecified  -     HEMOGLOBIN A1C; Future; Expected date: 01/27/2025  -     CBC W/ AUTO DIFFERENTIAL; Future; Expected date: 01/27/2025  -     LIPID PANEL; Future; Expected date: 01/27/2025    15. Prediabetes  -     TSH; Future; Expected date: 01/27/2025         Follow up in about 1 week (around 2/3/2025).     All questions answered. Pt to call/message the Primary Clinic for any additional concerns    I spent a total of 62 minutes on the day of the visit.        Abdiaziz Lim MD  [facility]    This note was generated with the assistance of ambient listening technology. Verbal consent was obtained by the patient and accompanying visitor(s) for the  recording of patient appointment to facilitate this note. I attest to having reviewed and edited the generated note for accuracy, though some syntax or spelling errors may persist. Please contact the author of this note for any clarification.

## 2025-01-28 NOTE — ASSESSMENT & PLAN NOTE
BP Readings from Last 5 Encounters:   01/27/25 (!) 142/70   01/16/25 132/72   11/11/24 (!) 156/90   04/26/24 (!) 144/78   10/06/23 (!) 178/63   - BP elevated but pt is out of some antihypertensive medications  - Reviewed med lists; continue appropriate BP regimen  - Daily home BP checks/logs  - Cardiac diet encouraged  - Aerobic exercise as tolerated, goal 150min/week.

## 2025-01-28 NOTE — ASSESSMENT & PLAN NOTE
- Continue current therapies  - Continue BP and HLD management  - Follow up with Cardiology as scheduled

## 2025-01-28 NOTE — ASSESSMENT & PLAN NOTE
- Encouraged regularly exercise  - Referral placed to outpatient PT/OT  - Discussed first line agents for neuropathic; pt previously tried gabapentin and pregablin but but was unable to tolerate them  - Continue current meds and forllow up with PT?OT

## 2025-01-29 NOTE — PROGRESS NOTES
"DATE: 2/6/2025  PATIENT: Kermit Castro    Attending Physician: Lester Johnson M.D.    HISTORY:  Kermit Castro is a 79 y.o. male with pmhx of L2-3 TLIF who returns to me today for follow up.  He was last seen by me 1/25/2023.  Today he is doing well but notes low back and bilateral leg pain. There is associated numbness and tingling. There is subjective weakness. He presents today using a cane.   The Patient denies myelopathic symptoms such as handwriting changes or difficulty with buttons/coins/keys. Denies perineal paresthesias, bowel/bladder dysfunction.    EXAM:  Ht 5' 8" (1.727 m)   Wt 84.4 kg (186 lb 1.1 oz)   BMI 28.29 kg/m²     My physical examination was notable for the following findings:     antalgic station and gait, no difficulty with toe or heel walk.   Dorsal lumbar skin negative for rashes, lesions, hairy patches and surgical scars. There is mild lumbar tenderness to palpation.  Lumbar range of motion is acceptable.  Global saggital and coronal spinal balance acceptable, not significant for scoliosis and kyphosis.  No pain with the range of motion of the bilateral hips. No trochanteric tenderness to palpation.  Bilateral lower extremities warm and well perfused, dorsalis pedis pulses 2+ bilaterally.  decreased strength and tone in all major motor groups in the bilateral lower extremities. Normal sensation to light touch in the L2-S1 dermatomes bilaterally.  Deep tendon reflexes symmetric 2+ in the bilateral lower extremities.  Negative Babinski bilaterally. Straight leg raise negative bilaterally.    IMAGING:  Today I personally re- reviewed AP, Lat and Flex/Ex  upright L-spine that demonstrate moderate curve with hardware at L2-3 in place without failure. Moderate DDD throughout.     Body mass index is 28.29 kg/m².    Hemoglobin A1C   Date Value Ref Range Status   01/27/2025 6.2 (H) 4.0 - 5.6 % Final     Comment:     ADA Screening Guidelines:  5.7-6.4%  Consistent with prediabetes  >or=6.5%  " "Consistent with diabetes    High levels of fetal hemoglobin interfere with the HbA1C  assay. Heterozygous hemoglobin variants (HbS, HgC, etc)do  not significantly interfere with this assay.   However, presence of multiple variants may affect accuracy.           ASSESSMENT/PLAN:    Kermit Perez" was seen today for low-back pain and back pain.    Diagnoses and all orders for this visit:    S/P spinal surgery  -     pregabalin (LYRICA) 50 MG capsule; Take 1 capsule (50 mg total) by mouth 3 (three) times daily.  -     MRI Lumbar Spine W WO Contrast; Future    Lumbar radiculopathy  -     Ambulatory referral/consult to [facility]  -     pregabalin (LYRICA) 50 MG capsule; Take 1 capsule (50 mg total) by mouth 3 (three) times daily.  -     MRI Lumbar Spine W WO Contrast; Future    Dorsalgia, unspecified  -     MRI Lumbar Spine W WO Contrast; Future      Today we discussed at length all of the different treatment options including anti-inflammatories, acetaminophen, rest, ice, heat, physical therapy including strengthening and stretching exercises, home exercises, ROM, aerobic conditioning, aqua therapy, other modalities including ultrasound, massage, and dry needling, epidural steroid injections and finally surgical intervention.    MRI ordered, follow up for results    "

## 2025-01-31 ENCOUNTER — TELEPHONE (OUTPATIENT)
Dept: ORTHOPEDICS | Facility: CLINIC | Age: 80
End: 2025-01-31
Payer: MEDICARE

## 2025-01-31 DIAGNOSIS — M51.362 DEGENERATION OF INTERVERTEBRAL DISC OF LUMBAR REGION WITH DISCOGENIC BACK PAIN AND LOWER EXTREMITY PAIN: Primary | ICD-10-CM

## 2025-01-31 RX ORDER — FLUTICASONE FUROATE, UMECLIDINIUM BROMIDE AND VILANTEROL TRIFENATATE 100; 62.5; 25 UG/1; UG/1; UG/1
1 POWDER RESPIRATORY (INHALATION) DAILY
Qty: 60 EACH | Refills: 11 | Status: SHIPPED | OUTPATIENT
Start: 2025-01-31

## 2025-01-31 RX ORDER — LISINOPRIL 40 MG/1
40 TABLET ORAL DAILY
Qty: 30 TABLET | Refills: 11 | Status: SHIPPED | OUTPATIENT
Start: 2025-01-31

## 2025-01-31 RX ORDER — TAMSULOSIN HYDROCHLORIDE 0.4 MG/1
0.4 CAPSULE ORAL DAILY
Qty: 90 CAPSULE | Refills: 3 | Status: SHIPPED | OUTPATIENT
Start: 2025-01-31 | End: 2026-01-31

## 2025-01-31 RX ORDER — AMLODIPINE BESYLATE 10 MG/1
10 TABLET ORAL DAILY
Qty: 30 TABLET | Refills: 11 | Status: SHIPPED | OUTPATIENT
Start: 2025-01-31

## 2025-01-31 RX ORDER — CLOPIDOGREL BISULFATE 75 MG/1
TABLET ORAL
Qty: 34 TABLET | Refills: 11 | Status: SHIPPED | OUTPATIENT
Start: 2025-01-31

## 2025-01-31 RX ORDER — FINASTERIDE 5 MG/1
5 TABLET, FILM COATED ORAL DAILY
Qty: 30 TABLET | Refills: 11 | Status: SHIPPED | OUTPATIENT
Start: 2025-01-31

## 2025-01-31 RX ORDER — ALBUTEROL SULFATE 2.5 MG/.5ML
2.5 SOLUTION RESPIRATORY (INHALATION) EVERY 4 HOURS PRN
Qty: 30 EACH | Refills: 0 | Status: SHIPPED | OUTPATIENT
Start: 2025-01-31 | End: 2026-01-31

## 2025-01-31 RX ORDER — BETAMETHASONE DIPROPIONATE 0.5 MG/G
OINTMENT TOPICAL 2 TIMES DAILY
Qty: 135 G | Refills: 2 | Status: SHIPPED | OUTPATIENT
Start: 2025-01-31

## 2025-01-31 RX ORDER — IPRATROPIUM BROMIDE AND ALBUTEROL SULFATE 2.5; .5 MG/3ML; MG/3ML
3 SOLUTION RESPIRATORY (INHALATION) EVERY 6 HOURS PRN
Qty: 90 ML | Refills: 11 | Status: SHIPPED | OUTPATIENT
Start: 2025-01-31

## 2025-01-31 RX ORDER — PANTOPRAZOLE SODIUM 40 MG/1
40 TABLET, DELAYED RELEASE ORAL
Qty: 30 TABLET | Refills: 11 | Status: SHIPPED | OUTPATIENT
Start: 2025-01-31

## 2025-01-31 RX ORDER — ROSUVASTATIN CALCIUM 40 MG/1
40 TABLET, COATED ORAL NIGHTLY
Qty: 30 TABLET | Refills: 11 | Status: SHIPPED | OUTPATIENT
Start: 2025-01-31

## 2025-01-31 RX ORDER — METOPROLOL SUCCINATE 100 MG/1
100 TABLET, EXTENDED RELEASE ORAL DAILY
Qty: 30 TABLET | Refills: 11 | Status: SHIPPED | OUTPATIENT
Start: 2025-01-31 | End: 2026-01-31

## 2025-01-31 NOTE — TELEPHONE ENCOUNTER
Spoke to patient spouse regarding x-ray. Mrs. Castro is aware of patient x-ray scheduled for 8:00 am at the [facility] on 02/06/2025. Stated thank you. Thanks.

## 2025-02-05 ENCOUNTER — OFFICE VISIT (OUTPATIENT)
Dept: PRIMARY CARE CLINIC | Facility: CLINIC | Age: 80
End: 2025-02-05
Payer: MEDICARE

## 2025-02-05 VITALS
DIASTOLIC BLOOD PRESSURE: 80 MMHG | BODY MASS INDEX: 27.96 KG/M2 | WEIGHT: 183.88 LBS | SYSTOLIC BLOOD PRESSURE: 172 MMHG | OXYGEN SATURATION: 98 % | HEART RATE: 53 BPM

## 2025-02-05 DIAGNOSIS — Z91.148 MEDICATION NON-COMPLIANCE DUE TO EXCESSIVE PILL BURDEN: Primary | ICD-10-CM

## 2025-02-05 DIAGNOSIS — I50.32 CHRONIC HEART FAILURE WITH PRESERVED EJECTION FRACTION: ICD-10-CM

## 2025-02-05 PROCEDURE — 1157F ADVNC CARE PLAN IN RCRD: CPT | Mod: CPTII,S$GLB,,

## 2025-02-05 PROCEDURE — 1125F AMNT PAIN NOTED PAIN PRSNT: CPT | Mod: CPTII,S$GLB,,

## 2025-02-05 PROCEDURE — 99999 PR PBB SHADOW E&M-EST. PATIENT-LVL V: CPT | Mod: PBBFAC,,,

## 2025-02-05 PROCEDURE — 3077F SYST BP >= 140 MM HG: CPT | Mod: CPTII,S$GLB,,

## 2025-02-05 PROCEDURE — 3079F DIAST BP 80-89 MM HG: CPT | Mod: CPTII,S$GLB,,

## 2025-02-05 PROCEDURE — 99214 OFFICE O/P EST MOD 30 MIN: CPT | Mod: S$GLB,,,

## 2025-02-05 PROCEDURE — 1160F RVW MEDS BY RX/DR IN RCRD: CPT | Mod: CPTII,S$GLB,,

## 2025-02-05 PROCEDURE — 1159F MED LIST DOCD IN RCRD: CPT | Mod: CPTII,S$GLB,,

## 2025-02-05 NOTE — PATIENT INSTRUCTIONS
Medications for blood pressure:  amlodipine (NORVASC) 10 MG tablet  daily  lisinopriL (PRINIVIL,ZESTRIL) 40 MG tablet  daily  metoprolol succinate (TOPROL-XL) 100 MG 24 hr tablet    furosemide (LASIX) 40 MG tablet  as needed for weight change of +3lbs    Medication for cholesterol  rosuvastatin (CRESTOR) 40 MG Tab  nightly  ezetimibe (ZETIA) 10 mg tablet  daily    Medication for blood thinning  aspirin (ECOTRIN) 81 MG EC tablet  daily  clopidogreL (PLAVIX) 75 mg tablet  daily    Medications for fibrotic lung disease  nintedanib (OFEV) 150 mg Cap  twice daily    Breathing Treatments for Obstructive Lung Disease  albuterol sulfate 2.5 mg/0.5 mL Nebu  every 4 hours as needed  albuterol-ipratropium (DUO-NEB) 2.5 mg-0.5 mg/3 mL nebulizer solution  every 6 hours as needed  trelegy ellipta 100-62.5-25 mcg DsDv  one puff daily    Medication for urinary obstruction  tamsulosin (FLOMAX) 0.4 mg capsule daily    finasteride (PROSCAR) 5 mg tablet  daily    Medication for erectile dysfunction  sildenafiL (VIAGRA) 100 MG tablet      Medication for reflux  pantoprazole (PROTONIX) 40 MG tablet, take daily at least 30 minutes  before breakfast    Medication for pain  oxycodone-acetaminophen (PERCOCET)  mg per tablet      Medication for opioid induced constipation  lubiprostone (AMITIZA) 24 MCG Cap      Medication for potassium supplementation  potassium chloride SA (K-DUR,KLOR-CON) 20 MEQ tablet      Vitamins and supplements  potassium chloride SA (K-DUR,KLOR-CON) 20 MEQ tablet  daily  multivitamin (THERAGRAN) per tablet  daily    Skin cream  betamethasone dipropionate (BETANATE) 0.05 % ointment      Checking Blood Pressure at Home:  - Use an appropriate blood pressure cuff. See https://www.validatebp.org/  - Check twice in the morning before breakfast and morning medications  - Check twice at nice before dinner and evening medications  - Be seated in a chair with back support  - Make sure feet are on the ground  - Place cuff  on the upper arm and rest arm on a stable surface  - Wait for five minutes  - Refrain from talking and activity, then activate the blood pressure device.   - Note/record indications  - Repeat after waiting for at least 30 seconds  - Check blood pressure and log results for at least two weeks  - Bring results to next Primary Care visit.

## 2025-02-05 NOTE — PROGRESS NOTES
"  [facility]    Subjective     Patient Name: Kermit Castro  YOB: 1945  Patient Age: 79 y.o.  Patient Sex: male  Patient Phone: [phone]  PCP: Abdiaziz Lim MD  Last PCP Appointment: 2/5/2025    History of Present Illness    CHIEF COMPLAINT:  Mr. Castro presents today for follow up of uncontrolled hypertension.    HYPERTENSION:  He experiences headaches and drunk-like symptoms associated with blood pressure concerns. He reports wheezing after taking blood pressure medication. He has been mixing old and new medications, expressing uncertainty about proper dosages - believes one medication is 100mg but takes 40mg. He acknowledges difficulty managing multiple medications and distinguishing between morning and evening doses. He continues Metoprolol for blood pressure management.    EXERCISE TOLERANCE:  He reports dyspnea with exertion and leg fatigue during ambulation, even with short distances within his home.    SLEEP:  He wakes up 2-3 times per night with difficulty maintaining sleep, experiencing a pattern of getting up and returning to bed throughout the night. He notes waking up as early as 3:00 AM.    MEMORY:  He reports increased forgetfulness, stating he is "experiencing memory loss." Earlier discussions about dementia medication indicate awareness of his memory issues.    CURRENT MEDICATIONS:  He takes Ofev twice daily (morning and evening) and Metoprolol. He recently obtained medication refills from the pharmacy.    ROS:  ROS is negative unless otherwise indicated in HPI.         Health Maintenance Summary            Overdue - TETANUS VACCINE (Every 10 Years) Never done      No completion history exists for this topic.              Overdue - RSV Vaccine (Age 60+ and Pregnant patients) (1 - 1-dose 75+ series) Never done      No completion history exists for this topic.              Overdue - Shingles Vaccine (3 of 3) Overdue since 9/22/2023 07/28/2023  Outside Immunization: " zoster recombinant    09/11/2017  Outside Immunization: zoster live              Overdue - Influenza Vaccine (1) Overdue since 9/1/2024 12/12/2022  SmartData: WORKFLOW - HEALTHY PLANET - EXTERNAL DATA - EXTERNAL PROCEDURE DATE - INFLUENZA    10/19/2020  Outside Immunization: Influenza, high-dose, quadrivalent, PF    11/18/2019  Outside Immunization: Influenza, high-dose, trivalent, PF    10/24/2018  Outside Immunization: Influenza, high-dose, trivalent, PF    09/11/2017  Outside Immunization: Influenza, high-dose, trivalent, PF    Only the first 5 history entries have been loaded, but more history exists.              Overdue - COVID-19 Vaccine (4 - 2024-25 season) Overdue since 9/1/2024 02/24/2022  Outside Immunization: COVID-19, mRNA, LNP-S, PF, 100 mcg/0.5mL dose or 50 mcg/0.25mL dose    02/12/2021  Outside Immunization: COVID-19, mRNA, LNP-S, PF, 100 mcg/0.5mL dose or 50 mcg/0.25mL dose    01/12/2021  Outside Immunization: COVID-19, mRNA, LNP-S, PF, 100 mcg/0.5mL dose or 50 mcg/0.25mL dose              Lipid Panel (Every 5 Years) Next due on 1/27/2030 01/27/2025  Cholesterol Total component of LIPID PANEL    03/17/2022  Cholesterol Total component of Lipid Panel    10/05/2021  Cholesterol Total component of LIPID PANEL    08/27/2019  Cholesterol Total component of Lipid panel    08/09/2005  Cholesterol Total component of Lipid panel    Only the first 5 history entries have been loaded, but more history exists.              Pneumococcal Vaccines (Age 50+) (Series Information) Completed      07/28/2023  Outside Immunization: Pneumococcal conjugate PCV20, polysaccharide [id number] conjugate, adjuvant, PF    10/19/2020  Outside Immunization: Pneumococcal conjugate PCV 13                    Prior to Admission medications    Medication Sig Start Date End Date Taking? Authorizing Provider   albuterol sulfate 2.5 mg/0.5 mL Nebu Take 2.5 mg by nebulization every 4 (four) hours as needed (wheezing/shortness of  breath). Rescue 1/31/25 1/31/26 Yes Abdiaziz Lim MD   albuterol-ipratropium (DUO-NEB) 2.5 mg-0.5 mg/3 mL nebulizer solution Take 3 mLs by nebulization every 6 (six) hours as needed for Wheezing. Rescue 1/31/25  Yes Abdiaziz Lim MD   amLODIPine (NORVASC) 10 MG tablet Take 1 tablet (10 mg total) by mouth once daily. 1/31/25  Yes Abdiaziz Lim MD   aspirin (ECOTRIN) 81 MG EC tablet Take 81 mg by mouth once daily.   Yes Provider, Historical   betamethasone dipropionate (BETANATE) 0.05 % ointment Apply topically 2 (two) times daily. Use to affected areas for up to 2 weeks then take a 1 week break or decrease to 3 times weekly. Do not apply to groin or face. Apply to itchy bumps on arms 1/31/25  Yes Abdiaziz Lim MD   clopidogreL (PLAVIX) 75 mg tablet Take 4 tablets on day 1 then take 1 tablet daily there after. 1/31/25  Yes Abdiaziz Lim MD   finasteride (PROSCAR) 5 mg tablet Take 1 tablet (5 mg total) by mouth once daily. 1/31/25  Yes Abdiaziz Lim MD   flu vacc [id number],65yr up,PF (FLUZONE HIGH-DOSE 2019-20, PF,) 180 mcg/0.5 mL Syrg Fluzone High-Dose 2019-20 (PF) 180 mcg/0.5 mL intramuscular syringe   PHARMACIST ADMINISTERED IMMUNIZATION ADMINISTERED AT TIME OF DISPENSING   Yes Provider, Historical   influenza (HIGH-DOSE PF) 240 mcg/0.7 mL vaccine Fluzone High-Dose [id number] (PF) 180 mcg/0.5 mL intramuscular syringe   Yes Provider, Historical   lisinopriL (PRINIVIL,ZESTRIL) 40 MG tablet Take 1 tablet (40 mg total) by mouth once daily. 1/31/25  Yes Abdiaziz Lim MD   lubiprostone (AMITIZA) 24 MCG Cap    Yes Provider, Historical   metoprolol succinate (TOPROL-XL) 100 MG 24 hr tablet Take 1 tablet (100 mg total) by mouth once daily. 1/31/25 1/31/26 Yes Abdiaziz Lim MD   multivitamin (THERAGRAN) per tablet Take 1 tablet by mouth once daily.   Yes Provider, Historical   nintedanib (OFEV) 150 mg Cap Take 1 capsule (150 mg total) by mouth 2 (two) times a day. 11/12/24 11/7/25 Yes Linden Mesa MD    oxyCODONE-acetaminophen (PERCOCET)  mg per tablet oxycod/apap  tab 10-325mg   Yes Provider, Historical   pantoprazole (PROTONIX) 40 MG tablet Take 1 tablet (40 mg total) by mouth before breakfast. 1/31/25  Yes Abdiaziz Lim MD   potassium chloride SA (K-DUR,KLOR-CON) 20 MEQ tablet Take 1 tablet by mouth once daily.   Yes Provider, Historical   pulse oximeter (PULSE OXIMETER) device by Apply Externally route 2 (two) times a day. Use twice daily at 8 AM and 3 PM and record the value in MyChart as directed. 10/11/21  Yes PancoastJulisa PA   rosuvastatin (CRESTOR) 40 MG Tab Take 1 tablet (40 mg total) by mouth every evening. 1/31/25  Yes Abdiaziz Lim MD   sildenafiL (VIAGRA) 100 MG tablet Take 100 mg by mouth. 7/9/21  Yes Provider, Historical   tamsulosin (FLOMAX) 0.4 mg Cap Take 1 capsule (0.4 mg total) by mouth once daily. 1/31/25 1/31/26 Yes Abdiaziz Lim MD   TRELEGY ELLIPTA 100-62.5-25 mcg DsDv Inhale 1 puff into the lungs once daily. 1/31/25  Yes Abdiaziz Lim MD   ezetimibe (ZETIA) 10 mg tablet Take 1 tablet (10 mg total) by mouth once daily. 4/19/22 4/19/23  Moiz Villalta Jr., MD   furosemide (LASIX) 40 MG tablet Take 1 tablet (40 mg total) by mouth once daily. 4/19/22 10/6/23  Moiz Villalta Jr., MD       OBJECTIVE:     Vitals:    02/05/25 1110 02/05/25 1141   BP: (!) 194/100 (!) 172/80   BP Location: Left arm Left arm   Patient Position: Sitting Sitting   Pulse: (!) 53    SpO2: 98%    Weight: 83.4 kg (183 lb 13.8 oz)        Body mass index is 27.96 kg/m².     PHYSICAL EXAM  GEN - A+OX4, NAD   HEENT - PERRL, EOMI, OP clear  Neck - No thyromegaly or cervical LAD.   CV - RRR, no m/r   Chest - CTAB, no wheezing or rhonchi  Abd - S/NT/ND/+BS.   Ext - 2+BDP and radial pulses. Mild BLE edema  MSK - normal ROM, no tenderness  Neuro - 5/5 BUE and BLE strength.  LN - No axillary or inguinal LAD appreciated.  Skin - No rash.     LABS  Lab Results   Component Value Date    WBC 7.55 01/27/2025    HGB  13.8 (L) 01/27/2025    HCT 42.3 01/27/2025    MCV 88 01/27/2025     01/27/2025         CMP  Sodium   Date Value Ref Range Status   01/27/2025 144 136 - 145 mmol/L Final     Potassium   Date Value Ref Range Status   01/27/2025 3.7 3.5 - 5.1 mmol/L Final     Chloride   Date Value Ref Range Status   01/27/2025 106 95 - 110 mmol/L Final     CO2   Date Value Ref Range Status   01/27/2025 27 23 - 29 mmol/L Final     Glucose   Date Value Ref Range Status   01/27/2025 99 70 - 110 mg/dL Final     BUN   Date Value Ref Range Status   01/27/2025 7 (L) 8 - 23 mg/dL Final     Creatinine   Date Value Ref Range Status   01/27/2025 0.8 0.5 - 1.4 mg/dL Final     Calcium   Date Value Ref Range Status   01/27/2025 8.7 8.7 - 10.5 mg/dL Final     Total Protein   Date Value Ref Range Status   01/27/2025 6.8 6.0 - 8.4 g/dL Final     Albumin   Date Value Ref Range Status   01/27/2025 3.5 3.5 - 5.2 g/dL Final     Total Bilirubin   Date Value Ref Range Status   01/27/2025 0.8 0.1 - 1.0 mg/dL Final     Comment:     For infants and newborns, interpretation of results should be based  on gestational age, weight and in agreement with clinical  observations.    Premature Infant recommended reference ranges:  Up to 24 hours.............<8.0 mg/dL  Up to 48 hours............<12.0 mg/dL  3-5 days..................<15.0 mg/dL  6-29 days.................<15.0 mg/dL       Alkaline Phosphatase   Date Value Ref Range Status   01/27/2025 63 40 - 150 U/L Final     AST   Date Value Ref Range Status   01/27/2025 13 10 - 40 U/L Final     ALT   Date Value Ref Range Status   01/27/2025 9 (L) 10 - 44 U/L Final     Anion Gap   Date Value Ref Range Status   01/27/2025 11 8 - 16 mmol/L Final     eGFR   Date Value Ref Range Status   01/27/2025 >60.0 >60 mL/min/1.73 m^2 Final       ASSESSMENT & PLAN:   Mr. Kermit Castro is a 79 y.o. male who was seen in clinic today for scheduled follow up. Assessed patient's hypertension, noting severely elevated BP of  194/80, indicating stroke risk. Evaluated medication adherence, suspecting inconsistent use 2/2 to large pill burden and low health literacy as cause of BP elevation. Considered lung disease management, noting importance of Ofev for respiratory function in setting of ILD. Opted not to adjust medications due to uncertainty about patient's current regimen and adherence while pt is not disposing of discontinued therapies. Planned to review and organize patient's medications to ensure proper regimen and dosing; follow up phone conversation with pt's wife. Will reassess BP control in 1 week after implementing medication organization strategy. RTC scheduled in one week.        1. Medication non-compliance due to excessive pill burden  Overview:  - Pt with multiple medical diagnosis including COPD and CHF  - Currently taking >10 medications  - Pt reports difficulty managing the large quantity of medications complicated by changing doses  - Pt does not throw out discontinued medications  - Scripts sent to [facility] where cost are minimal ($0)  - Unsure if meds were taken this morning; unsure about which ones    Assessment & Plan:  - Previously tried to have pill packs via [facility] but unable to complete 2/2 to costs  - Discussed importance of taking the right medications and allowing help if needed  - Organize medication list for pt  - Recommended Ms Castro manage/organize medications in pill packs  - Will review entire medication list with Pt's wife to dispose of discontinued therapies      2. Chronic heart failure with preserved ejection fraction  Overview:  TTE from 09/21/21  · The left ventricle is normal in size with concentric remodeling and normal systolic function. The estimated ejection fraction is 55%.  · Grade II left ventricular diastolic dysfunction.  · Normal right ventricular size with normal right ventricular systolic function.  · Mild biatrial enlargement.  · Mild mitral regurgitation.  · Mild tricuspid  regurgitation.  · The estimated PA systolic pressure is 39 mmHg.  · Normal central venous pressure (3 mmHg).  - Managed with amlodipine (NORVASC) 10 MG tablet  daily, lisinopriL (PRINIVIL,ZESTRIL) 40 MG tablet  daily, metoprolol succinate (TOPROL-XL) 100 MG 24 hr tablet, furosemide (LASIX) 40 MG tablet  as needed for 3lb weight change  - On high intensity statin therapy with rosuvastatin  - On DAPT with ASA and plavix    Assessment & Plan:  BP Readings from Last 5 Encounters:   02/05/25 (!) 172/80   01/27/25 (!) 142/70   01/16/25 132/72   11/11/24 (!) 156/90   04/26/24 (!) 144/78       Lab Results   Component Value Date     (H) 04/19/2022    BNP 74 12/21/2021     - Patient has Diastolic (HFpEF) heart failure that is Chronic.   - HFpEF is compensated/uncompensated; Pt is euvolemic on examination  - Continue low sodium diet <2g  - Restrict daily fluid intake to 1.5L  - Check BP and weight daily   - Contact physician for elevations in weight of 5lbs or greater  - Reviewed antihypertensive medications with pt and wife  - Discussed importance of taking medications as indicated  - Discussed impact of malignant hypertension and dangers of stroke  - Follow up with Cardiology.             Follow up in about 1 week (around 2/12/2025).     All questions answered. Pt to call/message the Primary Clinic for any additional concerns    I spent a total of 44 minutes on the day of the visit.        Abdiaziz Lim MD  [facility] - [facility]    This note was generated with the assistance of ambient listening technology. Verbal consent was obtained by the patient and accompanying visitor(s) for the recording of patient appointment to facilitate this note. I attest to having reviewed and edited the generated note for accuracy, though some syntax or spelling errors may persist. Please contact the author of this note for any clarification.

## 2025-02-06 ENCOUNTER — HOSPITAL ENCOUNTER (OUTPATIENT)
Dept: RADIOLOGY | Facility: HOSPITAL | Age: 80
Discharge: HOME | End: 2025-02-06
Attending: REGISTERED NURSE
Payer: MEDICARE

## 2025-02-06 ENCOUNTER — OFFICE VISIT (OUTPATIENT)
Dept: ORTHOPEDICS | Facility: CLINIC | Age: 80
End: 2025-02-06
Payer: MEDICARE

## 2025-02-06 VITALS — BODY MASS INDEX: 28.2 KG/M2 | WEIGHT: 186.06 LBS | HEIGHT: 68 IN

## 2025-02-06 DIAGNOSIS — M51.362 DEGENERATION OF INTERVERTEBRAL DISC OF LUMBAR REGION WITH DISCOGENIC BACK PAIN AND LOWER EXTREMITY PAIN: ICD-10-CM

## 2025-02-06 DIAGNOSIS — M54.9 DORSALGIA, UNSPECIFIED: ICD-10-CM

## 2025-02-06 DIAGNOSIS — Z98.890 S/P SPINAL SURGERY: Primary | ICD-10-CM

## 2025-02-06 DIAGNOSIS — M50.30 DDD (DEGENERATIVE DISC DISEASE), CERVICAL: Primary | ICD-10-CM

## 2025-02-06 DIAGNOSIS — M54.16 LUMBAR RADICULOPATHY: ICD-10-CM

## 2025-02-06 PROCEDURE — 72110 X-RAY EXAM L-2 SPINE 4/>VWS: CPT | Mod: TC

## 2025-02-06 PROCEDURE — 1159F MED LIST DOCD IN RCRD: CPT | Mod: CPTII,S$GLB,, | Performed by: REGISTERED NURSE

## 2025-02-06 PROCEDURE — 99999 PR PBB SHADOW E&M-EST. PATIENT-LVL III: CPT | Mod: PBBFAC,,, | Performed by: REGISTERED NURSE

## 2025-02-06 PROCEDURE — 1157F ADVNC CARE PLAN IN RCRD: CPT | Mod: CPTII,S$GLB,, | Performed by: REGISTERED NURSE

## 2025-02-06 PROCEDURE — 3288F FALL RISK ASSESSMENT DOCD: CPT | Mod: CPTII,S$GLB,, | Performed by: REGISTERED NURSE

## 2025-02-06 PROCEDURE — 72110 X-RAY EXAM L-2 SPINE 4/>VWS: CPT | Mod: 26,,, | Performed by: RADIOLOGY

## 2025-02-06 PROCEDURE — 1101F PT FALLS ASSESS-DOCD LE1/YR: CPT | Mod: CPTII,S$GLB,, | Performed by: REGISTERED NURSE

## 2025-02-06 PROCEDURE — 99214 OFFICE O/P EST MOD 30 MIN: CPT | Mod: S$GLB,,, | Performed by: REGISTERED NURSE

## 2025-02-06 PROCEDURE — 1125F AMNT PAIN NOTED PAIN PRSNT: CPT | Mod: CPTII,S$GLB,, | Performed by: REGISTERED NURSE

## 2025-02-06 RX ORDER — PREGABALIN 50 MG/1
50 CAPSULE ORAL 3 TIMES DAILY
Qty: 90 CAPSULE | Refills: 5 | Status: SHIPPED | OUTPATIENT
Start: 2025-02-06 | End: 2025-08-07

## 2025-02-06 NOTE — ASSESSMENT & PLAN NOTE
BP Readings from Last 5 Encounters:   02/05/25 (!) 172/80   01/27/25 (!) 142/70   01/16/25 132/72   11/11/24 (!) 156/90   04/26/24 (!) 144/78       Lab Results   Component Value Date     (H) 04/19/2022    BNP 74 12/21/2021     - Patient has Diastolic (HFpEF) heart failure that is Chronic.   - HFpEF is compensated/uncompensated; Pt is euvolemic on examination  - Continue low sodium diet <2g  - Restrict daily fluid intake to 1.5L  - Check BP and weight daily   - Contact physician for elevations in weight of 5lbs or greater  - Reviewed antihypertensive medications with pt and wife  - Discussed importance of taking medications as indicated  - Discussed impact of malignant hypertension and dangers of stroke  - Follow up with Cardiology.

## 2025-02-06 NOTE — ASSESSMENT & PLAN NOTE
- Previously tried to have pill packs via [facility] but unable to complete 2/2 to costs  - Discussed importance of taking the right medications and allowing help if needed  - Organize medication list for pt  - Recommended Ms Hutchinson manage/organize medications in pill packs  - Will review entire medication list with Pt's wife to dispose of discontinued therapies

## 2025-02-07 NOTE — PROGRESS NOTES
"DATE: 2/20/2025  PATIENT: Kermit Castro    Attending Physician: Lester Johnson M.D.    HISTORY:  Kermit Castro is a 79 y.o. male who returns to me today for MRI results.  He was last seen by me 2/6/2025.  Today he is doing well but notes low back and bilateral leg pain. He also reports neck pain with bilateral arm numbness that comes and goes. The Patient reports myelopathic symptoms such as handwriting changes or difficulty with buttons/coins/keys. Denies perineal paresthesias, bowel/bladder dysfunction.    EXAM:  Wt 84.4 kg (186 lb)   BMI 28.28 kg/m²   Stable     IMAGING:  Today I personally re- reviewed AP, Lat and Flex/Ex  upright L-spine that demonstrate moderate curve with hardware at L2-3 in place without failure. Moderate DDD throughout.     Lumbar MRI shows moderate stenosis at L1-2 and from L3-5 with severe stenosis at L5-S1.     Body mass index is 28.28 kg/m².    Hemoglobin A1C   Date Value Ref Range Status   01/27/2025 6.2 (H) 4.0 - 5.6 % Final     Comment:     ADA Screening Guidelines:  5.7-6.4%  Consistent with prediabetes  >or=6.5%  Consistent with diabetes    High levels of fetal hemoglobin interfere with the HbA1C  assay. Heterozygous hemoglobin variants (HbS, HgC, etc)do  not significantly interfere with this assay.   However, presence of multiple variants may affect accuracy.           ASSESSMENT/PLAN:    Kermit Perez" was seen today for neck pain.    Diagnoses and all orders for this visit:    S/P spinal surgery  -     CT Lumbar Spine Without Contrast; Future  -     DXA Bone Density Axial Skeleton 1 or more sites; Future    Lumbar radiculopathy  -     Procedure Order to Pain Management; Future    Dorsalgia, unspecified  -     CT Lumbar Spine Without Contrast; Future    Cervical radiculopathy  -     MRI Cervical Spine Without Contrast; Future    Age-related osteoporosis with current pathological fracture, left pelvis, initial encounter for fracture  -     DXA Bone Density Axial Skeleton 1 " or more sites; Future      Cervical MRI ordered. Follow up for results  Lumbar CT and DXA scan ordered. Follow up with Dr. Johnson for surgical options

## 2025-02-10 ENCOUNTER — CLINICAL SUPPORT (OUTPATIENT)
Dept: REHABILITATION | Facility: HOSPITAL | Age: 80
End: 2025-02-10
Payer: MEDICARE

## 2025-02-10 DIAGNOSIS — R53.1 DECREASED STRENGTH: ICD-10-CM

## 2025-02-10 DIAGNOSIS — M25.651 DECREASED RANGE OF MOTION OF BOTH HIPS: ICD-10-CM

## 2025-02-10 DIAGNOSIS — M54.50 CHRONIC BILATERAL LOW BACK PAIN WITHOUT SCIATICA: Primary | ICD-10-CM

## 2025-02-10 DIAGNOSIS — M25.652 DECREASED RANGE OF MOTION OF BOTH HIPS: ICD-10-CM

## 2025-02-10 DIAGNOSIS — G89.29 CHRONIC BILATERAL LOW BACK PAIN WITHOUT SCIATICA: Primary | ICD-10-CM

## 2025-02-13 ENCOUNTER — HOSPITAL ENCOUNTER (OUTPATIENT)
Dept: RADIOLOGY | Facility: HOSPITAL | Age: 80
Discharge: HOME | End: 2025-02-13
Attending: REGISTERED NURSE
Payer: MEDICARE

## 2025-02-13 DIAGNOSIS — M54.16 LUMBAR RADICULOPATHY: ICD-10-CM

## 2025-02-13 DIAGNOSIS — M50.30 DDD (DEGENERATIVE DISC DISEASE), CERVICAL: ICD-10-CM

## 2025-02-13 DIAGNOSIS — M54.9 DORSALGIA, UNSPECIFIED: ICD-10-CM

## 2025-02-13 DIAGNOSIS — Z98.890 S/P SPINAL SURGERY: ICD-10-CM

## 2025-02-13 PROCEDURE — 72050 X-RAY EXAM NECK SPINE 4/5VWS: CPT | Mod: 26,,, | Performed by: RADIOLOGY

## 2025-02-13 PROCEDURE — 25500020 PHARM REV CODE 255: Performed by: REGISTERED NURSE

## 2025-02-13 PROCEDURE — 72158 MRI LUMBAR SPINE W/O & W/DYE: CPT | Mod: 26,,, | Performed by: RADIOLOGY

## 2025-02-13 PROCEDURE — 72050 X-RAY EXAM NECK SPINE 4/5VWS: CPT | Mod: TC

## 2025-02-13 PROCEDURE — A9585 GADOBUTROL INJECTION: HCPCS | Performed by: REGISTERED NURSE

## 2025-02-13 PROCEDURE — 72158 MRI LUMBAR SPINE W/O & W/DYE: CPT | Mod: TC

## 2025-02-13 RX ORDER — GADOBUTROL 604.72 MG/ML
10 INJECTION INTRAVENOUS
Status: COMPLETED | OUTPATIENT
Start: 2025-02-13 | End: 2025-02-13

## 2025-02-13 RX ADMIN — GADOBUTROL 10 ML: 604.72 INJECTION INTRAVENOUS at 12:02

## 2025-02-14 PROBLEM — M25.651 DECREASED RANGE OF MOTION OF BOTH HIPS: Status: ACTIVE | Noted: 2025-02-14

## 2025-02-14 PROBLEM — R53.1 DECREASED STRENGTH: Status: ACTIVE | Noted: 2025-02-14

## 2025-02-14 PROBLEM — G89.29 CHRONIC BILATERAL LOW BACK PAIN WITHOUT SCIATICA: Status: ACTIVE | Noted: 2025-02-14

## 2025-02-14 PROBLEM — M54.50 CHRONIC BILATERAL LOW BACK PAIN WITHOUT SCIATICA: Status: ACTIVE | Noted: 2025-02-14

## 2025-02-14 PROBLEM — M25.652 DECREASED RANGE OF MOTION OF BOTH HIPS: Status: ACTIVE | Noted: 2025-02-14

## 2025-02-15 NOTE — PROGRESS NOTES
Outpatient Rehab    Physical Therapy Evaluation    Patient Name: Kermit Castro  MRN: [id number]  YOB: 1945  Today's Date: 2/14/2025    Therapy Diagnosis:   Encounter Diagnoses   Name Primary?    Chronic bilateral low back pain without sciatica Yes    Decreased range of motion of both hips     Decreased strength      Physician: Abdiaziz Lim MD    Physician Orders: Eval and Treat  Medical Diagnosis: Lumbar radiculopathy [M54.16]     Visit # / Visits Authorized:  1/1   Date of Evaluation:  2/10/2025   Insurance Authorization Period: 1/27/2025 to 12/31/2025  Plan of Care Certification:  2/10/2025 to 3/28/2025      Time In: 1207   Time Out: 1256  Total Time: 49   Total Billable Time: 49 minutes    Intake Outcome Measure for FOTO Survey    Therapist reviewed FOTO scores for Kermit Castro on 2/10/2025.   FOTO report - see Media section or FOTO account episode details.     Intake Score: 49%         Subjective   History of Present Illness  Kermit is a 79 y.o. male who reports to physical therapy with a chief concern of Bilateral low back pain. According to the patient's chart, Kermit has a past medical history of Coronary artery disease, Hyperlipidemia, and Hypertension. Kermit has a past surgical history that includes Coronary artery bypass graft; back surgery (screws & okate); Abdominal surgery; Appendectomy; Transforaminal epidural injection of steroid (Right, 11/5/2020); Percutaneous transluminal angioplasty (PTA) of peripheral vessel (N/A, 1/27/2022); Coronary bypass graft angiography (5/16/2022); and Coronary angiography (N/A, 5/16/2022).    The patient reports a medical diagnosis of Lumbar radiculopathy (M54.16).            Dominant Hand: Right  History of Present Condition/Illness: Chronic history of low back pain. Patient had a lumbar fusion maybe 20-30 years ago. Patient reports pain fluctuates around his body, including neck, left shoulder, and right foot. Bilateral low back pain is most consistent      Pain     Patient reports a current pain level of 8/10. Pain at best is reported as 2/10. Pain at worst is reported as 8/10.   Location: Bilateral low back  Clinical Progression (since onset): Stable  Pain Qualities: Other (Comment), Aching  Other Pain Qualities: Deep  Pain-Aggravating Factors: Sitting, Walking, Standing         Review of Systems  Patient reports: Shortness of Breath  Patient denies: Bladder Incontinence, Bowel Incontinence, Lower Extremity Neurological Deficits, Night Sweats/Chills, Night Pain, Saddle Numbness, Sleep Disturbance, Weight Gain, Weight Loss, Cancer History, and Diabetes  Additional Red Flag Details: Patient reports sedentary lifestyle. Patient reports shortness of breath with increased activity.      Living Arrangements  Home Setup  Home Access: Stairs with rails  Entrance Stairs - Number of Steps: 4  Entrance Stairs - Rails: Right  Number of Levels in Home: One level    Equipment/Treatments  Mobility Equipment: Straight cane        Employment  Employment Status: Retired          Past Medical History/Physical Systems Review:   Kermit Castro  has a past medical history of Coronary artery disease, Hyperlipidemia, and Hypertension.    Kermit Castro  has a past surgical history that includes Coronary artery bypass graft; back surgery (screws & okate); Abdominal surgery; Appendectomy; Transforaminal epidural injection of steroid (Right, 11/5/2020); Percutaneous transluminal angioplasty (PTA) of peripheral vessel (N/A, 1/27/2022); Coronary bypass graft angiography (5/16/2022); and Coronary angiography (N/A, 5/16/2022).    Kermit ANN has a current medication list which includes the following prescription(s): albuterol sulfate, albuterol-ipratropium, amlodipine, aspirin, betamethasone dipropionate, clopidogrel, ezetimibe, finasteride, fluzone high-dose 2019-20 (pf), furosemide, influenza, lisinopril, lubiprostone, metoprolol succinate, multivitamin, ofev, oxycodone-acetaminophen,  "pantoprazole, potassium chloride sa, pregabalin, pulse oximeter, rosuvastatin, sildenafil, tamsulosin, and trelegy ellipta, and the following Facility-Administered Medications: acetaminophen, epinephrine, methylprednisolone sodium succinate, ondansetron, sodium chloride 0.9% 500 mL flush bag, and sodium chloride 0.9%.    Review of patient's allergies indicates:   Allergen Reactions    Shellfish containing products Anaphylaxis        Objective       Hip Range of Motion   Right Hip   Active (deg) Passive (deg) Pain   Flexion 85 95 Yes   Extension 0 5 Yes   ABduction         ADduction         External Rotation 90/90         External Rotation Prone 20 25     Internal Rotation 90/90         Internal Rotation Prone 23 25         Left Hip   Active (deg) Passive (deg) Pain   Flexion 97 105 Yes   Extension 0 5 Yes   ABduction         ADduction         External Rotation 90/90         External Rotation Prone 20 22     Internal Rotation 90/90         Internal Rotation Prone 26 31                        Hip Strength - Planes of Motion   Right Strength Right Pain Left Strength Left  Pain   Flexion (L2) 4   4 Yes   Extension 3- Yes 3- Yes   ABduction 3+   3+     ADduction 3+   3+     Internal Rotation 4   4-     External Rotation 4-   4-         Knee Strength   Right Strength Right Pain Left Strength Left  Pain   Flexion (S2) 5   4+     Prone Flexion           Extension (L3) 5   5                   Treatment:  Therapeutic Activity  Therapeutic Activity 1: HEP:  Therapeutic Activity 2: Seated hip IR with hip add isometric: 5"x10 DL  Therapeutic Activity 3: Seated DKTC: 5"x10 B  Therapeutic Activity 4: Standing hip abduction: 2x10 B  Therapeutic Activity 5: Standing hip extension: 2x10 B  Therapeutic Activity 6: Standing march: 2x10 B    Assessment & Plan   Assessment  Kermit presents with a condition of Moderate complexity.   Presentation of Symptoms: Uncomplicated       Functional Limitations: Activity tolerance, Ambulating on " uneven surfaces, Bed mobility, Carrying objects, Community integration, Decreased ambulation distance/endurance, Functional mobility, Gait limitations, Getting off the floor, Gross motor coordination, Painful locomotion/ambulation, Squatting, Standing tolerance  Personal Factors Affecting Prognosis: Pain    Prognosis: Fair  Assessment Details: Kermit ANN is a 79 y.o. male referred to outpatient Physical Therapy with a medical diagnosis of lumbar radiculopathy. Patient presents with chronic bilateral low back pain. History of fusion > 20 years ago. Hip range of motion and strength limited with may contribute to pain. Sedentary lifestyle also contributes    Plan  From a physical therapy perspective, the patient would benefit from: Skilled Rehab Services    Planned therapy interventions include: Therapeutic exercise, Therapeutic activities, Manual therapy, Neuromuscular re-education, ADLs/IADLs, and Gait training.    Planned modalities to include: Electrical stimulation - attended and Electrical stimulation - passive/unattended.        Visit Frequency: 2 times Per Week for 6 Weeks.       This plan was discussed with Patient.   Discussion participants: Agreed Upon Plan of Care             Patient's spiritual, cultural, and educational needs considered and patient agreeable to plan of care and goals.     Education  Education was done with Patient. The patient's learning style includes Listening and Demonstration. The patient Demonstrates understanding and Verbalizes understanding.         Findings, prognosis and plan of care, home exercise program         Goals:   Active       1. Short Term Goals       Patient will improve hip active range of motion 10 degrees globally to decrease mechanical load to trunk during closed chain activities.        Start:  02/10/25    Expected End:  03/07/25            Patient will perform home exercise program 1-2x/daily to assist therapy interventions in long term reduction in pain        Start:  02/10/25    Expected End:  03/07/25            Patient will perform weighted sit>stand without pain to promote pain free lifting.       Start:  02/10/25    Expected End:  03/07/25               2. Long Term Goals       1/ Patient will improve lower extremity manual muscle tests to 4/5 to promote tolerance to closed chain activity.        Start:  02/10/25    Expected End:  03/28/25            Patient will perform 6 minute walk test at 0.8 m/s without pain to promote community integration.        Start:  02/10/25    Expected End:  03/28/25            Patient will report </=4/10 pain during physical activity to promote more active lifestyle.       Start:  02/10/25    Expected End:  03/28/25                Sherice Del Toro, PT

## 2025-02-17 ENCOUNTER — TELEPHONE (OUTPATIENT)
Dept: PRIMARY CARE CLINIC | Facility: CLINIC | Age: 80
End: 2025-02-17
Payer: MEDICARE

## 2025-02-17 ENCOUNTER — CLINICAL SUPPORT (OUTPATIENT)
Dept: REHABILITATION | Facility: HOSPITAL | Age: 80
End: 2025-02-17
Payer: MEDICARE

## 2025-02-17 DIAGNOSIS — M25.652 DECREASED RANGE OF MOTION OF BOTH HIPS: ICD-10-CM

## 2025-02-17 DIAGNOSIS — M25.651 DECREASED RANGE OF MOTION OF BOTH HIPS: ICD-10-CM

## 2025-02-17 DIAGNOSIS — M54.50 CHRONIC BILATERAL LOW BACK PAIN WITHOUT SCIATICA: Primary | ICD-10-CM

## 2025-02-17 DIAGNOSIS — R53.1 DECREASED STRENGTH: ICD-10-CM

## 2025-02-17 DIAGNOSIS — G89.29 CHRONIC BILATERAL LOW BACK PAIN WITHOUT SCIATICA: Primary | ICD-10-CM

## 2025-02-17 PROCEDURE — 97112 NEUROMUSCULAR REEDUCATION: CPT | Mod: PN

## 2025-02-17 NOTE — TELEPHONE ENCOUNTER
Spoke to wife and informed her they need to call insurance and tell them Dr. Lim is his current pcp to be attributed to him. Wife understood and agreed to call today.

## 2025-02-17 NOTE — PROGRESS NOTES
"  Outpatient Rehab    Physical Therapy Visit    Patient Name: Kermit Castro  MRN: [id number]  YOB: 1945  Today's Date: 2/18/2025    Therapy Diagnosis:   Encounter Diagnoses   Name Primary?    Chronic bilateral low back pain without sciatica Yes    Decreased range of motion of both hips     Decreased strength      Physician: Abdiaziz Lim MD    Physician Orders: Eval and Treat  Medical Diagnosis: Lumbar radiculopathy [M54.16]      Visit # / Visits Authorized:  1/1   Date of Evaluation:  2/10/2025   Insurance Authorization Period: 2/7/2025 to 12/31/2025  Plan of Care Certification:  2/10/2025 to 3/28/2025        Time In: 0902   Time Out: 0953  Total Time: 51   Total Billable Time: 28 minutes    FOTO:  Intake Score:  %  Survey Score 1:  %  Survey Score 2:  %         Subjective   Pain in low back comes and goes. Patient without pain currently. Patient reports shortness of breath associated with activty. Patient has struggled with this for years since triple bypass. Patient was not compliant with HEP..  Pain reported as 0/10. B low back    Objective            Treatment:  Therapeutic Exercise  Therapeutic Exercise Activity 1: Sink extension stretch to neutral: 5"x10  Therapeutic Exercise Activity 2: NuStep for endurance and reciprocal coordination of upper and lower limbs: Level 4/50+ RPM, 8'    Balance/Neuromuscular Re-Education  Balance/Neuromuscular Re-Education Activity 1: Standing 3-way hip (flexion via march): RTB 2x10 B  Balance/Neuromuscular Re-Education Activity 2: 2 point gait with SPC - emphasis on upright trunk posture: 220 feet with supervision  Balance/Neuromuscular Re-Education Activity 3: Rows: BTB 3x10  Balance/Neuromuscular Re-Education Activity 4: SAPD: GTB 2x10    Therapeutic Activity  Therapeutic Activity 1: Sit<>stands: 2x15    Assessment & Plan   Assessment: Kermit ANN is a 79 y.o. male that presents to outpatient Physical Therapy with chronic bilateral low back pain. History of fusion > " 20 years ago. Several rest breaks taken secondary to fatigue from exercise in standing. Tendency to flex trunk in standing, more so while ambulating. Use of SPC for support doesn't seem to make much of a difference.  Evaluation/Treatment Tolerance: Patient limited by fatigue    Patient will continue to benefit from skilled outpatient physical therapy to address the deficits listed in the problem list box on initial evaluation, provide pt/family education and to maximize pt's level of independence in the home and community environment.     Patient's spiritual, cultural, and educational needs considered and patient agreeable to plan of care and goals.     Education  Education was done with Patient. The patient's learning style includes Listening. The patient Requires continuing/additional education.         Initiate home exercise program given at evaluation. Once patient starts, home exercise program can be updated at next visit. Patient returns to clinic several minutes later to ask about updated home exercise program. Therapist repeats education.        Plan:      Goals:   Active       1. Short Term Goals       Patient will improve hip active range of motion 10 degrees globally to decrease mechanical load to trunk during closed chain activities.  (Progressing)       Start:  02/10/25    Expected End:  03/07/25            Patient will perform home exercise program 1-2x/daily to assist therapy interventions in long term reduction in pain (Progressing)       Start:  02/10/25    Expected End:  03/07/25            Patient will perform weighted sit>stand without pain to promote pain free lifting. (Progressing)       Start:  02/10/25    Expected End:  03/07/25               2. Long Term Goals       1/ Patient will improve lower extremity manual muscle tests to 4/5 to promote tolerance to closed chain activity.  (Progressing)       Start:  02/10/25    Expected End:  03/28/25            Patient will perform 6 minute walk test  at 0.8 m/s without pain to promote community integration.  (Progressing)       Start:  02/10/25    Expected End:  03/28/25            Patient will report </=4/10 pain during physical activity to promote more active lifestyle. (Progressing)       Start:  02/10/25    Expected End:  03/28/25                Sherice Del Toro PT

## 2025-02-18 ENCOUNTER — OFFICE VISIT (OUTPATIENT)
Dept: PRIMARY CARE CLINIC | Facility: CLINIC | Age: 80
End: 2025-02-18
Payer: MEDICARE

## 2025-02-18 VITALS
HEART RATE: 64 BPM | DIASTOLIC BLOOD PRESSURE: 70 MMHG | SYSTOLIC BLOOD PRESSURE: 130 MMHG | WEIGHT: 186.38 LBS | OXYGEN SATURATION: 98 % | BODY MASS INDEX: 28.34 KG/M2

## 2025-02-18 DIAGNOSIS — E78.2 MIXED HYPERLIPIDEMIA: ICD-10-CM

## 2025-02-18 DIAGNOSIS — I10 ESSENTIAL HYPERTENSION: ICD-10-CM

## 2025-02-18 DIAGNOSIS — J43.9 COMBINED PULMONARY FIBROSIS AND EMPHYSEMA (CPFE): ICD-10-CM

## 2025-02-18 DIAGNOSIS — Z95.1 HX OF CABG: Primary | ICD-10-CM

## 2025-02-18 DIAGNOSIS — J84.10 COMBINED PULMONARY FIBROSIS AND EMPHYSEMA (CPFE): ICD-10-CM

## 2025-02-18 RX ORDER — SPIRONOLACTONE 25 MG/1
25 TABLET ORAL DAILY
Qty: 30 TABLET | Refills: 11 | Status: CANCELLED | OUTPATIENT
Start: 2025-02-18 | End: 2026-02-18

## 2025-02-18 RX ORDER — EZETIMIBE 10 MG/1
10 TABLET ORAL DAILY
Qty: 90 TABLET | Refills: 3 | Status: SHIPPED | OUTPATIENT
Start: 2025-02-18 | End: 2026-02-18

## 2025-02-18 RX ORDER — EVOLOCUMAB 140 MG/ML
140 INJECTION, SOLUTION SUBCUTANEOUS
Qty: 6 EACH | Refills: 3 | Status: CANCELLED | OUTPATIENT
Start: 2025-02-18

## 2025-02-18 NOTE — ASSESSMENT & PLAN NOTE
Lab Results   Component Value Date    CHOL 191 01/27/2025    HDL 52 01/27/2025    LDLCALC 119.4 01/27/2025    TRIG 98 01/27/2025   - Cholesterol/triglycerides not well managed  - LDL not controlled <54; started ezetimibe 10mg qd  - Continue statin therapy and low cholesterol diet  - Aerobic exercise as tolerated, goal 150min/week  - Trend future lipid panel.

## 2025-02-18 NOTE — ASSESSMENT & PLAN NOTE
BP Readings from Last 5 Encounters:   02/18/25 130/70   02/05/25 (!) 172/80   01/27/25 (!) 142/70   01/16/25 132/72   11/11/24 (!) 156/90   - BP well managed on current antihypertensive medications  - Continue current BP regimen  - Daily home BP checks/logs  - Cardiac diet encouraged  - Aerobic exercise as tolerated, goal 150min/week.

## 2025-02-18 NOTE — ASSESSMENT & PLAN NOTE
- Reduced DLCO in chronic emphysema  - Continue management with current therapies  - Pt continues to abstain from tobacco use,

## 2025-02-18 NOTE — PROGRESS NOTES
[facility]    Subjective     Patient Name: Kermit Castro  YOB: 1945  Patient Age: 79 y.o.  Patient Sex: male  Patient Phone: [phone]  PCP: Abdiaziz Lim MD  Last PCP Appointment: 2/18/2025    History of Present Illness:  Mr Castro is a 80yo gentleman who presents today to the clinic with his wife to follow up on blood pressure management.       Last seen on 2/05/25 for BP review.    HYPERTENSION:  He reports today that he has been taking his BP medications regularly. He previously removed discontinued medications and his wife helped him organize the remaining therapies according to their day time and night time doses. and has few problems afterwards.     EDEMA:   Noted bilateral lower extremity pain with notable swelling. Pain worse with heavy activity, like working in the yard. Not impacted by walking inside the house. Pt has compression stockings and sometimes elevates his feet.    Prediabetes  - Last A1c on 1/27/25 was 6.2  - Diet not well balanced    HTN  - Currently taking:   amlodipine (NORVASC) 10 MG tablet  daily  lisinopriL (PRINIVIL,ZESTRIL) 40 MG tablet  daily  metoprolol succinate (TOPROL-XL) 100 MG 24 hr tablet    furosemide (LASIX) 40 MG tablet  as needed for weight change of +3lbs  - How often taking BP at home: BID, average is: 135/75  - Today, BP is: 130/70    HLD  - Currently taking: rosuvastatin 40mg qhs  - Last lipid panel was on 1/27/25  The ASCVD Risk score (Cristina DK, et al., 2019) failed to calculate for the following reasons:    The 2019 ASCVD risk score is only valid for ages 40 to 79     Asthma/COPD  - Currently taking: ICS/LABA and PRN CARMELO  - Smoking status: quit 1998  - Not on home O2    Medications: Does not have pill packs    Health Maintenance Summary            Current Care Gaps       Shingles Vaccine (3 of 3) Overdue since 9/22/2023 07/28/2023  Outside Immunization: zoster recombinant    09/11/2017  Outside Immunization: zoster live               Influenza Vaccine (1) Overdue since 9/1/2024 12/12/2022  SmartData: WORKFLOW - HEALTHY PLANET - EXTERNAL DATA - EXTERNAL PROCEDURE DATE - INFLUENZA    10/19/2020  Outside Immunization: Influenza, high-dose, quadrivalent, PF    11/18/2019  Outside Immunization: Influenza, high-dose, trivalent, PF    10/24/2018  Outside Immunization: Influenza, high-dose, trivalent, PF    09/11/2017  Outside Immunization: Influenza, high-dose, trivalent, PF     Only the first 5 history entries have been loaded, but more history exists.            COVID-19 Vaccine (4 - 2024-25 season) Overdue since 9/1/2024 02/24/2022  Outside Immunization: COVID-19, mRNA, LNP-S, PF, 100 mcg/0.5mL dose or 50 mcg/0.25mL dose    02/12/2021  Outside Immunization: COVID-19, mRNA, LNP-S, PF, 100 mcg/0.5mL dose or 50 mcg/0.25mL dose    01/12/2021  Outside Immunization: COVID-19, mRNA, LNP-S, PF, 100 mcg/0.5mL dose or 50 mcg/0.25mL dose              TETANUS VACCINE (Every 10 Years) Never done      No completion history exists for this topic.              RSV Vaccine (Age 60+ and Pregnant patients) (1 - 1-dose 75+ series) Never done     No completion history exists for this topic.                      Upcoming       Lipid Panel (Every 5 Years) Next due on 1/27/2030 01/27/2025  Cholesterol Total component of LIPID PANEL    03/17/2022  Cholesterol Total component of Lipid Panel    10/05/2021  Cholesterol Total component of LIPID PANEL    08/27/2019  Cholesterol Total component of Lipid panel    08/09/2005  Cholesterol Total component of Lipid panel     Only the first 5 history entries have been loaded, but more history exists.                    Completed or No Longer Recommended       Pneumococcal Vaccines (Age 50+) (Series Information) Completed      07/28/2023  Outside Immunization: Pneumococcal conjugate PCV20, polysaccharide END315 conjugate, adjuvant, PF    10/19/2020  Outside Immunization: Pneumococcal conjugate PCV 13                             Prior to Admission medications    Medication Sig Start Date End Date Taking? Authorizing Provider   albuterol sulfate 2.5 mg/0.5 mL Nebu Take 2.5 mg by nebulization every 4 (four) hours as needed (wheezing/shortness of breath). Rescue 1/31/25 1/31/26 Yes Abdiaziz Lim MD   albuterol-ipratropium (DUO-NEB) 2.5 mg-0.5 mg/3 mL nebulizer solution Take 3 mLs by nebulization every 6 (six) hours as needed for Wheezing. Rescue 1/31/25  Yes Abdiaziz Lim MD   amLODIPine (NORVASC) 10 MG tablet Take 1 tablet (10 mg total) by mouth once daily. 1/31/25  Yes Abdiaziz Lim MD   aspirin (ECOTRIN) 81 MG EC tablet Take 81 mg by mouth once daily.   Yes Provider, Historical   betamethasone dipropionate (BETANATE) 0.05 % ointment Apply topically 2 (two) times daily. Use to affected areas for up to 2 weeks then take a 1 week break or decrease to 3 times weekly. Do not apply to groin or face. Apply to itchy bumps on arms 1/31/25  Yes Abdiaziz Lim MD   clopidogreL (PLAVIX) 75 mg tablet Take 4 tablets on day 1 then take 1 tablet daily there after. 1/31/25  Yes Abdiaziz Lim MD   finasteride (PROSCAR) 5 mg tablet Take 1 tablet (5 mg total) by mouth once daily. 1/31/25  Yes Abdiaziz Lim MD   flu vacc [id number],65yr up,PF (FLUZONE HIGH-DOSE 2019-20, PF,) 180 mcg/0.5 mL Syrg Fluzone High-Dose 2019-20 (PF) 180 mcg/0.5 mL intramuscular syringe   PHARMACIST ADMINISTERED IMMUNIZATION ADMINISTERED AT TIME OF DISPENSING   Yes Provider, Historical   influenza (HIGH-DOSE PF) 240 mcg/0.7 mL vaccine Fluzone High-Dose [id number] (PF) 180 mcg/0.5 mL intramuscular syringe   Yes Provider, Historical   lisinopriL (PRINIVIL,ZESTRIL) 40 MG tablet Take 1 tablet (40 mg total) by mouth once daily. 1/31/25  Yes Abdiaziz Lim MD   lubiprostone (AMITIZA) 24 MCG Cap    Yes Provider, Historical   metoprolol succinate (TOPROL-XL) 100 MG 24 hr tablet Take 1 tablet (100 mg total) by mouth once daily. 1/31/25 1/31/26 Yes Abdiaziz Lim MD   multivitamin  (THERAGRAN) per tablet Take 1 tablet by mouth once daily.   Yes Provider, Historical   nintedanib (OFEV) 150 mg Cap Take 1 capsule (150 mg total) by mouth 2 (two) times a day. 11/12/24 11/7/25 Yes Linden Mesa MD   oxyCODONE-acetaminophen (PERCOCET)  mg per tablet oxycod/apap  tab 10-325mg   Yes Provider, Historical   pantoprazole (PROTONIX) 40 MG tablet Take 1 tablet (40 mg total) by mouth before breakfast. 1/31/25  Yes Abdiaziz Lim MD   potassium chloride SA (K-DUR,KLOR-CON) 20 MEQ tablet Take 1 tablet by mouth once daily.   Yes Provider, Historical   pregabalin (LYRICA) 50 MG capsule Take 1 capsule (50 mg total) by mouth 3 (three) times daily. 2/6/25 8/7/25 Yes BRIDGET Ozuna NP   pulse oximeter (PULSE OXIMETER) device by Apply Externally route 2 (two) times a day. Use twice daily at 8 AM and 3 PM and [facility] as directed. 10/11/21  Yes Julisa Garza PA   rosuvastatin (CRESTOR) 40 MG Tab Take 1 tablet (40 mg total) by mouth every evening. 1/31/25  Yes Abdiaziz Lim MD   sildenafiL (VIAGRA) 100 MG tablet Take 100 mg by mouth. 7/9/21  Yes Provider, Historical   tamsulosin (FLOMAX) 0.4 mg Cap Take 1 capsule (0.4 mg total) by mouth once daily. 1/31/25 1/31/26 Yes Abdiaziz Lim MD   TRELEGY ELLIPTA 100-62.5-25 mcg DsDv Inhale 1 puff into the lungs once daily. 1/31/25  Yes Abdiaziz Lim MD   ezetimibe (ZETIA) 10 mg tablet Take 1 tablet (10 mg total) by mouth once daily. 2/18/25 2/18/26  Abdiaziz Lim MD   furosemide (LASIX) 40 MG tablet Take 1 tablet (40 mg total) by mouth once daily. 4/19/22 10/6/23  Moiz Villalta Jr., MD   ezetimibe (ZETIA) 10 mg tablet Take 1 tablet (10 mg total) by mouth once daily. 4/19/22 2/18/25  Moiz Villalta Jr., MD       ROS  Comprehensive review of systems otherwise negative. See history/subjective section for more details.    OBJECTIVE:     Vitals:    02/18/25 0830   BP: 130/70   BP Location: Left arm   Patient Position: Sitting   Pulse: 64    SpO2: 98%   Weight: 84.5 kg (186 lb 6.4 oz)       Body mass index is 28.34 kg/m².     PHYSICAL EXAM  GEN - A+OX4, NAD   HEENT - PERRL, EOMI, OP clear  Neck - No thyromegaly or cervical LAD.   CV - RRR, no m/r   Chest - CTAB, no wheezing or rhonchi  Abd - S/NT/ND/+BS.   Ext - 2+BDP and radial pulses. 1+ BLEPE  Neuro - 5/5 BUE and BLE strength. Numbness in feet, bilaterally   LN - No axillary or inguinal LAD appreciated.  Skin - No rash.     LABS  Previous labs reviewed.    ASSESSMENT & PLAN:   Mr. Kermit Castro is a 79 y.o. male who was seen today in clinic for BP follow up. Since previous visit, Mr Castro and his wife have organized the hypertensive therapies which has helped with overall management. He has a better understanding of the BP medications and is taking them appropriately. As a result, overall BP is better controlled with fewer symptoms. BLE edema noted on examination. Reviewed pt's weight change and requirements for using lasix. Dry weight for pt should be 183lbs so 40mg of lasix may be taken if weight is 186lbs or greater. Discussed holding any BP therapy in setting of systolic pressures lower than 100mmHg. Routine labs ordered with scheduled follow up in one month.    1. Hx of CABG  Overview:  - Hx of PCIs with numerous stents placed  - Managed with rosuvastatin 40mg qhs, and toprol 100mg qd  - BP controlled with amlodipine 10mg qd, lisinopril 40mg qd, and lasix 40mg qd  - DAPT with ASA 81mg qd, plavix 75mg qd,     Assessment & Plan:  - Continue current therapies  - Start ezetimibe 10mg qd to further reduce LDL level    Orders:  -     Ambulatory referral/consult to Cardiology; Future; Expected date: 02/25/2025    2. Essential hypertension  Overview:  - HTN managed with toprol 100mg qd, lisinopril 40mg qd, and amlodipine 10mg qd  - PRN lasix 40mg qd for weight >186lbs    Assessment & Plan:  BP Readings from Last 5 Encounters:   02/18/25 130/70   02/05/25 (!) 172/80   01/27/25 (!) 142/70   01/16/25  132/72   11/11/24 (!) 156/90   - BP well managed on current antihypertensive medications  - Continue current BP regimen  - Daily home BP checks/logs  - Cardiac diet encouraged  - Aerobic exercise as tolerated, goal 150min/week.    Orders:  -     CBC Auto Differential; Future; Expected date: 02/18/2025  -     Comprehensive Metabolic Panel; Future; Expected date: 02/18/2025  -     ezetimibe (ZETIA) 10 mg tablet; Take 1 tablet (10 mg total) by mouth once daily.  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     Assessment & Plan:  - Reduced DLCO in chronic emphysema  - Continue management with current therapies  - Pt continues to abstain from tobacco use,      4. Mixed hyperlipidemia  Overview:  - HLD managed with rosuvastatin 40mg qhs  - Not taking ezetimibe    Assessment & Plan:  Lab Results   Component Value Date    CHOL 191 01/27/2025    HDL 52 01/27/2025    LDLCALC 119.4 01/27/2025    TRIG 98 01/27/2025   - Cholesterol/triglycerides not well managed  - LDL not controlled <54; started ezetimibe 10mg qd  - Continue statin therapy and low cholesterol diet  - Aerobic exercise as tolerated, goal 150min/week  - Trend future lipid panel.    Orders:  -     ezetimibe (ZETIA) 10 mg tablet; Take 1 tablet (10 mg total) by mouth once daily.  Dispense: 90 tablet; Refill: 3         Follow up in about 4 weeks (around 3/18/2025).   All questions answered. Pt to call/message the Primary Clinic for any additional concerns    No LOS data to gayle Freed  Baker MD [facility] - [facility]

## 2025-02-20 ENCOUNTER — TELEPHONE (OUTPATIENT)
Dept: PAIN MEDICINE | Facility: CLINIC | Age: 80
End: 2025-02-20
Payer: MEDICARE

## 2025-02-20 ENCOUNTER — OFFICE VISIT (OUTPATIENT)
Dept: ORTHOPEDICS | Facility: CLINIC | Age: 80
End: 2025-02-20
Payer: MEDICARE

## 2025-02-20 VITALS — BODY MASS INDEX: 28.28 KG/M2 | WEIGHT: 186 LBS

## 2025-02-20 DIAGNOSIS — M54.12 CERVICAL RADICULOPATHY: ICD-10-CM

## 2025-02-20 DIAGNOSIS — Z98.890 S/P SPINAL SURGERY: Primary | ICD-10-CM

## 2025-02-20 DIAGNOSIS — M54.16 LUMBAR RADICULOPATHY: ICD-10-CM

## 2025-02-20 DIAGNOSIS — M54.16 LUMBAR RADICULOPATHY: Primary | ICD-10-CM

## 2025-02-20 DIAGNOSIS — M80.0B2A AGE-RELATED OSTEOPOROSIS WITH CURRENT PATHOLOGICAL FRACTURE, LEFT PELVIS, INITIAL ENCOUNTER FOR FRACTURE: ICD-10-CM

## 2025-02-20 DIAGNOSIS — M54.9 DORSALGIA, UNSPECIFIED: ICD-10-CM

## 2025-02-20 NOTE — TELEPHONE ENCOUNTER
----- Message from BRIDGET Ozuna NP sent at 2025  8:42 AM CST -----  Regarding: Order for RITCHIE KHAN    Patient Name: RITCHIE KHAN([id number])  Sex: Male  : 1945      PCP: JOE KWAN    Center: [facility] ([facility])     Level of Service:02619     CA OFFICE/OUTPT VISIT, EST, LEVL III, 20-29 MIN    Types of orders made on 2025: Imaging, Procedure Request    Order Date:2025  Ordering User:WAYNE OZUNA [[id number]]  Encounter Provider:BRIDGET Ozuna NP [[id number]]  Authorizing Provider: BRIDGET Ozuna NP [[id number]]  Supervising Provider:ANDREW GONZALEZ [[id number]]  Type of Supervision:Collaborating Physician  Department:[facility][[id number]]    Common Order Information  Procedure -> Epidural Injection (specify level) Cmt: L4/5    Order Specific Information  Order: Procedure Order to Pain Management [Custom: [id number]]  Order #:          [id number]: 1 FUTURE    Priority: Routine  Class: Clinic Performed    Future Order Information      Expires on:2026            Expected by:2025                   Associated Diagnoses      M54.16 Lumbar radiculopathy      Facility Name: -> [place]           Priority: Routine  Class: Clinic Performed    Future Order Information      Expires on:2026            Expected by:2025                   Associated Diagnoses      M54.16 Lumbar radiculopathy      Procedure -> Epidural Injection (specify level) Cmt: L4/5        Facility Name: -> [place]

## 2025-02-21 ENCOUNTER — TELEPHONE (OUTPATIENT)
Dept: PAIN MEDICINE | Facility: CLINIC | Age: 80
End: 2025-02-21
Payer: MEDICARE

## 2025-02-21 ENCOUNTER — TELEPHONE (OUTPATIENT)
Dept: PRIMARY CARE CLINIC | Facility: CLINIC | Age: 80
End: 2025-02-21
Payer: MEDICARE

## 2025-02-21 NOTE — TELEPHONE ENCOUNTER
----- Message from Abdiaziz Lim MD sent at 2/21/2025 12:44 PM CST -----  Regarding: RE: Request to hold Plavix for injection  Fco Medinaa5 day hold is good. Thanks for the notification. TB  ----- Message -----  From: Earline Rea  Sent: 2/21/2025  11:37 AM CST  To: Abdiaziz Lim MD; [facility] Freed Staff  Subject: Request to hold Plavix for injection             Good morning, We have scheduled Kermit Castro for a L4/5 Epidural Steroid Injection on 3/10/25 with Dr Mcarthur and we are requesting clearance for him to hold his Plavix 5 days prior to this injection. Thank you in advance Earline

## 2025-02-21 NOTE — TELEPHONE ENCOUNTER
----- Message from Earline sent at 2/21/2025 11:35 AM CST -----  Regarding: Request to hold Plavix for injection  Good morning, We have scheduled Kermitcam Castro for a L4/5 Epidural Steroid Injection on 3/10/25 with Dr Mcarthur and we are requesting clearance for him to hold his Plavix 5 days prior to this injection. Thank you in advance Earline

## 2025-02-25 ENCOUNTER — CLINICAL SUPPORT (OUTPATIENT)
Dept: REHABILITATION | Facility: HOSPITAL | Age: 80
End: 2025-02-25
Payer: MEDICARE

## 2025-02-25 DIAGNOSIS — M25.652 DECREASED RANGE OF MOTION OF BOTH HIPS: ICD-10-CM

## 2025-02-25 DIAGNOSIS — G89.29 CHRONIC BILATERAL LOW BACK PAIN WITHOUT SCIATICA: Primary | ICD-10-CM

## 2025-02-25 DIAGNOSIS — R53.1 DECREASED STRENGTH: ICD-10-CM

## 2025-02-25 DIAGNOSIS — M54.50 CHRONIC BILATERAL LOW BACK PAIN WITHOUT SCIATICA: Primary | ICD-10-CM

## 2025-02-25 DIAGNOSIS — M25.651 DECREASED RANGE OF MOTION OF BOTH HIPS: ICD-10-CM

## 2025-02-25 PROCEDURE — 97530 THERAPEUTIC ACTIVITIES: CPT | Mod: PN

## 2025-02-25 PROCEDURE — 97112 NEUROMUSCULAR REEDUCATION: CPT | Mod: PN

## 2025-02-25 NOTE — PROGRESS NOTES
"  Outpatient Rehab    Physical Therapy Visit    Patient Name: Kermit Castro  MRN: [id number]  YOB: 1945  Encounter Date: 2/25/2025    Therapy Diagnosis:   Encounter Diagnoses   Name Primary?    Chronic bilateral low back pain without sciatica Yes    Decreased range of motion of both hips     Decreased strength      Physician: Abdiaziz Lim MD    Physician Orders: Eval and Treat  Medical Diagnosis: Lumbar radiculopathy [M54.16]      Visit # / Visits Authorized:  2/20 + 1  Date of Evaluation:  2/10/2025   Insurance Authorization Period: 2/7/2025 to 12/31/2025  Plan of Care Certification:  2/10/2025 to 3/28/2025      Time In: 0900   Time Out: 0954  Total Time: 54   Total Billable Time: 30 minutes    FOTO:  Intake Score:  %  Survey Score 1:  %  Survey Score 2:  %         Subjective   No pain currently. It comes and goes.  Pain reported as 0/10. B low back    Objective            Treatment:  Therapeutic Exercise  Therapeutic Exercise Activity 1: Seated hip IR with hip add isometric: 5"x10 DL  Therapeutic Exercise Activity 2: Sink extension stretch to neutral: 5"x10. 2 rounds  Therapeutic Exercise Activity 3: NuStep for endurance and reciprocal coordination of upper and lower limbs: Level 5/80+ RPM, 8'    Balance/Neuromuscular Re-Education  Balance/Neuromuscular Re-Education Activity 1: Standing 3-way hip (flexion via march): RTB 2x10 B  Balance/Neuromuscular Re-Education Activity 2: 2 point gait with SPC - emphasis on upright trunk posture: 320 feet with supervision  Balance/Neuromuscular Re-Education Activity 3: Rows @ cable cross: 3 lbs, 3x10  Balance/Neuromuscular Re-Education Activity 4: SAPD @ cable cross: 3 lbs, 2x10    Therapeutic Activity  Therapeutic Activity 1: Sit<>stands: 6 lb ball, 2x8  Therapeutic Activity 2: Forward step ups: 6" step, 10x B with B UE support    Assessment & Plan   Assessment: Kermit ANN is a 79 y.o. male that presents to outpatient Physical Therapy with chronic bilateral low back " pain. History of fusion > 20 years ago. Cues persist to maintain upright positioning during cable cross activities and ambulation. Increased fatigue during sit<>stands and step ups  Evaluation/Treatment Tolerance: Patient limited by fatigue    Patient will continue to benefit from skilled outpatient physical therapy to address the deficits listed in the problem list box on initial evaluation, provide pt/family education and to maximize pt's level of independence in the home and community environment.     Patient's spiritual, cultural, and educational needs considered and patient agreeable to plan of care and goals.     Education  Education was done with Patient. The patient's learning style includes Listening. The patient Verbalizes understanding.         Continue home exercise program        Plan: Progress postural and standing endurance as tolerated.    Goals:   Active       1. Short Term Goals       Patient will improve hip active range of motion 10 degrees globally to decrease mechanical load to trunk during closed chain activities.  (Progressing)       Start:  02/10/25    Expected End:  03/07/25            Patient will perform home exercise program 1-2x/daily to assist therapy interventions in long term reduction in pain (Progressing)       Start:  02/10/25    Expected End:  03/07/25            Patient will perform weighted sit>stand without pain to promote pain free lifting. (Progressing)       Start:  02/10/25    Expected End:  03/07/25               2. Long Term Goals       1/ Patient will improve lower extremity manual muscle tests to 4/5 to promote tolerance to closed chain activity.  (Progressing)       Start:  02/10/25    Expected End:  03/28/25            Patient will perform 6 minute walk test at 0.8 m/s without pain to promote community integration.  (Progressing)       Start:  02/10/25    Expected End:  03/28/25            Patient will report </=4/10 pain during physical activity to promote more  active lifestyle. (Progressing)       Start:  02/10/25    Expected End:  03/28/25                Sherice Del Toro PT

## 2025-02-26 NOTE — PROGRESS NOTES
"DATE: 3/19/2025  PATIENT: Kermit Castro    Attending Physician: Lester Johnson M.D.    HISTORY:  Kermit Castro is a 79 y.o. male who returns to me today for MRI results.  He was last seen by me 2/20/2025.  Today he is doing well but notes low back and bilateral leg pain   The Patient reports myelopathic symptoms such as handwriting changes or difficulty with buttons/coins/keys. Denies perineal paresthesias, bowel/bladder dysfunction.    EXAM:  Ht 5' 8" (1.727 m)   Wt 84.9 kg (187 lb 2.7 oz)   BMI 28.46 kg/m²   Stable     IMAGING:  Today I personally re- reviewed:  AP, Lat and Flex/Ex  upright L-spine that demonstrate moderate curve with hardware at L2-3 in place without failure. Moderate DDD throughout.      Lumbar MRI shows moderate stenosis at L1-2 and from L3-5 with severe stenosis at L5-S1.     DXA scan shows osteopenia    Cervical xray shows severe DDD at C5-6    Cervical MRI shows severe stenosis from C5-7.     Body mass index is 28.46 kg/m².    Hemoglobin A1C   Date Value Ref Range Status   01/27/2025 6.2 (H) 4.0 - 5.6 % Final     Comment:     ADA Screening Guidelines:  5.7-6.4%  Consistent with prediabetes  >or=6.5%  Consistent with diabetes    High levels of fetal hemoglobin interfere with the HbA1C  assay. Heterozygous hemoglobin variants (HbS, HgC, etc)do  not significantly interfere with this assay.   However, presence of multiple variants may affect accuracy.           ASSESSMENT/PLAN:    Kermit Perez" was seen today for low-back pain and back pain.    Diagnoses and all orders for this visit:    Cervical myelopathy    Lumbar radiculopathy      I had a sit down discussion with the patient and his care taker regarding cervical myelopathy. I discussed the known stepwise degeneration of cervical myelopathy. I discussed the risks and benefits of decompressive surgery, including the concept that surgery would be done to prevent further neurological injury/degeneration rather than to ameliorate any " existing deficits.     I had a sit down discussion with the patient regarding a cervical posterior fusione. We specifically discussed the risks, benefits, and alternatives to surgery. We discussed the surgical procedure including the skin incision, nerve decompression, and arthroplasty.  They understand the risks include but are not limited to death, paralysis, blindness, bleeding, infection, damage to arteries, veins and nerves, tracheal injury, esophageal injury, vertebral artery injury, dysphagia, spinal fluid leak, continued or worsening pain, no improvement in symptoms, failure of prosthesis, and the possible need for more surgery in the future, as well as the possibility other unforseen and unknown complications. We talked about expected hospital stay and recovery period. I discussed the possibility of intraoperative conversion to an ACDF if there is significant endplate violation or difficulty with the prosthesis. All questions were answered; they understand and wish to proceed.      Patient is scheduled to see Dr. Johnson on 3/26.

## 2025-02-27 ENCOUNTER — CLINICAL SUPPORT (OUTPATIENT)
Dept: REHABILITATION | Facility: HOSPITAL | Age: 80
End: 2025-02-27
Payer: MEDICARE

## 2025-02-27 DIAGNOSIS — M25.651 DECREASED RANGE OF MOTION OF BOTH HIPS: ICD-10-CM

## 2025-02-27 DIAGNOSIS — M54.50 CHRONIC BILATERAL LOW BACK PAIN WITHOUT SCIATICA: Primary | ICD-10-CM

## 2025-02-27 DIAGNOSIS — R53.1 DECREASED STRENGTH: ICD-10-CM

## 2025-02-27 DIAGNOSIS — M25.652 DECREASED RANGE OF MOTION OF BOTH HIPS: ICD-10-CM

## 2025-02-27 DIAGNOSIS — G89.29 CHRONIC BILATERAL LOW BACK PAIN WITHOUT SCIATICA: Primary | ICD-10-CM

## 2025-02-27 PROCEDURE — 97112 NEUROMUSCULAR REEDUCATION: CPT | Mod: PN

## 2025-02-27 PROCEDURE — 97530 THERAPEUTIC ACTIVITIES: CPT | Mod: PN

## 2025-02-27 NOTE — PROGRESS NOTES
"  Outpatient Rehab    Physical Therapy Visit    Patient Name: Kermit Castro  MRN: [id number]  YOB: 1945  Encounter Date: 2/27/2025    Therapy Diagnosis:   Encounter Diagnoses   Name Primary?    Chronic bilateral low back pain without sciatica Yes    Decreased range of motion of both hips     Decreased strength      Physician: Abdiaziz Lim MD    Physician Orders: Eval and Treat  Medical Diagnosis: Lumbar radiculopathy [M54.16]      Visit # / Visits Authorized:  3/20 + 1  Date of Evaluation:  2/10/2025   Insurance Authorization Period: 2/7/2025 to 12/31/2025  Plan of Care Certification:  2/10/2025 to 3/28/2025      Time In: 1101   Time Out: 1154  Total Time: 53   Total Billable Time: 53 minutes    FOTO:  Intake Score:  %  Survey Score 1:  %  Survey Score 2:  %         Subjective   Pain this morning but not bad. Patient took pain killers to address, usually does this every morning. Patient reports his shortness of breath limits him due to heart issues. Patient reports he did not eat much before today's visit..  Pain reported as 2/10. B low back    Objective   Vital Signs  /86   Pulse 66   BP Location: Left arm  BP Position: Sitting  BP Cuff Size: Adult               Treatment:  Therapeutic Exercise  Therapeutic Exercise Activity 1: Sink extension stretch to neutral: 5"x10. 2 rounds    Balance/Neuromuscular Re-Education  Balance/Neuromuscular Re-Education Activity 1: Standing 3-way hip (flexion via march): RTB 3x10 B  Balance/Neuromuscular Re-Education Activity 2: Rows: Black theraband, 30x  Balance/Neuromuscular Re-Education Activity 3: SAPD: Blue theraband, 30x    Therapeutic Activity  Therapeutic Activity 1: Sit<>stands: 6 lb ball, 3x10  Therapeutic Activity 2: 2 lap walk at 0.8+ m/s: 3 rounds with SPC and supervision. 1 round not counted (<0.8 m/s)    Assessment & Plan   Assessment: Kermit ANN is a 79 y.o. male that presents to outpatient Physical Therapy with chronic bilateral low back pain. Worked " on brisk walking as endurance and functional community ambulation activity. Patient able to improve pace considerably past self selected speed. Patient required rest breaks after each round of walking and sit<>stands. Blood pressure slightly elevated; taken in response to subjective rather than issue during treatment.  Evaluation/Treatment Tolerance: Patient limited by fatigue    Patient will continue to benefit from skilled outpatient physical therapy to address the deficits listed in the problem list box on initial evaluation, provide pt/family education and to maximize pt's level of independence in the home and community environment.     Patient's spiritual, cultural, and educational needs considered and patient agreeable to plan of care and goals.     Education  Education was done with Patient. The patient's learning style includes Listening. The patient Verbalizes understanding.         Eat before therapy. Continue home exercise program        Plan: Progress postural and standing endurance as tolerated.    Goals:   Active       1. Short Term Goals       Patient will improve hip active range of motion 10 degrees globally to decrease mechanical load to trunk during closed chain activities.  (Progressing)       Start:  02/10/25    Expected End:  03/07/25            Patient will perform home exercise program 1-2x/daily to assist therapy interventions in long term reduction in pain (Progressing)       Start:  02/10/25    Expected End:  03/07/25            Patient will perform weighted sit>stand without pain to promote pain free lifting. (Progressing)       Start:  02/10/25    Expected End:  03/07/25               2. Long Term Goals       1/ Patient will improve lower extremity manual muscle tests to 4/5 to promote tolerance to closed chain activity.  (Progressing)       Start:  02/10/25    Expected End:  03/28/25            Patient will perform 6 minute walk test at 0.8 m/s without pain to promote community  integration.  (Progressing)       Start:  02/10/25    Expected End:  03/28/25            Patient will report </=4/10 pain during physical activity to promote more active lifestyle. (Progressing)       Start:  02/10/25    Expected End:  03/28/25                Sherice Del Toro PT

## 2025-03-03 ENCOUNTER — TELEPHONE (OUTPATIENT)
Dept: PAIN MEDICINE | Facility: CLINIC | Age: 80
End: 2025-03-03
Payer: MEDICARE

## 2025-03-03 ENCOUNTER — CLINICAL SUPPORT (OUTPATIENT)
Dept: REHABILITATION | Facility: HOSPITAL | Age: 80
End: 2025-03-03
Payer: MEDICARE

## 2025-03-03 VITALS — DIASTOLIC BLOOD PRESSURE: 86 MMHG | HEART RATE: 66 BPM | SYSTOLIC BLOOD PRESSURE: 138 MMHG

## 2025-03-03 DIAGNOSIS — R53.1 DECREASED STRENGTH: ICD-10-CM

## 2025-03-03 DIAGNOSIS — M25.651 DECREASED RANGE OF MOTION OF BOTH HIPS: ICD-10-CM

## 2025-03-03 DIAGNOSIS — M54.50 CHRONIC BILATERAL LOW BACK PAIN WITHOUT SCIATICA: Primary | ICD-10-CM

## 2025-03-03 DIAGNOSIS — M25.652 DECREASED RANGE OF MOTION OF BOTH HIPS: ICD-10-CM

## 2025-03-03 DIAGNOSIS — G89.29 CHRONIC BILATERAL LOW BACK PAIN WITHOUT SCIATICA: Primary | ICD-10-CM

## 2025-03-03 PROCEDURE — 97530 THERAPEUTIC ACTIVITIES: CPT | Mod: PN

## 2025-03-03 NOTE — PROGRESS NOTES
"  Outpatient Rehab    Physical Therapy Visit    Patient Name: Kermit Castro  MRN: [id number]  YOB: 1945  Encounter Date: 3/3/2025    Therapy Diagnosis:   Encounter Diagnoses   Name Primary?    Chronic bilateral low back pain without sciatica Yes    Decreased range of motion of both hips     Decreased strength      Physician: Abdiaziz Lim MD    Physician Orders: Eval and Treat  Medical Diagnosis: Lumbar radiculopathy [M54.16]      Visit # / Visits Authorized: 4/20 + 1  Date of Evaluation:  2/10/2025   Insurance Authorization Period: 2/7/2025 to 12/31/2025  Plan of Care Certification:  2/10/2025 to 3/28/2025      Time In: 1005   Time Out: 1059  Total Time: 54   Total Billable Time: 23 minutes    FOTO:  Intake Score:  %  Survey Score 1:  %  Survey Score 2:  %         Subjective   No pain currently..  Pain reported as 0/10. B low back    Objective            Treatment:       Balance/Neuromuscular Re-Education  Balance/Neuromuscular Re-Education Activity 1: Standing 3-way hip (flexion via march): GTB 2x10 B  Balance/Neuromuscular Re-Education Activity 2: Rows: Black theraband, 30x  Balance/Neuromuscular Re-Education Activity 3: SAPD: Blue theraband, 30x    Therapeutic Activity  Therapeutic Activity 1: Sit<>stands: 6 lb ball, 3x10  Therapeutic Activity 2: 2-3 lap walk at 0.8+ m/s: 3 rounds with SPC.  Therapeutic Activity 3: Forward step ups: 6" step, 20x B with B UE support    Assessment & Plan   Assessment: Kermit ANN is a 79 y.o. male that presents to outpatient Physical Therapy with chronic bilateral low back pain. Able to increase walking distances although time usually slowed by end. Increased UE support on step ups. Similar sitting breaks to break up standing activities.  Evaluation/Treatment Tolerance: Patient limited by fatigue    Patient will continue to benefit from skilled outpatient physical therapy to address the deficits listed in the problem list box on initial evaluation, provide pt/family " education and to maximize pt's level of independence in the home and community environment.     Patient's spiritual, cultural, and educational needs considered and patient agreeable to plan of care and goals.     Education  Education was done with Patient. The patient's learning style includes Listening. The patient Verbalizes understanding.         Increase standing and walking durations at home to improve endurance of lumbar extensors to these activities and thus burning.        Plan: Progress postural and standing endurance as tolerated.    Goals:   Active       1. Short Term Goals       Patient will improve hip active range of motion 10 degrees globally to decrease mechanical load to trunk during closed chain activities.  (Progressing)       Start:  02/10/25    Expected End:  03/07/25            Patient will perform home exercise program 1-2x/daily to assist therapy interventions in long term reduction in pain (Progressing)       Start:  02/10/25    Expected End:  03/07/25            Patient will perform weighted sit>stand without pain to promote pain free lifting. (Progressing)       Start:  02/10/25    Expected End:  03/07/25               2. Long Term Goals       1/ Patient will improve lower extremity manual muscle tests to 4/5 to promote tolerance to closed chain activity.  (Progressing)       Start:  02/10/25    Expected End:  03/28/25            Patient will perform 6 minute walk test at 0.8 m/s without pain to promote community integration.  (Progressing)       Start:  02/10/25    Expected End:  03/28/25            Patient will report </=4/10 pain during physical activity to promote more active lifestyle. (Progressing)       Start:  02/10/25    Expected End:  03/28/25                Sherice Del Toro, PT

## 2025-03-06 NOTE — PRE-PROCEDURE INSTRUCTIONS
Patient reviewed on 3/6/2025.  Okay to proceed at [facility]. The following pre-procedure instructions and arrival time have been reviewed with patient's wife via phone and sent to patient portal for review.  Patient's wife verbalized an understanding.  Pt to be accompanied by wife-Samira day of procedure as responsible .      Dear Kermit ,     Please read over the following pre-procedure instructions in it's entirety as there is helpful information here to get you well prepared for your upcoming procedure.     You are scheduled for a procedure with Dr. Mcarthur on 3/10/25.     [facility] is located at the corner of [facility] and [facility]. It is in the [place] next to [facility]. The address is: [address] [place] [address]. Take the elevator to the 2nd floor.      Registration check in time: 9:25 am  Scheduled procedure time: 10:25 am     You are scheduled to receive:_______Oral sedation                                                                 ___X___IV Sedation                                                                 _______No Sedation                                                                                           If you are receiving any sedation, you CANNOT drive yourself and must have a responsible friend or family member (no rideshare) to drive you home.     You should take any medications that you routinely take for blood pressure, heart medications, thyroid, cholesterol, etc.      *The fasting restrictions are dependent on whether or not you are receiving sedation. Sedation is not available for all procedures.      Your fasting instructions for sedation patients are as follow:  IV sedation. Nothing to eat after midnight the night prior to procedure. Patients are encouraged to consume clear liquids up to 2 hours prior to scheduled arrival time. -Clear liquids include Gatorade, water, soda, black coffee or tea (no milk or creamer), and clear juices. -  Clear liquids do NOT include anything with pulp or food particles (chicken broth, ice cream, yogurt, Jello, etc.) You CANNOT drive yourself and must have a .            If you are on blood thinners, you need to follow the anticoagulation instructions that had been discussed previously. You should only stop the blood thinners if it was approved by your primary care physician or your cardiologist. In the event that you are not able to stop your blood thinners, a blood thinner was not listed on your medication list, or we were not able to get clearance from your cardiologist, then the procedure may have to be postponed/canceled.      IF you were told to stop your blood thinners, this is how long you should generally hold some of the more common ones. Remember that stopping blood thinners is only necessary for certain procedures. If you are unsure of your instructions, please call us.   Aspirin - 5 days  Plavix/Clopidogrel - 7 days  Warfarin / Coumadin - 5 days  Eliquis - 3 days  Pradaxa/Dabigatran - 4 days  Xarelto/Rivaroxaban - 3 days     HOLD all non-insulin injections (shots) until after surgery (Semaglutide, Tirzepatide, Ozempic, Mounjaro, Trulicity, Victoza, Byetta, Wegovy and Adlyxin) (Total of 7 days prior)        If you are a diabetic, do not take your medication if you will be fasting, but bring it with you. Please plan on being here for roughly 2-3 hours. Please note that most procedures will not be performed if you blood sugar is >200.     Please call us if you have been sick (running fever, having any flu-like symptoms) or have been taking ANTIBIOTICS in the past 2 weeks or had any outpatient procedures other than with us (colonoscopy, endoscopy, OBGYN, dental, etc.).      If you have been previously COVID positive, you will need to hold off on your procedure until you are symptom free for 10 days. If you did not have any symptoms, you can have your procedure 10 days from your positive test result.          On the morning of your procedure:  *HOLD ALL VITAMINS, MINERALS, HERBS (INCLUDING HERBAL TEAS) AND SUPPLEMENTS  *SHOWER WITH ANTIBACTERIAL SOAP (EX. DIAL) NIGHT BEFORE AND MORNING OF PROCEDURE  *DO NOT APPLY ANY LOTIONS, OILS, POWDERS, PERFUME/COLOGNE, OINTMENTS, GELS, CREAMS, MAKEUP OR DEODORANT TO YOUR SKIN MORNING OF PROCEDURE  *LEAVE JEWELRY AND ANY VALUABLES AT HOME  *WEAR LOOSE COMFORTABLE CLOTHING      In the event that you are running late or need to reschedule on the day of your procedure, please contact the pre-op desk at [phone].       Please reply to this portal message as receipt of delivery.     Thank you,  [facility]  Tegan, LPN [facility]  Pre-Admit    No

## 2025-03-09 RX ORDER — LIDOCAINE HYDROCHLORIDE 10 MG/ML
1 INJECTION, SOLUTION EPIDURAL; INFILTRATION; INTRACAUDAL; PERINEURAL ONCE
OUTPATIENT
Start: 2025-03-09 | End: 2025-03-09

## 2025-03-09 RX ORDER — SODIUM CHLORIDE 9 MG/ML
500 INJECTION, SOLUTION INTRAVENOUS CONTINUOUS
OUTPATIENT
Start: 2025-03-09

## 2025-03-10 ENCOUNTER — HOSPITAL ENCOUNTER (OUTPATIENT)
Facility: HOSPITAL | Age: 80
Discharge: HOME | End: 2025-03-10
Attending: STUDENT IN AN ORGANIZED HEALTH CARE EDUCATION/TRAINING PROGRAM | Admitting: STUDENT IN AN ORGANIZED HEALTH CARE EDUCATION/TRAINING PROGRAM
Payer: MEDICARE

## 2025-03-10 VITALS
SYSTOLIC BLOOD PRESSURE: 123 MMHG | DIASTOLIC BLOOD PRESSURE: 68 MMHG | RESPIRATION RATE: 17 BRPM | OXYGEN SATURATION: 98 % | HEART RATE: 48 BPM | TEMPERATURE: 98 F

## 2025-03-10 DIAGNOSIS — M54.16 LUMBAR RADICULOPATHY: Primary | ICD-10-CM

## 2025-03-10 DIAGNOSIS — G89.29 CHRONIC PAIN: ICD-10-CM

## 2025-03-10 PROCEDURE — 62323 NJX INTERLAMINAR LMBR/SAC: CPT | Mod: ,,, | Performed by: STUDENT IN AN ORGANIZED HEALTH CARE EDUCATION/TRAINING PROGRAM

## 2025-03-10 PROCEDURE — 25500020 PHARM REV CODE 255: Performed by: STUDENT IN AN ORGANIZED HEALTH CARE EDUCATION/TRAINING PROGRAM

## 2025-03-10 PROCEDURE — 62323 NJX INTERLAMINAR LMBR/SAC: CPT | Performed by: STUDENT IN AN ORGANIZED HEALTH CARE EDUCATION/TRAINING PROGRAM

## 2025-03-10 PROCEDURE — 63600175 PHARM REV CODE 636 W HCPCS: Performed by: STUDENT IN AN ORGANIZED HEALTH CARE EDUCATION/TRAINING PROGRAM

## 2025-03-10 RX ORDER — DEXAMETHASONE SODIUM PHOSPHATE 10 MG/ML
INJECTION, SOLUTION INTRA-ARTICULAR; INTRALESIONAL; INTRAMUSCULAR; INTRAVENOUS; SOFT TISSUE
Status: DISCONTINUED | OUTPATIENT
Start: 2025-03-10 | End: 2025-03-10 | Stop reason: HOSPADM

## 2025-03-10 RX ORDER — LIDOCAINE HYDROCHLORIDE 10 MG/ML
INJECTION, SOLUTION EPIDURAL; INFILTRATION; INTRACAUDAL; PERINEURAL
Status: DISCONTINUED | OUTPATIENT
Start: 2025-03-10 | End: 2025-03-10 | Stop reason: HOSPADM

## 2025-03-10 RX ORDER — LIDOCAINE HYDROCHLORIDE 20 MG/ML
INJECTION, SOLUTION EPIDURAL; INFILTRATION; INTRACAUDAL; PERINEURAL
Status: DISCONTINUED | OUTPATIENT
Start: 2025-03-10 | End: 2025-03-10 | Stop reason: HOSPADM

## 2025-03-10 RX ORDER — FENTANYL CITRATE 50 UG/ML
INJECTION, SOLUTION INTRAMUSCULAR; INTRAVENOUS
Status: DISCONTINUED | OUTPATIENT
Start: 2025-03-10 | End: 2025-03-10 | Stop reason: HOSPADM

## 2025-03-10 RX ORDER — MIDAZOLAM HYDROCHLORIDE 1 MG/ML
INJECTION INTRAMUSCULAR; INTRAVENOUS
Status: DISCONTINUED | OUTPATIENT
Start: 2025-03-10 | End: 2025-03-10 | Stop reason: HOSPADM

## 2025-03-10 NOTE — DISCHARGE SUMMARY
[facility]  Discharge Note  Short Stay    Procedure(s) (LRB):  L4/5 KATIANA (very low volume) (N/A)      OUTCOME: Patient tolerated treatment/procedure well without complication and is now ready for discharge.    DISPOSITION: Home or Self Care    FINAL DIAGNOSIS:  <principal problem not specified>    FOLLOWUP: In clinic    DISCHARGE INSTRUCTIONS:  No discharge procedures on file.     TIME SPENT ON DISCHARGE: 10 minutes

## 2025-03-10 NOTE — H&P
HPI  Patient presenting for Procedure(s) (LRB):  L4/5 KATIANA (very low volume) (N/A)     Patient on Anti-coagulation No    No health changes since previous encounter    Past Medical History:   Diagnosis Date    Coronary artery disease     Hyperlipidemia     Hypertension      Past Surgical History:   Procedure Laterality Date    ABDOMINAL SURGERY      Polyps in colon removed (noncancerous)    APPENDECTOMY      back surgery (screws & okate)      CORONARY ANGIOGRAPHY N/A 5/16/2022    Procedure: ANGIOGRAM, CORONARY ARTERY;  Surgeon: Gume Nagy MD;  [facility] [facility];  Service: Cardiology;  Laterality: N/A;    CORONARY ARTERY BYPASS GRAFT      1998    CORONARY BYPASS GRAFT ANGIOGRAPHY  5/16/2022    Procedure: Bypass graft study;  Surgeon: Gume Nagy MD;  [facility];  Service: Cardiology;;    PERCUTANEOUS TRANSLUMINAL ANGIOPLASTY (PTA) OF PERIPHERAL VESSEL N/A 1/27/2022    Procedure: PTA, PERIPHERAL VESSEL;  Surgeon: Gume Nagy MD;  [facility];  Service: Cardiology;  Laterality: N/A;    TRANSFORAMINAL EPIDURAL INJECTION OF STEROID Right 11/5/2020    Procedure: LUMBAR TRANSFORAMINAL RIGHT L3/4 AND L4/5 DIRECT REFERRAL;  Surgeon: Nicole Toth MD;  Location: [facility] PAIN MGT;  Service: Pain Management;  Laterality: Right;  NEEDS CONSENT, PT STATES NO LONGER TAKES PLETAL     Review of patient's allergies indicates:   Allergen Reactions    Shellfish containing products Anaphylaxis      No current facility-administered medications for this encounter.       PMHx, PSHx, Allergies, Medications reviewed in epic    ROS negative except pain complaints in HPI    OBJECTIVE:    There were no vitals taken for this visit.    PHYSICAL EXAMINATION:    GENERAL: Well appearing, in no acute distress, alert and oriented x3.  PSYCH:  Mood and affect appropriate.  SKIN: Skin color, texture, turgor normal, no rashes or lesions which will impact the procedure.  CV: RRR with palpation of the  radial artery.  PULM: No evidence of respiratory difficulty, symmetric chest rise. Clear to auscultation.  NEURO: Cranial nerves grossly intact.    Plan:    Proceed with procedure as planned Procedure(s) (LRB):  L4/5 KATIANA (very low volume) (N/A)    Meme Chase  03/10/2025

## 2025-03-10 NOTE — DISCHARGE INSTRUCTIONS
[facility]  [facility] service # [phone]  On-call pager for emergency# [phone]     POST-PROCEDURE INSTRUCTIONS:    Today you had an injection that included a steroid medications.  The steroid may or may not have been mixed with a local anesthetic when it was injected.   If the injection was in the neck, you may feel some pressure, numbness, or slight weakness in the arm after the procedure for a short period of time (this is a normal response), if this persists for longer than 1 day please contact our office or go to the emergency room.  If the injection was in the low back, you may feel some pressure, numbness, or slight weakness in the leg after the procedure for a short period of time (this is a normal response), if this persists for longer than 1 day please contact our office or go to the emergency room.  You may get side effects from the steroid.  This is not uncommon.  Symptoms include: elevated blood sugar, elevated blood pressure, headache, flushing, nausea, insomnia.  These symptoms are transient and will resolve within 1-3 days.  If symptoms last longer than this please contact our office or head to the emergency room.  Steroid medications can take anywhere from 3-14 days to take effect (rarely longer).  You may notice that your pain worsens for a short period of time after the injection, this would not be unusual due to the pressure and trauma from the needle.    If you do not have a follow up appointment scheduled, please contact my office (or the office of the physician who referred you for the procedure) to get a post-procedure follow up scheduled 2-4 weeks after the procedure.  This can be done as a virtual visit if that is more convenient for you.      What you need to do:    Keep a record of your response to the injection you had today.    How much relief did you get?   When did the relief start and how long did it last?  Were you  able to decrease the use of any of your pain medications?  Were you able to increase your level of activity?  How long did the relief last?    What to watch out for:    If you experience any of the following symptoms after your procedure, please notify the messaging service immediately (see above for contact information):   fever (increased oral temperature)   bleeding or swelling at the injection site,    drainage, rash or redness at the injection site    possible signs of infection    increased pain at the injection site   worsening of your usual pain   severe headache   new or worsening numbness    new arm and/or leg weakness, or    changes in bowel and/or bladder function: urinating or defecating on yourself and not knowing that you did it.    PLEASE FOLLOW ALL INSTRUCTIONS CAREFULLY     Do not engage in strenuous activity (e.g., lifting or pushing heavy objects or repeated bending) for 24 hours.     Do not take a bath, swim or use Jacuzzi for 24 hours after procedure. (A shower is fine).   Remove any Band-Aids when you get home.    Use cold/ice, as needed for comfort.  We recommend the use of cold therapy alternating on for 20 minutes, off for 20 minutes.    Do not apply direct heat (heating pad or heat packs) to the injection site for 24 hours.     Resume your usual medications, unless instructed otherwise by your Pain Physician.     If you are on warfarin (Coumadin) or other blood thinner, resume this medication as instructed by your prescribing Physician.    IF AT ANY POINT YOU ARE VERY CONCERNED ABOUT YOUR SYMPTOMS, PLEASE GO TO THE EMERGENCY ROOM.    If you develop worsening pain, weakness, numbness, lose bowel or bladder control (i.e., having an accident where you did not even know you had to go to the bathroom and suddenly noticed you soiled yourself), saddle anesthesia (a loss of sensation restricted to the area of the buttocks, anus and between the legs -- i.e., those parts of your body that would  touch a saddle if you were sitting on one) you need to go immediately to the emergency department for evaluation and treatment.    ----------------------------------------------------------------------------------------------------------------------------------------------------------------  If you received Sedation please read the following instructions:  POST SEDATION INSTRUCTIONS    Today you received intravenous medication (also known as sedation) that was used to help you relax and/or decrease discomfort during your procedure. This medication will be acting in your body for the next 24 hours, so you might feel a little tired or sleepy. This feeling will slowly wear off.   Common side effects associated with these medications include: drowsiness, dizziness, sleepiness, confusion, feeling excited, difficulty remembering things, lack of steadiness with walking or balance, loss of fine muscle control, slowed reflexes, difficulty focusing, and blurred vision.  Some over-the-counter and prescription medications (e.g., muscle relaxants, opioids, mood-altering medications, sedatives/hypnotics, antihistamines) can interact with the intravenous medication you received and cause an increased risk of the side effects listed above in addition to other potentially life threatening side effects. Use extreme caution if you are taking such medications, and consult with your Pain Physician or prescribing physician if you have any questions.  For the next 12-24 hours:    DO NOT--Drive a car, operate machinery or power tools   DO NOT--Drink any alcoholic beverages (not even beer), they may dangerously increase the risk of side effects.    DO NOT--Make any important legal or business decisions or sign important documents.  We advise you to have someone to assist you at home. Move slowly and carefully. Do not make sudden changes in position. Be aware of dizziness or light-headedness and move accordingly.   If you seek medical  treatment within 24 hours, let the nurse or doctor caring for you know that you have received the above medications. If you have any questions or concerns related to your sedation or treatment today please contact us.

## 2025-03-10 NOTE — PLAN OF CARE
Discharge instructions given and explained to patient with verbalization of understanding all instructions. Patients v/s stable, denies n/v and tolerating po, rates pain level tolerable, IV removed, and  for patient discharge home.

## 2025-03-10 NOTE — OP NOTE
"PROCEDURE:  LUMBAR L4-5 INTERLAMINAR EPIDURAL STEROID INJECTION    Patient Name: Kermit Castro  MRN: [id number]  DATE OF PROCEDURE: 03/10/2025    INJECTION # 1    DIAGNOSIS: Lumbar Radiculopathy  CPT CODE: 26956      POSTPROCEDURE DIAGNOSIS: Same    PHYSICIAN: Chadd Rodriguez DO  NEEDLE TYPE: - 20G 3.5" Touhy Needle  MEDICATIONS INJECTED: 2cc mixture of 1cc Normal Saline + 10mg Dexamethasone (10mg/ml)  CONTRAST: Omni 300  LOSS OF RESISTANCE DEPTH: 5 cm    Sedation Medications - Mild Sedation with 2md Versed and 50mcg Fentanyl    Estimated Blood Loss - <2ml  Drains: None  Specimens Removed: None  Urine Output - Not Measured  Complications: None  Outcome: Good    Informed Consent:  The patient's condition and proposed procedures, risks, and alternatives were discussed with the patient or responsible party.  The patient's / responsible party's questions were answered.   The patient / responsible party appeared to understand and chose to proceed.  Informed consent was obtained.  After obtaining written consent, an IV hep lock was placed. (See nurses notes for details).     Procedure in Detail:  The patient was taken back to the OR suite and placed in a prone position. The skin overlying the injection site was prepped and draped in an aseptic fashion. The target injection site (see above) was identified with fluoroscopy and marked.     Procedural Pause:  A procedural pause verifying correct patient, medical record number, allergies, medications to be administered, current vital signs, and surgical site was performed immediately prior to beginning the procedure.    The skin and subcutaneous tissue overlying the target site of injection for the L4-5 epidural steroid injection was/were anesthetized using 4 mL of 2% lidocaine with a 25-gauge, 1½-inch needle.  The above noted Tuohy needle was advanced under fluoroscopic guidance towards the epidural space. Lateral fluoroscopic imaging was used to confirm depth. The " epidural space was identified using a loss of resistance to saline technique. (See above for loss of resistance depth). A microbore extension tubing was attached to the needle to minimize any movement of the needle during injection or syringe change.  After negative aspiration for heme or CSF, 1ml of contrast was injected to confirm placement and no intrathecal or vascular spread.  After repeat negative aspiration for heme or CSF, the above noted steroid solution was slowly injected in increments. The needle was then retracted approximately long-term and the needle track was flushed with 0.5 ml of Lidocaine 2% to clear the needle prior to removal. The Tuohy needle was then removed.     The heart rate, pulse oximetry, and blood pressure were continuously monitored throughout the procedure.  The prpocedure was well tolerated. He was carefully escorted to the recovery room in stable condition. Patient was monitored by RN for recovery period.  The patient will be contacted in the next few days to determine extent of relief.  Patient was given post procedure and discharge instructions to follow at home.  The patient was discharged in a stable condition.    Note Electronically Signed By:  Chadd Brown

## 2025-03-10 NOTE — CARE UPDATE
Pt d/c with vitals stable and voiced understanding of d/c instructions.  Pt was escorted via wheelchair and safely entered vehicle driven by family/friend.

## 2025-03-14 ENCOUNTER — HOSPITAL ENCOUNTER (OUTPATIENT)
Dept: RADIOLOGY | Facility: HOSPITAL | Age: 80
Discharge: HOME | End: 2025-03-14
Attending: REGISTERED NURSE
Payer: MEDICARE

## 2025-03-14 ENCOUNTER — HOSPITAL ENCOUNTER (OUTPATIENT)
Dept: RADIOLOGY | Facility: CLINIC | Age: 80
Discharge: HOME | End: 2025-03-14
Attending: REGISTERED NURSE
Payer: MEDICARE

## 2025-03-14 DIAGNOSIS — M54.12 CERVICAL RADICULOPATHY: ICD-10-CM

## 2025-03-14 DIAGNOSIS — M80.0B2A AGE-RELATED OSTEOPOROSIS WITH CURRENT PATHOLOGICAL FRACTURE, LEFT PELVIS, INITIAL ENCOUNTER FOR FRACTURE: ICD-10-CM

## 2025-03-14 DIAGNOSIS — Z98.890 S/P SPINAL SURGERY: ICD-10-CM

## 2025-03-14 DIAGNOSIS — M54.9 DORSALGIA, UNSPECIFIED: ICD-10-CM

## 2025-03-14 PROCEDURE — 77080 DXA BONE DENSITY AXIAL: CPT | Mod: TC

## 2025-03-14 PROCEDURE — 77080 DXA BONE DENSITY AXIAL: CPT | Mod: 26,,, | Performed by: INTERNAL MEDICINE

## 2025-03-14 PROCEDURE — 72131 CT LUMBAR SPINE W/O DYE: CPT | Mod: 26,,, | Performed by: RADIOLOGY

## 2025-03-14 PROCEDURE — 72131 CT LUMBAR SPINE W/O DYE: CPT | Mod: TC

## 2025-03-14 PROCEDURE — 72141 MRI NECK SPINE W/O DYE: CPT | Mod: 26,,, | Performed by: RADIOLOGY

## 2025-03-14 PROCEDURE — 72141 MRI NECK SPINE W/O DYE: CPT | Mod: TC

## 2025-03-18 ENCOUNTER — CLINICAL SUPPORT (OUTPATIENT)
Dept: REHABILITATION | Facility: HOSPITAL | Age: 80
End: 2025-03-18
Payer: MEDICARE

## 2025-03-18 ENCOUNTER — LAB VISIT (OUTPATIENT)
Dept: LAB | Facility: HOSPITAL | Age: 80
End: 2025-03-18
Payer: MEDICARE

## 2025-03-18 DIAGNOSIS — G89.29 CHRONIC BILATERAL LOW BACK PAIN WITHOUT SCIATICA: Primary | ICD-10-CM

## 2025-03-18 DIAGNOSIS — R53.1 DECREASED STRENGTH: ICD-10-CM

## 2025-03-18 DIAGNOSIS — M25.652 DECREASED RANGE OF MOTION OF BOTH HIPS: ICD-10-CM

## 2025-03-18 DIAGNOSIS — I10 ESSENTIAL HYPERTENSION: ICD-10-CM

## 2025-03-18 DIAGNOSIS — M54.50 CHRONIC BILATERAL LOW BACK PAIN WITHOUT SCIATICA: Primary | ICD-10-CM

## 2025-03-18 DIAGNOSIS — M25.651 DECREASED RANGE OF MOTION OF BOTH HIPS: ICD-10-CM

## 2025-03-18 LAB
ALBUMIN SERPL BCP-MCNC: 3.5 G/DL (ref 3.5–5.2)
ALP SERPL-CCNC: 66 U/L (ref 40–150)
ALT SERPL W/O P-5'-P-CCNC: 11 U/L (ref 10–44)
ANION GAP SERPL CALC-SCNC: 11 MMOL/L (ref 8–16)
AST SERPL-CCNC: 12 U/L (ref 10–40)
BASOPHILS # BLD AUTO: 0.04 K/UL (ref 0–0.2)
BASOPHILS NFR BLD: 0.4 % (ref 0–1.9)
BILIRUB SERPL-MCNC: 0.8 MG/DL (ref 0.1–1)
BUN SERPL-MCNC: 9 MG/DL (ref 8–23)
CALCIUM SERPL-MCNC: 9 MG/DL (ref 8.7–10.5)
CHLORIDE SERPL-SCNC: 107 MMOL/L (ref 95–110)
CO2 SERPL-SCNC: 23 MMOL/L (ref 23–29)
CREAT SERPL-MCNC: 0.8 MG/DL (ref 0.5–1.4)
DIFFERENTIAL METHOD BLD: ABNORMAL
EOSINOPHIL # BLD AUTO: 0.2 K/UL (ref 0–0.5)
EOSINOPHIL NFR BLD: 1.6 % (ref 0–8)
ERYTHROCYTE [DISTWIDTH] IN BLOOD BY AUTOMATED COUNT: 15.7 % (ref 11.5–14.5)
EST. GFR  (NO RACE VARIABLE): >60 ML/MIN/1.73 M^2
GLUCOSE SERPL-MCNC: 91 MG/DL (ref 70–110)
HCT VFR BLD AUTO: 43.7 % (ref 40–54)
HGB BLD-MCNC: 14 G/DL (ref 14–18)
IMM GRANULOCYTES # BLD AUTO: 0.05 K/UL (ref 0–0.04)
IMM GRANULOCYTES NFR BLD AUTO: 0.5 % (ref 0–0.5)
LYMPHOCYTES # BLD AUTO: 2 K/UL (ref 1–4.8)
LYMPHOCYTES NFR BLD: 20.6 % (ref 18–48)
MCH RBC QN AUTO: 28.5 PG (ref 27–31)
MCHC RBC AUTO-ENTMCNC: 32 G/DL (ref 32–36)
MCV RBC AUTO: 89 FL (ref 82–98)
MONOCYTES # BLD AUTO: 1.1 K/UL (ref 0.3–1)
MONOCYTES NFR BLD: 10.6 % (ref 4–15)
NEUTROPHILS # BLD AUTO: 6.6 K/UL (ref 1.8–7.7)
NEUTROPHILS NFR BLD: 66.3 % (ref 38–73)
NRBC BLD-RTO: 0 /100 WBC
PLATELET # BLD AUTO: 325 K/UL (ref 150–450)
PMV BLD AUTO: 9.6 FL (ref 9.2–12.9)
POTASSIUM SERPL-SCNC: 3.6 MMOL/L (ref 3.5–5.1)
PROT SERPL-MCNC: 6.8 G/DL (ref 6–8.4)
RBC # BLD AUTO: 4.91 M/UL (ref 4.6–6.2)
SODIUM SERPL-SCNC: 141 MMOL/L (ref 136–145)
WBC # BLD AUTO: 9.91 K/UL (ref 3.9–12.7)

## 2025-03-18 PROCEDURE — 97112 NEUROMUSCULAR REEDUCATION: CPT | Mod: PN

## 2025-03-18 PROCEDURE — 36415 COLL VENOUS BLD VENIPUNCTURE: CPT

## 2025-03-18 PROCEDURE — 85025 COMPLETE CBC W/AUTO DIFF WBC: CPT

## 2025-03-18 PROCEDURE — 80053 COMPREHEN METABOLIC PANEL: CPT

## 2025-03-18 PROCEDURE — 97530 THERAPEUTIC ACTIVITIES: CPT | Mod: PN

## 2025-03-18 NOTE — PROGRESS NOTES
Outpatient Rehab    Physical Therapy Visit    Patient Name: Kermit Castro  MRN: [id number]  YOB: 1945  Encounter Date: 3/18/2025    Therapy Diagnosis:   Encounter Diagnoses   Name Primary?    Chronic bilateral low back pain without sciatica Yes    Decreased range of motion of both hips     Decreased strength      Physician: Abdiaziz Lim MD    Physician Orders: Eval and Treat  Medical Diagnosis: Lumbar radiculopathy [M54.16]      Visit # / Visits Authorized: 5/20 + 1  Date of Evaluation:  2/10/2025   Insurance Authorization Period: 2/7/2025 to 12/31/2025  Plan of Care Certification:  2/10/2025 to 3/28/2025      Time In: 1000   Time Out: 1055  Total Time: 55   Total Billable Time: 30 minutes  FOTO:  Intake Score:  %  Survey Score 1:  %  Survey Score 2:  %         Subjective   No pain currently, but he continues with variable pain in the morning that he addresses with a pain pill. Patient reports no difference in pain or physical activity level since beginning PT.  Pain reported as 0/10. B low back. Patient unable to provide NPRS pain rating for pain this AM.    Objective       Hip Range of Motion   Right Hip   Active (deg) Passive (deg) Pain   Flexion 100 110     Extension 0 5 Yes   ABduction         ADduction         External Rotation 90/90 26 32     External Rotation Prone         Internal Rotation 90/90 30 32     Internal Rotation Prone             Left Hip   Active (deg) Passive (deg) Pain   Flexion 112 120     Extension 0 5     ABduction         ADduction         External Rotation 90/90 25 30     External Rotation Prone         Internal Rotation 90/90 24 29     Internal Rotation Prone                            Hip Strength - Planes of Motion   Right Strength Right Pain Left Strength Left  Pain   Flexion (L2) 4   4     Extension 3-   3-     ABduction 4-   4-     ADduction           Internal Rotation 4   4     External Rotation 4   4         Knee Strength   Right Strength Right Pain Left Strength  Left  Pain   Flexion (S2) 5   5     Prone Flexion           Extension (L3) 5   5                   Ambulation Details    2' walk test: 0.74 m/s         Treatment:  Therapeutic Exercise  TE 1: MMTs and hip ROM    Balance/Neuromuscular Re-Education  NMR 1: Standing 3-way hip (flexion via march): GTB 2x10 B  NMR 2: Rows: Black theraband, 30x  NMR 3: SAPD: Blue theraband, 30x    Therapeutic Activity  TA 1: Sit<>stands: 6 lbs, 3x10  TA 2: 2' walk test  TA 3: Supine>sit via log roll: 1x. Verbal cues to push through bottom elbow and top hand  TA 4: Updated HEP review including band set up in door frame    Assessment & Plan   Assessment: Kermit ANN is a 79 y.o. male that presents to outpatient Physical Therapy with chronic bilateral low back pain. 6' walk terminated secondary to reports of dizziness. Lack of food intake in addition to pain and hypertension medication consumption prior to session might contribute. Strength and majority of hip motion goals have been met. B hip extension range remains limited. Improvements in pain and function are dependent on lifestyle changes; hoping to see updated HEP compliance and subjective improvement next week.  Evaluation/Treatment Tolerance: Other (Comment) (HEP noncompliance and dizziness)    Patient will continue to benefit from skilled outpatient physical therapy to address the deficits listed in the problem list box on initial evaluation, provide pt/family education and to maximize pt's level of independence in the home and community environment.     Patient's spiritual, cultural, and educational needs considered and patient agreeable to plan of care and goals.     Education  Education was done with Patient. The patient's learning style includes Listening. The patient Demonstrates understanding and Verbalizes understanding.         Updated home exercise program. Importance of compliance to improve functional tolerance. Visits remaining.         Plan: Promote HEP compliance.  Tentative discharge next week    Goals:   Active       1. Short Term Goals       Patient will improve hip active range of motion 10 degrees globally to decrease mechanical load to trunk during closed chain activities.  (Progressing)       Start:  02/10/25    Expected End:  03/07/25            Patient will perform home exercise program 1-2x/daily to assist therapy interventions in long term reduction in pain (Not Progressing)       Start:  02/10/25    Expected End:  03/07/25            Patient will perform weighted sit>stand without pain to promote pain free lifting. (Met)       Start:  02/10/25    Expected End:  03/07/25    Resolved:  03/18/25            2. Long Term Goals       1/ Patient will improve lower extremity manual muscle tests to 4/5 to promote tolerance to closed chain activity.  (Met)       Start:  02/10/25    Expected End:  03/28/25    Resolved:  03/18/25         Patient will perform 6 minute walk test at 0.8 m/s without pain to promote community integration.  (Progressing)       Start:  02/10/25    Expected End:  03/28/25            Patient will report </=4/10 pain during physical activity to promote more active lifestyle. (Not Progressing)       Start:  02/10/25    Expected End:  03/28/25                Sherice Del Toro, PT

## 2025-03-19 ENCOUNTER — OFFICE VISIT (OUTPATIENT)
Dept: PRIMARY CARE CLINIC | Facility: CLINIC | Age: 80
End: 2025-03-19
Payer: MEDICARE

## 2025-03-19 ENCOUNTER — TELEPHONE (OUTPATIENT)
Dept: PRIMARY CARE CLINIC | Facility: CLINIC | Age: 80
End: 2025-03-19
Payer: MEDICARE

## 2025-03-19 ENCOUNTER — OFFICE VISIT (OUTPATIENT)
Dept: ORTHOPEDICS | Facility: CLINIC | Age: 80
End: 2025-03-19
Payer: MEDICARE

## 2025-03-19 VITALS
HEART RATE: 56 BPM | OXYGEN SATURATION: 98 % | WEIGHT: 187.06 LBS | DIASTOLIC BLOOD PRESSURE: 68 MMHG | BODY MASS INDEX: 28.44 KG/M2 | SYSTOLIC BLOOD PRESSURE: 138 MMHG

## 2025-03-19 VITALS — WEIGHT: 187.19 LBS | HEIGHT: 68 IN | BODY MASS INDEX: 28.37 KG/M2

## 2025-03-19 DIAGNOSIS — I10 ESSENTIAL HYPERTENSION: ICD-10-CM

## 2025-03-19 DIAGNOSIS — J43.9 COMBINED PULMONARY FIBROSIS AND EMPHYSEMA (CPFE): ICD-10-CM

## 2025-03-19 DIAGNOSIS — M79.604 PAIN IN BOTH LOWER EXTREMITIES: ICD-10-CM

## 2025-03-19 DIAGNOSIS — J84.10 COMBINED PULMONARY FIBROSIS AND EMPHYSEMA (CPFE): ICD-10-CM

## 2025-03-19 DIAGNOSIS — M79.605 PAIN IN BOTH LOWER EXTREMITIES: ICD-10-CM

## 2025-03-19 DIAGNOSIS — I70.223 ATHEROSCLEROSIS OF NATIVE ARTERIES OF EXTREMITIES WITH REST PAIN, BILATERAL LEGS: ICD-10-CM

## 2025-03-19 DIAGNOSIS — Z71.89 ADVANCED CARE PLANNING/COUNSELING DISCUSSION: ICD-10-CM

## 2025-03-19 DIAGNOSIS — E78.2 MIXED HYPERLIPIDEMIA: ICD-10-CM

## 2025-03-19 DIAGNOSIS — I50.32 CHRONIC HEART FAILURE WITH PRESERVED EJECTION FRACTION: ICD-10-CM

## 2025-03-19 DIAGNOSIS — G95.9 CERVICAL MYELOPATHY: Primary | ICD-10-CM

## 2025-03-19 DIAGNOSIS — M85.89 OSTEOPENIA OF MULTIPLE SITES: ICD-10-CM

## 2025-03-19 DIAGNOSIS — G99.2 STENOSIS OF CERVICAL SPINE WITH MYELOPATHY: Primary | ICD-10-CM

## 2025-03-19 DIAGNOSIS — I73.9 PAD (PERIPHERAL ARTERY DISEASE): ICD-10-CM

## 2025-03-19 DIAGNOSIS — Z23 IMMUNIZATION DUE: ICD-10-CM

## 2025-03-19 DIAGNOSIS — M48.02 STENOSIS OF CERVICAL SPINE WITH MYELOPATHY: Primary | ICD-10-CM

## 2025-03-19 DIAGNOSIS — M26.79 TORI PRESENT ON RESIDUAL ALVEOLAR RIDGE OF MAXILLA: ICD-10-CM

## 2025-03-19 DIAGNOSIS — M54.16 LUMBAR RADICULOPATHY: ICD-10-CM

## 2025-03-19 DIAGNOSIS — L28.1 PRURIGO NODULARIS: ICD-10-CM

## 2025-03-19 PROCEDURE — 3075F SYST BP GE 130 - 139MM HG: CPT | Mod: CPTII,S$GLB,,

## 2025-03-19 PROCEDURE — 99215 OFFICE O/P EST HI 40 MIN: CPT | Mod: S$GLB,,, | Performed by: REGISTERED NURSE

## 2025-03-19 PROCEDURE — 1157F ADVNC CARE PLAN IN RCRD: CPT | Mod: CPTII,S$GLB,,

## 2025-03-19 PROCEDURE — 1101F PT FALLS ASSESS-DOCD LE1/YR: CPT | Mod: CPTII,S$GLB,, | Performed by: REGISTERED NURSE

## 2025-03-19 PROCEDURE — 3078F DIAST BP <80 MM HG: CPT | Mod: CPTII,S$GLB,,

## 2025-03-19 PROCEDURE — 99999 PR PBB SHADOW E&M-EST. PATIENT-LVL V: CPT | Mod: PBBFAC,,,

## 2025-03-19 PROCEDURE — 99999 PR PBB SHADOW E&M-EST. PATIENT-LVL IV: CPT | Mod: PBBFAC,,, | Performed by: REGISTERED NURSE

## 2025-03-19 PROCEDURE — 99417 PROLNG OP E/M EACH 15 MIN: CPT | Mod: S$GLB,,,

## 2025-03-19 PROCEDURE — 1157F ADVNC CARE PLAN IN RCRD: CPT | Mod: CPTII,S$GLB,, | Performed by: REGISTERED NURSE

## 2025-03-19 PROCEDURE — 99215 OFFICE O/P EST HI 40 MIN: CPT | Mod: S$GLB,,,

## 2025-03-19 PROCEDURE — 1126F AMNT PAIN NOTED NONE PRSNT: CPT | Mod: CPTII,S$GLB,,

## 2025-03-19 PROCEDURE — 3288F FALL RISK ASSESSMENT DOCD: CPT | Mod: CPTII,S$GLB,, | Performed by: REGISTERED NURSE

## 2025-03-19 PROCEDURE — 1125F AMNT PAIN NOTED PAIN PRSNT: CPT | Mod: CPTII,S$GLB,, | Performed by: REGISTERED NURSE

## 2025-03-19 PROCEDURE — 1159F MED LIST DOCD IN RCRD: CPT | Mod: CPTII,S$GLB,, | Performed by: REGISTERED NURSE

## 2025-03-19 RX ORDER — OXYCODONE AND ACETAMINOPHEN 10; 325 MG/1; MG/1
1 TABLET ORAL EVERY 12 HOURS PRN
Qty: 60 TABLET | Refills: 0 | Status: SHIPPED | OUTPATIENT
Start: 2025-03-19

## 2025-03-19 RX ORDER — VALSARTAN 320 MG/1
320 TABLET ORAL DAILY
Qty: 90 TABLET | Refills: 3 | Status: SHIPPED | OUTPATIENT
Start: 2025-03-19 | End: 2026-03-19

## 2025-03-19 RX ORDER — HYDROCODONE BITARTRATE AND ACETAMINOPHEN 10; 325 MG/1; MG/1
1 TABLET ORAL EVERY 8 HOURS PRN
COMMUNITY
Start: 2025-03-14 | End: 2025-03-19

## 2025-03-19 NOTE — ASSESSMENT & PLAN NOTE
- Explained the importance of this process to the patient.    - Reviewed the importance of designating a Health Care Power of  (HCPOA) should they become sick and lose decision-making capacity.   - Updated contacts in pt history  - Discussed/updated Code Status  - Spent a total time of 5-10 minutes discussing this issue with the patient.

## 2025-03-19 NOTE — ASSESSMENT & PLAN NOTE
- No acute symptoms of changes  - Continue management regimen  - Follow up with appropriate providers as scheduled for continued observation/management  - Discuss intervention options with Ortho-spine

## 2025-03-19 NOTE — ASSESSMENT & PLAN NOTE
Results for orders placed during the hospital encounter of 09/21/21    Echo    Interpretation Summary  · The left ventricle is normal in size with concentric remodeling and normal systolic function. The estimated ejection fraction is 55%.  · Grade II left ventricular diastolic dysfunction.  · Normal right ventricular size with normal right ventricular systolic function.  · Mild biatrial enlargement.  · Mild mitral regurgitation.  · Mild tricuspid regurgitation.  · The estimated PA systolic pressure is 39 mmHg.  · Normal central venous pressure (3 mmHg).    Lab Results   Component Value Date     (H) 04/19/2022    BNP 74 12/21/2021   - Patient has Diastolic (HFpEF) heart failure that is Chronic.   - HFpEF is compensated; Pt is euvolemic on examination  - Continue low sodium diet <2g  - Restrict daily fluid intake to 1.5L  - Check BP and weight daily   - Contact physician for elevations in weight of 5lbs or greater  - Follow up with Cardiology.

## 2025-03-19 NOTE — TELEPHONE ENCOUNTER
----- Message from Susy sent at 3/19/2025 10:34 AM CDT -----  Contact: [phone]  Pharmacy is calling to clarify an RX.RX name:  oxyCODONE-acetaminophen (PERCOCET)  mg per tablet What do they need to clarify:  possible duplicatonComments:  Patient is on Norco for pain under Dr. Ajith Boateng  filled on 3/14/25 at [facility]

## 2025-03-19 NOTE — ASSESSMENT & PLAN NOTE
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

## 2025-03-19 NOTE — ASSESSMENT & PLAN NOTE
- Recommend daily supplement with calcium (calcium carbonate: 1000mg daily) and vitamin D3: 25mcg daily or 1000units daily). This can be taken individually or as a combination therapy (ex Citracal or Viactiv).  - Continue aerobic exercise as tolerated, goal 150min weekly  - Future DEXA scan in 2 years.

## 2025-03-19 NOTE — ASSESSMENT & PLAN NOTE
- Provided vaccination card for future immunizations  - Will reassess healthcare gaps and discuss pertinent vaccinations at follow up visit

## 2025-03-19 NOTE — PROGRESS NOTES
[facility]    Subjective     Patient Name: Kermit Castro  YOB: 1945  Patient Age: 79 y.o.  Patient Sex: male  Patient Phone: [phone]  PCP: Abdiaziz Lim MD  Last PCP Appointment: 3/19/2025    History of Present Illness:  Pt presents today to the clinic with his wife for scheduled follow up to review MRI Cervical Spine and blood pressure.    CERVICAL STENOSIS:  Pt with history of chronic cervical pain with radiculopathy. Medical history notable for lumbar stenosis. Recent imaging suggestive of multi-level,  severe, cervical, spinal canal stenosis. No acute issues reported; pt followed by orthopedic surgery.    HTN:   Baseline systolic blood pressure at home reported to be 140's. Pt has continued to get assistance from his wife with pill packing and medication reminders. No new complications noted.    PREDIABETES:  Managed with diet and exercise. Most recent A1C from 1/27/25 at 6.2. Discussed risks/rewards of starting therapy. Pt comfortable managing with diet and exercise currently. No acute symptoms or weight changes    HLD  - Currently taking: rosuvastatin 40mg qhs and ezetimibe 10mg qd  - Last lipid panel was on 1/27/25 with notable elevated LDL >100  The ASCVD Risk score ([place] DK, et al., 2019) failed to calculate for the following reasons:    The 2019 ASCVD risk score is only valid for ages 40 to 79       Medications: Does not have pill packs  - Medication management assisted by Ms Castro via pill box    Health Maintenance Summary            Current Care Gaps       Shingles Vaccine (3 of 3) Overdue since 9/22/2023 07/28/2023  Outside Immunization: zoster recombinant    09/11/2017  Outside Immunization: zoster live              COVID-19 Vaccine (4 - 2024-25 season) Overdue since 9/1/2024 02/24/2022  Outside Immunization: COVID-19, mRNA, LNP-S, PF, 100 mcg/0.5mL dose or 50 mcg/0.25mL dose    02/12/2021  Outside Immunization: COVID-19, mRNA, LNP-S, PF, 100 mcg/0.5mL  dose or 50 mcg/0.25mL dose    01/12/2021  Outside Immunization: COVID-19, mRNA, LNP-S, PF, 100 mcg/0.5mL dose or 50 mcg/0.25mL dose              TETANUS VACCINE (Every 10 Years) Never done     No completion history exists for this topic.              RSV Vaccine (Age 60+ and Pregnant patients) (1 - 1-dose 75+ series) Never done     No completion history exists for this topic.                      Upcoming       DEXA Scan (Every 2 Years) Next due on 3/14/2027      03/14/2025  DXA Bone Density Axial Skeleton 1 or more sites              Lipid Panel (Every 5 Years) Next due on 1/27/2030 01/27/2025  Cholesterol Total component of LIPID PANEL    03/17/2022  Cholesterol Total component of Lipid Panel    10/05/2021  Cholesterol Total component of LIPID PANEL    08/27/2019  Cholesterol Total component of Lipid panel    08/09/2005  Cholesterol Total component of Lipid panel     Only the first 5 history entries have been loaded, but more history exists.                    Completed or No Longer Recommended       Influenza Vaccine (Series Information) Completed      02/20/2025  SmartData: WORKFLOW - HEALTHY PLANET - EXTERNAL DATA - EXTERNAL PROCEDURE DATE - INFLUENZA    12/12/2022  SmartData: WORKFLOW - HEALTHY PLANET - EXTERNAL DATA - EXTERNAL PROCEDURE DATE - INFLUENZA    10/19/2020  Outside Immunization: Influenza, high-dose, quadrivalent, PF    11/18/2019  Outside Immunization: Influenza, high-dose, trivalent, PF    10/24/2018  Outside Immunization: Influenza, high-dose, trivalent, PF      Only the first 5 history entries have been loaded, but more history exists.              Pneumococcal Vaccines (Age 50+) (Series Information) Completed      07/28/2023  Outside Immunization: Pneumococcal conjugate PCV20, polysaccharide [id number] conjugate, adjuvant, PF    10/19/2020  Outside Immunization: Pneumococcal conjugate PCV 13                            Prior to Admission medications    Medication Sig Start Date End Date  Taking? Authorizing Provider   albuterol sulfate 2.5 mg/0.5 mL Nebu Take 2.5 mg by nebulization every 4 (four) hours as needed (wheezing/shortness of breath). Rescue 1/31/25 1/31/26 Yes Abdiaziz Lim MD   albuterol-ipratropium (DUO-NEB) 2.5 mg-0.5 mg/3 mL nebulizer solution Take 3 mLs by nebulization every 6 (six) hours as needed for Wheezing. Rescue 1/31/25  Yes Abdiaziz Lim MD   amLODIPine (NORVASC) 10 MG tablet Take 1 tablet (10 mg total) by mouth once daily. 1/31/25  Yes Abdiaziz Lim MD   betamethasone dipropionate (BETANATE) 0.05 % ointment Apply topically 2 (two) times daily. Use to affected areas for up to 2 weeks then take a 1 week break or decrease to 3 times weekly. Do not apply to groin or face. Apply to itchy bumps on arms 1/31/25  Yes Abdiaziz Lim MD   clopidogreL (PLAVIX) 75 mg tablet Take 4 tablets on day 1 then take 1 tablet daily there after. 1/31/25  Yes Abdiaziz Lim MD   ezetimibe (ZETIA) 10 mg tablet Take 1 tablet (10 mg total) by mouth once daily. 2/18/25 2/18/26 Yes Abdiaziz Lim MD   finasteride (PROSCAR) 5 mg tablet Take 1 tablet (5 mg total) by mouth once daily. 1/31/25  Yes Abdiaziz Lim MD   flu vacc [id number] up,PF (FLUZONE HIGH-DOSE 2019-20, PF,) 180 mcg/0.5 mL Syrg Fluzone High-Dose 2019-20 (PF) 180 mcg/0.5 mL intramuscular syringe   PHARMACIST ADMINISTERED IMMUNIZATION ADMINISTERED AT TIME OF DISPENSING   Yes Provider, Historical   influenza (HIGH-DOSE PF) 240 mcg/0.7 mL vaccine Fluzone High-Dose [id number] (PF) 180 mcg/0.5 mL intramuscular syringe   Yes Provider, Historical   lubiprostone (AMITIZA) 24 MCG Cap    Yes Provider, Historical   metoprolol succinate (TOPROL-XL) 100 MG 24 hr tablet Take 1 tablet (100 mg total) by mouth once daily. 1/31/25 1/31/26 Yes Abdiaziz Lim MD   multivitamin (THERAGRAN) per tablet Take 1 tablet by mouth once daily.   Yes Provider, Historical   nintedanib (OFEV) 150 mg Cap Take 1 capsule (150 mg total) by mouth 2 (two) times a day. 11/12/24  11/7/25 Yes Linden Mesa MD   pantoprazole (PROTONIX) 40 MG tablet Take 1 tablet (40 mg total) by mouth before breakfast. 1/31/25  Yes Abdiaziz Lim MD   potassium chloride SA (K-DUR,KLOR-CON) 20 MEQ tablet Take 1 tablet by mouth once daily.   Yes Provider, Historical   pregabalin (LYRICA) 50 MG capsule Take 1 capsule (50 mg total) by mouth 3 (three) times daily. 2/6/25 8/7/25 Yes BRIDGET Ozuna NP   pulse oximeter (PULSE OXIMETER) device by Apply Externally route 2 (two) times a day. Use twice daily at 8 AM and 3 PM and record the value in [employer] as directed. 10/11/21  Yes Julisa Garza PA   rosuvastatin (CRESTOR) 40 MG Tab Take 1 tablet (40 mg total) by mouth every evening. 1/31/25  Yes Abdiaziz Lim MD   tamsulosin (FLOMAX) 0.4 mg Cap Take 1 capsule (0.4 mg total) by mouth once daily. 1/31/25 1/31/26 Yes Abdiaziz Lim MD   TRELEGY ELLIPTA 100-62.5-25 mcg DsDv Inhale 1 puff into the lungs once daily. 1/31/25  Yes Abdiaziz Lim MD   HYDROcodone-acetaminophen (NORCO)  mg per tablet Take 1 tablet by mouth every 8 (eight) hours as needed for Pain. 3/14/25 3/19/25 Yes Provider, Historical   lisinopriL (PRINIVIL,ZESTRIL) 40 MG tablet Take 1 tablet (40 mg total) by mouth once daily. 1/31/25 3/19/25 Yes Abdiaziz Lim MD   furosemide (LASIX) 40 MG tablet Take 1 tablet (40 mg total) by mouth once daily. 4/19/22 3/10/25  Moiz Villalta Jr., MD   oxyCODONE-acetaminophen (PERCOCET)  mg per tablet Take 1 tablet by mouth every 12 (twelve) hours as needed for Pain. 3/19/25   Abdiaziz Lim MD   valsartan (DIOVAN) 320 MG tablet Take 1 tablet (320 mg total) by mouth once daily. 3/19/25 3/19/26  Abdiaziz Lim MD   oxyCODONE-acetaminophen (PERCOCET)  mg per tablet oxycod/apap  tab 10-325mg  3/19/25  Provider, Historical       ROS  Comprehensive review of systems otherwise negative. See history/subjective section for more details.    OBJECTIVE:     Vitals:    03/19/25 0810 03/19/25 0913   BP: (!)  149/91 138/68   BP Location: Left arm Left arm   Patient Position: Sitting Sitting   Pulse: (!) 56    SpO2: 98%    Weight: 84.8 kg (187 lb 1 oz)        Body mass index is 28.44 kg/m².     PHYSICAL EXAM  GEN - A+OX4, NAD   HEENT - PERRL, EOMI, OP clear  Neck - No thyromegaly or cervical LAD.   CV - RRR, no m/r   Chest - CTAB, no wheezing or rhonchi  Abd - S/NT/ND/+BS.   Ext - 2+BDP and radial pulses. Mild BLE edema.  Neuro - 5/5 BUE and BLE strength. Intermittent bilateral upper extremity warmth/numbness  LN - No LAD appreciated.  Skin - No rash. 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    ASSESSMENT & PLAN:   Mr. Kermit Castro is a 79 y.o. male who was seen today in clinic for scheduled follow up after working on better blood pressure control. Pt notes SBP baseline is 140 at home; no issues with medication management. Numbness and warmth in BUE described as intermittent and not impacting ADLs. Known cervical mylopathy 2/2 to cervical spinal canal stenosis; pt to follow up with Ortho-spine. PT continued with notable improvement in balance and ambulation. Pt followed by Dermatology for ongoing management of prurigo nodularis. Routine labs scheduled; clinic follow up in one month.    1. Stenosis of cervical spine with myelopathy  Overview:  - Chronic cervical neck pain with radiation to shoulders  - MRI Cervical Spine from 3/14/25 s/f  Severe cervical spondylosis, significant for multilevel severe spinal canal stenosis worse at C5-6 where the AP diameter of the canal is no more than 2mm.  Signal changes within the cervical cord, most prominent at C5-C6 concerning for myelomalacia.  Multilevel high-grade neural foraminal narrowing.  - Pt reports no acute change in symptoms  - Followed by Orthopedics, Pain Management, and Neurosurgery  - Managed with pregablin 50mg TID and Norco 10-325gm    Assessment & Plan:  - No acute symptoms of changes  - Continue management regimen  - Follow up  with appropriate providers as scheduled for continued observation/management  - Discuss intervention options with Ortho-spine      2. Atherosclerosis of native arteries of extremities with rest pain, bilateral legs  Overview:  - JOAQUINA from 3/08/22 s/f acute reduction in exercise JOAQUINA:  Right: rest/exercise = 0.96/0.63  Left: rest/exercise = 0.68/0.45  - Managed with DAPT of clopidogrel 75mg qd & ASA 81mg qd  - HLD managed with rosuvastatin 40mg qhs and ezetimibe 10mg qd    Assessment & Plan:  - No acute changes reported  - Continue regular aerobic exercise as tolerated  - Continue DAPT and HLD management  - Recommend low sugar/cholesterol cardiac diet      3. PAD (peripheral artery disease)  Overview:  - BLE arterial US in 2019 s/f; severe peripheral arterial disease in the left lower extremity,  left anterior distal tibial artery is occluded, left dorsalis pedis artery has retrograde flow, moderate peripheral arterial disease in the right lower extremity, mild amount of atherosclerosis in both lower extremities.  - Severely decreased bilateral ABIs  - Multiple PCIs and coronary stents  - Managed with DAPT (ASA 81mg qd and plavix 75mg qd), ezetimibe 10mg qd, and rosuvastatin 40mg qhs   - Followed by Dr Nagy (Cardiology)    Assessment & Plan:  - See atherosclerotic disease of native lower extremity vessels  - Follow up with Cardiology as scheduled      4. Essential hypertension  Overview:  - HTN managed with toprol 100mg qd, lisinopril 40mg qd, and amlodipine 10mg qd  - PRN lasix 40mg qd for weight >186lbs    Assessment & Plan:  BP Readings from Last 5 Encounters:   03/10/25 123/68   02/27/25 138/86   02/18/25 130/70   02/05/25 (!) 172/80   01/27/25 (!) 142/70   - BP managed appropriately on current antihypertensive medications  - Continue current BP regimen  - Daily home BP checks/logs  - Cardiac diet encouraged  - Aerobic exercise as tolerated, goal 150min/week.    Orders:  -     valsartan (DIOVAN) 320 MG tablet;  Take 1 tablet (320 mg total) by mouth once daily.  Dispense: 90 tablet; Refill: 3    5. Chronic heart failure with preserved ejection fraction  Overview:  TTE from 09/21/21  · The left ventricle is normal in size with concentric remodeling and normal systolic function. The estimated ejection fraction is 55%.  · Grade II left ventricular diastolic dysfunction.  · Normal right ventricular size with normal right ventricular systolic function.  · Mild biatrial enlargement.  · Mild mitral regurgitation.  · Mild tricuspid regurgitation.  · The estimated PA systolic pressure is 39 mmHg.  · Normal central venous pressure (3 mmHg).  - Managed with amlodipine (NORVASC) 10 MG tablet  daily, lisinopriL (PRINIVIL,ZESTRIL) 40 MG tablet  daily, metoprolol succinate (TOPROL-XL) 100 MG 24 hr tablet, furosemide (LASIX) 40 MG tablet  as needed for 3lb weight change  - On high intensity statin therapy with rosuvastatin  - On DAPT with ASA and plavix    Assessment & Plan:  Results for orders placed during the hospital encounter of 09/21/21    Echo    Interpretation Summary  · The left ventricle is normal in size with concentric remodeling and normal systolic function. The estimated ejection fraction is 55%.  · Grade II left ventricular diastolic dysfunction.  · Normal right ventricular size with normal right ventricular systolic function.  · Mild biatrial enlargement.  · Mild mitral regurgitation.  · Mild tricuspid regurgitation.  · The estimated PA systolic pressure is 39 mmHg.  · Normal central venous pressure (3 mmHg).    Lab Results   Component Value Date     (H) 04/19/2022    BNP 74 12/21/2021   - Patient has Diastolic (HFpEF) heart failure that is Chronic.   - HFpEF is compensated; Pt is euvolemic on examination  - Continue low sodium diet <2g  - Restrict daily fluid intake to 1.5L  - Check BP and weight daily   - Contact physician for elevations in weight of 5lbs or greater  - Follow up with Cardiology.      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     Assessment & Plan:  - Reduced DLCO in chronic emphysema  - Continue respiratory therapy and follow up with Pulmonology as scheduled  - Abstain from tobacco use      7. Osteopenia of multiple sites  Overview:  - DEXA scan results from 3/14/25:  Femoral neck:                       T-score is -1.7, and Z-score is 0.5.  Total hip:                                T-score is -0.9, and Z-score is 0.2.  Distal 1/3 radius:                   T-score is -0.4, and Z-score is 1.6.  5.0% risk of a major osteoporotic fracture in the next 10 years.  1.6% risk of hip fracture in the next 10 years.  Low bone mass (Osteopenia); FRAX calculations do not support treatment as osteoporosis.     Assessment & Plan:  - Recommend daily supplement with calcium (calcium carbonate: 1000mg daily) and vitamin D3: 25mcg daily or 1000units daily). This can be taken individually or as a combination therapy (ex Citracal or Viactiv).  - Continue aerobic exercise as tolerated, goal 150min weekly  - Future DEXA scan in 2 years.        8. Immunization due  Overview:  - Acknowledged the need for future vaccinations  T/dap  Covid booster  RSV  Shingles    Assessment & Plan:  - Provided vaccination card for future immunizations  - Will reassess healthcare gaps and discuss pertinent vaccinations at follow up visit      9. Advanced care planning/counseling discussion  Overview:  - HCPOA or ACP documentation on record      Assessment & Plan:  - Explained the importance of this process to the patient.    - Reviewed the importance of designating a Health Care Power of   (HCPOA) should they become sick and lose decision-making capacity.   - Updated contacts in pt history  - Discussed/updated Code Status  - Spent a total time of 5-10 minutes discussing this issue with the patient.      10. Pain in both lower extremities  -     oxyCODONE-acetaminophen (PERCOCET)  mg per tablet; Take 1 tablet by mouth every 12 (twelve) hours as needed for Pain.  Dispense: 60 tablet; Refill: 0    11. 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 Prurigo nodularis  Overview:  - Followed by Dr Mcguire (Dermatology)  - Managed with betamethasone dipropionate, topical      Assessment & Plan:  - Try to avoid scratching nodules  - Use mild soaps or even no soap at all when bathing  - Apply skin moisturizer several times a day  - Apply over-the-counter lotions and ointments including capsaicin cream, pramoxine hydrochloride (a topical anesthetic), and products that contain camphor, menthol, and phenol to soothe skin and reduce itchiness  - Follow up with Dermatology as needed      13. Mixed hyperlipidemia  Overview:  - HLD managed with rosuvastatin 40mg qhs and ezetimibe 10mg qd    Assessment & Plan:  Lab Results   Component Value Date    CHOL 191 01/27/2025    HDL 52 01/27/2025    LDLCALC 119.4 01/27/2025    TRIG 98 01/27/2025   - Cholesterol/triglycerides poorly managed  - LDL goal of <70 not achieved  - Continue statin therapy and low cholesterol diet  - Aerobic exercise as tolerated, goal 150min/week  - Trend future lipid panel to evaluate trends and medication adjustment             Follow up in about 4 weeks (around 4/16/2025).     All questions answered. Pt to call/message the Primary Clinic for any additional concerns    I spent a total of 97 minutes on the day of the visit.      This includes face to face time and non-face to face time preparing to see the patient (eg, review of tests),  obtaining and/or reviewing separately obtained history, documenting clinical information in the electronic or other health record, independently interpreting results and communicating results to the patient/family/caregiver, or care coordinator.       Abdiaziz Lim MD  [facility]

## 2025-03-19 NOTE — ASSESSMENT & PLAN NOTE
- No acute changes reported  - Continue regular aerobic exercise as tolerated  - Continue DAPT and HLD management  - Recommend low sugar/cholesterol cardiac diet

## 2025-03-19 NOTE — ASSESSMENT & PLAN NOTE
BP Readings from Last 5 Encounters:   03/10/25 123/68   02/27/25 138/86   02/18/25 130/70   02/05/25 (!) 172/80   01/27/25 (!) 142/70   - BP managed appropriately on current antihypertensive medications  - Continue current BP regimen  - Daily home BP checks/logs  - Cardiac diet encouraged  - Aerobic exercise as tolerated, goal 150min/week.

## 2025-03-19 NOTE — ASSESSMENT & PLAN NOTE
- Reduced DLCO in chronic emphysema  - Continue respiratory therapy and follow up with Pulmonology as scheduled  - Abstain from tobacco use

## 2025-03-20 PROBLEM — M26.79: Status: ACTIVE | Noted: 2025-03-20

## 2025-03-20 NOTE — ASSESSMENT & PLAN NOTE
- Try to avoid scratching nodules  - Use mild soaps or even no soap at all when bathing  - Apply skin moisturizer several times a day  - Apply over-the-counter lotions and ointments including capsaicin cream, pramoxine hydrochloride (a topical anesthetic), and products that contain camphor, menthol, and phenol to soothe skin and reduce itchiness  - Follow up with Dermatology as needed

## 2025-03-20 NOTE — ASSESSMENT & PLAN NOTE
Lab Results   Component Value Date    CHOL 191 01/27/2025    HDL 52 01/27/2025    LDLCALC 119.4 01/27/2025    TRIG 98 01/27/2025   - Cholesterol/triglycerides poorly managed  - LDL goal of <70 not achieved  - Continue statin therapy and low cholesterol diet  - Aerobic exercise as tolerated, goal 150min/week  - Trend future lipid panel to evaluate trends and medication adjustment

## 2025-03-21 ENCOUNTER — TELEPHONE (OUTPATIENT)
Dept: PRIMARY CARE CLINIC | Facility: CLINIC | Age: 80
End: 2025-03-21
Payer: MEDICARE

## 2025-03-21 NOTE — TELEPHONE ENCOUNTER
----- Message from Brionna sent at 3/21/2025 10:02 AM CDT -----  Contact: [facility]  pharmacy /[email or web address]/[phone]  Pharmacy is calling to clarify an RX.RX name:  oxyCODONE-acetaminophen (PERCOCET)  mg per tabletWhat do they need to clarify:  pt is already taking Norco from Pain Management Doctor , need okay to fill this rx from Dr Lim Comments:

## 2025-03-24 DIAGNOSIS — Z00.00 ENCOUNTER FOR MEDICARE ANNUAL WELLNESS EXAM: ICD-10-CM

## 2025-03-27 ENCOUNTER — CLINICAL SUPPORT (OUTPATIENT)
Dept: REHABILITATION | Facility: HOSPITAL | Age: 80
End: 2025-03-27
Payer: MEDICARE

## 2025-03-27 ENCOUNTER — TELEPHONE (OUTPATIENT)
Dept: ORTHOPEDICS | Facility: CLINIC | Age: 80
End: 2025-03-27
Payer: MEDICARE

## 2025-03-27 DIAGNOSIS — R53.1 DECREASED STRENGTH: ICD-10-CM

## 2025-03-27 DIAGNOSIS — M54.50 CHRONIC BILATERAL LOW BACK PAIN WITHOUT SCIATICA: Primary | ICD-10-CM

## 2025-03-27 DIAGNOSIS — M25.651 DECREASED RANGE OF MOTION OF BOTH HIPS: ICD-10-CM

## 2025-03-27 DIAGNOSIS — M25.652 DECREASED RANGE OF MOTION OF BOTH HIPS: ICD-10-CM

## 2025-03-27 DIAGNOSIS — G89.29 CHRONIC BILATERAL LOW BACK PAIN WITHOUT SCIATICA: Primary | ICD-10-CM

## 2025-03-27 PROCEDURE — 97530 THERAPEUTIC ACTIVITIES: CPT | Mod: PN

## 2025-03-27 NOTE — PROGRESS NOTES
Outpatient Rehab    Physical Therapy Visit    Patient Name: Kermit Castro  MRN: [id number]  YOB: 1945  Encounter Date: 3/27/2025    Therapy Diagnosis:   Encounter Diagnoses   Name Primary?    Chronic bilateral low back pain without sciatica Yes    Decreased range of motion of both hips     Decreased strength      Physician: Abdiaziz Lim MD    Physician Orders: Eval and Treat  Medical Diagnosis: Lumbar radiculopathy [M54.16]      Visit # / Visits Authorized: 6/20 + 1  Date of Evaluation:  2/10/2025   Insurance Authorization Period: 2/7/2025 to 12/31/2025  Plan of Care Certification:  2/10/2025 to 3/28/2025      Time In: 1000   Time Out: 1041  Total Time: 41   Total Billable Time: 31 minutes  FOTO:  Intake Score:  %  Survey Score 1:  %  Survey Score 2:  %         Subjective   He continues to have pain in the morning; 4/10 this morning. Patient has not performed HEP since last visit. Pateint reports increased neck pain, particularly on the R side, over the last week. Patient is agreeable to making today his last visit. Patient's wife reports they had to cancel tomorrow's appointment with spine orthopedics, but reschedule asap..  Pain reported as 0/10. B low back. Patient took medicine to address pain this AM.    Objective      Lumbar Range of Motion   Active (deg) Passive (deg) Pain   Flexion         Extension 15       Right Lateral Flexion 10       Right Rotation         Left Lateral Flexion 5   Yes   Left Rotation           Patient resting in 15 degrees flexion; active extension to neutral. Side bend combined with flexion      Hip Range of Motion   Right Hip   Active (deg) Passive (deg) Pain   Flexion 115 120 Yes   Extension 0 5     ABduction         ADduction         External Rotation 90/90 35 39     External Rotation Prone         Internal Rotation 90/90 28 31     Internal Rotation Prone             Left Hip   Active (deg) Passive (deg) Pain   Flexion 120 130 Yes   Extension 0 5     ABduction        "  ADduction         External Rotation 90/90 38 40     External Rotation Prone         Internal Rotation 90/90 26 41     Internal Rotation Prone                         Timed Up & Go (TUG)        An older adult who takes >=12 seconds to complete the TUG is at risk for falling.    6 minute walk test: Self terminated at 3'09". 77 m/s          Treatment:  Therapeutic Exercise  TE 1: ROM measurements         Therapeutic Activity  TA 1: 6' walk test attempt  TA 2: HEP review: Hip 3-way: GTB 2x10 B  TA 3: Rows: BTB 2x10  TA 4: Straight arm pull downs: BTB 2x10  TA 5: Weighted sit<>stands: 3 kg, 3x10  TA 6: Walking program    Assessment & Plan   Assessment: Kermit ANN is a 79 y.o. male that presents to outpatient Physical Therapy with chronic bilateral low back pain. 6' walk self terminated secondary to shortness of breath. Patient to be placed on hold at this time secondary to similar pain in the morning and tolerance to standing and walking. Lack of HEP performance during plan of care might contribute to progress.  Evaluation/Treatment Tolerance: Patient limited by fatigue    Patient will continue to benefit from skilled outpatient physical therapy to address the deficits listed in the problem list box on initial evaluation, provide pt/family education and to maximize pt's level of independence in the home and community environment.     Patient's spiritual, cultural, and educational needs considered and patient agreeable to plan of care and goals.     Education  Education was done with Patient. The patient's learning style includes Listening. The patient Verbalizes understanding.         Reviewed home exercise program. Hold from therapy secondary to similar pain and tolerance to standing.            Plan: Hold from physical therapy.    Goals:   Active       1. Short Term Goals       Patient will improve hip active range of motion 10 degrees globally to decrease mechanical load to trunk during closed chain activities.  (Met) "       Start:  02/10/25    Expected End:  03/07/25    Resolved:  03/30/25         Patient will perform home exercise program 1-2x/daily to assist therapy interventions in long term reduction in pain (Unable to Meet)       Start:  02/10/25    Expected End:  03/07/25            Patient will perform weighted sit>stand without pain to promote pain free lifting. (Met)       Start:  02/10/25    Expected End:  03/07/25    Resolved:  03/18/25            2. Long Term Goals       1/ Patient will improve lower extremity manual muscle tests to 4/5 to promote tolerance to closed chain activity.  (Met)       Start:  02/10/25    Expected End:  03/28/25    Resolved:  03/18/25         Patient will perform 6 minute walk test at 0.8 m/s without pain to promote community integration.  (Unable to Meet)       Start:  02/10/25    Expected End:  03/28/25            Patient will report </=4/10 pain during physical activity to promote more active lifestyle. (Unable to Meet)       Start:  02/10/25    Expected End:  03/28/25                Sherice Del Toro, PT

## 2025-03-27 NOTE — TELEPHONE ENCOUNTER
Spoke with pt's wife and new appt date/time given. Pt's wife verbally confirmed appt and appt letter mailed out to pt.    ----- Message from Nick sent at 3/27/2025 11:10 AM CDT -----  Type:  Sooner Apoointment RequestCaller is requesting a sooner appointment.  Caller declined first available appointment listed below.  Caller will not accept being placed on the waitlist and is requesting a message be sent to doctor.Name of Caller:pt's wife Helena is the first available appointment?06Symptoms:surgical consultWould the patient rather a call back or a response via [facility]? [facility]:[phone] Information: Physical therapy states they want Dr. Johnson to take a look at pt's neck.

## 2025-03-27 NOTE — PATIENT INSTRUCTIONS
Green theraband provided for hip 3-way      Blue theraband provided and instructions for setup in door frame for rows and straight arm pull down

## 2025-04-09 ENCOUNTER — OFFICE VISIT (OUTPATIENT)
Dept: CARDIOLOGY | Facility: CLINIC | Age: 80
End: 2025-04-09
Payer: MEDICARE

## 2025-04-09 VITALS
SYSTOLIC BLOOD PRESSURE: 144 MMHG | BODY MASS INDEX: 27.59 KG/M2 | WEIGHT: 181.44 LBS | DIASTOLIC BLOOD PRESSURE: 72 MMHG | HEART RATE: 87 BPM

## 2025-04-09 DIAGNOSIS — G47.33 OSA (OBSTRUCTIVE SLEEP APNEA): ICD-10-CM

## 2025-04-09 DIAGNOSIS — Z91.148 MEDICATION NON-COMPLIANCE DUE TO EXCESSIVE PILL BURDEN: ICD-10-CM

## 2025-04-09 DIAGNOSIS — G89.29 CHRONIC BILATERAL LOW BACK PAIN WITHOUT SCIATICA: ICD-10-CM

## 2025-04-09 DIAGNOSIS — I73.9 PAD (PERIPHERAL ARTERY DISEASE): ICD-10-CM

## 2025-04-09 DIAGNOSIS — E78.2 MIXED HYPERLIPIDEMIA: ICD-10-CM

## 2025-04-09 DIAGNOSIS — M54.50 CHRONIC BILATERAL LOW BACK PAIN WITHOUT SCIATICA: ICD-10-CM

## 2025-04-09 DIAGNOSIS — J84.9 ILD (INTERSTITIAL LUNG DISEASE): ICD-10-CM

## 2025-04-09 DIAGNOSIS — I25.118 CORONARY ARTERY DISEASE OF NATIVE ARTERY OF NATIVE HEART WITH STABLE ANGINA PECTORIS: Primary | ICD-10-CM

## 2025-04-09 DIAGNOSIS — Z95.1 HX OF CABG: ICD-10-CM

## 2025-04-09 DIAGNOSIS — I50.30 HEART FAILURE WITH PRESERVED EJECTION FRACTION, UNSPECIFIED HF CHRONICITY: ICD-10-CM

## 2025-04-09 DIAGNOSIS — I10 ESSENTIAL HYPERTENSION: ICD-10-CM

## 2025-04-09 DIAGNOSIS — R06.02 SOB (SHORTNESS OF BREATH): ICD-10-CM

## 2025-04-09 PROBLEM — I70.223 ATHEROSCLEROSIS OF NATIVE ARTERIES OF EXTREMITIES WITH REST PAIN, BILATERAL LEGS: Status: RESOLVED | Noted: 2025-03-19 | Resolved: 2025-04-09

## 2025-04-09 PROBLEM — I70.211 ATHEROSCLEROSIS OF NATIVE ARTERY OF RIGHT LOWER EXTREMITY WITH INTERMITTENT CLAUDICATION: Status: RESOLVED | Noted: 2019-08-07 | Resolved: 2025-04-09

## 2025-04-09 LAB
OHS QRS DURATION: 112 MS
OHS QTC CALCULATION: 473 MS

## 2025-04-09 PROCEDURE — 93000 ELECTROCARDIOGRAM COMPLETE: CPT | Mod: S$GLB,,, | Performed by: INTERNAL MEDICINE

## 2025-04-09 PROCEDURE — 99999 PR PBB SHADOW E&M-EST. PATIENT-LVL III: CPT | Mod: PBBFAC,,, | Performed by: INTERNAL MEDICINE

## 2025-04-09 PROCEDURE — 3288F FALL RISK ASSESSMENT DOCD: CPT | Mod: CPTII,S$GLB,, | Performed by: INTERNAL MEDICINE

## 2025-04-09 PROCEDURE — 1101F PT FALLS ASSESS-DOCD LE1/YR: CPT | Mod: CPTII,S$GLB,, | Performed by: INTERNAL MEDICINE

## 2025-04-09 PROCEDURE — 3078F DIAST BP <80 MM HG: CPT | Mod: CPTII,S$GLB,, | Performed by: INTERNAL MEDICINE

## 2025-04-09 PROCEDURE — 1157F ADVNC CARE PLAN IN RCRD: CPT | Mod: CPTII,S$GLB,, | Performed by: INTERNAL MEDICINE

## 2025-04-09 PROCEDURE — 99215 OFFICE O/P EST HI 40 MIN: CPT | Mod: 25,S$GLB,, | Performed by: INTERNAL MEDICINE

## 2025-04-09 PROCEDURE — 1160F RVW MEDS BY RX/DR IN RCRD: CPT | Mod: CPTII,S$GLB,, | Performed by: INTERNAL MEDICINE

## 2025-04-09 PROCEDURE — 1159F MED LIST DOCD IN RCRD: CPT | Mod: CPTII,S$GLB,, | Performed by: INTERNAL MEDICINE

## 2025-04-09 PROCEDURE — 1125F AMNT PAIN NOTED PAIN PRSNT: CPT | Mod: CPTII,S$GLB,, | Performed by: INTERNAL MEDICINE

## 2025-04-09 PROCEDURE — 3077F SYST BP >= 140 MM HG: CPT | Mod: CPTII,S$GLB,, | Performed by: INTERNAL MEDICINE

## 2025-04-09 RX ORDER — METOPROLOL SUCCINATE 100 MG/1
100 TABLET, EXTENDED RELEASE ORAL DAILY
Start: 2025-04-09 | End: 2026-04-09

## 2025-04-09 NOTE — PROGRESS NOTES
Patient ID: Kermit Castro is a 79 y.o. male.    History of Present Illness    CHIEF COMPLAINT:  - Patient presents for initial evaluation and to establish care with a new cardiologist.    HPI:  Patient reports infrequent chest discomfort, with the last episode occurring a few months ago. He has difficulty recalling the last instance of chest pain, indicating its rarity. He also reports dyspnea, particularly when walking, which appears to be ongoing and stable compared to previous years.    He identifies back pain as his most limiting symptom, requiring the use of a cane for mobility assistance. When asked about activity limitations, he indicates that back pain is the primary factor restricting his movement. His wife adds that he also has dyspnea when walking.    Regarding his peripheral arterial disease, he reports occasional numbness in his feet upon waking at night. He denies any leg issues during daytime ambulation.    His wife reports that he has difficulty walking long distances and has a fear of falling. She mentions a recent fall during a snow event.    He is following up with a pulmonologist for interstitial lung disease. He previously had a CPAP but was not using it, resulting in its return. His pulmonary doctor is working on obtaining a new permit for another CPAP.    His wife reports intermittent home BP monitoring. She notes one instance of significantly elevated BP, but recent measurements have been within acceptable range. She acknowledges inconsistent monitoring lately.    He denies fluid retention and familiarity with a history of heart failure.    CARDIAC HISTORY:  - CT chest 08/2022: normal heart size, normal aorta, aortic and coronary calcification  - Echo 08/2021: normal LV size and function, EF 55%  - Cardiac cath 08/2022: mid LAD , patent left circ with diffuse branch vessel disease, no significant OM vessel, patent ASCENCIO to LAD, mid vessel RCA , patent SVG to distal OM (diffusely  diseased), patent SVG to distal RCA (diffusely diseased)  - PET stress 08/2022: normal LVEF, fixed infralateral defect (16% of LV myocardium)  - CABG x3 08/1990  - ECG 04/2025: SR with PACs, first degree AV block (), no Q waves or ST changes    MEDICATIONS:  - Clopidogrel 75 mg daily  - Amlodipine 10 mg daily  - Valsartan 320 mg daily  - Metoprolol 100 mg daily.  Patient not taking.  - Rosuvastatin 40 mg daily  - Ezetimibe 10 mg daily  - Furosemide 40 mg daily  - aspirin 81 mg daily.  Patient not taking.  - lisinopril 40 mg daily      TEST RESULTS:  - CBC and CMP: 03/2025, normal  - Lipid Panel: 03/2025, Total Cholesterol 191, HDL 52, , Triglycerides 98  - A1C: 03/2025, 6.2  - TSH: 03/2025, normal    Below cardiac testing reviewed.  - ECG 04/2025: SR with PACs, first degree AV block (), no Q waves or ST changes  - Echo 08/2021: normal LV size and function, EF 55%  - PET stress 08/2022: normal LVEF, fixed infralateral defect (16% of LV myocardium)  - Cardiac cath 08/2022: mid LAD , patent left circ with diffuse branch vessel disease, no significant OM vessel, patent ASCENCIO to LAD, mid vessel RCA , patent SVG to distal OM (diffusely diseased), patent SVG to distal RCA (diffusely diseased)  - CT chest 08/2022: normal heart size, normal aorta, aortic and coronary calcification  - JOAQUINA: 08/2022, 0.96 on the right and 0.68 on the left, bilateral decrease with exercise  - Peripheral catheterization: 08/2022, patent aortoiliac system including a right external iliac stent, bilateral SFA occlusions including an occluded left SFA stent    MEDICAL HISTORY:  - CAD  - Peripheral arterial disease  - HTN  - HLD  - Heart failure with preserved EF  - Interstitial lung disease  - COPD  - Sleep apnea    SURGICAL HISTORY:  - Multiple interventions for peripheral arterial disease    SOCIAL HISTORY:  - Smoking: Former smoker  - Marital status:          Physical Exam    Vitals: Pulse: 87. Blood pressure:  144/72.  Extremities: All normal.  Lungs: All normal.  Cardiovascular: All normal.         Assessment & Plan    I25.708 Atherosclerosis of coronary artery bypass graft(s), unspecified, with other forms of angina pectoris  I50.22 Chronic systolic (congestive) heart failure  I70.223 Atherosclerosis of native arteries of extremities with rest pain, bilateral legs  I10 Essential (primary) hypertension  I44.0 Atrioventricular block, first degree  I49.1 Atrial premature depolarization  J84.9 Interstitial pulmonary disease, unspecified  J44.9 Chronic obstructive pulmonary disease, unspecified  E78.5 Hyperlipidemia, unspecified  G47.33 Obstructive sleep apnea (adult) (pediatric)  Z91.81 History of falling  Z87.891 Personal history of nicotine dependence  Z95.1 Presence of aortocoronary bypass graft  Z95.820 Peripheral vascular angioplasty status with implants and grafts    CORONARY ARTERY DISEASE:  - CAD stable.  - okay to stay off aspirin 81 mg daily.  - Continued clopidogrel 75 mg daily.  - restarted metoprolol 100 mg daily.  - Continued rosuvastatin 40 mg daily.  - Continued ezetimibe 10 mg daily.    HEART FAILURE:  - No recent evidence of heart failure.  - Ordered echocardiogram.    PERIPHERAL ARTERIAL DISEASE:  - Peripheral arterial disease non-limiting at present.  - same management as CAD.    HYPERTENSION:  - Continued amlodipine 10 mg daily.  - Continued valsartan 320 mg daily.  - restart metoprolol 100 mg daily.  - Discontinued lisinopril 40 mg.    SLEEP APNEA:  - Sleep apnea management important from cardiac perspective.    FALL PREVENTION:  - Patient to use cane to prevent falls, walk a little each day as tolerated.    FOLLOW-UP:  - Follow up in 1 year.  - Contact office if any issues arise before next visit.     The total time spent today in care of this established patient was 40 minutes.          This note was generated with the assistance of ambient listening technology. Verbal consent was obtained by the  patient and accompanying visitor(s) for the recording of patient appointment to facilitate this note. I attest to having reviewed and edited the generated note for accuracy, though some syntax or spelling errors may persist. Please contact the author of this note for any clarification.

## 2025-04-21 ENCOUNTER — TELEPHONE (OUTPATIENT)
Dept: PRIMARY CARE CLINIC | Facility: CLINIC | Age: 80
End: 2025-04-21
Payer: MEDICARE

## 2025-04-21 ENCOUNTER — OFFICE VISIT (OUTPATIENT)
Dept: PRIMARY CARE CLINIC | Facility: CLINIC | Age: 80
End: 2025-04-21
Payer: MEDICARE

## 2025-04-21 VITALS
DIASTOLIC BLOOD PRESSURE: 81 MMHG | SYSTOLIC BLOOD PRESSURE: 153 MMHG | WEIGHT: 187.06 LBS | OXYGEN SATURATION: 99 % | HEIGHT: 68 IN | BODY MASS INDEX: 28.35 KG/M2 | HEART RATE: 58 BPM

## 2025-04-21 DIAGNOSIS — I50.32 CHRONIC HEART FAILURE WITH PRESERVED EJECTION FRACTION: ICD-10-CM

## 2025-04-21 DIAGNOSIS — J43.9 COMBINED PULMONARY FIBROSIS AND EMPHYSEMA (CPFE): ICD-10-CM

## 2025-04-21 DIAGNOSIS — J84.9 ILD (INTERSTITIAL LUNG DISEASE): ICD-10-CM

## 2025-04-21 DIAGNOSIS — J84.9 INTERSTITIAL PULMONARY DISEASE, UNSPECIFIED: ICD-10-CM

## 2025-04-21 DIAGNOSIS — R73.03 PREDIABETES: ICD-10-CM

## 2025-04-21 DIAGNOSIS — I10 ESSENTIAL HYPERTENSION: Primary | ICD-10-CM

## 2025-04-21 DIAGNOSIS — J84.10 COMBINED PULMONARY FIBROSIS AND EMPHYSEMA (CPFE): ICD-10-CM

## 2025-04-21 PROCEDURE — 3288F FALL RISK ASSESSMENT DOCD: CPT | Mod: CPTII,S$GLB,,

## 2025-04-21 PROCEDURE — 3077F SYST BP >= 140 MM HG: CPT | Mod: CPTII,S$GLB,,

## 2025-04-21 PROCEDURE — 99215 OFFICE O/P EST HI 40 MIN: CPT | Mod: S$GLB,,,

## 2025-04-21 PROCEDURE — 99999 PR PBB SHADOW E&M-EST. PATIENT-LVL III: CPT | Mod: PBBFAC,,,

## 2025-04-21 PROCEDURE — 1157F ADVNC CARE PLAN IN RCRD: CPT | Mod: CPTII,S$GLB,,

## 2025-04-21 PROCEDURE — 1160F RVW MEDS BY RX/DR IN RCRD: CPT | Mod: CPTII,S$GLB,,

## 2025-04-21 PROCEDURE — 1125F AMNT PAIN NOTED PAIN PRSNT: CPT | Mod: CPTII,S$GLB,,

## 2025-04-21 PROCEDURE — 3079F DIAST BP 80-89 MM HG: CPT | Mod: CPTII,S$GLB,,

## 2025-04-21 PROCEDURE — 1101F PT FALLS ASSESS-DOCD LE1/YR: CPT | Mod: CPTII,S$GLB,,

## 2025-04-21 PROCEDURE — 1159F MED LIST DOCD IN RCRD: CPT | Mod: CPTII,S$GLB,,

## 2025-04-21 RX ORDER — SPIRONOLACTONE 25 MG/1
25 TABLET ORAL DAILY
Qty: 30 TABLET | Refills: 11 | Status: SHIPPED | OUTPATIENT
Start: 2025-04-21 | End: 2026-04-21

## 2025-04-21 RX ORDER — FLUTICASONE FUROATE, UMECLIDINIUM BROMIDE AND VILANTEROL TRIFENATATE 100; 62.5; 25 UG/1; UG/1; UG/1
1 POWDER RESPIRATORY (INHALATION) DAILY
Qty: 60 EACH | Refills: 11 | Status: SHIPPED | OUTPATIENT
Start: 2025-04-21

## 2025-04-21 NOTE — PATIENT INSTRUCTIONS
For Today: Take an extra dose of lasix (furosemide) 40mg for excess fluid.  Ideal weight is 180-182lbs  Stay away from sugar and soft drinks    For BP:   - Start spironolactone 25mg daily  - Continue metoprolol 100mg daily, valsartan 320mg daily, amlodipine 10mg daily     Future follow up with Orthopedics (Dr Johnson) on 4/30/25 at 1:30pm    Future CT Chest and Echocardiogram on 5/05/25 at 1:45pm    If you have any questions, concerns or develop symptoms that need to be addressed during business hours from 8am-5pm Mon-Friday (except during holidays), please call [phone]. If it is after 5pm on a weekday, on weekends or during holidays, you can call the [facility] On Call nurse line at [phone]. If needed, they can also consult with an emergency room physician virtually to assist with triage.

## 2025-04-21 NOTE — ASSESSMENT & PLAN NOTE
Lab Results   Component Value Date     (H) 04/19/2022    BNP 74 12/21/2021   - Continue current therapies  ** Start aldactone 25mg qd and recheck CMP/K+  - Patient has Diastolic (HFpEF) heart failure that is Chronic.   - HFpEF is compensated; Pt is euvolemic on examination  - Continue low sodium diet <2g  - Restrict daily fluid intake to 1.5L  - Check BP and weight daily   - Contact physician for elevations in weight of 5lbs or greater  - Follow up with Cardiology.

## 2025-04-21 NOTE — ASSESSMENT & PLAN NOTE
BP Readings from Last 5 Encounters:   04/21/25 (!) 153/81   04/09/25 (!) 144/72   03/19/25 138/68   03/10/25 123/68   02/27/25 138/86   - BP elevated on current antihypertensive medications  - Weight elevated > 186lbs, currently at 187lbs  - Continue current BP regimen and take an extra dose of lasix to manage BP  ** Started aldactone 25mg qd; potassium chronically normal/low  - Future CMP to check electrolytes  - Daily home BP checks/logs  - Cardiac diet encouraged  - Aerobic exercise as tolerated, goal 150min/week.

## 2025-04-21 NOTE — TELEPHONE ENCOUNTER
----- Message from Susy sent at 4/21/2025 11:51 AM CDT -----  Contact: [phone]  Pharmacy is calling to clarify an RX.RX name:  What do they need to clarify:  LISA Comments: spironolactone (ALDACTONE) 25 MG tablet  And valsartan (DIOVAN) 320 MG tablet Drug interaction causing increase in potassium.

## 2025-04-21 NOTE — PROGRESS NOTES
[facility]    Subjective     Patient Name: Kermit Castro  YOB: 1945  Patient Age: 79 y.o.  Patient Sex: male  Patient Phone: [phone]  PCP: Abdiaziz Lim MD  Last PCP Appointment: 4/21/2025    History of Present Illness    CHIEF COMPLAINT:  Mr. Castro presents today with his wife for follow-up on multiple chronic conditions    EXERCISE TOLERANCE:  He experiences significant fatigue with minimal exertion, becoming short of breath after simple activities such as walking to the mailbox or making his bed. He requires rest breaks despite minimal activity and reports difficulty walking due to leg tiredness. He also notes pain behind his knee.    COGNITIVE FUNCTION:  He reports memory issues, frequently misplacing items including medications, requiring caregiver assistance to locate them. He experiences disorientation, struggling to navigate familiar locations such as the local [facility]. He has previously used dementia medication prescribed by a local primary care physician, but currently has no refills available.    MEDICATIONS:  He currently takes valsartan, metoprolol, amlodipine, and Trelegy. He takes five different medicines in the morning and expresses concern about the appropriateness of this regimen. He reports medication compliance but feels overwhelmed by the quantity of medications, noting some bottles contain up to 90 tablets. Pt's wife continues to help assist with pill boxes.    DIET:  He reports high consumption of sweets, particularly chocolate candies, and frequent Pepsi intake throughout the day.    SOCIAL HISTORY:  He has a history of significant alcohol abuse, drinking continuously throughout the day for 40 years while working as a . After maintaining sobriety for over a year, he recently consumed one full glass of alcohol a few weeks ago resulting in intoxication.    ROS:  ROS is negative unless otherwise indicated in HPI.         Health Maintenance Summary             Current Care Gaps       Shingles Vaccine (3 of 3) Overdue since 9/22/2023 07/28/2023  Outside Immunization: zoster recombinant    09/11/2017  Outside Immunization: zoster live              COVID-19 Vaccine (4 - 2024-25 season) Overdue since 9/1/2024 02/24/2022  Outside Immunization: COVID-19, mRNA, LNP-S, PF, 100 mcg/0.5mL dose or 50 mcg/0.25mL dose    02/12/2021  Outside Immunization: COVID-19, mRNA, LNP-S, PF, 100 mcg/0.5mL dose or 50 mcg/0.25mL dose    01/12/2021  Outside Immunization: COVID-19, mRNA, LNP-S, PF, 100 mcg/0.5mL dose or 50 mcg/0.25mL dose              TETANUS VACCINE (Every 10 Years) Never done     No completion history exists for this topic.              RSV Vaccine (Age 60+ and Pregnant patients) (1 - 1-dose 75+ series) Never done     No completion history exists for this topic.                      Awaiting Completion       Hemoglobin A1c (Prediabetes) (Yearly) Scheduled for 5/5/2025 04/21/2025  Order placed for Hemoglobin A1C by Abdiaziz Lim MD    01/27/2025  Hemoglobin A1C External component of HEMOGLOBIN A1C                      Upcoming       Aspirin/Antiplatelet Therapy (Yearly) Next due on 4/21/2026 04/21/2025  Registry Metric: Last Current Aspirin/Antiplatelet Reviewed Date    01/31/2025  Registry Metric: Last Active Aspirin/antiplatelet Medication              DEXA Scan (Every 2 Years) Next due on 3/14/2027      03/14/2025  DXA Bone Density Axial Skeleton 1 or more sites              Lipid Panel (Every 5 Years) Next due on 1/27/2030 01/27/2025  Cholesterol Total component of LIPID PANEL    03/17/2022  Cholesterol Total component of Lipid Panel    10/05/2021  Cholesterol Total component of LIPID PANEL    08/27/2019  Cholesterol Total component of Lipid panel    08/09/2005  Cholesterol Total component of Lipid panel     Only the first 5 history entries have been loaded, but more history exists.                    Completed or No Longer Recommended        Influenza Vaccine (Series Information) Completed      02/20/2025  SmartData: WORKFLOW - HEALTHY PLANET - EXTERNAL DATA - EXTERNAL PROCEDURE DATE - INFLUENZA    12/12/2022  SmartData: WORKFLOW - HEALTHY PLANET - EXTERNAL DATA - EXTERNAL PROCEDURE DATE - INFLUENZA    10/19/2020  Outside Immunization: Influenza, high-dose, quadrivalent, PF    11/18/2019  Outside Immunization: Influenza, high-dose, trivalent, PF    10/24/2018  Outside Immunization: Influenza, high-dose, trivalent, PF      Only the first 5 history entries have been loaded, but more history exists.              Pneumococcal Vaccines (Age 50+) (Series Information) Completed      07/28/2023  Outside Immunization: Pneumococcal conjugate PCV20, polysaccharide [id number] conjugate, adjuvant, PF    10/19/2020  Outside Immunization: Pneumococcal conjugate PCV 13                            Prior to Admission medications    Medication Sig Start Date End Date Taking? Authorizing Provider   albuterol sulfate 2.5 mg/0.5 mL Nebu Take 2.5 mg by nebulization every 4 (four) hours as needed (wheezing/shortness of breath). Rescue 1/31/25 1/31/26 Yes Abdiaziz Lim MD   albuterol-ipratropium (DUO-NEB) 2.5 mg-0.5 mg/3 mL nebulizer solution Take 3 mLs by nebulization every 6 (six) hours as needed for Wheezing. Rescue 1/31/25  Yes Abdiaziz Lim MD   amLODIPine (NORVASC) 10 MG tablet Take 1 tablet (10 mg total) by mouth once daily. 1/31/25  Yes Abdiaziz Lim MD   betamethasone dipropionate (BETANATE) 0.05 % ointment Apply topically 2 (two) times daily. Use to affected areas for up to 2 weeks then take a 1 week break or decrease to 3 times weekly. Do not apply to groin or face. Apply to itchy bumps on arms 1/31/25  Yes Abdiaziz Lim MD   clopidogreL (PLAVIX) 75 mg tablet Take 4 tablets on day 1 then take 1 tablet daily there after. 1/31/25  Yes Abdiaziz Lim MD   ezetimibe (ZETIA) 10 mg tablet Take 1 tablet (10 mg total) by mouth once daily. 2/18/25 2/18/26 Yes Abdiaziz Lim MD    finasteride (PROSCAR) 5 mg tablet Take 1 tablet (5 mg total) by mouth once daily. 1/31/25  Yes Abdiaziz Lim MD   flu vacc [id number],65yr up,PF (FLUZONE HIGH-DOSE 2019-20, PF,) 180 mcg/0.5 mL Syrg Fluzone High-Dose 2019-20 (PF) 180 mcg/0.5 mL intramuscular syringe   PHARMACIST ADMINISTERED IMMUNIZATION ADMINISTERED AT TIME OF DISPENSING   Yes Provider, Historical   influenza (HIGH-DOSE PF) 240 mcg/0.7 mL vaccine Fluzone High-Dose [id number] (PF) 180 mcg/0.5 mL intramuscular syringe   Yes Provider, Historical   lubiprostone (AMITIZA) 24 MCG Cap    Yes Provider, Historical   metoprolol succinate (TOPROL-XL) 100 MG 24 hr tablet Take 1 tablet (100 mg total) by mouth once daily. 4/9/25 4/9/26 Yes Raffi Duran MD   multivitamin (THERAGRAN) per tablet Take 1 tablet by mouth once daily.   Yes Provider, Historical   nintedanib (OFEV) 150 mg Cap Take 1 capsule (150 mg total) by mouth 2 (two) times a day. 11/12/24 11/7/25 Yes [facility] MD   oxyCODONE-acetaminophen (PERCOCET)  mg per tablet Take 1 tablet by mouth every 12 (twelve) hours as needed for Pain. 3/19/25  Yes Abdiaziz Lim MD   pantoprazole (PROTONIX) 40 MG tablet Take 1 tablet (40 mg total) by mouth before breakfast. 1/31/25  Yes Abdiaziz Lim MD   potassium chloride SA (K-DUR,KLOR-CON) 20 MEQ tablet Take 1 tablet by mouth once daily.   Yes Provider, Historical   pregabalin (LYRICA) 50 MG capsule Take 1 capsule (50 mg total) by mouth 3 (three) times daily. 2/6/25 8/7/25 Yes BRIDGET Ozuna NP   pulse oximeter (PULSE OXIMETER) device by Apply Externally route 2 (two) times a day. Use twice daily at 8 AM and 3 PM and record the value in MyChart as directed. 10/11/21  Yes Julisa Garza PA   rosuvastatin (CRESTOR) 40 MG Tab Take 1 tablet (40 mg total) by mouth every evening. 1/31/25  Yes Abdiaziz Lim MD   tamsulosin (FLOMAX) 0.4 mg Cap Take 1 capsule (0.4 mg total) by mouth once daily. 1/31/25 1/31/26 Yes Abdiaziz Lim MD   valsartan  "(DIOVAN) 320 MG tablet Take 1 tablet (320 mg total) by mouth once daily. 3/19/25 3/19/26 Yes Abdiaziz Lim MD TRELEGY ELLIPTA 100-62.5-25 mcg DsDv Inhale 1 puff into the lungs once daily. 1/31/25 4/21/25 Yes Abdiaziz Lim MD   furosemide (LASIX) 40 MG tablet Take 1 tablet (40 mg total) by mouth once daily. 4/19/22 3/10/25  Moiz Villalta Jr., MD   spironolactone (ALDACTONE) 25 MG tablet Take 1 tablet (25 mg total) by mouth once daily. 4/21/25 4/21/26  Abdiaziz Lim MD TRELEGY ELLIPTA 100-62.5-25 mcg DsDv Inhale 1 puff into the lungs once daily. 4/21/25   Abdiaziz Lim MD       OBJECTIVE:     Vitals:    04/21/25 0853   BP: (!) 153/81   BP Location: Right arm   Patient Position: Sitting   Pulse: (!) 58   SpO2: 99%   Weight: 84.8 kg (187 lb 1 oz)   Height: 5' 8" (1.727 m)       Body mass index is 28.44 kg/m².     PHYSICAL EXAM  GEN - A+OX4, NAD, hypertensive in clinic but pt reported not taking morning meds   HEENT - PERRL, EOMI  Neck - No thyromegaly or cervical LAD.   CV - RRR, no m/r   Chest - CTAB, Bilateral lower lobe crackles  Abd - S/NT/ND/+BS.   Ext - 2+BDP and radial pulses. 1+ BLEPE.  MSK - normal ROM, no tenderness  Neuro - 5/5 BUE and BLE strength.  LN - No axillary or inguinal LAD appreciated.  Skin - No rash.       LABS  Lab Results   Component Value Date    WBC 9.91 03/18/2025    HGB 14.0 03/18/2025    HCT 43.7 03/18/2025    MCV 89 03/18/2025     03/18/2025         CMP  Sodium   Date Value Ref Range Status   03/18/2025 141 136 - 145 mmol/L Final     Potassium   Date Value Ref Range Status   03/18/2025 3.6 3.5 - 5.1 mmol/L Final     Chloride   Date Value Ref Range Status   03/18/2025 107 95 - 110 mmol/L Final     CO2   Date Value Ref Range Status   03/18/2025 23 23 - 29 mmol/L Final     Glucose   Date Value Ref Range Status   03/18/2025 91 70 - 110 mg/dL Final     BUN   Date Value Ref Range Status   03/18/2025 9 8 - 23 mg/dL Final     Creatinine   Date Value Ref Range Status   03/18/2025 0.8 " 0.5 - 1.4 mg/dL Final     Calcium   Date Value Ref Range Status   03/18/2025 9.0 8.7 - 10.5 mg/dL Final     Total Protein   Date Value Ref Range Status   03/18/2025 6.8 6.0 - 8.4 g/dL Final     Albumin   Date Value Ref Range Status   03/18/2025 3.5 3.5 - 5.2 g/dL Final     Total Bilirubin   Date Value Ref Range Status   03/18/2025 0.8 0.1 - 1.0 mg/dL Final     Comment:     For infants and newborns, interpretation of results should be based  on gestational age, weight and in agreement with clinical  observations.    Premature Infant recommended reference ranges:  Up to 24 hours.............<8.0 mg/dL  Up to 48 hours............<12.0 mg/dL  3-5 days..................<15.0 mg/dL  6-29 days.................<15.0 mg/dL       Alkaline Phosphatase   Date Value Ref Range Status   03/18/2025 66 40 - 150 U/L Final     AST   Date Value Ref Range Status   03/18/2025 12 10 - 40 U/L Final     ALT   Date Value Ref Range Status   03/18/2025 11 10 - 44 U/L Final     Anion Gap   Date Value Ref Range Status   03/18/2025 11 8 - 16 mmol/L Final     eGFR   Date Value Ref Range Status   03/18/2025 >60 >60 mL/min/1.73 m^2 Final       ASSESSMENT & PLAN:   Mr. Kermit Castro is a 79 y.o. male who was seen in clinic today for scheduled follow up. Assessed BP control, noting consistently elevated readings despite current regimen. Initiated spironolactone (aldactone) daily to address HTN and low potassium levels, to be taken with other medications. Evaluated fluid retention, recommending increased diuretic use as needed based on weight and edema. Dry weight noted to be 181-183lbs; pt weighing 187 today I'm clinic with noted edema on exam with bilateral crackles on pulmonary auscultation. Reviewed recent labs, noting stable renal function. Will schedule routine prelabs to assess sodium and follow up in clinic in one month.         1. Essential hypertension  Overview:  - HTN managed with toprol 100mg qd, valsartan 320mg qd, and amlodipine 10mg  qd  - PRN lasix 40mg qd for weight >186lbs    Assessment & Plan:  BP Readings from Last 5 Encounters:   04/21/25 (!) 153/81   04/09/25 (!) 144/72   03/19/25 138/68   03/10/25 123/68   02/27/25 138/86   - BP elevated on current antihypertensive medications  - Weight elevated > 186lbs, currently at 187lbs  - Continue current BP regimen and take an extra dose of lasix to manage BP  ** Started aldactone 25mg qd; potassium chronically normal/low  - Future CMP to check electrolytes  - Daily home BP checks/logs  - Cardiac diet encouraged  - Aerobic exercise as tolerated, goal 150min/week.      Orders:  -     CBC Auto Differential; Future; Expected date: 04/21/2025  -     Comprehensive Metabolic Panel; Future; Expected date: 04/21/2025  -     spironolactone (ALDACTONE) 25 MG tablet; Take 1 tablet (25 mg total) by mouth once daily.  Dispense: 30 tablet; Refill: 11  -     Hemoglobin A1C; Future; Expected date: 04/21/2025    2. Chronic heart failure with preserved ejection fraction  Overview:  - CABG x3 in 1990, managed with DAPT on ASA and plavix,   - Managed with amlodipine (NORVASC) 10 MG tablet  daily, valsartan 320 MG tablet  daily, metoprolol succinate (TOPROL-XL) 100 MG 24 hr tablet, furosemide (LASIX) 40 MG tablet  as needed for 3lb weight change  - HLD managed with rosuvastatin 40mg qhs and ezetimibe 10mg qd  TTE from 09/21/21  · The left ventricle is normal in size with concentric remodeling and normal systolic function. The estimated ejection fraction is 55%.  · Grade II left ventricular diastolic dysfunction.  · Normal right ventricular size with normal right ventricular systolic function.  · Mild biatrial enlargement.  · Mild mitral regurgitation.  · Mild tricuspid regurgitation.  · The estimated PA systolic pressure is 39 mmHg.  · Normal central venous pressure (3 mmHg).    Assessment & Plan:  Lab Results   Component Value Date     (H) 04/19/2022    BNP 74 12/21/2021   - Continue current therapies  **  Start aldactone 25mg qd and recheck CMP/K+  - Patient has Diastolic (HFpEF) heart failure that is Chronic.   - HFpEF is compensated; Pt is euvolemic on examination  - Continue low sodium diet <2g  - Restrict daily fluid intake to 1.5L  - Check BP and weight daily   - Contact physician for elevations in weight of 5lbs or greater  - Follow up with Cardiology.      Orders:  -     Hemoglobin A1C; Future; Expected date: 04/21/2025    3. ILD (interstitial lung disease)  Overview:  - Followed by [facility] (Pulmonology)  - Not on home oxygen  - Managed with Ofev 150mg BID  - Controlled with Trelegy Ellipta 1 puff qd and Duo-nebs 3ml q6h  - Rescue with PRN CARMELO  - Not longer taking lubiprostone 25mcg qd  - 6MWT 11/11/24: 267 meters, no oxygen desaturation  - PFT 2023: FVC 3.17 (102% of predicted), TLC 4.10 (61% of predicted), DLCO 12.1 (50% of predicted)     Assessment & Plan:  - Continue current management and F/U with Pulmonology as scheduled  - Future CT Chest ordered     Orders:  -     CT Chest Without Contrast; Future; Expected date: 05/05/2025    4. 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     Orders:  -     TRELEGY ELLIPTA 100-62.5-25 mcg DsDv; Inhale 1 puff into the lungs once daily.  Dispense: 60 each; Refill: 11    6. Prediabetes  -     Hemoglobin A1C; Future; Expected date: 04/21/2025         Follow up in about 3 months (around 7/21/2025).     All questions answered. Pt to call/message the Primary Clinic for any additional concerns    I  spent a total of 42 minutes on the day of the visit.      This includes face to face time and non-face to face time preparing to see the patient (eg, review of tests), obtaining and/or reviewing separately obtained history, documenting clinical information in the electronic or other health record, independently interpreting results and communicating results to the patient/family/caregiver, or care coordinator.       Abdiaziz Lim MD  [facility]    This note was generated with the assistance of ambient listening technology. Verbal consent was obtained by the patient and accompanying visitor(s) for the recording of patient appointment to facilitate this note. I attest to having reviewed and edited the generated note for accuracy, though some syntax or spelling errors may persist. Please contact the author of this note for any clarification.

## 2025-04-29 ENCOUNTER — TELEPHONE (OUTPATIENT)
Dept: PRIMARY CARE CLINIC | Facility: CLINIC | Age: 80
End: 2025-04-29
Payer: MEDICARE

## 2025-04-29 NOTE — TELEPHONE ENCOUNTER
Spoke to pt wife, Samira, inquiring about taking the spirnolactone and valsartan together per pharmacist. Per Dr. Preston note pt is to take both as it will correct chronically low potassium. Spoke to pharmacist and provided this information and they will fill the RX, returned call to Ms. Hogan verbalized understanding and will  the medication. Reminded them about labs one week after starting the medication verbalized understanding.

## 2025-04-30 ENCOUNTER — OFFICE VISIT (OUTPATIENT)
Dept: ORTHOPEDICS | Facility: CLINIC | Age: 80
End: 2025-04-30
Payer: MEDICARE

## 2025-04-30 VITALS — WEIGHT: 181.44 LBS | HEIGHT: 68 IN | BODY MASS INDEX: 27.5 KG/M2

## 2025-04-30 DIAGNOSIS — M47.812 CERVICAL SPONDYLOSIS: ICD-10-CM

## 2025-04-30 DIAGNOSIS — M50.30 DDD (DEGENERATIVE DISC DISEASE), CERVICAL: ICD-10-CM

## 2025-04-30 DIAGNOSIS — M47.816 LUMBAR SPONDYLOSIS: ICD-10-CM

## 2025-04-30 DIAGNOSIS — R26.89 IMBALANCE: ICD-10-CM

## 2025-04-30 DIAGNOSIS — M41.50 DEGENERATIVE SCOLIOSIS: ICD-10-CM

## 2025-04-30 DIAGNOSIS — M48.02 STENOSIS OF CERVICAL SPINE WITH MYELOPATHY: Primary | ICD-10-CM

## 2025-04-30 DIAGNOSIS — M51.362 DEGENERATION OF INTERVERTEBRAL DISC OF LUMBAR REGION WITH DISCOGENIC BACK PAIN AND LOWER EXTREMITY PAIN: ICD-10-CM

## 2025-04-30 DIAGNOSIS — M48.02 SPINAL STENOSIS, CERVICAL REGION: ICD-10-CM

## 2025-04-30 DIAGNOSIS — G99.2 STENOSIS OF CERVICAL SPINE WITH MYELOPATHY: Primary | ICD-10-CM

## 2025-04-30 DIAGNOSIS — Z98.1 HISTORY OF LUMBAR FUSION: ICD-10-CM

## 2025-04-30 DIAGNOSIS — M43.8X9 SAGITTAL PLANE IMBALANCE: ICD-10-CM

## 2025-04-30 PROCEDURE — 99999 PR PBB SHADOW E&M-EST. PATIENT-LVL V: CPT | Mod: PBBFAC,,, | Performed by: ORTHOPAEDIC SURGERY

## 2025-04-30 NOTE — PROGRESS NOTES
DATE: 5/1/2025  PATIENT: Kermit Castro    Attending Physician: Lester Johnson M.D.    CHIEF COMPLAINT: neck pain; LBP and BLE pain    HISTORY:  Kermit Castro is a 79 y.o. male w/ a hx of L2-3 PLDF in 2015 presents for initial evaluation of neck pain (Neck - 5). The pain has been present for months. The patient describes the pain as dull but it does not go down arms. The pain is worse with activity and improved by rest. There is no associated numbness and tingling. There is no subjective weakness. Prior treatments have included meds (percocet), PT, but no KATIANA or surgery. Patient is miserable and wants surgical intervention.     He also las years history of LBP that radiates down BLE to the knees. He has BLE n/t and weakness. He cannot walk longer than 1/2 block due to pain; he gets relief by leaning forward on a shopping cart. He has tried meds, PT, KATIANA but no surgery. He is considering surgical intervetion.    The Patient endorses myelopathic symptoms such as handwriting changes or difficulty with buttons/coins/keys. He has trouble with walking/balance. Denies perineal paresthesias, bowel/bladder dysfunction.    The patient does not smoke, have DM or endorse IVDU. The patient takes Plavix for hx of CABG. He is a retired . He was accompanied by her wife.    PAST MEDICAL/SURGICAL HISTORY:  Past Medical History:   Diagnosis Date    Coronary artery disease     Hyperlipidemia     Hypertension      Past Surgical History:   Procedure Laterality Date    ABDOMINAL SURGERY      Polyps in colon removed (noncancerous)    APPENDECTOMY      back surgery (screws & okate)      CORONARY ANGIOGRAPHY N/A 5/16/2022    Procedure: ANGIOGRAM, CORONARY ARTERY;  Surgeon: Gume Nagy MD;  Location: [facility] CATH LAB;  Service: Cardiology;  Laterality: N/A;    CORONARY ARTERY BYPASS GRAFT      1998    CORONARY BYPASS GRAFT ANGIOGRAPHY  5/16/2022    Procedure: Bypass graft study;  Surgeon: Gume Nagy MD;  Location:  "[facility] CATH LAB;  Service: Cardiology;;    EPIDURAL STEROID INJECTION INTO LUMBAR SPINE N/A 3/10/2025    Procedure: L4/5 KATIANA (very low volume);  Surgeon: Chadd Rodriguez DO;  Location: [facility] PAIN [facility];  Service: Pain Management;  Laterality: N/A;  Plavix 5 days    PERCUTANEOUS TRANSLUMINAL ANGIOPLASTY (PTA) OF PERIPHERAL VESSEL N/A 1/27/2022    Procedure: PTA, PERIPHERAL VESSEL;  Surgeon: Gume Nagy MD;  Location: [facility] CATH LAB;  Service: Cardiology;  Laterality: N/A;    TRANSFORAMINAL EPIDURAL INJECTION OF STEROID Right 11/5/2020    Procedure: LUMBAR TRANSFORAMINAL RIGHT L3/4 AND L4/5 DIRECT REFERRAL;  Surgeon: Nicole Toth MD;  Location: [facility] PAIN T;  Service: Pain Management;  Laterality: Right;  NEEDS CONSENT, PT STATES NO LONGER TAKES PLETAL       Current Medications: Current Medications[1]    Social History: Social History[2]    REVIEW OF SYSTEMS:  Constitution: Negative. Negative for chills, fever and night sweats.   Cardiovascular: Negative for chest pain and syncope.   Respiratory: Negative for cough and shortness of breath.   Gastrointestinal: See HPI. Negative for nausea/vomiting. Negative for abdominal pain.  Genitourinary: See HPI. Negative for discoloration or dysuria.  Skin: Negative for dry skin, itching and rash.   Hematologic/Lymphatic: negative for bleeding/clotting disorders.   Musculoskeletal: Negative for falls and muscle weakness.   Neurological: See HPI. no history of seizures. no history of cranial surgery or shunts.  Endocrine: Negative for polydipsia, polyphagia and polyuria.   Allergic/Immunologic: Negative for hives and persistent infections.  Psychiatric/Behavioral: Negative for depression and insomnia.         EXAM:  Ht 5' 8" (1.727 m)   Wt 82.3 kg (181 lb 7 oz)   BMI 27.59 kg/m²     General: The patient is a 79 y.o. male in no apparent distress, the patient is orientatied to person, place and time.  Psych: Normal mood and affect  HEENT: Vision grossly " intact, hearing intact to the spoken word.  Lungs: Respirations unlabored.  Gait: Normal station and gait, no difficulty with toe or heel walk.   Skin: Cervical skin negative for rashes, lesions, hairy patches. Posterior lumbar scar.  Range of motion: Cervical range of motion is acceptable. There is posterior cervical and lower lumbar tenderness to palpation.  Spinal Balance: Global saggital and coronal spinal balance acceptable, no significant for scoliosis and kyphosis.  Musculoskeletal: No pain with the range of motion of the bilateral shoulders and elbows. Normal bulk and contour of the bilateral hands.  Vascular: Bilateral hands warm and well perfused, Radial pulses 2+ bilaterally.  Neurological: Normal strength and tone in all major motor groups in the bilateral upper and lower extremities. Normal sensation to light touch in the C5-T1 and L2-S1 dermatomes bilaterally.  Deep tendon reflexes symmetric 2+ in the bilateral upper and lower extremities.  + Rhomberg    IMAGING:   Today I independently reviewed the following images and my interpretations are as follows:    AP, Lat and Flex/Ex  upright C-spine films demonstrate spondylosis and DDD.    Cervical MRI showed C4-7 severe central stenosis with myelomalacia.    Lumbar Xrs showed L2-3 PSF. Screws look lateral. Degen scoliosis.    Lumbar MRI showed L3-S1 mod-severe central stenosis. L2-3 previous fusion.     Lumbar CT showed spondylosis. L2 screws lateral.    DEXA in 2025 showed lumbar T-score of -0.8 but worst T-score of -1.7 (FN).     Body mass index is 27.59 kg/m².  Hemoglobin A1C   Date Value Ref Range Status   01/27/2025 6.2 (H) 4.0 - 5.6 % Final     Comment:     ADA Screening Guidelines:  5.7-6.4%  Consistent with prediabetes  >or=6.5%  Consistent with diabetes    High levels of fetal hemoglobin interfere with the HbA1C  assay. Heterozygous hemoglobin variants (HbS, HgC, etc)do  not significantly interfere with this assay.   However, presence of multiple  variants may affect accuracy.         ASSESSMENT/PLAN:  Stenosis of cervical spine with myelopathy  -     Case Request Operating Room: ARTHRODESIS, SPINE, CERVICAL, POSTERIOR  C3-T1  DEPUY  SNS: MOTORS/SSEP/EMG    Cervical spondylosis  -     Case Request Operating Room: ARTHRODESIS, SPINE, CERVICAL, POSTERIOR  C3-T1  DEPUY  SNS: MOTORS/SSEP/EMG    DDD (degenerative disc disease), cervical    History of lumbar fusion    Lumbar spondylosis    Degenerative scoliosis    Degeneration of intervertebral disc of lumbar region with discogenic back pain and lower extremity pain    Sagittal plane imbalance  -     X-Ray Scoliosis Complete; Future; Expected date: 04/30/2025    Imbalance  -     MRI Thoracic Spine Without Contrast; Future; Expected date: 04/30/2025    Spinal stenosis, cervical region  -     CT Cervical Spine Without Contrast; Future; Expected date: 04/30/2025      No follow-ups on file.    Patient has cervical spondylosis and stenosis with myelopathy. I discussed the natural history of their diagnoses as well as surgical and nonsurgical treatment options. I educated the patient on the importance of core/back strengthening, correct posture, bending/lifting ergonomics, and low-impact aerobic exercises (walking, elliptical, and aquatherapy). Continue medications. Patient has failed conservative management and is a candidate for C3-T1 PCDF. I ordered thoracic MRI to evaluate cord compression. I ordered CT cervical to assess OPLL and preop plan. He will need preop clearance.    I had a sit down discussion with the patient and wife regarding cervical myelopathy. I discussed the known stepwise degeneration of cervical myelopathy. I discussed the risks and benefits of decompressive surgery, including the concept that surgery would be done to prevent further neurological injury/degeneration rather than to ameliorate any existing deficits.     I had a sit down discussion with the patient and wife regarding the above  surgery. We specifically discussed the risks, benefits, and alternatives to surgery. We discussed the surgical procedure including the skin incision, nerve decompression, bone fusion, allograft and/or iliac crest bone graft, and surgical implants including lateral mass screws and pedicle screws as indicated: they understand the risks include but are not limited to death, paralysis, blindness, bleeding, infection, damage to arteries, veins and nerves, vertebral artery injury, dysphagia, spinal fluid leak, continued or worsening pain, no improvement in symptoms, non-union, and the possible need for more surgery in the future, as well as the possibility other unforseen and unknown complications. We talked about expected hospital stay and recovery period. All questions were answered; they understand and wish to proceed    He also has lumbar spondylosis and stenosis with neurogenic claudication. We will address his lower back after cervical surgery recovery. I ordered scoliosis Xrs to assess sagittal balance. Patient is a candidate for SUKHWINDER, L4-S1 ALIF and T10-pelvis PLDF.    Lester Johnson MD  Orthopaedic Spine Surgeon  Department of Orthopaedic Surgery  [phone]           [1]   Current Outpatient Medications:     albuterol sulfate 2.5 mg/0.5 mL Nebu, Take 2.5 mg by nebulization every 4 (four) hours as needed (wheezing/shortness of breath). Rescue, Disp: 30 each, Rfl: 0    albuterol-ipratropium (DUO-NEB) 2.5 mg-0.5 mg/3 mL nebulizer solution, Take 3 mLs by nebulization every 6 (six) hours as needed for Wheezing. Rescue, Disp: 90 mL, Rfl: 11    amLODIPine (NORVASC) 10 MG tablet, Take 1 tablet (10 mg total) by mouth once daily., Disp: 30 tablet, Rfl: 11    betamethasone dipropionate (BETANATE) 0.05 % ointment, Apply topically 2 (two) times daily. Use to affected areas for up to 2 weeks then take a 1 week break or decrease to 3 times weekly. Do not apply to groin or face. Apply to itchy bumps on arms, Disp: 135 g, Rfl: 2     clopidogreL (PLAVIX) 75 mg tablet, Take 4 tablets on day 1 then take 1 tablet daily there after., Disp: 34 tablet, Rfl: 11    ezetimibe (ZETIA) 10 mg tablet, Take 1 tablet (10 mg total) by mouth once daily., Disp: 90 tablet, Rfl: 3    finasteride (PROSCAR) 5 mg tablet, Take 1 tablet (5 mg total) by mouth once daily., Disp: 30 tablet, Rfl: 11    flu vacc [id number] up,PF (FLUZONE HIGH-DOSE 2019-20, PF,) 180 mcg/0.5 mL Syrg, Fluzone High-Dose 2019-20 (PF) 180 mcg/0.5 mL intramuscular syringe  PHARMACIST ADMINISTERED IMMUNIZATION ADMINISTERED AT TIME OF DISPENSING, Disp: , Rfl:     influenza (HIGH-DOSE PF) 240 mcg/0.7 mL vaccine, Fluzone High-Dose [id number] (PF) 180 mcg/0.5 mL intramuscular syringe, Disp: , Rfl:     lubiprostone (AMITIZA) 24 MCG Cap, , Disp: , Rfl:     metoprolol succinate (TOPROL-XL) 100 MG 24 hr tablet, Take 1 tablet (100 mg total) by mouth once daily., Disp: , Rfl:     multivitamin (THERAGRAN) per tablet, Take 1 tablet by mouth once daily., Disp: , Rfl:     nintedanib (OFEV) 150 mg Cap, Take 1 capsule (150 mg total) by mouth 2 (two) times a day., Disp: 60 capsule, Rfl: 11    oxyCODONE-acetaminophen (PERCOCET)  mg per tablet, Take 1 tablet by mouth every 12 (twelve) hours as needed for Pain., Disp: 60 tablet, Rfl: 0    pantoprazole (PROTONIX) 40 MG tablet, Take 1 tablet (40 mg total) by mouth before breakfast., Disp: 30 tablet, Rfl: 11    potassium chloride SA (K-DUR,KLOR-CON) 20 MEQ tablet, Take 1 tablet by mouth once daily., Disp: , Rfl:     pregabalin (LYRICA) 50 MG capsule, Take 1 capsule (50 mg total) by mouth 3 (three) times daily., Disp: 90 capsule, Rfl: 5    pulse oximeter (PULSE OXIMETER) device, by Apply Externally route 2 (two) times a day. Use twice daily at 8 AM and 3 PM and record the value in MyChart as directed., Disp: 1 each, Rfl: 0    rosuvastatin (CRESTOR) 40 MG Tab, Take 1 tablet (40 mg total) by mouth every evening., Disp: 30 tablet, Rfl: 11    spironolactone  (ALDACTONE) 25 MG tablet, Take 1 tablet (25 mg total) by mouth once daily., Disp: 30 tablet, Rfl: 11    tamsulosin (FLOMAX) 0.4 mg Cap, Take 1 capsule (0.4 mg total) by mouth once daily., Disp: 90 capsule, Rfl: 3    TRELEGY ELLIPTA 100-62.5-25 mcg DsDv, Inhale 1 puff into the lungs once daily., Disp: 60 each, Rfl: 11    valsartan (DIOVAN) 320 MG tablet, Take 1 tablet (320 mg total) by mouth once daily., Disp: 90 tablet, Rfl: 3    furosemide (LASIX) 40 MG tablet, Take 1 tablet (40 mg total) by mouth once daily., Disp: 30 tablet, Rfl: 11    Current Facility-Administered Medications:     acetaminophen tablet 650 mg, 650 mg, Oral, Once PRN, Rishabh Ware MD    EPINEPHrine (EPIPEN) 0.3 mg/0.3 mL pen injection 0.3 mg, 0.3 mg, Intramuscular, PRN, Rishabh Ware MD    methylPREDNISolone sodium succinate injection 40 mg, 40 mg, Intravenous, Once PRN, Rishabh Ware MD    ondansetron disintegrating tablet 4 mg, 4 mg, Oral, Once PRN, Rishabh Ware MD    sodium chloride 0.9% 500 mL flush bag, , Intravenous, PRNChaz Eric R., MD    sodium chloride 0.9% flush 10 mL, 10 mL, Intravenous, PRChaz DAMON Eric R., MD  [2]   Social History  Socioeconomic History    Marital status:    Tobacco Use    Smoking status: Former     Current packs/day: 0.00     Types: Cigarettes     Quit date: 10/5/1998     Years since quittin.5     Passive exposure: Never    Smokeless tobacco: Never   Substance and Sexual Activity    Alcohol use: Not Currently     Alcohol/week: 0.0 standard drinks of alcohol     Comment:     Drug use: Never

## 2025-05-01 ENCOUNTER — TELEPHONE (OUTPATIENT)
Dept: PRIMARY CARE CLINIC | Facility: CLINIC | Age: 80
End: 2025-05-01
Payer: MEDICARE

## 2025-05-01 ENCOUNTER — TELEPHONE (OUTPATIENT)
Dept: ORTHOPEDICS | Facility: CLINIC | Age: 80
End: 2025-05-01
Payer: MEDICARE

## 2025-05-01 NOTE — TELEPHONE ENCOUNTER
----- Message from Abdiaziz Lim MD sent at 4/30/2025  6:05 PM CDT -----  Regarding: RE: preop clearance  Sure, we will get him scheduled in the clinic for a preop evaluation after the Echo is completed.Thanks,TB  ----- Message -----  From: Lester Johnson MD  Sent: 4/30/2025   1:46 PM CDT  To: Sherice Chinchilla PA-C; Serena Newman#  Subject: preop clearance                                  Osmany Duran and feliberto, we have this mutual pt with cervical myelopathy. We are planning surgery in the near future. Could you see him for preop optimization/clearance? Thanks!

## 2025-05-01 NOTE — TELEPHONE ENCOUNTER
Spoke with patient's wife and surgery date given. Pateint's wife confirmed and verbalized understanding.    ----- Message from Sherice Chinchilla PA-C sent at 5/1/2025 10:14 AM CDT -----  Can you tell him were gonna book him for surgery 6/24 and put him on the waitlist? I think he already signed consents in clinic yesterday  ----- Message -----  From: Lester Johnson MD  Sent: 5/1/2025  10:10 AM CDT  To: Sherice Chinchilla PA-C    6/24  ----- Message -----  From: Sherice Chinchilla PA-C  Sent: 5/1/2025  10:06 AM CDT  To: Lester Johnson MD    Do you want me to book this on 6/3?

## 2025-05-05 ENCOUNTER — HOSPITAL ENCOUNTER (OUTPATIENT)
Dept: CARDIOLOGY | Facility: HOSPITAL | Age: 80
Discharge: HOME | End: 2025-05-05
Attending: INTERNAL MEDICINE
Payer: MEDICARE

## 2025-05-05 ENCOUNTER — HOSPITAL ENCOUNTER (OUTPATIENT)
Dept: RADIOLOGY | Facility: HOSPITAL | Age: 80
Discharge: HOME | End: 2025-05-05
Payer: MEDICARE

## 2025-05-05 VITALS
BODY MASS INDEX: 27.43 KG/M2 | WEIGHT: 181 LBS | DIASTOLIC BLOOD PRESSURE: 70 MMHG | SYSTOLIC BLOOD PRESSURE: 130 MMHG | HEART RATE: 70 BPM | HEIGHT: 68 IN

## 2025-05-05 DIAGNOSIS — J84.9 ILD (INTERSTITIAL LUNG DISEASE): ICD-10-CM

## 2025-05-05 DIAGNOSIS — I50.30 HEART FAILURE WITH PRESERVED EJECTION FRACTION, UNSPECIFIED HF CHRONICITY: ICD-10-CM

## 2025-05-05 DIAGNOSIS — J84.9 INTERSTITIAL PULMONARY DISEASE, UNSPECIFIED: ICD-10-CM

## 2025-05-05 DIAGNOSIS — R06.02 SOB (SHORTNESS OF BREATH): ICD-10-CM

## 2025-05-05 LAB
ASCENDING AORTA: 3.2 CM
AV AREA BY CONTINUOUS VTI: 2.9 CM2
AV INDEX (PROSTH): 0.83
AV LVOT MEAN GRADIENT: 2 MMHG
AV LVOT PEAK GRADIENT: 4 MMHG
AV MEAN GRADIENT: 3 MMHG
AV PEAK GRADIENT: 6 MMHG
AV VALVE AREA BY VELOCITY RATIO: 2.9 CM²
AV VALVE AREA: 2.9 CM2
AV VELOCITY RATIO: 0.83
BSA FOR ECHO PROCEDURE: 1.98 M2
CV ECHO LV RWT: 0.38 CM
DOP CALC AO PEAK VEL: 1.2 M/S
DOP CALC AO VTI: 27 CM
DOP CALC LVOT AREA: 3.5 CM2
DOP CALC LVOT DIAMETER: 2.1 CM
DOP CALC LVOT PEAK VEL: 1 M/S
DOP CALC LVOT STROKE VOLUME: 77.2 CM3
DOP CALC RVOT AREA: 3.97 CM2
DOP CALC RVOT DIAMETER: 2.25 CM
DOP CALCLVOT PEAK VEL VTI: 22.3 CM
E WAVE DECELERATION TIME: 258 MS
E/A RATIO: 0.69
E/E' RATIO: 6 M/S
ECHO EF ESTIMATED: 54 %
ECHO LV POSTERIOR WALL: 1 CM (ref 0.6–1.1)
FRACTIONAL SHORTENING: 28.3 % (ref 28–44)
HR MV ECHO: 70 BPM
INTERVENTRICULAR SEPTUM: 1 CM (ref 0.6–1.1)
IVC DIAMETER: 0.73 CM
LA MAJOR: 5.3 CM
LA MINOR: 4.4 CM
LA WIDTH: 3.3 CM
LEFT ATRIUM SIZE: 4.3 CM
LEFT ATRIUM VOLUME INDEX MOD: 27 ML/M2
LEFT ATRIUM VOLUME INDEX: 30 ML/M2
LEFT ATRIUM VOLUME MOD: 52 ML
LEFT ATRIUM VOLUME: 58 CM3
LEFT INTERNAL DIMENSION IN SYSTOLE: 3.8 CM (ref 2.1–4)
LEFT VENTRICLE DIASTOLIC VOLUME INDEX: 69.39 ML/M2
LEFT VENTRICLE DIASTOLIC VOLUME: 136 ML
LEFT VENTRICLE MASS INDEX: 102.2 G/M2
LEFT VENTRICLE SYSTOLIC VOLUME INDEX: 31.6 ML/M2
LEFT VENTRICLE SYSTOLIC VOLUME: 62 ML
LEFT VENTRICULAR INTERNAL DIMENSION IN DIASTOLE: 5.3 CM (ref 3.5–6)
LEFT VENTRICULAR MASS: 200.4 G
LV LATERAL E/E' RATIO: 4.4
LV SEPTAL E/E' RATIO: 8.8
MV A" WAVE DURATION": 139.87 MS
MV MEAN GRADIENT: 1 MMHG
MV PEAK A VEL: 0.77 M/S
MV PEAK E VEL: 0.53 M/S
MV PEAK GRADIENT: 3 MMHG
OHS CV RV/LV RATIO: 0.58 CM
OHS LV EJECTION FRACTION SIMPSONS BIPLANE MOD: 60 %
PISA TR MAX VEL: 3 M/S
PULM VEIN A" WAVE DURATION": 139.87 MS
PULM VEIN S/D RATIO: 0.93
PULMONIC VEIN PEAK A VELOCITY: 0.3 M/S
PV PEAK D VEL: 0.4 M/S
PV PEAK S VEL: 0.37 M/S
RA MAJOR: 4.95 CM
RA PRESSURE ESTIMATED: 3 MMHG
RA WIDTH: 3.42 CM
RIGHT ATRIAL AREA: 16.9 CM2
RIGHT VENTRICLE DIASTOLIC BASEL DIMENSION: 3.1 CM
RV TB RVSP: 6 MMHG
RV TISSUE DOPPLER FREE WALL SYSTOLIC VELOCITY 1 (APICAL 4 CHAMBER VIEW): 13.41 CM/S
SINUS: 2.87 CM
STJ: 3.2 CM
TDI LATERAL: 0.12 M/S
TDI SEPTAL: 0.06 M/S
TDI: 0.09 M/S
TRICUSPID ANNULAR PLANE SYSTOLIC EXCURSION: 2 CM
TV PEAK GRADIENT: 35 MMHG
TV REST PULMONARY ARTERY PRESSURE: 39 MMHG
Z-SCORE OF LEFT VENTRICULAR DIMENSION IN END DIASTOLE: -0.57
Z-SCORE OF LEFT VENTRICULAR DIMENSION IN END SYSTOLE: 0.77

## 2025-05-05 PROCEDURE — 93306 TTE W/DOPPLER COMPLETE: CPT | Mod: 26,,, | Performed by: INTERNAL MEDICINE

## 2025-05-05 PROCEDURE — 71250 CT THORAX DX C-: CPT | Mod: TC

## 2025-05-05 PROCEDURE — 93306 TTE W/DOPPLER COMPLETE: CPT

## 2025-05-05 PROCEDURE — 71250 CT THORAX DX C-: CPT | Mod: 26,,, | Performed by: STUDENT IN AN ORGANIZED HEALTH CARE EDUCATION/TRAINING PROGRAM

## 2025-05-06 ENCOUNTER — RESULTS FOLLOW-UP (OUTPATIENT)
Dept: PRIMARY CARE CLINIC | Facility: CLINIC | Age: 80
End: 2025-05-06
Payer: MEDICARE

## 2025-05-08 ENCOUNTER — HOSPITAL ENCOUNTER (OUTPATIENT)
Dept: RADIOLOGY | Facility: HOSPITAL | Age: 80
Discharge: HOME | End: 2025-05-08
Attending: ORTHOPAEDIC SURGERY
Payer: MEDICARE

## 2025-05-08 DIAGNOSIS — M43.8X9 SAGITTAL PLANE IMBALANCE: ICD-10-CM

## 2025-05-08 DIAGNOSIS — M48.02 SPINAL STENOSIS, CERVICAL REGION: ICD-10-CM

## 2025-05-08 DIAGNOSIS — R26.89 IMBALANCE: ICD-10-CM

## 2025-05-08 PROCEDURE — 72146 MRI CHEST SPINE W/O DYE: CPT | Mod: TC

## 2025-05-08 PROCEDURE — 72125 CT NECK SPINE W/O DYE: CPT | Mod: 26,,, | Performed by: INTERNAL MEDICINE

## 2025-05-08 PROCEDURE — 72082 X-RAY EXAM ENTIRE SPI 2/3 VW: CPT | Mod: 26,,, | Performed by: RADIOLOGY

## 2025-05-08 PROCEDURE — 72082 X-RAY EXAM ENTIRE SPI 2/3 VW: CPT | Mod: TC

## 2025-05-08 PROCEDURE — 72125 CT NECK SPINE W/O DYE: CPT | Mod: TC

## 2025-05-08 PROCEDURE — 72146 MRI CHEST SPINE W/O DYE: CPT | Mod: 26,,, | Performed by: INTERNAL MEDICINE

## 2025-05-09 ENCOUNTER — TELEPHONE (OUTPATIENT)
Dept: PRIMARY CARE CLINIC | Facility: CLINIC | Age: 80
End: 2025-05-09
Payer: MEDICARE

## 2025-05-12 ENCOUNTER — OFFICE VISIT (OUTPATIENT)
Dept: PRIMARY CARE CLINIC | Facility: CLINIC | Age: 80
End: 2025-05-12
Payer: MEDICARE

## 2025-05-12 VITALS
HEIGHT: 68 IN | DIASTOLIC BLOOD PRESSURE: 82 MMHG | HEART RATE: 73 BPM | BODY MASS INDEX: 27.41 KG/M2 | OXYGEN SATURATION: 98 % | SYSTOLIC BLOOD PRESSURE: 126 MMHG | WEIGHT: 180.88 LBS

## 2025-05-12 DIAGNOSIS — J43.9 COMBINED PULMONARY FIBROSIS AND EMPHYSEMA (CPFE): ICD-10-CM

## 2025-05-12 DIAGNOSIS — M48.02 STENOSIS OF CERVICAL SPINE WITH MYELOPATHY: ICD-10-CM

## 2025-05-12 DIAGNOSIS — G99.2 STENOSIS OF CERVICAL SPINE WITH MYELOPATHY: ICD-10-CM

## 2025-05-12 DIAGNOSIS — Z01.818 PRE-OP EXAMINATION: Primary | ICD-10-CM

## 2025-05-12 DIAGNOSIS — J84.10 COMBINED PULMONARY FIBROSIS AND EMPHYSEMA (CPFE): ICD-10-CM

## 2025-05-12 DIAGNOSIS — J84.9 ILD (INTERSTITIAL LUNG DISEASE): ICD-10-CM

## 2025-05-12 DIAGNOSIS — I50.32 CHRONIC HEART FAILURE WITH PRESERVED EJECTION FRACTION: ICD-10-CM

## 2025-05-12 PROCEDURE — 1126F AMNT PAIN NOTED NONE PRSNT: CPT | Mod: CPTII,S$GLB,,

## 2025-05-12 PROCEDURE — 1160F RVW MEDS BY RX/DR IN RCRD: CPT | Mod: CPTII,S$GLB,,

## 2025-05-12 PROCEDURE — 99999 PR PBB SHADOW E&M-EST. PATIENT-LVL III: CPT | Mod: PBBFAC,,,

## 2025-05-12 PROCEDURE — 1159F MED LIST DOCD IN RCRD: CPT | Mod: CPTII,S$GLB,,

## 2025-05-12 PROCEDURE — 99214 OFFICE O/P EST MOD 30 MIN: CPT | Mod: S$GLB,,,

## 2025-05-12 PROCEDURE — 3079F DIAST BP 80-89 MM HG: CPT | Mod: CPTII,S$GLB,,

## 2025-05-12 PROCEDURE — 1157F ADVNC CARE PLAN IN RCRD: CPT | Mod: CPTII,S$GLB,,

## 2025-05-12 PROCEDURE — 3074F SYST BP LT 130 MM HG: CPT | Mod: CPTII,S$GLB,,

## 2025-05-12 RX ORDER — ALBUTEROL SULFATE 2.5 MG/.5ML
2.5 SOLUTION RESPIRATORY (INHALATION) EVERY 4 HOURS PRN
Qty: 30 EACH | Refills: 0 | Status: SHIPPED | OUTPATIENT
Start: 2025-05-12 | End: 2026-05-12

## 2025-05-12 RX ORDER — LISINOPRIL 40 MG/1
40 TABLET ORAL
COMMUNITY
Start: 2025-03-28

## 2025-05-12 NOTE — ASSESSMENT & PLAN NOTE
Lab Results   Component Value Date     (H) 04/19/2022    BNP 74 12/21/2021   - Patient has Diastolic (HFpEF) heart failure that is Chronic.   - HFpEF is compensated; Pt is euvolemic on examination  - Continue current therapies and PRN lasix for Weight >185lbs  - Continue low sodium diet <2g  - Restrict daily fluid intake to 1.5L  - Check BP and weight daily   - Contact physician for elevations in weight of 5lbs or greater  - Follow up with Cardiology.

## 2025-05-12 NOTE — ASSESSMENT & PLAN NOTE
Cardiovascular Risk Assessment:  Non-emergent surgery.  No active cardiac problems (such as unstable angina, decompensated heart failure, significant uncontrolled arrhythmias or severe valvular disease).  Surgery is moderate risk  Functional Status: able to climb a flight of stairs (> 4 METS)    Revised Cardiac Risk Index   1. History of ischemic heart disease   2. History of congestive heart failure   3. History of cerebrovascular disease (stroke or transient ischemic attack)   4. History of diabetes requiring preoperative insulin use 5. Chronic kidney disease (creatinine > 2 mg/dL)   5. Undergoing suprainguinal vascular, intraperitoneal, or intrathoracic surgery Risk for cardiac death, nonfatal myocardial infarction, and nonfatal cardiac arrest     Risk of major cardiac event (death, myocardial infarction, or cardiac arrest) or elevation in troponin level 30 days after noncardiac surgery:   0 predictors = 3.9%, 1 predictor = 6.0%, 2 predictors = 10.1%, >=3 predictors = 15% (Chandrakant 2017)    RCRI Calculator Class and Risk percentage: 10.1%    MOST IMPORTANTLY: 13% mortality due to advanced age and undergoing an elective major surgery per JONATHON  https://jamanetwork.com/journals/jamasurgery/article-abstract/8347246#:~:text=Findings%20In%20this%20cohort%20study,the%20year%20after%20major%20surgery    Other Issues:   CAD: Hold Plavix 5 days before surgery and restart after 24hrs of completion    Recommendation:  Relatively low cardiac risk for intermediate risk surgery

## 2025-05-13 NOTE — ASSESSMENT & PLAN NOTE
- Continue management regimen  - Ortho-spine following for arthrodesis  - Pre-operative assessment completed

## 2025-05-16 ENCOUNTER — TELEPHONE (OUTPATIENT)
Dept: PRIMARY CARE CLINIC | Facility: CLINIC | Age: 80
End: 2025-05-16
Payer: MEDICARE

## 2025-05-16 NOTE — TELEPHONE ENCOUNTER
Per Dr. Lim, hold amlodipine and keep a record of his blood pressure readings , daily weight, over weekend and hydrate. Ms Samira instructed to take bp reading in morning with weight before any medications. Call office on Monday with reading, wife verbalized understanding. Pt has an old appt on Tuesday 5/20 but will await readings on Monday to see if pt needs to come to clinic on Tuesday. Ms Hogan instructed of this and verbalized understanding and agreement. Dr. Lim notified.

## 2025-05-16 NOTE — TELEPHONE ENCOUNTER
Returned call to patients wife, reports pt with low bp this am reading all taken after am meds 107/53 P 60, 88/52 P 63, 101/50 P 62 97/53 P 64, meds taken this am metoprolol valsartan, spirnolactone, amlodipine, OFEV, wt today 179 down 1 pound from the visit on 12th. Pt also fell this morning no injuries. Dr. Lim notified.

## 2025-05-19 ENCOUNTER — TELEPHONE (OUTPATIENT)
Dept: PRIMARY CARE CLINIC | Facility: CLINIC | Age: 80
End: 2025-05-19
Payer: MEDICARE

## 2025-05-19 NOTE — TELEPHONE ENCOUNTER
"Placed call to patients wife, states his blood pressure over weekend "was normal", ok per Dr. Lim to continue to hold amlodipine and may cancel old appt for tomorrow.. Reminded them of AWV with NIA Montoya on Thursday, wife verbalized understanding.   "

## 2025-05-21 ENCOUNTER — E-CONSULT (OUTPATIENT)
Dept: CARDIOLOGY | Facility: CLINIC | Age: 80
End: 2025-05-21
Payer: MEDICARE

## 2025-05-21 DIAGNOSIS — M79.605 PAIN IN BOTH LOWER EXTREMITIES: ICD-10-CM

## 2025-05-21 DIAGNOSIS — M79.604 PAIN IN BOTH LOWER EXTREMITIES: ICD-10-CM

## 2025-05-21 DIAGNOSIS — Z01.818 PREOPERATIVE TESTING: Primary | ICD-10-CM

## 2025-05-21 DIAGNOSIS — Z01.810 PREOP CARDIOVASCULAR EXAM: Primary | ICD-10-CM

## 2025-05-21 PROCEDURE — 99499 UNLISTED E&M SERVICE: CPT | Mod: S$GLB,,, | Performed by: INTERNAL MEDICINE

## 2025-05-21 NOTE — CONSULTS
[facility] Cardiology - [facility]  Response for E-Consult     Patient Name: Kermit Castro  MRN: [id number]  Primary Care Provider: Abdiaziz Lim MD   Requesting Provider: Leola Baker MD  E-Consult to General Cardiology  Consult performed by: Taina Perez MD  Consult ordered by: Leola Baker MD          Recommendation: Patient saw Dr Duran 4/9/15. Please send consult request directly to him.    Additional future steps to consider: none    Total time of Consultation: 5 minute    I did not speak to the requesting provider verbally about this.     *This eConsult is based on the clinical data available to me and is furnished without benefit of a physical examination. The eConsult will need to be interpreted in light of any clinical issues or changes in patient status not available to me at the time of filing this eConsults. Significant changes in patient condition or level of acuity should result in immediate formal consultation and reevaluation. Please alert me if you have further questions.    Thank you for this eConsult referral.     MD Radames Yarbrough [facility] Cardiology [address]

## 2025-05-22 ENCOUNTER — OFFICE VISIT (OUTPATIENT)
Dept: PRIMARY CARE CLINIC | Facility: CLINIC | Age: 80
End: 2025-05-22
Payer: MEDICARE

## 2025-05-22 ENCOUNTER — TELEPHONE (OUTPATIENT)
Dept: PRIMARY CARE CLINIC | Facility: CLINIC | Age: 80
End: 2025-05-22
Payer: MEDICARE

## 2025-05-22 VITALS
OXYGEN SATURATION: 99 % | BODY MASS INDEX: 27.52 KG/M2 | HEART RATE: 56 BPM | SYSTOLIC BLOOD PRESSURE: 104 MMHG | WEIGHT: 181 LBS | DIASTOLIC BLOOD PRESSURE: 64 MMHG

## 2025-05-22 DIAGNOSIS — I50.32 CHRONIC HEART FAILURE WITH PRESERVED EJECTION FRACTION: ICD-10-CM

## 2025-05-22 DIAGNOSIS — I73.9 PAD (PERIPHERAL ARTERY DISEASE): ICD-10-CM

## 2025-05-22 DIAGNOSIS — Z99.89 DEPENDENCE ON OTHER ENABLING MACHINES AND DEVICES: ICD-10-CM

## 2025-05-22 DIAGNOSIS — F99 ABNORMAL MINI-MENTAL STATUS EXAM: ICD-10-CM

## 2025-05-22 DIAGNOSIS — E78.2 MIXED HYPERLIPIDEMIA: ICD-10-CM

## 2025-05-22 DIAGNOSIS — I10 ESSENTIAL HYPERTENSION: ICD-10-CM

## 2025-05-22 DIAGNOSIS — J84.9 ILD (INTERSTITIAL LUNG DISEASE): ICD-10-CM

## 2025-05-22 DIAGNOSIS — H90.3 SENSORINEURAL HEARING LOSS (SNHL) OF BOTH EARS: ICD-10-CM

## 2025-05-22 DIAGNOSIS — Z00.00 ENCOUNTER FOR MEDICARE ANNUAL WELLNESS EXAM: Primary | ICD-10-CM

## 2025-05-22 DIAGNOSIS — G47.33 OSA (OBSTRUCTIVE SLEEP APNEA): ICD-10-CM

## 2025-05-22 PROCEDURE — 99999 PR PBB SHADOW E&M-EST. PATIENT-LVL V: CPT | Mod: PBBFAC,,, | Performed by: PHYSICIAN ASSISTANT

## 2025-05-22 NOTE — PROGRESS NOTES
Kermit Castro presented for an initial Medicare AWV today. The following components were reviewed and updated:    Medical history  Family History  Social history  Allergies and Current Medications  Health Risk Assessment  Health Maintenance  Care Team    **See Completed Assessments for Annual Wellness visit with in the encounter summary    The following assessments were completed:  Depression Screening  Cognitive function Screening  Timed Get Up Test  Whisper Test      Opioid documentation:      Patient does not have a current opioid prescription.          Vitals:    05/22/25 1041   BP: 104/64   BP Location: Right arm   Patient Position: Sitting   Pulse: (!) 56   SpO2: 99%   Weight: 82.1 kg (181 lb)     Body mass index is 27.52 kg/m².       Physical Exam  Constitutional:       Appearance: Normal appearance.   HENT:      Head: Normocephalic and atraumatic.   Cardiovascular:      Rate and Rhythm: Normal rate and regular rhythm.   Pulmonary:      Effort: Pulmonary effort is normal.      Breath sounds: Normal breath sounds.   Neurological:      Mental Status: He is alert.   Psychiatric:         Mood and Affect: Mood normal.         Cognition and Memory: Memory is impaired.           Diagnoses and health risks identified today and associated recommendations/orders:  1. Encounter for Medicare annual wellness exam  Provided Kermit ANN with a 5-10 year written screening schedule and personal prevention plan. Recommendations were developed using the USPSTF age appropriate recommendations. Education, counseling, and referrals were provided as needed.  After Visit Summary printed and given to patient which includes a list of additional screenings\tests needed.    2. Dependence on other enabling machines and devices  Uses cane, no recent falls    3. Abnormal mini-mental status exam  Discussed with PCP will perform MOCA next visit, likely need neuro referral    4. PAD (peripheral artery disease)  Overview:  - BLE arterial US  in 2019 s/f; severe peripheral arterial disease in the left lower extremity,  left anterior distal tibial artery is occluded, left dorsalis pedis artery has retrograde flow, moderate peripheral arterial disease in the right lower extremity, mild amount of atherosclerosis in both lower extremities.  - Severely decreased bilateral ABIs  - Multiple PCIs and coronary stents  - Managed with DAPT (ASA 81mg qd and plavix 75mg qd), ezetimibe 10mg qd, and rosuvastatin 40mg qhs   - Followed by Dr Nagy (Cardiology)    Assessment & Plan:  Stable  Continue asa, plavix, zetia, and statin  Followed by cardiology  Monitor for worsening symptoms or symptoms occurring at rest      5. Mixed hyperlipidemia  Overview:  - HLD managed with rosuvastatin 40mg qhs and ezetimibe 10mg qd    Assessment & Plan:  LDL above goal at 119  On zetia and statin  If no improvement on recheck discuss repatha     Orders:  -     Lipid Panel; Future; Expected date: 05/22/2025    6. Essential hypertension  Overview:  - HTN managed with toprol 100mg qd, valsartan 320mg qd, and amlodipine 10mg qd  - PRN lasix 40mg qd for weight >186lbs    Assessment & Plan:  Stable, continue current meds      7. ILD (interstitial lung disease)  Overview:  - Followed by [place] (Pulmonology)  - Not on home oxygen  - Managed with Ofev 150mg BID  - Controlled with Trelegy Ellipta 1 puff qd and Duo-nebs 3ml q6h  - Rescue with PRN CARMELO  - Not longer taking lubiprostone 25mcg qd  - 6MWT 11/11/24: 267 meters, no oxygen desaturation  - PFT 2023: FVC 3.17 (102% of predicted), TLC 4.10 (61% of predicted), DLCO 12.1 (50% of predicted)     Assessment & Plan:  - Continue current management and F/U with Pulmonology as scheduled      8. Chronic heart failure with preserved ejection fraction  Overview:  - CABG x3 in 1990, managed with DAPT on ASA and plavix,   - Managed with amlodipine (NORVASC) 10 MG tablet  daily, valsartan 320 MG tablet  daily, metoprolol succinate (TOPROL-XL) 100  "MG 24 hr tablet, furosemide (LASIX) 40 MG tablet  as needed for 3lb weight change  - HLD managed with rosuvastatin 40mg qhs and ezetimibe 10mg qd  TTE from 09/21/21  · The left ventricle is normal in size with concentric remodeling and normal systolic function. The estimated ejection fraction is 55%.  · Grade II left ventricular diastolic dysfunction.  · Normal right ventricular size with normal right ventricular systolic function.  · Mild biatrial enlargement.  · Mild mitral regurgitation.  · Mild tricuspid regurgitation.  · The estimated PA systolic pressure is 39 mmHg.  · Normal central venous pressure (3 mmHg).    Assessment & Plan:  Lab Results   Component Value Date     (H) 04/19/2022    BNP 74 12/21/2021   - Patient has Diastolic (HFpEF) heart failure that is Chronic.   - HFpEF is compensated; Pt is euvolemic on examination  - Continue current therapies and PRN lasix for Weight >185lbs  - Continue low sodium diet <2g  - Restrict daily fluid intake to 1.5L  - Check BP and weight daily   - Contact physician for elevations in weight of 5lbs or greater  - Follow up with Cardiology.        9. JACQUELYN (obstructive sleep apnea)  Assessment & Plan:  Not using cpap, discussed he will wear it every night      10. Sensorineural hearing loss (SNHL) of both ears  Assessment & Plan:  Wears hearing aids "not often." Stressed importance of wearing all day except when showering. Link to decline in memory        No follow-ups on file.      Amy Lado, PA-C [facility]       "

## 2025-05-22 NOTE — ASSESSMENT & PLAN NOTE
Stable  Continue asa, plavix, zetia, and statin  Followed by cardiology  Monitor for worsening symptoms or symptoms occurring at rest

## 2025-05-22 NOTE — ASSESSMENT & PLAN NOTE
"Wears hearing aids "not often." Stressed importance of wearing all day except when showering. Link to decline in memory  "

## 2025-05-22 NOTE — PATIENT INSTRUCTIONS
Counseling and Referral of Other Preventative  (Italic type indicates deductible and co-insurance are waived)    Patient Name: Kermit Castro  Today's Date: 5/22/2025    Health Maintenance       Date Due Completion Date    TETANUS VACCINE Never done ---    RSV Vaccine (Age 60+ and Pregnant patients) (1 - 1-dose 75+ series) Never done ---    Shingles Vaccine (3 of 3) 09/22/2023 7/28/2023    COVID-19 Vaccine (4 - 2024-25 season) 09/01/2024 2/24/2022    Hemoglobin A1c (Prediabetes) 05/05/2026 5/5/2025    Aspirin/Antiplatelet Therapy 05/12/2026 5/12/2025    DEXA Scan 03/14/2027 3/14/2025    Lipid Panel 01/27/2030 1/27/2025        No orders of the defined types were placed in this encounter.      The following information is provided to all patients.  This information is to help you find resources for any of the problems found today that may be affecting your health:                  Living healthy guide: www.[facility].[place].gov      Understanding Diabetes: www.diabetes.org      Eating healthy: www.cdc.gov/healthyweight      CDC home safety checklist: www.cdc.gov/steadi/patient.html      Agency on Aging: www.goea.[place].gov      Alcoholics anonymous (AA): www.aa.org      Physical Activity: www.warren.nih.gov/yo3pqrg      Tobacco use: www.quitwithusla.org

## 2025-05-27 ENCOUNTER — E-CONSULT (OUTPATIENT)
Dept: CARDIOLOGY | Facility: CLINIC | Age: 80
End: 2025-05-27
Payer: MEDICARE

## 2025-05-27 DIAGNOSIS — Z01.810 PREOP CARDIOVASCULAR EXAM: Primary | ICD-10-CM

## 2025-05-27 PROCEDURE — 99024 POSTOP FOLLOW-UP VISIT: CPT | Mod: S$GLB,,, | Performed by: INTERNAL MEDICINE

## 2025-05-28 ENCOUNTER — E-CONSULT (OUTPATIENT)
Dept: CARDIOLOGY | Facility: CLINIC | Age: 80
End: 2025-05-28
Payer: MEDICARE

## 2025-05-28 DIAGNOSIS — I25.118 CORONARY ARTERY DISEASE OF NATIVE ARTERY OF NATIVE HEART WITH STABLE ANGINA PECTORIS: ICD-10-CM

## 2025-05-28 DIAGNOSIS — Z95.1 HX OF CABG: Primary | ICD-10-CM

## 2025-05-28 NOTE — CONSULTS
[facility] Cardiology - [facility]  Response for E-Consult     Patient Name: Kermit Castro  MRN: [id number]  Primary Care Provider: Abdiaziz Lim MD   Requesting Provider: Leola Baker MD  E-Consult to General Cardiology  Consult performed by: Sj Rincon MD  Consult ordered by: Leola Baker MD        Recommendation: See prior e-consult note from last week, order was placed incorrectly and sent to general cardiology pool.    Additional future steps to consider: Placing econsult correctly    Total time of Consultation: 5 minute    I did not speak to the requesting provider verbally about this.     *This eConsult is based on the clinical data available to me and is furnished without benefit of a physical examination. The eConsult will need to be interpreted in light of any clinical issues or changes in patient status not available to me at the time of filing this eConsults. Significant changes in patient condition or level of acuity should result in immediate formal consultation and reevaluation. Please alert me if you have further questions.    Thank you for this eConsult referral.     MD [facility] Cardiology - [facility]

## 2025-05-28 NOTE — CONSULTS
[facility] ([facility])  Response for E-Consult     Patient Name: Kermit Castro  MRN: [id number]  Primary Care Provider: Abdiaziz Lim MD   Requesting Provider: Leola Baker MD  Consults    Recommendation:  Okay to proceed with anticipated cervical fusion.  Patient at elevated risk for perioperative cardiovascular events by RCRI criteria.  However, he is medically optimized at this time with a normal LVEF and no evidence of heart failure.  Patient is on clopidogrel monotherapy.  Okay to hold 5 days pre procedure and restart when safe from a surgical perspective.    Additional future steps to consider:  Not applicable    Total time of Consultation: 5 minute    I did not speak to the requesting provider verbally about this.     *This eConsult is based on the clinical data available to me and is furnished without benefit of a physical examination. The eConsult will need to be interpreted in light of any clinical issues or changes in patient status not available to me at the time of filing this eConsults. Significant changes in patient condition or level of acuity should result in immediate formal consultation and reevaluation. Please alert me if you have further questions.    Thank you for this eConsult referral.     Raffi Duran MD  [facility] ([facility])

## 2025-05-29 ENCOUNTER — E-CONSULT (OUTPATIENT)
Dept: CARDIOLOGY | Facility: CLINIC | Age: 80
End: 2025-05-29
Payer: MEDICARE

## 2025-05-29 DIAGNOSIS — I25.118 CORONARY ARTERY DISEASE OF NATIVE ARTERY OF NATIVE HEART WITH STABLE ANGINA PECTORIS: Primary | ICD-10-CM

## 2025-05-29 NOTE — CONSULTS
[facility] Cardiology - [facility]  Response for E-Consult     Patient Name: Kermit Castro  MRN: [id number]  Primary Care Provider: Abdiaziz Lim MD   Requesting Provider: Leola Baker MD  E-Consult to General Cardiology  Consult performed by: Sj Rincon MD  Consult ordered by: Leola Baker MD      E-Consult to General Cardiology  Consult performed by: Sj Rincon MD  Consult ordered by: Leola Baker MD          Recommendation: Given primary teams concern for the patient, placing 2-3 e-consults for pre-operative evaluation for the same procedure, suspect that there may be some undisclosed concern. Thus would recommend cancelling the procedure and in-person evaluation with any available provider.     Additional future steps to consider: as above    Total time of Consultation: 15 minute    I did not speak to the requesting provider verbally about this.     *This eConsult is based on the clinical data available to me and is furnished without benefit of a physical examination. The eConsult will need to be interpreted in light of any clinical issues or changes in patient status not available to me at the time of filing this eConsults. Significant changes in patient condition or level of acuity should result in immediate formal consultation and reevaluation. Please alert me if you have further questions.    Thank you for this eConsult referral.     MD [facility] Cardiology [address]

## 2025-06-06 ENCOUNTER — PATIENT MESSAGE (OUTPATIENT)
Dept: ORTHOPEDICS | Facility: CLINIC | Age: 80
End: 2025-06-06
Payer: MEDICARE

## 2025-06-09 ENCOUNTER — TELEPHONE (OUTPATIENT)
Dept: ORTHOPEDICS | Facility: CLINIC | Age: 80
End: 2025-06-09
Payer: MEDICARE

## 2025-06-09 NOTE — TELEPHONE ENCOUNTER
-confirmed per pathology report  -Podiatry aware   -ID following   -IV antibiotics   -PICC line to be placed for long term therapy-planned for am   -discharge to [facility]       Called patient, no answer at number provided. Left detailed msg on voicemail instructing patient to contact me to discuss preop appt and setting up new surgery date due to missed preop and lab appts.

## 2025-06-10 ENCOUNTER — TELEPHONE (OUTPATIENT)
Dept: ORTHOPEDICS | Facility: CLINIC | Age: 80
End: 2025-06-10
Payer: MEDICARE

## 2025-06-10 NOTE — TELEPHONE ENCOUNTER
Returned call and left msg on voicemail confirming surgery is cancelled.    ----- Message from [employer] sent at 6/9/2025  5:23 PM CDT -----  Type: General Call Back Name of Caller:ptWould the patient rather a call back or a response via [facility]? [email or web address] Call Back Number:[phone] Additional Information: Pt's states he wants to verify 06/12 surgery is cancelled.

## 2025-07-24 ENCOUNTER — TELEPHONE (OUTPATIENT)
Dept: PRIMARY CARE CLINIC | Facility: CLINIC | Age: 80
End: 2025-07-24
Payer: MEDICARE

## 2025-07-24 NOTE — TELEPHONE ENCOUNTER
Returned call to pts wife, Samira regarding the testing performed for the May appt. No answer, LVM requesting return call.

## 2025-07-30 ENCOUNTER — TELEPHONE (OUTPATIENT)
Dept: PRIMARY CARE CLINIC | Facility: CLINIC | Age: 80
End: 2025-07-30
Payer: MEDICARE

## 2025-07-30 NOTE — TELEPHONE ENCOUNTER
Spoke to patients wife, she is asking about the outcome of the AWV from May with NIA Montoya. Ms. Hogan canceld appt for 8/12 pt has another appt that day. Informed pts wife that the results of that visit will be reviewed by Dr. Lim at pts next appt and offered to reschedule the appt next available appt next week. She declined to reschedule at this time she will call when she gets home to her calendar.  Informed her that as of now we have openings next week she will call to schedule when she gets home. She verbalized understanding and agreement with this plan.

## 2025-08-14 DIAGNOSIS — J84.89 PROGRESSIVE FIBROSING INTERSTITIAL LUNG DISEASE: ICD-10-CM

## 2025-08-14 RX ORDER — NINTEDANIB 150 MG/1
CAPSULE ORAL
Qty: 180 CAPSULE | Refills: 3 | Status: SHIPPED | OUTPATIENT
Start: 2025-08-14

## 2025-08-20 ENCOUNTER — TELEPHONE (OUTPATIENT)
Dept: PRIMARY CARE CLINIC | Facility: CLINIC | Age: 80
End: 2025-08-20
Payer: MEDICARE

## (undated) DEVICE — INFLATOR ENCORE 26 BLLN INFL

## (undated) DEVICE — SPIKE CONTRAST CONTROLLER

## (undated) DEVICE — PROTECTION STATION PLUS

## (undated) DEVICE — COVER BAND BAG 40 X 40

## (undated) DEVICE — SHEATH INTRODUCER 6FR 11CM

## (undated) DEVICE — CATH MP 4FR 125CM

## (undated) DEVICE — GUIDEWIRE EMERALD 150CM PTFE

## (undated) DEVICE — TUBING HPCIL ROT M/F ADPT 10IN

## (undated) DEVICE — VISE RADIFOCUS MULTI TORQUE

## (undated) DEVICE — KIT PROBE COVER WITH GEL

## (undated) DEVICE — DRESSING LEUKOPLAST FLEX 1X3IN

## (undated) DEVICE — CATH INFINITI JUDKINS JR4

## (undated) DEVICE — VISIPAQUE 320 200ML +PK

## (undated) DEVICE — OMNIPAQUE 350 200ML

## (undated) DEVICE — KIT MICROINTRO 4F .018X40X7CM

## (undated) DEVICE — SEE MEDLINE ITEM 156894

## (undated) DEVICE — GUIDEWIRE V-18 CONTROL .18

## (undated) DEVICE — CATH MPA2 INFINITI 4FR 100CM

## (undated) DEVICE — GUIDEWIRE CHOICE PT  XS 182CM

## (undated) DEVICE — KIT CUSTOM MANIFOLD

## (undated) DEVICE — GUIDEWIRE STD .035X180CM ANG

## (undated) DEVICE — CATH INFINITI 4F JL4 .042X100

## (undated) DEVICE — CATH IMA INFINITI 4FRX100CM

## (undated) DEVICE — SEE MEDLINE ITEM 157187

## (undated) DEVICE — SHEATH 6FR 40CM CROSSOVER

## (undated) DEVICE — Device

## (undated) DEVICE — GUIDEWIRE STF .035X180CM ANG

## (undated) DEVICE — GUIDE WIRE WHOLLY FLOPPY

## (undated) DEVICE — SYR MED RAD 150ML

## (undated) DEVICE — GUIDEWIRE AMPLATZ .035X260

## (undated) DEVICE — WIRE GUIDE SAFE-T-J .035 260CM

## (undated) DEVICE — CATH ULTRAVERSE 035 6X150X130

## (undated) DEVICE — CATH DXTERITY MPASHA 110CM 6FR

## (undated) DEVICE — SHEATH INTRODUCER 4FR 11CM

## (undated) DEVICE — PAD RADI FEMORAL